# Patient Record
Sex: MALE | Race: WHITE | Employment: OTHER | URBAN - METROPOLITAN AREA
[De-identification: names, ages, dates, MRNs, and addresses within clinical notes are randomized per-mention and may not be internally consistent; named-entity substitution may affect disease eponyms.]

---

## 2017-01-01 ENCOUNTER — HOSPITAL ENCOUNTER (OUTPATIENT)
Dept: CT IMAGING | Age: 82
Discharge: HOME | End: 2017-12-19
Attending: INTERNAL MEDICINE
Payer: MEDICARE

## 2017-01-01 DIAGNOSIS — R19.7 DIARRHEA: ICD-10-CM

## 2017-01-01 DIAGNOSIS — R63.4 WEIGHT LOSS, UNINTENTIONAL: ICD-10-CM

## 2017-01-01 LAB — CREAT BLD-MCNC: 0.9 MG/DL (ref 0.6–1.3)

## 2017-01-01 PROCEDURE — 74011250636 HC RX REV CODE- 250/636: Performed by: INTERNAL MEDICINE

## 2017-01-01 PROCEDURE — 74011636320 HC RX REV CODE- 636/320: Performed by: INTERNAL MEDICINE

## 2017-01-01 PROCEDURE — 82565 ASSAY OF CREATININE: CPT

## 2017-01-01 PROCEDURE — 74011000255 HC RX REV CODE- 255: Performed by: INTERNAL MEDICINE

## 2017-01-01 PROCEDURE — 74177 CT ABD & PELVIS W/CONTRAST: CPT

## 2017-01-01 RX ORDER — SODIUM CHLORIDE 9 MG/ML
50 INJECTION, SOLUTION INTRAVENOUS
Status: COMPLETED | OUTPATIENT
Start: 2017-01-01 | End: 2017-01-01

## 2017-01-01 RX ORDER — METOPROLOL SUCCINATE 25 MG/1
25 TABLET, EXTENDED RELEASE ORAL DAILY
Qty: 90 TAB | Refills: 3 | Status: SHIPPED | OUTPATIENT
Start: 2017-01-01 | End: 2018-01-01 | Stop reason: DRUGHIGH

## 2017-01-01 RX ORDER — BARIUM SULFATE 20 MG/ML
900 SUSPENSION ORAL
Status: COMPLETED | OUTPATIENT
Start: 2017-01-01 | End: 2017-01-01

## 2017-01-01 RX ORDER — SODIUM CHLORIDE 0.9 % (FLUSH) 0.9 %
10 SYRINGE (ML) INJECTION
Status: COMPLETED | OUTPATIENT
Start: 2017-01-01 | End: 2017-01-01

## 2017-01-01 RX ADMIN — BARIUM SULFATE 900 ML: 21 SUSPENSION ORAL at 10:26

## 2017-01-01 RX ADMIN — SODIUM CHLORIDE 50 ML/HR: 900 INJECTION, SOLUTION INTRAVENOUS at 10:26

## 2017-01-01 RX ADMIN — Medication 10 ML: at 10:26

## 2017-01-01 RX ADMIN — IOPAMIDOL 100 ML: 755 INJECTION, SOLUTION INTRAVENOUS at 10:26

## 2017-03-01 ENCOUNTER — HOSPITAL ENCOUNTER (OUTPATIENT)
Dept: LAB | Age: 82
Discharge: HOME | End: 2017-03-01
Payer: MEDICARE

## 2017-03-01 ENCOUNTER — APPOINTMENT (OUTPATIENT)
Dept: INTERNAL MEDICINE CLINIC | Age: 82
End: 2017-03-01

## 2017-03-01 DIAGNOSIS — E78.00 HYPERCHOLESTEROLEMIA: ICD-10-CM

## 2017-03-01 DIAGNOSIS — I10 ESSENTIAL HYPERTENSION WITH GOAL BLOOD PRESSURE LESS THAN 140/90: ICD-10-CM

## 2017-03-01 DIAGNOSIS — E11.42 WELL CONTROLLED TYPE 2 DIABETES MELLITUS WITH PERIPHERAL NEUROPATHY (HCC): ICD-10-CM

## 2017-03-01 PROCEDURE — 80048 BASIC METABOLIC PNL TOTAL CA: CPT

## 2017-03-01 PROCEDURE — 80061 LIPID PANEL: CPT

## 2017-03-01 PROCEDURE — 36415 COLL VENOUS BLD VENIPUNCTURE: CPT

## 2017-03-01 PROCEDURE — 82043 UR ALBUMIN QUANTITATIVE: CPT

## 2017-03-01 PROCEDURE — 83036 HEMOGLOBIN GLYCOSYLATED A1C: CPT

## 2017-03-02 LAB
ALBUMIN/CREAT UR: 3.2 MG/G CREAT (ref 0–30)
BUN SERPL-MCNC: 17 MG/DL (ref 8–27)
BUN/CREAT SERPL: 18 (ref 10–22)
CALCIUM SERPL-MCNC: 9.1 MG/DL (ref 8.6–10.2)
CHLORIDE SERPL-SCNC: 99 MMOL/L (ref 96–106)
CHOLEST SERPL-MCNC: 157 MG/DL (ref 100–199)
CO2 SERPL-SCNC: 27 MMOL/L (ref 18–29)
CREAT SERPL-MCNC: 0.92 MG/DL (ref 0.76–1.27)
CREAT UR-MCNC: 113.8 MG/DL
EST. AVERAGE GLUCOSE BLD GHB EST-MCNC: 140 MG/DL
GLUCOSE SERPL-MCNC: 111 MG/DL (ref 65–99)
HBA1C MFR BLD: 6.5 % (ref 4.8–5.6)
HDLC SERPL-MCNC: 50 MG/DL
INTERPRETATION, 910389: NORMAL
LDLC SERPL CALC-MCNC: 87 MG/DL (ref 0–99)
Lab: NORMAL
MICROALBUMIN UR-MCNC: 3.6 UG/ML
POTASSIUM SERPL-SCNC: 4.4 MMOL/L (ref 3.5–5.2)
SODIUM SERPL-SCNC: 141 MMOL/L (ref 134–144)
TRIGL SERPL-MCNC: 99 MG/DL (ref 0–149)
VLDLC SERPL CALC-MCNC: 20 MG/DL (ref 5–40)

## 2017-03-31 ENCOUNTER — OFFICE VISIT (OUTPATIENT)
Dept: INTERNAL MEDICINE CLINIC | Age: 82
End: 2017-03-31

## 2017-03-31 VITALS
TEMPERATURE: 98 F | BODY MASS INDEX: 23.19 KG/M2 | WEIGHT: 162 LBS | DIASTOLIC BLOOD PRESSURE: 57 MMHG | HEIGHT: 70 IN | OXYGEN SATURATION: 95 % | SYSTOLIC BLOOD PRESSURE: 123 MMHG | RESPIRATION RATE: 14 BRPM | HEART RATE: 64 BPM

## 2017-03-31 DIAGNOSIS — E78.00 HYPERCHOLESTEROLEMIA: ICD-10-CM

## 2017-03-31 DIAGNOSIS — I10 HYPERTENSION, ESSENTIAL: ICD-10-CM

## 2017-03-31 DIAGNOSIS — I25.10 CORONARY ARTERY DISEASE INVOLVING NATIVE CORONARY ARTERY OF NATIVE HEART WITHOUT ANGINA PECTORIS: ICD-10-CM

## 2017-03-31 DIAGNOSIS — Z95.2 S/P AVR (AORTIC VALVE REPLACEMENT): ICD-10-CM

## 2017-03-31 DIAGNOSIS — M79.671 PAIN IN RIGHT FOOT: ICD-10-CM

## 2017-03-31 DIAGNOSIS — E11.42 WELL CONTROLLED TYPE 2 DIABETES MELLITUS WITH PERIPHERAL NEUROPATHY (HCC): Primary | ICD-10-CM

## 2017-03-31 DIAGNOSIS — M25.471 PAIN AND SWELLING OF ANKLE, RIGHT: ICD-10-CM

## 2017-03-31 DIAGNOSIS — I77.9 BILATERAL CAROTID ARTERY DISEASE (HCC): ICD-10-CM

## 2017-03-31 DIAGNOSIS — Z23 NEED FOR TDAP VACCINATION: ICD-10-CM

## 2017-03-31 DIAGNOSIS — M25.571 PAIN AND SWELLING OF ANKLE, RIGHT: ICD-10-CM

## 2017-03-31 NOTE — PROGRESS NOTES
HISTORY OF PRESENT ILLNESS  Lisa Souaz is a 80 y.o. male. HPI  He presents for follow up of diabetes mellitus, hypertension, hyperlipidemia, coronary artery disease, carotid artery disease, and AVR. Diabetic ROS - medication compliance: compliant all of the time,      home glucose monitoring: is not performed,      [facility]. further diabetic ROS: no polyuria or polydipsia, no unusual visual symptoms, no hypoglycemia, no medication side effects noted, has dysesthesias in the feet. Diet and Lifestyle: generally follows a low fat low cholesterol diet, generally follows a low sodium diet, does not rigorously follow a diabetic diet, exercises regularly, nonsmoker  Cardiovascular ROS:  He complains of fatigue, ?claudication at 1/4 mile walking, dyspnea on exertion. He denies palpitations, orthopnea, exertional chest pressure/discomfort, lower extremity edema, dizziness     Pain right foot plantar tingling for approximately 2 years. He has pain right ankle associated with swelling for at least 2 years. He has rheumatoid arthritis. Dr Vidya Martínez plans to do an ultrasound at his office. He also wants an X-ray, but that is not done at his office.      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    Ulcerative colitis ([facility] [address] K51.90    Hypercholesterolemia E78.00    Carotid artery disease ([facility] [address] I77.9    Inguinal hernia K40.90    Asbestos exposure Z77.090    Hypertension, essential I10    Thyroid nodule O87.6    Umbilical hernia B22.4    S/P AVR (aortic valve replacement) Z95.2    Myalgia and myositis [id number]    Polyneuropathy in diabetes(357.2)     Osteoporosis M81.0    Lumbar radiculopathy M54.16    Coronary artery disease involving native coronary artery without angina pectoris I25.10    Essential tremor G25.0    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 ([facility] [address] E11.42    Advance directive discussed with patient Z71.89     Past Medical History:   Diagnosis Date    Aortic stenosis, severe     Arrhythmia     heart murmur    Arthritis     Rheumatoid    CAD (coronary artery disease) 1998    S/P CABG    Cancer (HCC)     Basal cell carcinoma    Chronic pain     from rheumatoid arthritis in knees, elbows fingers    HTN (hypertension)     Hypercholesterolemia     S/P AVR (aortic valve replacement)     23 mm Trifecta with Redo CABG x 1SVG to PDA    S/P CABG x 1 10/13/2014    Redo CABG x 1 SVG to PDA; 'y' from collins of existing OM vein graft    Sarcoidosis ([facility] [address]     No longer active.      Ulcerative colitis ([facility] [address]     Not active     Past Surgical History:   Procedure Laterality Date    CARDIAC SURG PROCEDURE UNLIST      CABG    HX AORTIC VALVE REPLACEMENT  10/2014    CABG redo X1    HX CATARACT REMOVAL      Left    HX LUMBAR LAMINECTOMY      HX OTHER SURGICAL      skin lesions removed from nose and forehead- basal cell    LAMINECTOMY,LUMBAR  110/2000    MT COLONOSCOPY W/BIOPSY SINGLE/MULTIPLE  12/5/2011          Social History     Social History    Marital status:      Spouse name: N/A    Number of children: N/A    Years of education: N/A     Social History Main Topics    Smoking status: Former Smoker     Packs/day: 1.50     Years: 20.00     Quit date: 1/1/1981    Smokeless tobacco: Never Used    Alcohol use Yes      Comment: rarel    Drug use: No    Sexual activity: Not Asked     Other Topics Concern    None     Social History Narrative     Family History   Problem Relation Age of Onset    Heart Disease Father     Cancer Sister      breast     Diabetes Sister     Cancer Sister      leukemia    Diabetes Sister     Diabetes Mother    Adama Corey Bladder Disease Mother      Allergies   Allergen Reactions    Lisinopril Cough    Questran [Cholestyramine (With Sugar)] Other (comments)     Muscle cramps     Current Outpatient Prescriptions   Medication Sig Dispense Refill    metoprolol succinate (TOPROL-XL) 25 mg XL tablet Take 1 Tab by mouth daily. 90 Tab 3    atorvastatin (LIPITOR) 20 mg tablet Take 1 tablet by mouth  daily 90 Tab 3    losartan (COZAAR) 25 mg tablet Take 1 tablet by mouth  daily 90 Tab 3    oxycodone-acetaminophen (PERCOCET)  mg per tablet Take 1 tablet by mouth every four (4) hours as needed for Pain.  predniSONE (DELTASONE) 5 mg tablet Take 2.5 mg by mouth daily.  methotrexate sodium (METHOTREXATE, ANTI-RHEUMATIC,) 2.5 mg DsPk Take  by mouth every seven (7) days. 8 tablets week      folic acid (FOLVITE) 1 mg tablet Take 1 mg by mouth daily.  etanercept (ENBREL) 50 mg/mL (0.98 mL) injection by SubCUTAneous route every seven (7) days.  aspirin 81 mg chewable tablet Take 81 mg by mouth every evening.  docusate sodium (COLACE) 100 mg capsule Take 100 mg by mouth two (2) times daily as needed. Review of Systems   Constitutional: Positive for malaise/fatigue. Negative for weight loss. Gastrointestinal: Positive for constipation and heartburn. Negative for diarrhea. Musculoskeletal: Positive for back pain and joint pain (right ankle, wrists). Neurological: Negative for dizziness, tingling and focal weakness. Visit Vitals    /57 (BP 1 Location: Left arm, BP Patient Position: Sitting)    Pulse 64    Temp 98 °F (36.7 °C) (Oral)    Resp 14    Ht 5' 10\" (1.778 m)    Wt 162 lb (73.5 kg)    SpO2 95%    BMI 23.24 kg/m2     Physical Exam   Constitutional: He is oriented to person, place, and time. He appears well-developed and well-nourished. HENT:   Head: Normocephalic and atraumatic. Eyes: Conjunctivae are normal. Pupils are equal, round, and reactive to light. Neck: Neck supple. Carotid bruit is not present. No thyromegaly present. Cardiovascular: Normal rate and regular rhythm. PMI is not displaced. Exam reveals no gallop. Murmur heard. Systolic (at base) murmur is present with a grade of 2/6    No diastolic murmur is present   Pulses:       Dorsalis pedis pulses are 2+ on the right side, and 2+ on the left side. Posterior tibial pulses are 2+ on the right side, and 2+ on the left side. Pulmonary/Chest: Effort normal. He has no wheezes. He has no rhonchi. He has no rales. Abdominal: Soft. Normal appearance. He exhibits no abdominal bruit and no mass. There is no hepatosplenomegaly. There is no tenderness. Musculoskeletal: He exhibits no edema. Right ankle: He exhibits decreased range of motion and swelling (throughout ankle. Appears to be effusion). He exhibits no ecchymosis and no deformity. Tenderness. Lateral malleolus tenderness found. Lymphadenopathy:     He has no cervical adenopathy. Right: No supraclavicular adenopathy present. Left: No inguinal and no supraclavicular adenopathy present. Neurological: He is alert and oriented to person, place, and time. No sensory deficit. Skin: Skin is warm, dry and intact. No rash noted. Psychiatric: He has a normal mood and affect. His behavior is normal.   Nursing note and vitals reviewed.       Results for orders placed or performed in visit on 03/01/17   HEMOGLOBIN A1C WITH EAG   Result Value Ref Range    Hemoglobin A1c 6.5 (H) 4.8 - 5.6 %    Estimated average glucose 140 mg/dL   LIPID PANEL   Result Value Ref Range    Cholesterol, total 157 100 - 199 mg/dL    Triglyceride 99 0 - 149 mg/dL    HDL Cholesterol 50 >39 mg/dL    VLDL, calculated 20 5 - 40 mg/dL    LDL, calculated 87 0 - 99 mg/dL   METABOLIC PANEL, BASIC   Result Value Ref Range    Glucose 111 (H) 65 - 99 mg/dL    BUN 17 8 - 27 mg/dL    Creatinine 0.92 0.76 - 1.27 mg/dL    GFR est non-AA 79 >59 mL/min/1.73    GFR est AA 91 >59 mL/min/1.73    BUN/Creatinine ratio 18 10 - 22    Sodium 141 134 - 144 mmol/L    Potassium 4.4 3.5 - 5.2 mmol/L    Chloride 99 96 - 106 mmol/L    CO2 27 18 - 29 mmol/L Calcium 9.1 8.6 - 10.2 mg/dL   MICROALBUMIN, UR, RAND W/ MICROALBUMIN/CREA RATIO   Result Value Ref Range    Creatinine, urine 113.8 Not Estab. mg/dL    Microalbumin, urine 3.6 Not Estab. ug/mL    Microalb/Creat ratio (ug/mg creat.) 3.2 0.0 - 30.0 mg/g creat   CVD REPORT   Result Value Ref Range    INTERPRETATION Note    DIABETES PATIENT EDUCATION   Result Value Ref Range    PDF Image Not applicable      Results for orders placed or performed in visit on 03/31/17   XR ANKLE RT MIN 3 V    Narrative    EXAM:  XR ANKLE RT MIN 3 V, XR FOOT RT MIN 3 V    INDICATION:  pain and swelling, no injury. History of rheumatoid arthritis    COMPARISON: None. FINDINGS:     Three views of the right ankle demonstrate no fracture or disruption of the  ankle mortise. There is anterior and posterior ankle spurring. A large ankle  effusion is present. Lateral soft tissue swelling is seen. Vascular  calcification is noted. The soft tissues are within normal limits. 3 views of the right foot demonstrate no fracture. There are corticated   erosions involving all of the metatarsal heads. Forefoot osteopenia is seen. Nonspecific mild soft tissue swelling is present. There is a small spur at the  Achilles insertion. Impression    IMPRESSION:     Right ankle sprain. Right ankle arthritis. Inactive erosions in the metatarsal heads with nonspecific associated soft  tissue swelling. XR FOOT RT MIN 3 V    Narrative    EXAM:  XR ANKLE RT MIN 3 V, XR FOOT RT MIN 3 V    INDICATION:  pain and swelling, no injury. History of rheumatoid arthritis    COMPARISON: None. FINDINGS:     Three views of the right ankle demonstrate no fracture or disruption of the  ankle mortise. There is anterior and posterior ankle spurring. A large ankle  effusion is present. Lateral soft tissue swelling is seen. Vascular  calcification is noted. The soft tissues are within normal limits. 3 views of the right foot demonstrate no fracture.  There are corticated   erosions involving all of the metatarsal heads. Forefoot osteopenia is seen. Nonspecific mild soft tissue swelling is present. There is a small spur at the  Achilles insertion. Impression    IMPRESSION:     Right ankle sprain. Right ankle arthritis. Inactive erosions in the metatarsal heads with nonspecific associated soft  tissue swelling. ASSESSMENT and PLAN    ICD-10-CM ICD-9-CM    1. 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 (HCC) E11.42 250.60      357.2    2. Pain and swelling of ankle, right M25.471 719.47 XR ANKLE RT MIN 3 V    M25.474 719.07     M25.571     3. Pain in right foot M79.671 729.5 XR FOOT RT MIN 3 V   4. Need for Tdap vaccination Z23 V06.1 Diphth, Pertus,Acell,, Tetanus (BOOSTRIX TDAP) 2.5-8-5 Lf-mcg-Lf/0.5mL susp susp   5. Hypertension, essential I10 401.9    6. Hypercholesterolemia E78.00 272.0    7. Bilateral carotid artery disease (HCC) I77.9 447.9    8. Coronary artery disease involving native coronary artery of native heart without angina pectoris I25.10 414.01    9. S/P AVR (aortic valve replacement) Z95.2 V43.3          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 (HCC)    Pain and swelling of ankle, right  Given X-rays on a disc for Dr Silvia Mason. He may want to inject joint. -     XR ANKLE RT MIN 3 V; Future    Pain in right foot  -     XR FOOT RT MIN 3 V; Future    Need for Tdap vaccination  -     Diphth, Pertus,Acell,, Tetanus (BOOSTRIX TDAP) 2.5-8-5 Lf-mcg-Lf/0.5mL susp susp; 0.5 mL by IntraMUSCular route once for 1 dose. Hypertension, essential  Hypertension is controlled    Hypercholesterolemia  Hyperlipidemia is controlled    Bilateral carotid artery disease (HCC)    Coronary artery disease involving native coronary artery of native heart without angina pectoris  Stable.      S/P AVR (aortic valve replacement)      Follow-up Disposition:  Return in about 6 months (around 9/30/2017) for DM, chol, CAD, POC A1c .  lab results and schedule of future lab studies reviewed with patient  reviewed diet, exercise and weight control  cardiovascular risk and specific lipid/LDL goals reviewed  I have discussed the diagnosis with the patient and the intended plan as seen in the above orders. Patient is in agreement. The patient has received an after-visit summary and questions were answered concerning future plans.   I have discussed medication side effects and warnings with the patient as well.'

## 2017-03-31 NOTE — MR AVS SNAPSHOT
Visit Information Date & Time Provider Department Dept. Phone Encounter #  
 3/31/2017 10:00 AM Chalo Tello MD [facility] [phone] [id number] Follow-up Instructions Return in about 6 months (around 9/30/2017) for DM, chol, CAD, POC A1c . Upcoming Health Maintenance Date Due DTaP/Tdap/Td series (1 - Tdap) 1/2/2006 EYE EXAM RETINAL OR DILATED Q1 9/22/2016 FOOT EXAM Q1 5/2/2017 HEMOGLOBIN A1C Q6M 9/1/2017 MEDICARE YEARLY EXAM 9/8/2017 GLAUCOMA SCREENING Q2Y 9/22/2017 MICROALBUMIN Q1 3/1/2018 LIPID PANEL Q1 3/1/2018 Allergies as of 3/31/2017  Review Complete On: 3/31/2017 By: Chalo Tello MD  
  
 Severity Noted Reaction Type Reactions Lisinopril  11/18/2009    Cough Questran [Cholestyramine (With Sugar)]  11/18/2009    Other (comments) Muscle cramps Current Immunizations  Reviewed on 3/31/2017 Name Date Influenza High Dose Vaccine PF 9/7/2016 Influenza Vaccine 9/29/2015 Influenza Vaccine (Madin Berenice Canine Kidney) PF 10/16/2014 10:54 AM  
 Influenza Vaccine Split 10/29/2012, 9/24/2011  1:34 PM, 10/12/2010 Influenza Vaccine Whole 10/10/2009 Pneumococcal Conjugate (PCV-13) 9/29/2015 Pneumococcal Vaccine (Unspecified Type) 1/1/2009, 11/14/2002 TD Vaccine 1/1/2006 Reviewed by Crys Harrington LPN on 6/86/7709 at  9:41 AM  
 Reviewed by Crys Harrington LPN on 9/79/6824 at 31:63 AM  
You Were Diagnosed With   
  
 Codes Comments 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 ([facility] [address]    -  Primary ICD-10-CM: E11.42 
ICD-9-CM: 250.60, 357.2 Pain and swelling of ankle, right     ICD-10-CM: M25.471, M25.474, M25.571 ICD-9-CM: 719.47, 719.07 Pain in right foot     ICD-10-CM: M79.671 ICD-9-CM: 729.5 Need for Tdap vaccination     ICD-10-CM: N67 ICD-9-CM: V06.1 Hypertension, essential     ICD-10-CM: I10 
ICD-9-CM: 401.9 Hypercholesterolemia     ICD-10-CM: E78.00 ICD-9-CM: 272.0 Bilateral carotid artery disease ([facility] [address]     ICD-10-CM: I77.9 ICD-9-CM: 447.9 Coronary artery disease involving native coronary artery of native heart without angina pectoris     ICD-10-CM: I25.10 ICD-9-CM: 414.01 S/P AVR (aortic valve replacement)     ICD-10-CM: I25.1 ICD-9-CM: V43.3 Vitals BP Pulse Temp Resp Height(growth percentile) Weight(growth percentile) 123/57 (BP 1 Location: Left arm, BP Patient Position: Sitting) 64 98 °F (36.7 °C) (Oral) 14 5' 10\" (1.778 m) 162 lb (73.5 kg) SpO2 BMI Smoking Status 95% 23.24 kg/m2 Former Smoker BMI and BSA Data Body Mass Index Body Surface Area  
 23.24 kg/m 2 1.91 m 2 Preferred Pharmacy Pharmacy Name Phone 305 [facility], [address] [place] [address] [address] Your Updated Medication List  
  
   
This list is accurate as of: 3/31/17 10:48 AM.  Always use your most recent med list.  
  
  
  
  
 aspirin 81 mg chewable tablet Take 81 mg by mouth every evening. atorvastatin 20 mg tablet Commonly known as:  LIPITOR Take 1 tablet by mouth  daily COLACE 100 mg capsule Generic drug:  docusate sodium Take 100 mg by mouth two (2) times daily as needed. Diphth, Pertus(Acell), Tetanus 2.5-8-5 Lf-mcg-Lf/0.5mL Susp susp Commonly known as:  BOOSTRIX TDAP  
0.5 mL by IntraMUSCular route once for 1 dose. ENBREL 50 mg/mL (0.98 mL) injection Generic drug:  etanercept  
by SubCUTAneous route every seven (7) days. folic acid 1 mg tablet Commonly known as:  Google Take 1 mg by mouth daily. losartan 25 mg tablet Commonly known as:  COZAAR Take 1 tablet by mouth  daily METHOTREXATE (ANTI-RHEUMATIC) 2.5 mg tablet dose pack Generic drug:  methotrexate Take  by mouth every seven (7) days. 8 tablets week  
  
 metoprolol succinate 25 mg XL tablet Commonly known as:  TOPROL-XL Take 1 Tab by mouth daily. PERCOCET  mg per tablet Generic drug:  oxyCODONE-acetaminophen Take 1 tablet by mouth every four (4) hours as needed for Pain. predniSONE 5 mg tablet Commonly known as:  Shauna Oyster Take 2.5 mg by mouth daily. Prescriptions Printed Refills Urban Gaspar,, Tetanus (BOOSTRIX TDAP) 2.5-8-5 Lf-mcg-Lf/0.5mL susp susp 0 Si.5 mL by IntraMUSCular route once for 1 dose. Class: Print Route: IntraMUSCular Follow-up Instructions Return in about 6 months (around 2017) for DM, chol, CAD, POC A1c . To-Do List   
 2017 Imaging:  XR ANKLE RT MIN 3 V   
  
 2017 Imaging:  XR FOOT RT MIN 3 V Patient Instructions Office visit in 6 months ; we will do hemoglobin A1c at that visit. Diabetes Foot Health: Care Instructions Your Care Instructions When you have diabetes, your feet need extra care and attention. Diabetes can damage the nerve endings and blood vessels in your feet, making you less likely to notice when your feet are injured. Diabetes also limits your body's ability to fight infection and get blood to areas that need it. If you get a minor foot injury, it could become an ulcer or a serious infection. With good foot care, you can prevent most of these problems. Caring for your feet can be quick and easy. Most of the care can be done when you are bathing or getting ready for bed. Follow-up care is a key part of your treatment and safety. Be sure to make and go to all appointments, and call your doctor if you are having problems. Its also a good idea to know your test results and keep a list of the medicines you take. How can you care for yourself at home? · Keep your blood sugar close to normal by watching what and how much you eat, monitoring blood sugar, taking medicines if prescribed, and getting regular exercise. · Do not smoke. Smoking affects blood flow and can make foot problems worse. If you need help quitting, talk to your doctor about stop-smoking programs and medicines. These can increase your chances of quitting for good. · Eat a diet that is low in fats. High fat intake can cause fat to build up in your blood vessels and decrease blood flow. · Inspect your feet daily for blisters, cuts, cracks, or sores. If you cannot see well, use a mirror or have someone help you. · Take care of your feet: 
[facility] your feet every day. Use warm (not hot) water. Check the water temperature with your wrists or other part of your body, not your feet. ¨ Dry your feet well. Pat them dry. Do not rub the skin on your feet too hard. Dry well between your toes. If the skin on your feet stays moist, bacteria or a fungus can grow, which can lead to infection. ¨ Keep your skin soft. Use moisturizing skin cream to keep the skin on your feet soft and prevent calluses and cracks. But do not put the cream between your toes, and stop using any cream that causes a rash. ¨ Clean underneath your toenails carefully. Do not use a sharp object to clean underneath your toenails. Use the blunt end of a nail file or other rounded tool. ¨ Trim and file your toenails straight across to prevent ingrown toenails. Use a nail clipper, not scissors. Use an emery board to smooth the edges. · Change socks daily. Socks without seams are best, because seams often rub the feet. You can find socks for people with diabetes from specialty catalogs. · Look inside your shoes every day for things like gravel or torn linings, which could cause blisters or sores. · Buy shoes that fit well: 
¨ Look for shoes that have plenty of space around the toes. This helps prevent bunions and blisters. ¨ Try on shoes while wearing the kind of socks you will usually wear with the shoes. ¨ Avoid plastic shoes. They may rub your feet and cause blisters.  Good shoes should be made of materials that are flexible and breathable, such as leather or cloth. ¨ Break in new shoes slowly by wearing them for no more than an hour a day for several days. Take extra time to check your feet for red areas, blisters, or other problems after you wear new shoes. · Do not go barefoot. Do not wear sandals, and do not wear shoes with very thin soles. Thin soles are easy to puncture. They also do not protect your feet from hot pavement or cold weather. · Have your doctor check your feet during each visit. If you have a foot problem, see your doctor. Do not try to treat an early foot problem at home. Home remedies or treatments that you can buy without a prescription (such as corn removers) can be harmful. · Always get early treatment for foot problems. A minor irritation can lead to a major problem if not properly cared for early. When should you call for help? Call your doctor now or seek immediate medical care if: 
· You have a foot sore, an ulcer or break in the skin that is not healing after 4 days, bleeding corns or calluses, or an ingrown toenail. · You have blue or black areas, which can mean bruising or blood flow problems. · You have peeling skin or tiny blisters between your toes or cracking or oozing of the skin. · You have a fever for more than 24 hours and a foot sore. · You have new numbness or tingling in your feet that does not go away after you move your feet or change positions. · You have unexplained or unusual swelling of the foot or ankle. Watch closely for changes in your health, and be sure to contact your doctor if: 
· You cannot do proper foot care. Where can you learn more? Go to http://ligia-judi.info/. Enter A739 in the search box to learn more about \"Diabetes Foot Health: Care Instructions. \" Current as of: May 23, 2016 Content Version: 11.2 © 1671-9811 [employer], [place].  Care instructions adapted under license by [address] (which disclaims liability or warranty for this information). If you have questions about a medical condition or this instruction, always ask your healthcare professional. [address] any warranty or liability for your use of this information. Introducing [facility]! Jessica Julio Cesar introduces Uptake patient portal. Now you can access parts of your medical record, email your doctor's office, and request medication refills online. 1. In your internet browser, go to [email or web address] 2. Click on the First Time User? Click Here link in the Sign In box. You will see the New Member Sign Up page. 3. Enter your Uptake Access Code exactly as it appears below. You will not need to use this code after youve completed the sign-up process. If you do not sign up before the expiration date, you must request a new code. · Uptake Access Code: [email or web address] Expires: 6/29/2017 10:48 AM 
 
4. Enter the last four digits of your Social Security Number (xxxx) and Date of Birth (mm/dd/yyyy) as indicated and click Submit. You will be taken to the next sign-up page. 5. Create a Uptake ID. This will be your Uptake login ID and cannot be changed, so think of one that is secure and easy to remember. 6. Create a Uptake password. You can change your password at any time. 7. Enter your Password Reset Question and Answer. This can be used at a later time if you forget your password. 8. Enter your e-mail address. You will receive e-mail notification when new information is available in [address] 9. Click Sign Up. You can now view and download portions of your medical record. 10. Click the Download Summary menu link to download a portable copy of your medical information. If you have questions, please visit the Frequently Asked Questions section of the Uptake website.  Remember, Uptake is NOT to be used for urgent needs. For medical emergencies, dial 911. Now available from your iPhone and Android! Please provide this summary of care documentation to your next provider. Your primary care clinician is listed as Eudora Gilford. If you have any questions after today's visit, please call [phone].

## 2017-03-31 NOTE — PATIENT INSTRUCTIONS
Office visit in 6 months ; we will do hemoglobin A1c at that visit. Diabetes Foot Health: Care Instructions  Your Care Instructions    When you have diabetes, your feet need extra care and attention. Diabetes can damage the nerve endings and blood vessels in your feet, making you less likely to notice when your feet are injured. Diabetes also limits your body's ability to fight infection and get blood to areas that need it. If you get a minor foot injury, it could become an ulcer or a serious infection. With good foot care, you can prevent most of these problems. Caring for your feet can be quick and easy. Most of the care can be done when you are bathing or getting ready for bed. Follow-up care is a key part of your treatment and safety. Be sure to make and go to all appointments, and call your doctor if you are having problems. Its also a good idea to know your test results and keep a list of the medicines you take. How can you care for yourself at home? · Keep your blood sugar close to normal by watching what and how much you eat, monitoring blood sugar, taking medicines if prescribed, and getting regular exercise. · Do not smoke. Smoking affects blood flow and can make foot problems worse. If you need help quitting, talk to your doctor about stop-smoking programs and medicines. These can increase your chances of quitting for good. · Eat a diet that is low in fats. High fat intake can cause fat to build up in your blood vessels and decrease blood flow. · Inspect your feet daily for blisters, cuts, cracks, or sores. If you cannot see well, use a mirror or have someone help you. · Take care of your feet:  [facility] your feet every day. Use warm (not hot) water. Check the water temperature with your wrists or other part of your body, not your feet. ¨ Dry your feet well. Pat them dry. Do not rub the skin on your feet too hard. Dry well between your toes.  If the skin on your feet stays moist, bacteria or a fungus can grow, which can lead to infection. ¨ Keep your skin soft. Use moisturizing skin cream to keep the skin on your feet soft and prevent calluses and cracks. But do not put the cream between your toes, and stop using any cream that causes a rash. ¨ Clean underneath your toenails carefully. Do not use a sharp object to clean underneath your toenails. Use the blunt end of a nail file or other rounded tool. ¨ Trim and file your toenails straight across to prevent ingrown toenails. Use a nail clipper, not scissors. Use an emery board to smooth the edges. · Change socks daily. Socks without seams are best, because seams often rub the feet. You can find socks for people with diabetes from specialty catalogs. · Look inside your shoes every day for things like gravel or torn linings, which could cause blisters or sores. · Buy shoes that fit well:  ¨ Look for shoes that have plenty of space around the toes. This helps prevent bunions and blisters. ¨ Try on shoes while wearing the kind of socks you will usually wear with the shoes. ¨ Avoid plastic shoes. They may rub your feet and cause blisters. Good shoes should be made of materials that are flexible and breathable, such as leather or cloth. ¨ Break in new shoes slowly by wearing them for no more than an hour a day for several days. Take extra time to check your feet for red areas, blisters, or other problems after you wear new shoes. · Do not go barefoot. Do not wear sandals, and do not wear shoes with very thin soles. Thin soles are easy to puncture. They also do not protect your feet from hot pavement or cold weather. · Have your doctor check your feet during each visit. If you have a foot problem, see your doctor. Do not try to treat an early foot problem at home. Home remedies or treatments that you can buy without a prescription (such as corn removers) can be harmful. · Always get early treatment for foot problems.  A minor irritation can lead to a major problem if not properly cared for early. When should you call for help? Call your doctor now or seek immediate medical care if:  · You have a foot sore, an ulcer or break in the skin that is not healing after 4 days, bleeding corns or calluses, or an ingrown toenail. · You have blue or black areas, which can mean bruising or blood flow problems. · You have peeling skin or tiny blisters between your toes or cracking or oozing of the skin. · You have a fever for more than 24 hours and a foot sore. · You have new numbness or tingling in your feet that does not go away after you move your feet or change positions. · You have unexplained or unusual swelling of the foot or ankle. Watch closely for changes in your health, and be sure to contact your doctor if:  · You cannot do proper foot care. Where can you learn more? Go to http://ligia-judi.info/. Enter A739 in the search box to learn more about \"Diabetes Foot Health: Care Instructions. \"  Current as of: May 23, 2016  Content Version: 11.2  © 0662-9300 [employer]. Care instructions adapted under license by [employer] (which disclaims liability or warranty for this information). If you have questions about a medical condition or this instruction, always ask your healthcare professional. [address] any warranty or liability for your use of this information.

## 2017-03-31 NOTE — PROGRESS NOTES
Reviewed record In preparation for visit and have obtained necessary documentation. Has info on advanced directive but has not filled them out. 1. Have you been to the ER, urgent care clinic or hospitalized since your last visit? No     2. Have you seen or consulted any other health care providers outside of the [address] [place] [address] since your last visit? Include any pap smears or colon screening.  Dr Elaine Lorenzo, Dr Hernando Rice Maintenance reviewed:

## 2017-07-18 ENCOUNTER — HOSPITAL ENCOUNTER (OUTPATIENT)
Dept: ULTRASOUND IMAGING | Age: 82
Discharge: HOME | End: 2017-07-18
Attending: OTOLARYNGOLOGY
Payer: MEDICARE

## 2017-07-18 DIAGNOSIS — R22.1 NECK MASS: ICD-10-CM

## 2017-07-18 PROCEDURE — 76536 US EXAM OF HEAD AND NECK: CPT

## 2017-08-01 ENCOUNTER — OFFICE VISIT (OUTPATIENT)
Dept: INTERNAL MEDICINE CLINIC | Age: 82
End: 2017-08-01

## 2017-08-01 VITALS
RESPIRATION RATE: 14 BRPM | DIASTOLIC BLOOD PRESSURE: 51 MMHG | BODY MASS INDEX: 22.48 KG/M2 | HEART RATE: 76 BPM | OXYGEN SATURATION: 95 % | HEIGHT: 70 IN | SYSTOLIC BLOOD PRESSURE: 98 MMHG | TEMPERATURE: 98.1 F | WEIGHT: 157 LBS

## 2017-08-01 DIAGNOSIS — S16.1XXA CERVICAL MUSCLE STRAIN, INITIAL ENCOUNTER: Primary | ICD-10-CM

## 2017-08-01 RX ORDER — PREDNISONE 2.5 MG/1
2.5 TABLET ORAL DAILY
Status: ON HOLD | COMMUNITY
Start: 2017-07-21 | End: 2018-01-01

## 2017-08-01 RX ORDER — AMOXICILLIN 500 MG/1
CAPSULE ORAL
COMMUNITY
Start: 2017-06-27 | End: 2018-01-01

## 2017-08-01 NOTE — PROGRESS NOTES
HISTORY OF PRESENT ILLNESS  Liv Muller is a [de-identified] y.o. male. HPI  He complains of a burning prickling feeling behind right ear for 6-8 weeks. It swells for about 50% of the time. He saw Dr Lois Mcgregor , his dermatologist, then Dr Jazzmine yRan, ENT, who did U/S of this area. Nothing was seen on ultrasound. His neck feels stiff. He denies any numbness, tingling or weakness in upper extremities. 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    Ulcerative colitis ([address] K51.90    Hypercholesterolemia E78.00    Carotid artery disease ([address] I77.9    Inguinal hernia K40.90    Asbestos exposure Z77.090    Hypertension, essential I10    Thyroid nodule L26.7    Umbilical hernia G39.0    S/P AVR (aortic valve replacement) Z95.2    Myalgia and myositis [id number]    Diabetic peripheral neuropathy (HCC) E11.42    Osteoporosis M81.0    Lumbar radiculopathy M54.16    Coronary artery disease involving native coronary artery without angina pectoris I25.10    Essential tremor G25.0    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 ([address] E11.42    Advance directive discussed with patient Z71.89     Past Medical History:   Diagnosis Date    Aortic stenosis, severe     Arrhythmia     heart murmur    Arthritis     Rheumatoid    CAD (coronary artery disease) 1998    S/P CABG    Cancer (HCC)     Basal cell carcinoma    Chronic pain     from rheumatoid arthritis in knees, elbows fingers    HTN (hypertension)     Hypercholesterolemia     S/P AVR (aortic valve replacement)     23 mm Trifecta with Redo CABG x 1SVG to PDA    S/P CABG x 1 10/13/2014    Redo CABG x 1 SVG to PDA; 'y' from collins of existing OM vein graft    Sarcoidosis (HCC)     No longer active.      Ulcerative colitis ([address]     Not active     Past Surgical History:   Procedure Laterality Date    CARDIAC SURG PROCEDURE UNLIST      CABG    HX AORTIC VALVE REPLACEMENT  10/2014    CABG redo X1    HX CATARACT REMOVAL      Left    HX LUMBAR LAMINECTOMY      HX OTHER SURGICAL      skin lesions removed from nose and forehead- basal cell    LAMINECTOMY,LUMBAR  110/2000    AL COLONOSCOPY W/BIOPSY SINGLE/MULTIPLE  12/5/2011          Social History     Social History    Marital status:      Spouse name: N/A    Number of children: N/A    Years of education: N/A     Social History Main Topics    Smoking status: Former Smoker     Packs/day: 1.50     Years: 20.00     Quit date: 1/1/1981    Smokeless tobacco: Never Used    Alcohol use Yes      Comment: rarel    Drug use: No    Sexual activity: Not Asked     Other Topics Concern    None     Social History Narrative     Family History   Problem Relation Age of Onset    Heart Disease Father     Cancer Sister      breast     Diabetes Sister     Cancer Sister      leukemia    Diabetes Sister     Diabetes Mother    Clarise Boot Bladder Disease Mother      Allergies   Allergen Reactions    Lisinopril Cough    Questran [Cholestyramine (With Sugar)] Other (comments)     Muscle cramps     Current Outpatient Prescriptions   Medication Sig Dispense Refill    amoxicillin (AMOXIL) 500 mg capsule 4 tablets 1 hour prior to dental work      predniSONE (DELTASONE) 2.5 mg tablet Take 2.5 mg by mouth daily.  metoprolol succinate (TOPROL-XL) 25 mg XL tablet Take 1 Tab by mouth daily. 90 Tab 3    atorvastatin (LIPITOR) 20 mg tablet Take 1 tablet by mouth  daily 90 Tab 3    oxycodone-acetaminophen (PERCOCET)  mg per tablet Take 1 tablet by mouth every four (4) hours as needed for Pain.  methotrexate sodium (METHOTREXATE, ANTI-RHEUMATIC,) 2.5 mg DsPk Take  by mouth every seven (7) days. 8 tablets week      folic acid (FOLVITE) 1 mg tablet Take 1 mg by mouth daily.  etanercept (ENBREL) 50 mg/mL (0.98 mL) injection by SubCUTAneous route every seven (7) days.       aspirin 81 mg chewable tablet Take 81 mg by mouth every evening.  docusate sodium (COLACE) 100 mg capsule Take 100 mg by mouth two (2) times daily as needed.  polyethylene glycol (MIRALAX) 17 gram/dose powder Take 17 g by mouth two (2) times a day. 235 g 1       ROS     Visit Vitals    BP 98/51 (BP 1 Location: Right arm, BP Patient Position: Sitting)    Pulse 76    Temp 98.1 °F (36.7 °C) (Oral)    Resp 14    Ht 5' 10\" (1.778 m)    Wt 157 lb (71.2 kg)    SpO2 95%    BMI 22.53 kg/m2     Physical Exam   Constitutional: He appears well-developed and well-nourished. HENT:   Head: Normocephalic and atraumatic. Right Ear: Tympanic membrane and ear canal normal.   Left Ear: Tympanic membrane and ear canal normal.   Mouth/Throat: Oropharynx is clear and moist. Mucous membranes are not pale and not dry. No mass or swelling in post auricular area. Neck: Neck supple. Carotid bruit is not present. Musculoskeletal:        Cervical back: He exhibits decreased range of motion, tenderness and spasm. He exhibits no bony tenderness, no swelling and no deformity. Back:    Neurological:   Reflex Scores:       Bicep reflexes are 2+ on the right side and 2+ on the left side. Motor strength is 5/5 to finger flexion/extension, abduction and opposition, wrist flexion/extension, elbow flexion extension     Nursing note and vitals reviewed. ASSESSMENT and PLAN    ICD-10-CM ICD-9-CM    1. Cervical muscle strain, initial encounter S16. 1XXA 847.0      Diagnoses and all orders for this visit:    1. Cervical muscle strain, initial encounter  Heat, stretching, Tylenol or Aspercreme rub        Follow-up Disposition:  Return if symptoms worsen or fail to improve. I have discussed the diagnosis with the patient and the intended plan as seen in the above orders. Patient is in agreement. The patient has received an after-visit summary and questions were answered concerning future plans.   I have discussed medication side effects and warnings with the patient as well.

## 2017-08-01 NOTE — PATIENT INSTRUCTIONS
Use heat for 15 minutes 3-4 times a day. Do exercises only after using heat (once or twice a day.)  If there is increased pain after exercises, use an ice pack (wrapped in towel) for 10-15 minutes. If you want may use Aspercreme, but not immediately before heat application. Neck Spasm: Exercises  Your Care Instructions  Here are some examples of typical rehabilitation exercises for your condition. Start each exercise slowly. Ease off the exercise if you start to have pain. Your doctor or physical therapist will tell you when you can start these exercises and which ones will work best for you. How to do the exercises  Levator scapula stretch    1. Sit in a firm chair, or stand up straight. 2. Gently tilt your head toward your left shoulder. 3. Turn your head to look down into your armpit, bending your head slightly forward. Let the weight of your head stretch your neck muscles. 4. Hold for 15 to 30 seconds. 5. Return to your starting position. 6. Follow the same instructions above, but tilt your head toward your right shoulder. 7. Repeat 2 to 4 times toward each shoulder. Upper trapezius stretch    1. Sit in a firm chair, or stand up straight. 2. This stretch works best if you keep your shoulder down as you lean away from it. To help you remember to do this, start by relaxing your shoulders and lightly holding on to your thighs or your chair. 3. Tilt your head toward your shoulder and hold for 15 to 30 seconds. Let the weight of your head stretch your muscles. 4. If you would like a little added stretch, place your arm behind your back. Use the arm opposite of the direction you are tilting your head. For example, if you are tilting your head to the left, place your right arm behind your back. 5. Repeat 2 to 4 times toward each shoulder. Neck rotation    1. Sit in a firm chair, or stand up straight. 2. Keeping your chin level, turn your head to the right, and hold for 15 to 30 seconds.   3. Turn your head to the left, and hold for 15 to 30 seconds. 4. Repeat 2 to 4 times to each side. Chin tuck    1. Lie on the floor with a rolled-up towel under your neck. Your head should be touching the floor. 2. Slowly bring your chin toward the front of your neck. 3. Hold for a count of 6, and then relax for up to 10 seconds. 4. Repeat 8 to 12 times. Forward neck flexion    1. Sit in a firm chair, or stand up straight. 2. Bend your head forward. 3. Hold for 15 to 30 seconds, then return to your starting position. 4. Repeat 2 to 4 times. Follow-up care is a key part of your treatment and safety. Be sure to make and go to all appointments, and call your doctor if you are having problems. It's also a good idea to know your test results and keep a list of the medicines you take. Where can you learn more? Go to http://ligia-judi.info/. Enter P962 in the search box to learn more about \"Neck Spasm: Exercises. \"  Current as of: March 21, 2017  Content Version: 11.3  © 2741-2778 [employer]. Care instructions adapted under license by [employer] (which disclaims liability or warranty for this information). If you have questions about a medical condition or this instruction, always ask your healthcare professional. [address] any warranty or liability for your use of this information. Neck: Exercises  Your Care Instructions  Here are some examples of typical rehabilitation exercises for your condition. Start each exercise slowly. Ease off the exercise if you start to have pain. Your doctor or physical therapist will tell you when you can start these exercises and which ones will work best for you. How to do the exercises  Note: Stretching should make you feel a gentle stretch, but no pain. Stop any strengthening exercise that makes pain worse. Neck stretch    8. This stretch works best if you keep your shoulder down as you lean away from it. To help you remember to do this, start by relaxing your shoulders and lightly holding on to your thighs or your chair. 9. Tilt your head toward your shoulder and hold for 15 to 30 seconds. Let the weight of your head stretch your muscles. 10. If you would like a little added stretch, use your hand to gently and steadily pull your head toward your shoulder. For example, keeping your right shoulder down, lean your head to the left. 11. Repeat 2 to 4 times toward each shoulder. Diagonal neck stretch    6. Turn your head slightly toward the direction you will be stretching, and tilt your head diagonally toward your chest and hold for 15 to 30 seconds. 7. If you would like a little added stretch, use your hand to gently and steadily pull your head forward on the diagonal.  8. Repeat 2 to 4 times toward each side. Dorsal glide stretch    5. Sit or stand tall and look straight ahead. 6. Slowly tuck your chin as you glide your head backward over your body  7. Hold for a count of 6, and then relax for up to 10 seconds. 8. Repeat 8 to 12 times. Note: The dorsal glide stretches the back of the neck. If you feel pain, do not glide so far back. Some people find this exercise easier to do while lying on their backs with an ice pack on the neck. Chest and shoulder stretch    5. Sit or stand tall and glide your head backward as in the dorsal glide stretch. 6. Raise both arms so that your hands are next to your ears. 7. Take a deep breath, and as you breathe out, lower your elbows down and behind your back. You will feel your shoulder blades slide down and together, and at the same time you will feel a stretch across your chest and the front of your shoulders. 8. Hold for about 6 seconds, and then relax for up to 10 seconds. 9. Repeat 8 to 12 times. Strengthening: Hands on head    5.  Move your head backward, forward, and side to side against gentle pressure from your hands, holding each position for about 6 seconds. 6. Repeat 8 to 12 times. Follow-up care is a key part of your treatment and safety. Be sure to make and go to all appointments, and call your doctor if you are having problems. It's also a good idea to know your test results and keep a list of the medicines you take. Where can you learn more? Go to http://ligia-judi.info/. Enter P975 in the search box to learn more about \"Neck: Exercises. \"  Current as of: March 21, 2017  Content Version: 11.3  © 1745-5782 [employer]. Care instructions adapted under license by [employer] (which disclaims liability or warranty for this information). If you have questions about a medical condition or this instruction, always ask your healthcare professional. [address] any warranty or liability for your use of this information.

## 2017-08-01 NOTE — MR AVS SNAPSHOT
Visit Information Date & Time Provider Department Dept. Phone Encounter #  
 8/1/2017  4:00 PM MD Lyndsey Mondragon [phone] [id number] Follow-up Instructions Return if symptoms worsen or fail to improve. Your Appointments 10/5/2017 10:30 AM  
ROUTINE CARE with MD Lyndsey Mondragon [address] [facility]) Appt Note: 6mth f/u; DM, chol, CAD, POC A1c  
 [address] [facility] [address] [phone]  
  
   
 [address] Upcoming Health Maintenance Date Due DTaP/Tdap/Td series (1 - Tdap) 1/2/2006 EYE EXAM RETINAL OR DILATED Q1 9/22/2016 FOOT EXAM Q1 5/2/2017 INFLUENZA AGE 9 TO ADULT 8/1/2017 HEMOGLOBIN A1C Q6M 9/1/2017 GLAUCOMA SCREENING Q2Y 9/22/2017 MEDICARE YEARLY EXAM 9/8/2017 MICROALBUMIN Q1 3/1/2018 LIPID PANEL Q1 3/1/2018 Allergies as of 8/1/2017  Review Complete On: 8/1/2017 By: Ivette Contreras MD  
  
 Severity Noted Reaction Type Reactions Lisinopril  11/18/2009    Cough Questran [Cholestyramine (With Sugar)]  11/18/2009    Other (comments) Muscle cramps Current Immunizations  Reviewed on 8/1/2017 Name Date Influenza High Dose Vaccine PF 9/7/2016 Influenza Vaccine 9/29/2015 Influenza Vaccine (Madin Berenice Canine Kidney) PF 10/16/2014 10:54 AM  
 Influenza Vaccine Split 10/29/2012, 9/24/2011  1:34 PM, 10/12/2010 Influenza Vaccine Whole 10/10/2009 Pneumococcal Conjugate (PCV-13) 9/29/2015 TD Vaccine 1/1/2006 ZZZ-RETIRED (DO NOT USE) Pneumococcal Vaccine (Unspecified Type) 1/1/2009, 11/14/2002 Reviewed by Romario Mckeon LPN on 4/9/0819 at  1:82 PM  
You Were Diagnosed With   
  
 Codes Comments Cervical muscle strain, initial encounter    -  Primary ICD-10-CM: S16. Clif[place]heavenlyyessenia Paz ICD-9-CM: 277. 0 Vitals BP Pulse Temp Resp Height(growth percentile) Weight(growth percentile) 98/51 (BP 1 Location: Right arm, BP Patient Position: Sitting) 76 98.1 °F (36.7 °C) (Oral) 14 5' 10\" (1.778 m) 157 lb (71.2 kg) SpO2 BMI Smoking Status 95% 22.53 kg/m2 Former Smoker BMI and BSA Data Body Mass Index Body Surface Area  
 22.53 kg/m 2 1.88 m 2 Preferred Pharmacy Pharmacy Name Phone [address] [facility], [address] [place] [address] [address]ina Chavez [address] Your Updated Medication List  
  
   
This list is accurate as of: 8/1/17  4:43 PM.  Always use your most recent med list.  
  
  
  
  
 amoxicillin 500 mg capsule Commonly known as:  AMOXIL  
4 tablets 1 hour prior to dental work  
  
 aspirin 81 mg chewable tablet Take 81 mg by mouth every evening. atorvastatin 20 mg tablet Commonly known as:  LIPITOR Take 1 tablet by mouth  daily COLACE 100 mg capsule Generic drug:  docusate sodium Take 100 mg by mouth two (2) times daily as needed. ENBREL 50 mg/mL (0.98 mL) injection Generic drug:  etanercept  
by SubCUTAneous route every seven (7) days. folic acid 1 mg tablet Commonly known as:  Google Take 1 mg by mouth daily. losartan 25 mg tablet Commonly known as:  COZAAR Take 1 tablet by mouth  daily METHOTREXATE (ANTI-RHEUMATIC) 2.5 mg tablet dose pack Generic drug:  methotrexate Take  by mouth every seven (7) days. 8 tablets week  
  
 metoprolol succinate 25 mg XL tablet Commonly known as:  TOPROL-XL Take 1 Tab by mouth daily. PERCOCET  mg per tablet Generic drug:  oxyCODONE-acetaminophen Take 1 tablet by mouth every four (4) hours as needed for Pain. predniSONE 2.5 mg tablet Commonly known as:  Alcario Shires Take 2.5 mg by mouth daily. Follow-up Instructions Return if symptoms worsen or fail to improve. Patient Instructions Use heat for 15 minutes 3-4 times a day. Do exercises only after using heat (once or twice a day.) If there is increased pain after exercises, use an ice pack (wrapped in towel) for 10-15 minutes. If you want may use Aspercreme, but not immediately before heat application. Neck Spasm: Exercises Your Care Instructions Here are some examples of typical rehabilitation exercises for your condition. Start each exercise slowly. Ease off the exercise if you start to have pain. Your doctor or physical therapist will tell you when you can start these exercises and which ones will work best for you. How to do the exercises Levator scapula stretch 1. Sit in a firm chair, or stand up straight. 2. Gently tilt your head toward your left shoulder. 3. Turn your head to look down into your armpit, bending your head slightly forward. Let the weight of your head stretch your neck muscles. 4. Hold for 15 to 30 seconds. 5. Return to your starting position. 6. Follow the same instructions above, but tilt your head toward your right shoulder. 7. Repeat 2 to 4 times toward each shoulder. Upper trapezius stretch 1. Sit in a firm chair, or stand up straight. 2. This stretch works best if you keep your shoulder down as you lean away from it. To help you remember to do this, start by relaxing your shoulders and lightly holding on to your thighs or your chair. 3. Tilt your head toward your shoulder and hold for 15 to 30 seconds. Let the weight of your head stretch your muscles. 4. If you would like a little added stretch, place your arm behind your back. Use the arm opposite of the direction you are tilting your head. For example, if you are tilting your head to the left, place your right arm behind your back. 5. Repeat 2 to 4 times toward each shoulder. Neck rotation 1. Sit in a firm chair, or stand up straight. 2. Keeping your chin level, turn your head to the right, and hold for 15 to 30 seconds. 3. Turn your head to the left, and hold for 15 to 30 seconds. 4. Repeat 2 to 4 times to each side. Chin tuck 1. Lie on the floor with a rolled-up towel under your neck. Your head should be touching the floor. 2. Slowly bring your chin toward the front of your neck. 3. Hold for a count of 6, and then relax for up to 10 seconds. 4. Repeat 8 to 12 times. Forward neck flexion 1. Sit in a firm chair, or stand up straight. 2. Bend your head forward. 3. Hold for 15 to 30 seconds, then return to your starting position. 4. Repeat 2 to 4 times. Follow-up care is a key part of your treatment and safety. Be sure to make and go to all appointments, and call your doctor if you are having problems. It's also a good idea to know your test results and keep a list of the medicines you take. Where can you learn more? Go to http://ligia-judi.info/. Enter P962 in the search box to learn more about \"Neck Spasm: Exercises. \" Current as of: March 21, 2017 Content Version: 11.3 © 4699-8806 [employer]. Care instructions adapted under license by [employer] (which disclaims liability or warranty for this information). If you have questions about a medical condition or this instruction, always ask your healthcare professional. [address] any warranty or liability for your use of this information. Neck: Exercises Your Care Instructions Here are some examples of typical rehabilitation exercises for your condition. Start each exercise slowly. Ease off the exercise if you start to have pain. Your doctor or physical therapist will tell you when you can start these exercises and which ones will work best for you. How to do the exercises Note: Stretching should make you feel a gentle stretch, but no pain. Stop any strengthening exercise that makes pain worse. Neck stretch 8.  This stretch works best if you keep your shoulder down as you lean away from it. To help you remember to do this, start by relaxing your shoulders and lightly holding on to your thighs or your chair. 9. Tilt your head toward your shoulder and hold for 15 to 30 seconds. Let the weight of your head stretch your muscles. 10. If you would like a little added stretch, use your hand to gently and steadily pull your head toward your shoulder. For example, keeping your right shoulder down, lean your head to the left. 11. Repeat 2 to 4 times toward each shoulder. Diagonal neck stretch 6. Turn your head slightly toward the direction you will be stretching, and tilt your head diagonally toward your chest and hold for 15 to 30 seconds. 7. If you would like a little added stretch, use your hand to gently and steadily pull your head forward on the diagonal. 
8. Repeat 2 to 4 times toward each side. Dorsal glide stretch 5. Sit or stand tall and look straight ahead. 6. Slowly tuck your chin as you glide your head backward over your body 7. Hold for a count of 6, and then relax for up to 10 seconds. 8. Repeat 8 to 12 times. Note: The dorsal glide stretches the back of the neck. If you feel pain, do not glide so far back. Some people find this exercise easier to do while lying on their backs with an ice pack on the neck. Chest and shoulder stretch 5. Sit or stand tall and glide your head backward as in the dorsal glide stretch. 6. Raise both arms so that your hands are next to your ears. 7. Take a deep breath, and as you breathe out, lower your elbows down and behind your back. You will feel your shoulder blades slide down and together, and at the same time you will feel a stretch across your chest and the front of your shoulders. 8. Hold for about 6 seconds, and then relax for up to 10 seconds. 9. Repeat 8 to 12 times. Strengthening: Hands on head 5.  Move your head backward, forward, and side to side against gentle pressure from your hands, holding each position for about 6 seconds. 6. Repeat 8 to 12 times. Follow-up care is a key part of your treatment and safety. Be sure to make and go to all appointments, and call your doctor if you are having problems. It's also a good idea to know your test results and keep a list of the medicines you take. Where can you learn more? Go to http://ligia-judi.info/. Enter P975 in the search box to learn more about \"Neck: Exercises. \" Current as of: March 21, 2017 Content Version: 11.3 © 1080-2959 [employer]. Care instructions adapted under license by [employer] (which disclaims liability or warranty for this information). If you have questions about a medical condition or this instruction, always ask your healthcare professional. [address] any warranty or liability for your use of this information. Introducing [facility]! [facility] introduces Vacatia patient portal. Now you can access parts of your medical record, email your doctor's office, and request medication refills online. 1. In your internet browser, go to [email or web address] 2. Click on the First Time User? Click Here link in the Sign In box. You will see the New Member Sign Up page. 3. Enter your Vacatia Access Code exactly as it appears below. You will not need to use this code after youve completed the sign-up process. If you do not sign up before the expiration date, you must request a new code. · Vacatia Access Code: [email or web address] Expires: 10/15/2017  2:25 PM 
 
4. Enter the last four digits of your Social Security Number (xxxx) and Date of Birth (mm/dd/yyyy) as indicated and click Submit. You will be taken to the next sign-up page. 5. Create a Vacatia ID. This will be your Vacatia login ID and cannot be changed, so think of one that is secure and easy to remember. 6. Create a Art-Exchange password. You can change your password at any time. 7. Enter your Password Reset Question and Answer. This can be used at a later time if you forget your password. 8. Enter your e-mail address. You will receive e-mail notification when new information is available in [address] 9. Click Sign Up. You can now view and download portions of your medical record. 10. Click the Download Summary menu link to download a portable copy of your medical information. If you have questions, please visit the Frequently Asked Questions section of the Art-Exchange website. Remember, Art-Exchange is NOT to be used for urgent needs. For medical emergencies, dial 911. Now available from your iPhone and Android! Please provide this summary of care documentation to your next provider. Your primary care clinician is listed as Nick Eli. If you have any questions after today's visit, please call [phone].

## 2017-08-01 NOTE — PROGRESS NOTES
Reviewed record In preparation for visit and have obtained necessary documentation. Has info on advanced directive but has not filled them out. 1. Have you been to the ER, urgent care clinic or hospitalized since your last visit? No     2. Have you seen or consulted any other health care providers outside of the [address] [place] [address] since your last visit? Include any pap smears or colon screening. , Dr Viviane Rodriguez, Dr Eliana Delgado reviewed with provider.     Health Maintenance reviewed:

## 2017-08-04 ENCOUNTER — TELEPHONE (OUTPATIENT)
Dept: INTERNAL MEDICINE CLINIC | Age: 82
End: 2017-08-04

## 2017-08-04 DIAGNOSIS — K59.01 SLOW TRANSIT CONSTIPATION: Primary | ICD-10-CM

## 2017-08-04 RX ORDER — POLYETHYLENE GLYCOL 3350 17 G/17G
17 POWDER, FOR SOLUTION ORAL 2 TIMES DAILY
Qty: 235 G | Refills: 1 | Status: SHIPPED | OUTPATIENT
Start: 2017-08-04 | End: 2018-01-01 | Stop reason: DRUGHIGH

## 2017-08-04 NOTE — TELEPHONE ENCOUNTER
Pt would like to request a medication for constipation for taking narcotics. He stated that the Metamucil and stool softener are not working. He would like to get a prescription of the generic of that medication. Please send to [facility] on [address]. Pt can be reached at [phone] for further questions.

## 2017-08-16 ENCOUNTER — HOSPITAL ENCOUNTER (OUTPATIENT)
Dept: MRI IMAGING | Age: 82
Discharge: HOME | End: 2017-08-16
Attending: ORTHOPAEDIC SURGERY
Payer: MEDICARE

## 2017-08-16 DIAGNOSIS — M47.26 OSTEOARTHRITIS OF SPINE WITH RADICULOPATHY, LUMBAR REGION: ICD-10-CM

## 2017-08-16 DIAGNOSIS — M54.5 LOW BACK PAIN, UNSPECIFIED BACK PAIN LATERALITY, UNSPECIFIED CHRONICITY, WITH SCIATICA PRESENCE UNSPECIFIED: ICD-10-CM

## 2017-08-16 LAB — CREAT BLD-MCNC: 1.1 MG/DL (ref 0.6–1.3)

## 2017-08-16 PROCEDURE — A9576 INJ PROHANCE MULTIPACK: HCPCS | Performed by: ORTHOPAEDIC SURGERY

## 2017-08-16 PROCEDURE — 74011250636 HC RX REV CODE- 250/636: Performed by: ORTHOPAEDIC SURGERY

## 2017-08-16 PROCEDURE — 82565 ASSAY OF CREATININE: CPT

## 2017-08-16 PROCEDURE — 72158 MRI LUMBAR SPINE W/O & W/DYE: CPT

## 2017-08-16 RX ADMIN — GADOTERIDOL 15 ML: 279.3 INJECTION, SOLUTION INTRAVENOUS at 18:27

## 2017-10-05 ENCOUNTER — OFFICE VISIT (OUTPATIENT)
Dept: INTERNAL MEDICINE CLINIC | Age: 82
End: 2017-10-05

## 2017-10-05 VITALS
TEMPERATURE: 98 F | DIASTOLIC BLOOD PRESSURE: 61 MMHG | WEIGHT: 160 LBS | HEIGHT: 70 IN | BODY MASS INDEX: 22.9 KG/M2 | HEART RATE: 65 BPM | SYSTOLIC BLOOD PRESSURE: 133 MMHG | OXYGEN SATURATION: 97 % | RESPIRATION RATE: 20 BRPM

## 2017-10-05 DIAGNOSIS — E78.00 HYPERCHOLESTEROLEMIA: ICD-10-CM

## 2017-10-05 DIAGNOSIS — I25.10 CORONARY ARTERY DISEASE INVOLVING NATIVE CORONARY ARTERY OF NATIVE HEART WITHOUT ANGINA PECTORIS: ICD-10-CM

## 2017-10-05 DIAGNOSIS — Z71.89 ADVANCE DIRECTIVE DISCUSSED WITH PATIENT: ICD-10-CM

## 2017-10-05 DIAGNOSIS — M06.9 RHEUMATOID ARTHRITIS INVOLVING MULTIPLE SITES, UNSPECIFIED RHEUMATOID FACTOR PRESENCE: ICD-10-CM

## 2017-10-05 DIAGNOSIS — Z00.00 MEDICARE ANNUAL WELLNESS VISIT, SUBSEQUENT: Primary | ICD-10-CM

## 2017-10-05 DIAGNOSIS — I65.23 CAROTID ARTERY STENOSIS, ASYMPTOMATIC, BILATERAL: ICD-10-CM

## 2017-10-05 DIAGNOSIS — Z13.31 DEPRESSION SCREEN: ICD-10-CM

## 2017-10-05 DIAGNOSIS — Z23 ENCOUNTER FOR IMMUNIZATION: ICD-10-CM

## 2017-10-05 DIAGNOSIS — E11.42 CONTROLLED TYPE 2 DIABETES MELLITUS WITH DIABETIC POLYNEUROPATHY, WITHOUT LONG-TERM CURRENT USE OF INSULIN (HCC): ICD-10-CM

## 2017-10-05 DIAGNOSIS — I10 HYPERTENSION, ESSENTIAL: ICD-10-CM

## 2017-10-05 PROBLEM — I77.9 BILATERAL CAROTID ARTERY DISEASE (HCC): Status: ACTIVE | Noted: 2017-10-05

## 2017-10-05 PROBLEM — M48.062 SPINAL STENOSIS, LUMBAR REGION, WITH NEUROGENIC CLAUDICATION: Status: ACTIVE | Noted: 2017-08-28

## 2017-10-05 PROBLEM — M96.1 POSTLAMINECTOMY SYNDROME, LUMBAR REGION: Status: ACTIVE | Noted: 2017-08-28

## 2017-10-05 PROBLEM — M47.26 OSTEOARTHRITIS OF SPINE WITH RADICULOPATHY, LUMBAR REGION: Status: ACTIVE | Noted: 2017-08-09

## 2017-10-05 PROBLEM — M21.371 RIGHT FOOT DROP: Status: ACTIVE | Noted: 2017-08-28

## 2017-10-05 PROBLEM — R29.898 WEAKNESS OF RIGHT LOWER EXTREMITY: Status: ACTIVE | Noted: 2017-08-09

## 2017-10-05 LAB — HBA1C MFR BLD HPLC: 7 % (ref 4.8–5.6)

## 2017-10-05 RX ORDER — GABAPENTIN 100 MG/1
100 CAPSULE ORAL 3 TIMES DAILY
COMMUNITY
Start: 2017-08-28 | End: 2018-01-01

## 2017-10-05 RX ORDER — CLINDAMYCIN HYDROCHLORIDE 150 MG/1
CAPSULE ORAL
COMMUNITY
Start: 2017-10-03 | End: 2017-01-01

## 2017-10-05 NOTE — PROGRESS NOTES
Joi Torres    Chief Complaint   Patient presents with    Diabetes    Cholesterol Problem    Coronary Artery Disease    Immunization/Injection       Reviewed record In preparation for visit and have obtained necessary documentation. Has info on advanced directive but has not filled them out. 1. Have you been to the ER, urgent care clinic or hospitalized since your last visit? No     2. Have you seen or consulted any other health care providers outside of the [address] [place] [address] since your last visit? Include any pap smears or colon screening. DR Neno Booth, MRI    Vitals reviewed with provider. Health Maintenance reviewed: After verbal order read back of , patient received High Dose Flu Shot in right arm. [address] [phone]  Lot [id number] Exp 05/24/18. Patient tolerated procedure without complaints and received VIS.

## 2017-10-05 NOTE — PROGRESS NOTES
HISTORY OF PRESENT ILLNESS  Ruby Michelle is a [de-identified] y.o. male. HPI   He presents for follow up of diabetes mellitus with peripheral neuropathy, hypertension, hyperlipidemia, coronary artery disease, S/P CABG plus redo, S/P AVR and carotid artery disease (bilateral <50% stenosis). Diabetic ROS - medication compliance: compliant all of the time,      home glucose monitoring: is not performed,      home BP Monitoring: is well controlled at home, ranging 120's/70's.      further diabetic ROS: no polyuria or polydipsia, no unusual visual symptoms, no hypoglycemia, no medication side effects noted, numbness and tingling in right leg from radiculopthy. .   Diet and Lifestyle: generally follows a low fat low cholesterol diet, generally follows a low sodium diet, follows a diabetic diet regularly, exercises sporadically, nonsmoker  Cardiovascular ROS:  He complains of claudication on right from back, lower extremity edema, dyspnea on exertion. He denies palpitations, orthopnea, exertional chest pressure/discomfort, dizziness    He continues on Enbrel and low dose prednisone for rheumatoid arthritis. Joints are not painful. He thinks however that leg pain is so bothersome other pains are less prominent. Pain in low back to right leg. Has right foot drop and AFO brace. Had EMG and MRI. Given gabapentin which helps some. Also had ALFONSO. Followed by Dr Verner Asp.     Patient Active Problem List   Diagnosis Code    RA (rheumatoid arthritis) ([facility] [address] M06.9    Sarcoidosis of lung ([facility] [address] D86.0    Ulcerative colitis ([facility] [address] K51.90    Hypercholesterolemia E78.00    Inguinal hernia K40.90    Asbestos exposure Z77.090    Hypertension, essential I10    Thyroid nodule A95.2    Umbilical hernia T89.8    S/P AVR (aortic valve replacement) Z95.2    Myalgia and myositis [id number]    Diabetic peripheral neuropathy (HCC) E11.42    Osteoporosis M81.0    Lumbar radiculopathy M54.16    Coronary artery disease involving native coronary artery without angina pectoris I25.10    Essential tremor G25.0    Controlled type 2 diabetes mellitus with diabetic polyneuropathy, without long-term current use of insulin ([facility]) E11.42    Advance directive discussed with patient Z71.89    Postlaminectomy syndrome, lumbar region M96.1    Osteoarthritis of spine with radiculopathy, lumbar region M47.26    Right foot drop M21.371    Spinal stenosis, lumbar region, with neurogenic claudication M48.062    Weakness of right lower extremity R29.898    Coronary artery disease involving native coronary artery of native heart without angina pectoris I25.10    Bilateral carotid artery disease ([facility]) I77.9    Carotid artery stenosis, asymptomatic, bilateral I65.23     Past Medical History:   Diagnosis Date    Aortic stenosis, severe     Arrhythmia     heart murmur    Arthritis     Rheumatoid    CAD (coronary artery disease) 1998    S/P CABG    Cancer ([facility])     Basal cell carcinoma    Chronic pain     from rheumatoid arthritis in knees, elbows fingers    HTN (hypertension)     Hypercholesterolemia     S/P AVR (aortic valve replacement)     23 mm Trifecta with Redo CABG x 1SVG to PDA    S/P CABG x 1 10/13/2014    Redo CABG x 1 SVG to PDA; 'y' from collins of existing OM vein graft    Sarcoidosis     No longer active.      Ulcerative colitis ([facility] [address]     Not active     Past Surgical History:   Procedure Laterality Date    CARDIAC SURG PROCEDURE UNLIST      CABG    HX AORTIC VALVE REPLACEMENT  10/2014    CABG redo X1    HX CATARACT REMOVAL      Left    HX LUMBAR LAMINECTOMY      HX OTHER SURGICAL      skin lesions removed from nose and forehead- basal cell    LAMINECTOMY,LUMBAR  110/2000    [place] COLONOSCOPY W/BIOPSY SINGLE/MULTIPLE  12/5/2011          Social History     Social History    Marital status:      Spouse name: N/A    Number of children: N/A    Years of education: N/A     Social History Main Topics    Smoking status: Former Smoker Packs/day: 1.50     Years: 20.00     Quit date: 1/1/1981    Smokeless tobacco: Never Used    Alcohol use Yes      Comment: rarel    Drug use: No    Sexual activity: Not Asked     Other Topics Concern    None     Social History Narrative     Family History   Problem Relation Age of Onset    Heart Disease Father     Cancer Sister      breast     Diabetes Sister     Cancer Sister      leukemia    Diabetes Sister     Diabetes Mother    Eva Leavens Bladder Disease Mother      Allergies   Allergen Reactions    Lisinopril Cough    Questran [Cholestyramine (With Sugar)] Other (comments)     Muscle cramps     Current Outpatient Prescriptions   Medication Sig Dispense Refill    clindamycin (CLEOCIN) 150 mg capsule       gabapentin (NEURONTIN) 100 mg capsule       atorvastatin (LIPITOR) 20 mg tablet Take 1 tablet by mouth  daily 90 Tab 3    polyethylene glycol (MIRALAX) 17 gram/dose powder Take 17 g by mouth two (2) times a day. 235 g 1    amoxicillin (AMOXIL) 500 mg capsule 4 tablets 1 hour prior to dental work      predniSONE (DELTASONE) 2.5 mg tablet Take 2.5 mg by mouth daily.  metoprolol succinate (TOPROL-XL) 25 mg XL tablet Take 1 Tab by mouth daily. 90 Tab 3    oxycodone-acetaminophen (PERCOCET)  mg per tablet Take 1 tablet by mouth every four (4) hours as needed for Pain.  methotrexate sodium (METHOTREXATE, ANTI-RHEUMATIC,) 2.5 mg DsPk Take  by mouth every seven (7) days. 8 tablets week      folic acid (FOLVITE) 1 mg tablet Take 1 mg by mouth daily.  etanercept (ENBREL) 50 mg/mL (0.98 mL) injection by SubCUTAneous route every seven (7) days.  aspirin 81 mg chewable tablet Take 81 mg by mouth every evening.  docusate sodium (COLACE) 100 mg capsule Take 100 mg by mouth two (2) times daily as needed. Review of Systems   Constitutional: Positive for malaise/fatigue. Negative for weight loss. Gastrointestinal: Positive for constipation. Negative for diarrhea. Musculoskeletal: Negative for back pain and joint pain. Neurological: Positive for focal weakness (right leg ). Negative for dizziness and tingling. Visit Vitals    /61 (BP 1 Location: Left arm, BP Patient Position: Sitting)    Pulse 65    Temp 98 °F (36.7 °C) (Oral)    Resp 20    Ht 5' 10\" (1.778 m)    Wt 160 lb (72.6 kg)    SpO2 97%    BMI 22.96 kg/m2     Physical Exam   Constitutional: He is oriented to person, place, and time. He appears well-developed and well-nourished. HENT:   Head: Normocephalic and atraumatic. Eyes: Conjunctivae are normal. Pupils are equal, round, and reactive to light. Neck: Neck supple. Carotid bruit is not present. No thyromegaly present. Cardiovascular: Normal rate, regular rhythm and normal heart sounds. PMI is not displaced. Exam reveals no gallop. No murmur heard. Pulses:       Dorsalis pedis pulses are 0 on the right side, and 2+ on the left side. Posterior tibial pulses are 0 on the right side, and 2+ on the left side. Pulmonary/Chest: Effort normal. He has no wheezes. He has no rhonchi. He has no rales. Abdominal: Soft. Normal appearance. He exhibits no abdominal bruit and no mass. There is no hepatosplenomegaly. There is no tenderness. Musculoskeletal: He exhibits no edema. Lymphadenopathy:     He has no cervical adenopathy. Right: No supraclavicular adenopathy present. Left: No supraclavicular adenopathy present. Neurological: He is alert and oriented to person, place, and time. No sensory deficit. Skin: Skin is warm, dry and intact. No rash noted. Psychiatric: He has a normal mood and affect. His behavior is normal.   Nursing note and vitals reviewed.   Diabetic foot exam:     Left: Reflexes absent     Vibratory sensation diminished    Proprioception normal   Sharp/dull discrimination normal    Filament test 6/6   Pulse DP: 2+ (normal)   Pulse PT: 2+ (normal)   Deformities: None  Right: Reflexes absent   Vibratory sensation absent   Proprioception normal   Sharp/dull discrimination normal   Filament test 3/6   Pulse DP: absent   Pulse PT: absent   Deformities: None    Results for orders placed or performed in visit on 10/05/17   AMB POC HEMOGLOBIN A1C   Result Value Ref Range    Hemoglobin A1c (POC) 7.0 (A) 4.8 - 5.6 %     Lab Results   Component Value Date/Time    Cholesterol, total 157 03/01/2017 08:51 AM    HDL Cholesterol 50 03/01/2017 08:51 AM    LDL, calculated 87 03/01/2017 08:51 AM    VLDL, calculated 20 03/01/2017 08:51 AM    Triglyceride 99 03/01/2017 08:51 AM    CHOL/HDL Ratio 4.1 09/12/2014 10:44 AM     Lab Results   Component Value Date/Time    Sodium 141 03/01/2017 08:51 AM    Potassium 4.4 03/01/2017 08:51 AM    Chloride 99 03/01/2017 08:51 AM    CO2 27 03/01/2017 08:51 AM    Anion gap 7 10/16/2014 03:30 AM    Glucose 111 03/01/2017 08:51 AM    BUN 17 03/01/2017 08:51 AM    Creatinine 0.92 03/01/2017 08:51 AM    BUN/Creatinine ratio 18 03/01/2017 08:51 AM    GFR est AA 91 03/01/2017 08:51 AM    GFR est non-AA 79 03/01/2017 08:51 AM    Calcium 9.1 03/01/2017 08:51 AM    Bilirubin, total 0.6 08/31/2016 11:13 AM    AST (SGOT) 15 08/31/2016 11:13 AM    Alk. phosphatase 65 08/31/2016 11:13 AM    Protein, total 6.8 08/31/2016 11:13 AM    Albumin 4.2 08/31/2016 11:13 AM    Globulin 1.7 10/13/2014 07:35 PM    A-G Ratio 1.6 08/31/2016 11:13 AM    ALT (SGPT) 15 08/31/2016 11:13 AM         ASSESSMENT and PLAN    ICD-10-CM ICD-9-CM    1. Medicare annual wellness visit, subsequent Z00.00 V70.0    2. Advance directive discussed with patient Z71.89 V65.49    3. Controlled type 2 diabetes mellitus with diabetic polyneuropathy, without long-term current use of insulin (HCC) E11.42 250.60 AMB POC HEMOGLOBIN A1C     357.2 HM DIABETES FOOT EXAM      HEMOGLOBIN A1C WITH EAG      MICROALBUMIN, UR, RAND W/ MICROALBUMIN/CREA RATIO   4. Hypertension, essential I10 401.9    5.  Hypercholesterolemia E78.00 272.0 LIPID PANEL      METABOLIC PANEL, COMPREHENSIVE   6. Coronary artery disease involving native coronary artery of native heart without angina pectoris I25.10 414.01    7. Carotid artery stenosis, asymptomatic, bilateral I65.23 433.10      433.30    8. Rheumatoid arthritis involving multiple sites, unspecified rheumatoid factor presence (HCC) M06.9 714.0    9. Depression screen Z13.89 V79.0 MO DEPRESSION SCREEN ANNUAL   10. Encounter for immunization Z23 V03.89 INFLUENZA VIRUS VACCINE, HIGH DOSE SEASONAL, PRESERVATIVE FREE      ADMIN INFLUENZA VIRUS VAC     Diagnoses and all orders for this visit:    1. Medicare annual wellness visit, subsequent    2. Advance directive discussed with patient    3. Controlled type 2 diabetes mellitus with diabetic polyneuropathy, without long-term current use of insulin (HCC)  -     AMB POC HEMOGLOBIN A1C  -      DIABETES FOOT EXAM  -     HEMOGLOBIN A1C WITH EAG; Future  -     MICROALBUMIN, UR, RAND W/ MICROALBUMIN/CREA RATIO; Future    4. Hypertension, essential  Hypertension is controlled    5. Hypercholesterolemia  Hyperlipidemia is controlled  -     LIPID PANEL; Future  -     METABOLIC PANEL, COMPREHENSIVE; Future    6. Coronary artery disease involving native coronary artery of native heart without angina pectoris    7. Carotid artery stenosis, asymptomatic, bilateral    8. Rheumatoid arthritis involving multiple sites, unspecified rheumatoid factor presence ([facility] [address]    9. Depression screen  -     MO DEPRESSION SCREEN ANNUAL    10. Encounter for immunization  -     Influenza virus vaccine ([address]) 72 years and older (03240)  -     Administration fee () for Medicare insured patients      Follow-up Disposition:  Return in about 6 months (around 4/5/2018) for DM, HTN, chol, Fasting lab one week prior .   lab results and schedule of future lab studies reviewed with patient  reviewed diet, exercise and weight control  cardiovascular risk and specific lipid/LDL goals reviewed  I have discussed the diagnosis with the patient and the intended plan as seen in the above orders. Patient is in agreement. The patient has received an after-visit summary and questions were answered concerning future plans. I have discussed medication side effects and warnings with the patient as well.

## 2017-10-05 NOTE — PROGRESS NOTES
This is a Subsequent Medicare Annual Wellness Exam (AWV) (Performed 12 months after IPPE or effective date of Medicare Part B enrollment, Once in a lifetime)    I have reviewed the patient's medical history in detail and updated the computerized patient record. History     Past Medical History:   Diagnosis Date    Aortic stenosis, severe     Arrhythmia     heart murmur    Arthritis     Rheumatoid    CAD (coronary artery disease) 1998    S/P CABG    Cancer (HCC)     Basal cell carcinoma    Chronic pain     from rheumatoid arthritis in knees, elbows fingers    HTN (hypertension)     Hypercholesterolemia     S/P AVR (aortic valve replacement)     23 mm Trifecta with Redo CABG x 1SVG to PDA    S/P CABG x 1 10/13/2014    Redo CABG x 1 SVG to PDA; 'y' from collins of existing OM vein graft    Sarcoidosis     No longer active.  Ulcerative colitis ([address]     Not active      Past Surgical History:   Procedure Laterality Date    CARDIAC SURG PROCEDURE UNLIST      CABG    HX AORTIC VALVE REPLACEMENT  10/2014    CABG redo X1    HX CATARACT REMOVAL      Left    HX LUMBAR LAMINECTOMY      HX OTHER SURGICAL      skin lesions removed from nose and forehead- basal cell    LAMINECTOMY,LUMBAR  110/2000    [place] COLONOSCOPY W/BIOPSY SINGLE/MULTIPLE  12/5/2011          Current Outpatient Prescriptions   Medication Sig Dispense Refill    clindamycin (CLEOCIN) 150 mg capsule       gabapentin (NEURONTIN) 100 mg capsule       atorvastatin (LIPITOR) 20 mg tablet Take 1 tablet by mouth  daily 90 Tab 3    polyethylene glycol (MIRALAX) 17 gram/dose powder Take 17 g by mouth two (2) times a day. 235 g 1    amoxicillin (AMOXIL) 500 mg capsule 4 tablets 1 hour prior to dental work      predniSONE (DELTASONE) 2.5 mg tablet Take 2.5 mg by mouth daily.  metoprolol succinate (TOPROL-XL) 25 mg XL tablet Take 1 Tab by mouth daily.  90 Tab 3    oxycodone-acetaminophen (PERCOCET)  mg per tablet Take 1 tablet by mouth every four (4) hours as needed for Pain.  methotrexate sodium (METHOTREXATE, ANTI-RHEUMATIC,) 2.5 mg DsPk Take  by mouth every seven (7) days. 8 tablets week      folic acid (FOLVITE) 1 mg tablet Take 1 mg by mouth daily.  etanercept (ENBREL) 50 mg/mL (0.98 mL) injection by SubCUTAneous route every seven (7) days.  aspirin 81 mg chewable tablet Take 81 mg by mouth every evening.  docusate sodium (COLACE) 100 mg capsule Take 100 mg by mouth two (2) times daily as needed.        Allergies   Allergen Reactions    Lisinopril Cough    Questran [Cholestyramine (With Sugar)] Other (comments)     Muscle cramps     Family History   Problem Relation Age of Onset    Heart Disease Father     Cancer Sister      breast     Diabetes Sister    [employer] Cancer Sister      leukemia    Diabetes Sister     Diabetes Mother    [facility] Bladder Disease Mother      Social History   Substance Use Topics    Smoking status: Former Smoker     Packs/day: 1.50     Years: 20.00     Quit date: 1/1/1981    Smokeless tobacco: Never Used    Alcohol use Yes      Comment: rarel     Patient Active Problem List   Diagnosis Code    RA (rheumatoid arthritis) ([facility] [address] M06.9    Sarcoidosis of lung ([facility] [address] D86.0    Ulcerative colitis ([facility] [address] K51.90    Hypercholesterolemia E78.00    Inguinal hernia K40.90    Asbestos exposure Z77.090    Hypertension, essential I10    Thyroid nodule V72.2    Umbilical hernia U37.3    S/P AVR (aortic valve replacement) Z95.2    Myalgia and myositis [id number]    Diabetic peripheral neuropathy (HCC) E11.42    Osteoporosis M81.0    Lumbar radiculopathy M54.16    Coronary artery disease involving native coronary artery without angina pectoris I25.10    Essential tremor G25.0    Controlled type 2 diabetes mellitus with diabetic polyneuropathy, without long-term current use of insulin (HCC) E11.42    Advance directive discussed with patient Z71.89    Postlaminectomy syndrome, lumbar region M96.1    Osteoarthritis of spine with radiculopathy, lumbar region M47.26    Right foot drop M21.371    Spinal stenosis, lumbar region, with neurogenic claudication M48.062    Weakness of right lower extremity R29.898    Coronary artery disease involving native coronary artery of native heart without angina pectoris I25.10    Bilateral carotid artery disease (HCC) I77.9    Carotid artery stenosis, asymptomatic, bilateral I65.23       Depression Risk Factor Screening:     PHQ over the last two weeks 10/5/2017   Little interest or pleasure in doing things Not at all   Feeling down, depressed or hopeless Not at all   Total Score PHQ 2 0     Alcohol Risk Factor Screening: You do not drink alcohol or very rarely. Functional Ability and Level of Safety:   Hearing Loss  Thinks he has mild hearing loss; people say he does not hear as well as he use to hear. Activities of Daily Living  The home contains: handrails, grab bars and rugs  Patient does total self care    Fall RiskFall Risk Assessment, last 12 mths 10/5/2017   Able to walk? Yes   Fall in past 12 months? No       Abuse Screen  Patient is not abused    Cognitive Screening   Evaluation of Cognitive Function:  Has your family/caregiver stated any concerns about your memory: yes  Mini-Mental Status Examination (MMSE)    Max Score     Score                                             ORIENTATION         5                 4          1. What is the (year) (season) (date) (day) (month)? 5                 5          2. Where are we now: (state) (county) (town or city) (building)                                                                                                            (floor)                                        [address] [place]         3                 3           Name 3 unrelated objects \"apple,\" \"table,\" \"conor. \"  Score first                                                 repetition, but repeat until can name all three up to 6 trials ATTENTION AND CALCULATION         5                1/5          Serial 7's up to 5 subtractions, If unable to do spell \"WORLD\"                                             Backwards. RECALL         3                 3           Repeat 3 items                                        LANGUAGE         2                 2            Naming: \"wristwatch,\" \"pencil\"         1                 0            Repetition: \"No ifs, ands, or buts\"         3                3             3-Stage Command \"Take piece of paper in your right hand, fold it                                                                        in half and place it on the floor. 1                1             Reading: Large printed \"CLOSE YOUR EYES. \" Read and do                                                      what it says. 1                 1            Writing: Write a sentence. Must contain a subject, verb and be                                                    sensible. 1                 1            Copying: Copy figure of intersecting pentagons.  Must contain all                                                     10 angles and intersect to form a 4 sided figure          30                28        21 or greater = Mild cognitive impairment                                        10-20 = Moderate cognitive impairment                                        9 or less = Severe cognitive impairment              Patient Care Team   Patient Care Team:  Madisyn Aguilar MD as PCP - General (Internal Medicine)  Josefa Zafar MD (Inactive) (Cardiology)  Smita Humphrey MD (Rheumatology)  Stanley Edwards MD as Physician (Cardiothoracic Surgery)  Kalee Hamilton MD (Neurology)  Saleem Read OD as Physician (Optometry)    Assessment/Plan   Education and counseling provided:  End-of-Life planning (with patient's consent)  Influenza Vaccine    Diagnoses and all orders for this visit:    1. Controlled type 2 diabetes mellitus with diabetic polyneuropathy, without long-term current use of insulin (HCC)  -     AMB POC HEMOGLOBIN A1C  -      DIABETES FOOT EXAM    2. Hypertension, essential    3. Hypercholesterolemia    4. Coronary artery disease involving native coronary artery of native heart without angina pectoris    5. Carotid artery stenosis, asymptomatic, bilateral    6.  Encounter for immunization  -     Influenza virus vaccine ([address]) 72 years and older (06043)  -     Administration fee () for Medicare insured patients        Health Maintenance Due   Topic Date Due    DTaP/Tdap/Td series (1 - Tdap) 01/02/2006    FOOT EXAM Q1  05/02/2017    MEDICARE YEARLY EXAM  09/08/2017

## 2017-10-05 NOTE — MR AVS SNAPSHOT
Visit Information Date & Time Provider Department Dept. Phone Encounter #  
 10/5/2017 10:30 AM Marlene Pro MD [facility] [phone] [id number] Follow-up Instructions Return in about 6 months (around 4/5/2018) for DM, HTN, chol, Fasting lab one week prior . Upcoming Health Maintenance Date Due DTaP/Tdap/Td series (1 - Tdap) 1/2/2006 FOOT EXAM Q1 5/2/2017 MEDICARE YEARLY EXAM 9/8/2017 MICROALBUMIN Q1 3/1/2018 LIPID PANEL Q1 3/1/2018 HEMOGLOBIN A1C Q6M 4/5/2018 EYE EXAM RETINAL OR DILATED Q1 4/6/2018 GLAUCOMA SCREENING Q2Y 4/6/2019 Allergies as of 10/5/2017  Review Complete On: 10/5/2017 By: Marlene Pro MD  
  
 Severity Noted Reaction Type Reactions Lisinopril  11/18/2009    Cough Questran [Cholestyramine (With Sugar)]  11/18/2009    Other (comments) Muscle cramps Current Immunizations  Reviewed on 10/5/2017 Name Date Influenza High Dose Vaccine PF 10/5/2017, 9/7/2016 Influenza Vaccine 9/29/2015 Influenza Vaccine (Madin Culbertson Canine Kidney) PF 10/16/2014 10:54 AM  
 Influenza Vaccine Split 10/29/2012, 9/24/2011  1:34 PM, 10/12/2010 Influenza Vaccine Whole 10/10/2009 Pneumococcal Conjugate (PCV-13) 9/29/2015 TD Vaccine 1/1/2006 ZZZ-RETIRED (DO NOT USE) Pneumococcal Vaccine (Unspecified Type) 1/1/2009, 11/14/2002 Reviewed by Tanya Beck LPN on 22/5/0129 at 25:08 AM  
 Reviewed by Tanya Beck LPN on 06/2/8254 at 36:33 AM  
You Were Diagnosed With   
  
 Codes Comments Medicare annual wellness visit, subsequent    -  Primary ICD-10-CM: Z00.00 ICD-9-CM: V70.0 Advance directive discussed with patient     ICD-10-CM: Z71.89 ICD-9-CM: V65.49 Controlled type 2 diabetes mellitus with diabetic polyneuropathy, without long-term current use of insulin (HCC)     ICD-10-CM: E11.42 
ICD-9-CM: 250.60, 357.2 Hypertension, essential     ICD-10-CM: I10 
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 heart without angina pectoris     ICD-10-CM: I25.10 ICD-9-CM: 414.01 Carotid artery stenosis, asymptomatic, bilateral     ICD-10-CM: I65.23 ICD-9-CM: 433.10, 433.30 Rheumatoid arthritis involving multiple sites, unspecified rheumatoid factor presence ([facility]     ICD-10-CM: M06.9 ICD-9-CM: 714.0 Depression screen     ICD-10-CM: Z13.89 ICD-9-CM: V79.0 Encounter for immunization     ICD-10-CM: N79 ICD-9-CM: V03.89 Vitals BP Pulse Temp Resp Height(growth percentile) Weight(growth percentile) 133/61 (BP 1 Location: Left arm, BP Patient Position: Sitting) 65 98 °F (36.7 °C) (Oral) 20 5' 10\" (1.778 m) 160 lb (72.6 kg) SpO2 BMI Smoking Status 97% 22.96 kg/m2 Former Smoker Vitals History BMI and BSA Data Body Mass Index Body Surface Area  
 22.96 kg/m 2 1.89 m 2 Preferred Pharmacy Pharmacy Name Phone 305 [facility], [address] [place] [address] [address] [facility] 134 Your Updated Medication List  
  
   
This list is accurate as of: 10/5/17 11:35 AM.  Always use your most recent med list.  
  
  
  
  
 amoxicillin 500 mg capsule Commonly known as:  AMOXIL  
4 tablets 1 hour prior to dental work  
  
 aspirin 81 mg chewable tablet Take 81 mg by mouth every evening. atorvastatin 20 mg tablet Commonly known as:  LIPITOR Take 1 tablet by mouth  daily  
  
 clindamycin 150 mg capsule Commonly known as:  CLEOCIN  
  
 COLACE 100 mg capsule Generic drug:  docusate sodium Take 100 mg by mouth two (2) times daily as needed. ENBREL 50 mg/mL (0.98 mL) injection Generic drug:  etanercept  
by SubCUTAneous route every seven (7) days. folic acid 1 mg tablet Commonly known as:  Google Take 1 mg by mouth daily. gabapentin 100 mg capsule Commonly known as:  NEURONTIN  
  
 METHOTREXATE (ANTI-RHEUMATIC) 2.5 mg tablet dose pack Generic drug:  methotrexate Take  by mouth every seven (7) days. 8 tablets week  
  
 metoprolol succinate 25 mg XL tablet Commonly known as:  TOPROL-XL Take 1 Tab by mouth daily. PERCOCET  mg per tablet Generic drug:  oxyCODONE-acetaminophen Take 1 tablet by mouth every four (4) hours as needed for Pain.  
  
 polyethylene glycol 17 gram/dose powder Commonly known as:  [place] Armor Take 17 g by mouth two (2) times a day. predniSONE 2.5 mg tablet Commonly known as:  Jenny Brink Take 2.5 mg by mouth daily. We Performed the Following ADMIN INFLUENZA VIRUS VAC [ [facility]] AMB POC HEMOGLOBIN A1C [85061 CPT(R)]  DIABETES FOOT EXAM [[facility]] INFLUENZA VIRUS VACCINE, HIGH DOSE SEASONAL, PRESERVATIVE FREE [62838 CPT(R)] [address] [employer] [ [facility]] Follow-up Instructions Return in about 6 months (around 4/5/2018) for DM, HTN, chol, Fasting lab one week prior . To-Do List   
 04/05/2018 Lab:  HEMOGLOBIN A1C WITH EAG   
  
 04/05/2018 Lab:  LIPID PANEL   
  
 04/05/2018 Lab:  METABOLIC PANEL, COMPREHENSIVE   
  
 04/05/2018 Lab:  MICROALBUMIN, UR, RAND W/ MICROALBUMIN/CREA RATIO Patient Instructions Use Colace (or store brand equivalent) 100 mg, 1 or 2 once or twice a day for constipation. Add Miralax once a day if still constipated after 2 weeks. If still constipated after 2 more weeks, add Citrucel once a day. Drink plenty of water and maintain high fiber diet. May use Sennakot 1-2 tablets twice a day if still having problems. Office visit in 6 months with fasting lab work a week before visit. The best way to stay healthy is to live a healthy lifestyle. A healthy lifestyle includes regular exercise, eating a well-balanced diet, keeping a healthy weight and not smoking. Regular physical exams and screening tests are another important way to take care of yourself. Preventive exams provided by health care providers can find health problems early when treatment works best and can keep you from getting certain diseases or illnesses. Preventive services include exams, lab tests, screenings, shots, monitoring and information to help you take care of your own health. All people over 65 should have a pneumonia shot. Pneumonia shots are usually only needed once in a lifetime unless your doctor decides differently. In addition to your physical exam, some screening tests are recommended: 
 
All people over 65 should have a yearly flu shot. People over 65 are at medium to high risk for Hepatitis B. Three shots are needed for complete protection. Bone mass measurement (dexa scan) is recommended every two years. Diabetes Mellitus screening is recommended every year. Glaucoma is an eye disease caused by high pressure in the eye. An eye exam is recommended every year. Cardiovascular screening tests that check your cholesterol and other blood fat (lipid) levels are recommended every five years. Colorectal Cancer screening tests help to find pre-cancerous polyps (growths in the colon) so they can be removed before they turn into cancer. Tests ordered for screening depend on your personal and family history risk factors. Prostate Cancer Screening (annually up to age 76) Screening for breast cancer is recommended yearly with a Mammogram. 
 
Screening for cervical and vaginal cancer is recommended with a pelvic and Pap test every two years. However if you have had an abnormal pap in the past  three years or at high risk for cervical or vaginal cancer Medicare will cover a pap test and a pelvic exam every year. Here is a list of your current Health Maintenance items with a due date: 
Health Maintenance Due Topic Date Due  
  DTaP/Tdap/Td  (1 - Tdap) 01/02/2006  Diabetic Foot Care  05/02/2017 Tatiana Merida Annual Well Visit  09/08/2017 Vaccine Information Statement Influenza (Flu) Vaccine (Inactivated or Recombinant): What you need to know Many Vaccine Information Statements are available in German and other languages. See www.immunize.org/vis Hojas de Información Sobre Vacunas están disponibles en Español y en muchos otros idiomas. Visite www.immunize.org/vis 1. Why get vaccinated? Influenza (flu) is a contagious disease that spreads around the [facility] every year, usually between October and May. Flu is caused by influenza viruses, and is spread mainly by coughing, sneezing, and close contact. Anyone can get flu. Flu strikes suddenly and can last several days. Symptoms vary by age, but can include: 
 fever/chills  sore throat  muscle aches  fatigue  cough  headache  runny or stuffy nose Flu can also lead to pneumonia and blood infections, and cause diarrhea and seizures in children. If you have a medical condition, such as heart or lung disease, flu can make it worse. Flu is more dangerous for some people. Infants and young children, people 72years of age and older, pregnant women, and people with certain health conditions or a weakened immune system are at greatest risk. Each year thousands of people in the [facility] die from flu, and many more are hospitalized. Flu vaccine can: 
 keep you from getting flu, 
 make flu less severe if you do get it, and 
 keep you from spreading flu to your family and other people. 2. Inactivated and recombinant flu vaccines A dose of flu vaccine is recommended every flu season. Children 6 months through 6years of age may need two doses during the same flu season. Everyone else needs only one dose each flu season.   
 
 
Some inactivated flu vaccines contain a very small amount of a mercury-based preservative called thimerosal. Studies have not shown thimerosal in vaccines to be harmful, but flu vaccines that do not contain thimerosal are available. There is no live flu virus in flu shots. They cannot cause the flu. There are many flu viruses, and they are always changing. Each year a new flu vaccine is made to protect against three or four viruses that are likely to cause disease in the upcoming flu season. But even when the vaccine doesnt exactly match these viruses, it may still provide some protection Flu vaccine cannot prevent: 
 flu that is caused by a virus not covered by the vaccine, or 
 illnesses that look like flu but are not. It takes about 2 weeks for protection to develop after vaccination, and protection lasts through the flu season. 3. Some people should not get this vaccine Tell the person who is giving you the vaccine:  If you have any severe, life-threatening allergies. If you ever had a life-threatening allergic reaction after a dose of flu vaccine, or have a severe allergy to any part of this vaccine, you may be advised not to get vaccinated. Most, but not all, types of flu vaccine contain a small amount of egg protein.  If you ever had Guillain-Barré Syndrome (also called GBS). Some people with a history of GBS should not get this vaccine. This should be discussed with your doctor.  If you are not feeling well. It is usually okay to get flu vaccine when you have a mild illness, but you might be asked to come back when you feel better. 4. Risks of a vaccine reaction With any medicine, including vaccines, there is a chance of reactions. These are usually mild and go away on their own, but serious reactions are also possible. Most people who get a flu shot do not have any problems with it. Minor problems following a flu shot include:  
 soreness, redness, or swelling where the shot was given  hoarseness  sore, red or itchy eyes  cough  fever  aches  headache  itching  fatigue If these problems occur, they usually begin soon after the shot and last 1 or 2 days. More serious problems following a flu shot can include the following:  There may be a small increased risk of Guillain-Barré Syndrome (GBS) after inactivated flu vaccine. This risk has been estimated at 1 or 2 additional cases per million people vaccinated. This is much lower than the risk of severe complications from flu, which can be prevented by flu vaccine.  Young children who get the flu shot along with pneumococcal vaccine (PCV13) and/or DTaP vaccine at the same time might be slightly more likely to have a seizure caused by fever. Ask your doctor for more information. Tell your doctor if a child who is getting flu vaccine has ever had a seizure. Problems that could happen after any injected vaccine:  People sometimes faint after a medical procedure, including vaccination. Sitting or lying down for about 15 minutes can help prevent fainting, and injuries caused by a fall. Tell your doctor if you feel dizzy, or have vision changes or ringing in the ears.  Some people get severe pain in the shoulder and have difficulty moving the arm where a shot was given. This happens very rarely.  Any medication can cause a severe allergic reaction. Such reactions from a vaccine are very rare, estimated at about 1 in a million doses, and would happen within a few minutes to a few hours after the vaccination. As with any medicine, there is a very remote chance of a vaccine causing a serious injury or death. The safety of vaccines is always being monitored. For more information, visit: www.cdc.gov/vaccinesafety/ 
 
5. What if there is a serious reaction? What should I look for?  Look for anything that concerns you, such as signs of a severe allergic reaction, very high fever, or unusual behavior. Signs of a severe allergic reaction can include hives, swelling of the face and throat, difficulty breathing, a fast heartbeat, dizziness, and weakness  usually within a few minutes to a few hours after the vaccination. What should I do?  If you think it is a severe allergic reaction or other emergency that cant wait, call 9-1-1 and get the person to the nearest hospital. Otherwise, call your doctor.  Reactions should be reported to the Vaccine Adverse Event Reporting System (VAERS). Your doctor should file this report, or you can do it yourself through  the VAERS web site at www.vaers. [facility].gov, or by calling 0-821.812.8221. VAERS does not give medical advice. 6. The National Vaccine Injury Compensation Program 
 
The [facility] Vaccine Injury Compensation Program (VICP) is a federal program that was created to compensate people who may have been injured by certain vaccines. Persons who believe they may have been injured by a vaccine can learn about the program and about filing a claim by calling 4-663.589.6524 or visiting the [employer] website at www.[facility].gov/vaccinecompensation. There is a time limit to file a claim for compensation. 7. How can I learn more?  Ask your healthcare provider. He or she can give you the vaccine package insert or suggest other sources of information.  Call your local or state health department.  Contact the Centers for Disease Control and Prevention (CDC): 
- Call 6-145.408.6138 (1-800-CDC-INFO) or 
- Visit CDCs website at www.cdc.gov/flu Vaccine Information Statement Inactivated Influenza Vaccine 8/7/2015 
42 ANNITA Malagon [id number]-24 Department of Health and [employer] Centers for Disease Control and Prevention Office Use Only Introducing [facility]! [facility] introduces "[employer] patient portal. Now you can access parts of your medical record, email your doctor's office, and request medication refills online. 1. In your internet browser, go to [email or web address] 2. Click on the First Time User? Click Here link in the Sign In box. You will see the New Member Sign Up page. 3. Enter your Page[employer] Access Code exactly as it appears below. You will not need to use this code after youve completed the sign-up process. If you do not sign up before the expiration date, you must request a new code. · [employer] Access Code: [email or web address] Expires: 10/15/2017  2:25 PM 
 
4. Enter the last four digits of your Social Security Number (xxxx) and Date of Birth (mm/dd/yyyy) as indicated and click Submit. You will be taken to the next sign-up page. 5. Create a [employer] ID. This will be your Page[employer] login ID and cannot be changed, so think of one that is secure and easy to remember. 6. Create a [employer] password. You can change your password at any time. 7. Enter your Password Reset Question and Answer. This can be used at a later time if you forget your password. 8. Enter your e-mail address. You will receive e-mail notification when new information is available in [address] 9. Click Sign Up. You can now view and download portions of your medical record. 10. Click the Download Summary menu link to download a portable copy of your medical information. If you have questions, please visit the Frequently Asked Questions section of the [employer] website. Remember, [employer] is NOT to be used for urgent needs. For medical emergencies, dial 911. Now available from your iPhone and Android! Please provide this summary of care documentation to your next provider. Your primary care clinician is listed as Maryjo Pineda. If you have any questions after today's visit, please call [phone].

## 2017-10-05 NOTE — PATIENT INSTRUCTIONS
Use Colace (or store brand equivalent) 100 mg, 1 or 2 once or twice a day for constipation. Add Miralax once a day if still constipated after 2 weeks. If still constipated after 2 more weeks, add Citrucel once a day. Drink plenty of water and maintain high fiber diet. May use Sennakot 1-2 tablets twice a day if still having problems. Office visit in 6 months with fasting lab work a week before visit. The best way to stay healthy is to live a healthy lifestyle. A healthy lifestyle includes regular exercise, eating a well-balanced diet, keeping a healthy weight and not smoking. Regular physical exams and screening tests are another important way to take care of yourself. Preventive exams provided by health care providers can find health problems early when treatment works best and can keep you from getting certain diseases or illnesses. Preventive services include exams, lab tests, screenings, shots, monitoring and information to help you take care of your own health. All people over 65 should have a pneumonia shot. Pneumonia shots are usually only needed once in a lifetime unless your doctor decides differently. In addition to your physical exam, some screening tests are recommended:    All people over 65 should have a yearly flu shot. People over 65 are at medium to high risk for Hepatitis B. Three shots are needed for complete protection. Bone mass measurement (dexa scan) is recommended every two years. Diabetes Mellitus screening is recommended every year. Glaucoma is an eye disease caused by high pressure in the eye. An eye exam is recommended every year. Cardiovascular screening tests that check your cholesterol and other blood fat (lipid) levels are recommended every five years. Colorectal Cancer screening tests help to find pre-cancerous polyps (growths in the colon) so they can be removed before they turn into cancer.   Tests ordered for screening depend on your personal and family history risk factors. Prostate Cancer Screening (annually up to age 76)    Screening for breast cancer is recommended yearly with a Mammogram.    Screening for cervical and vaginal cancer is recommended with a pelvic and Pap test every two years. However if you have had an abnormal pap in the past  three years or at high risk for cervical or vaginal cancer Medicare will cover a pap test and a pelvic exam every year. Here is a list of your current Health Maintenance items with a due date:  Health Maintenance Due   Topic Date Due    DTaP/Tdap/Td  (1 - Tdap) 01/02/2006    Diabetic Foot Care  05/02/2017    Annual Well Visit  09/08/2017           Vaccine Information Statement    Influenza (Flu) Vaccine (Inactivated or Recombinant): What you need to know    Many Vaccine Information Statements are available in Welsh and other languages. See www.immunize.org/vis  Hojas de Información Sobre Vacunas están disponibles en Español y en muchos otros idiomas. Visite www.immunize.org/vis    1. Why get vaccinated? Influenza (flu) is a contagious disease that spreads around the [place] [facility] every year, usually between October and May. Flu is caused by influenza viruses, and is spread mainly by coughing, sneezing, and close contact. Anyone can get flu. Flu strikes suddenly and can last several days. Symptoms vary by age, but can include:   fever/chills   sore throat   muscle aches   fatigue   cough   headache    runny or stuffy nose    Flu can also lead to pneumonia and blood infections, and cause diarrhea and seizures in children. If you have a medical condition, such as heart or lung disease, flu can make it worse. Flu is more dangerous for some people. Infants and young children, people 72years of age and older, pregnant women, and people with certain health conditions or a weakened immune system are at greatest risk.       Each year thousands of people in the [place] die from flu, and many more are hospitalized. Flu vaccine can:   keep you from getting flu,   make flu less severe if you do get it, and   keep you from spreading flu to your family and other people. 2. Inactivated and recombinant flu vaccines    A dose of flu vaccine is recommended every flu season. Children 6 months through 6years of age may need two doses during the same flu season. Everyone else needs only one dose each flu season. Some inactivated flu vaccines contain a very small amount of a mercury-based preservative called thimerosal. Studies have not shown thimerosal in vaccines to be harmful, but flu vaccines that do not contain thimerosal are available. There is no live flu virus in flu shots. They cannot cause the flu. There are many flu viruses, and they are always changing. Each year a new flu vaccine is made to protect against three or four viruses that are likely to cause disease in the upcoming flu season. But even when the vaccine doesnt exactly match these viruses, it may still provide some protection    Flu vaccine cannot prevent:   flu that is caused by a virus not covered by the vaccine, or   illnesses that look like flu but are not. It takes about 2 weeks for protection to develop after vaccination, and protection lasts through the flu season. 3. Some people should not get this vaccine    Tell the person who is giving you the vaccine:     If you have any severe, life-threatening allergies. If you ever had a life-threatening allergic reaction after a dose of flu vaccine, or have a severe allergy to any part of this vaccine, you may be advised not to get vaccinated. Most, but not all, types of flu vaccine contain a small amount of egg protein.  If you ever had Guillain-Barré Syndrome (also called GBS). Some people with a history of GBS should not get this vaccine. This should be discussed with your doctor.  If you are not feeling well.     It is usually okay to get flu vaccine when you have a mild illness, but you might be asked to come back when you feel better. 4. Risks of a vaccine reaction    With any medicine, including vaccines, there is a chance of reactions. These are usually mild and go away on their own, but serious reactions are also possible. Most people who get a flu shot do not have any problems with it. Minor problems following a flu shot include:    soreness, redness, or swelling where the shot was given     hoarseness   sore, red or itchy eyes   cough   fever   aches   headache   itching   fatigue  If these problems occur, they usually begin soon after the shot and last 1 or 2 days. More serious problems following a flu shot can include the following:     There may be a small increased risk of Guillain-Barré Syndrome (GBS) after inactivated flu vaccine. This risk has been estimated at 1 or 2 additional cases per million people vaccinated. This is much lower than the risk of severe complications from flu, which can be prevented by flu vaccine.  Young children who get the flu shot along with pneumococcal vaccine (PCV13) and/or DTaP vaccine at the same time might be slightly more likely to have a seizure caused by fever. Ask your doctor for more information. Tell your doctor if a child who is getting flu vaccine has ever had a seizure. Problems that could happen after any injected vaccine:      People sometimes faint after a medical procedure, including vaccination. Sitting or lying down for about 15 minutes can help prevent fainting, and injuries caused by a fall. Tell your doctor if you feel dizzy, or have vision changes or ringing in the ears.  Some people get severe pain in the shoulder and have difficulty moving the arm where a shot was given. This happens very rarely.  Any medication can cause a severe allergic reaction.  Such reactions from a vaccine are very rare, estimated at about 1 in a million doses, and would happen within a few minutes to a few hours after the vaccination. As with any medicine, there is a very remote chance of a vaccine causing a serious injury or death. The safety of vaccines is always being monitored. For more information, visit: www.cdc.gov/vaccinesafety/    5. What if there is a serious reaction? What should I look for?  Look for anything that concerns you, such as signs of a severe allergic reaction, very high fever, or unusual behavior. Signs of a severe allergic reaction can include hives, swelling of the face and throat, difficulty breathing, a fast heartbeat, dizziness, and weakness  usually within a few minutes to a few hours after the vaccination. What should I do?  If you think it is a severe allergic reaction or other emergency that cant wait, call 9-1-1 and get the person to the nearest hospital. Otherwise, call your doctor.  Reactions should be reported to the Vaccine Adverse Event Reporting System (VAERS). Your doctor should file this report, or you can do it yourself through  the VAERS web site at www.vaers. [facility].gov, or by calling 9-664.443.8074. VAERS does not give medical advice. 6. The National Vaccine Injury Compensation Program    The [facility] Vaccine Injury Compensation Program (VICP) is a federal program that was created to compensate people who may have been injured by certain vaccines. Persons who believe they may have been injured by a vaccine can learn about the program and about filing a claim by calling 4-799.642.8781 or visiting the [employer] website at www.[facility].gov/vaccinecompensation. There is a time limit to file a claim for compensation. 7. How can I learn more?  Ask your healthcare provider. He or she can give you the vaccine package insert or suggest other sources of information.  Call your local or state health department.    Contact the Centers for Disease Control and Prevention (CDC):  - Call 2-178-644-499-865-5042 (7-816-RRV-INFO) or  - Visit CDCs website at www.cdc.gov/flu    Vaccine Information Statement   Inactivated Influenza Vaccine   8/7/2015  [address] [id number]    Department of Health and Human Services  Centers for Disease Control and Prevention    Office Use Only

## 2017-10-08 NOTE — ACP (ADVANCE CARE PLANNING)
With patient's permission advance care planning discussed. Primary SDM would be wife. Secondary SDM would be daughter Alicia Males. He wants no life prolonging treatment if death is imminent. He wants no life prolonging treatment if there is overwhelming, permanent neurologic injury. Reviewed paperwork. Given name of NN who can be contacted with any questions or help needed completing forms. Conversation took 4 minutes.

## 2018-01-01 ENCOUNTER — HOME CARE VISIT (OUTPATIENT)
Dept: SCHEDULING | Facility: HOME HEALTH | Age: 83
End: 2018-01-01
Payer: MEDICARE

## 2018-01-01 ENCOUNTER — HOME HEALTH ADMISSION (OUTPATIENT)
Dept: HOME HEALTH SERVICES | Facility: HOME HEALTH | Age: 83
End: 2018-01-01
Payer: MEDICARE

## 2018-01-01 ENCOUNTER — HOME CARE VISIT (OUTPATIENT)
Dept: HOME HEALTH SERVICES | Facility: HOME HEALTH | Age: 83
End: 2018-01-01
Payer: MEDICARE

## 2018-01-01 ENCOUNTER — PATIENT OUTREACH (OUTPATIENT)
Dept: INTERNAL MEDICINE CLINIC | Age: 83
End: 2018-01-01

## 2018-01-01 ENCOUNTER — APPOINTMENT (OUTPATIENT)
Dept: GENERAL RADIOLOGY | Age: 83
DRG: 371 | End: 2018-01-01
Attending: INTERNAL MEDICINE
Payer: MEDICARE

## 2018-01-01 ENCOUNTER — APPOINTMENT (OUTPATIENT)
Dept: MRI IMAGING | Age: 83
DRG: 371 | End: 2018-01-01
Attending: EMERGENCY MEDICINE
Payer: MEDICARE

## 2018-01-01 ENCOUNTER — APPOINTMENT (OUTPATIENT)
Dept: CT IMAGING | Age: 83
DRG: 371 | End: 2018-01-01
Attending: INTERNAL MEDICINE
Payer: MEDICARE

## 2018-01-01 ENCOUNTER — PATIENT OUTREACH (OUTPATIENT)
Dept: INTERNAL MEDICINE CLINIC | Facility: CLINIC | Age: 83
End: 2018-01-01

## 2018-01-01 ENCOUNTER — TELEPHONE (OUTPATIENT)
Dept: INTERNAL MEDICINE CLINIC | Facility: CLINIC | Age: 83
End: 2018-01-01

## 2018-01-01 ENCOUNTER — HOSPITAL ENCOUNTER (INPATIENT)
Age: 83
LOS: 11 days | Discharge: NURSING, REHAB OR LONG-TERM CARE | DRG: 371 | End: 2018-08-21
Attending: EMERGENCY MEDICINE | Admitting: INTERNAL MEDICINE
Payer: MEDICARE

## 2018-01-01 ENCOUNTER — ANESTHESIA (OUTPATIENT)
Dept: ENDOSCOPY | Age: 83
DRG: 371 | End: 2018-01-01
Payer: MEDICARE

## 2018-01-01 ENCOUNTER — APPOINTMENT (OUTPATIENT)
Dept: GENERAL RADIOLOGY | Age: 83
DRG: 872 | End: 2018-01-01
Attending: EMERGENCY MEDICINE
Payer: MEDICARE

## 2018-01-01 ENCOUNTER — APPOINTMENT (OUTPATIENT)
Dept: MRI IMAGING | Age: 83
DRG: 371 | End: 2018-01-01
Attending: PSYCHIATRY & NEUROLOGY
Payer: MEDICARE

## 2018-01-01 ENCOUNTER — TELEPHONE (OUTPATIENT)
Dept: INTERNAL MEDICINE CLINIC | Age: 83
End: 2018-01-01

## 2018-01-01 ENCOUNTER — HOSPITAL ENCOUNTER (INPATIENT)
Age: 83
LOS: 2 days | Discharge: HOME HEALTH | DRG: 872 | End: 2018-03-08
Attending: EMERGENCY MEDICINE | Admitting: INTERNAL MEDICINE
Payer: MEDICARE

## 2018-01-01 ENCOUNTER — ANESTHESIA EVENT (OUTPATIENT)
Dept: ENDOSCOPY | Age: 83
DRG: 371 | End: 2018-01-01
Payer: MEDICARE

## 2018-01-01 ENCOUNTER — APPOINTMENT (OUTPATIENT)
Dept: CT IMAGING | Age: 83
DRG: 371 | End: 2018-01-01
Attending: EMERGENCY MEDICINE
Payer: MEDICARE

## 2018-01-01 ENCOUNTER — OFFICE VISIT (OUTPATIENT)
Dept: INTERNAL MEDICINE CLINIC | Age: 83
End: 2018-01-01

## 2018-01-01 ENCOUNTER — HOSPICE ADMISSION (OUTPATIENT)
Dept: HOSPICE | Facility: HOSPICE | Age: 83
End: 2018-01-01

## 2018-01-01 ENCOUNTER — OFFICE VISIT (OUTPATIENT)
Dept: INTERNAL MEDICINE CLINIC | Facility: CLINIC | Age: 83
End: 2018-01-01

## 2018-01-01 ENCOUNTER — APPOINTMENT (OUTPATIENT)
Dept: GENERAL RADIOLOGY | Age: 83
DRG: 371 | End: 2018-01-01
Attending: PSYCHIATRY & NEUROLOGY
Payer: MEDICARE

## 2018-01-01 ENCOUNTER — NURSE NAVIGATOR (OUTPATIENT)
Dept: PALLATIVE CARE | Age: 83
End: 2018-01-01

## 2018-01-01 ENCOUNTER — APPOINTMENT (OUTPATIENT)
Dept: CT IMAGING | Age: 83
DRG: 872 | End: 2018-01-01
Attending: EMERGENCY MEDICINE
Payer: MEDICARE

## 2018-01-01 VITALS
HEART RATE: 69 BPM | DIASTOLIC BLOOD PRESSURE: 52 MMHG | SYSTOLIC BLOOD PRESSURE: 96 MMHG | TEMPERATURE: 98.4 F | RESPIRATION RATE: 12 BRPM | OXYGEN SATURATION: 96 %

## 2018-01-01 VITALS
SYSTOLIC BLOOD PRESSURE: 106 MMHG | BODY MASS INDEX: 18.19 KG/M2 | HEART RATE: 78 BPM | DIASTOLIC BLOOD PRESSURE: 60 MMHG | WEIGHT: 120 LBS | TEMPERATURE: 96.7 F | HEIGHT: 68 IN | OXYGEN SATURATION: 98 % | RESPIRATION RATE: 18 BRPM

## 2018-01-01 VITALS
DIASTOLIC BLOOD PRESSURE: 60 MMHG | RESPIRATION RATE: 16 BRPM | OXYGEN SATURATION: 98 % | HEART RATE: 72 BPM | SYSTOLIC BLOOD PRESSURE: 120 MMHG | TEMPERATURE: 98 F

## 2018-01-01 VITALS
TEMPERATURE: 98.3 F | WEIGHT: 102 LBS | OXYGEN SATURATION: 98 % | SYSTOLIC BLOOD PRESSURE: 124 MMHG | BODY MASS INDEX: 15.51 KG/M2 | DIASTOLIC BLOOD PRESSURE: 58 MMHG | RESPIRATION RATE: 16 BRPM | HEART RATE: 68 BPM

## 2018-01-01 VITALS
SYSTOLIC BLOOD PRESSURE: 108 MMHG | HEART RATE: 78 BPM | RESPIRATION RATE: 16 BRPM | OXYGEN SATURATION: 98 % | TEMPERATURE: 98.4 F | DIASTOLIC BLOOD PRESSURE: 54 MMHG

## 2018-01-01 VITALS
TEMPERATURE: 97.7 F | DIASTOLIC BLOOD PRESSURE: 55 MMHG | HEART RATE: 71 BPM | OXYGEN SATURATION: 98 % | SYSTOLIC BLOOD PRESSURE: 120 MMHG | RESPIRATION RATE: 18 BRPM

## 2018-01-01 VITALS
SYSTOLIC BLOOD PRESSURE: 99 MMHG | OXYGEN SATURATION: 97 % | RESPIRATION RATE: 18 BRPM | HEART RATE: 69 BPM | TEMPERATURE: 98.1 F | BODY MASS INDEX: 17.03 KG/M2 | WEIGHT: 115 LBS | DIASTOLIC BLOOD PRESSURE: 51 MMHG | HEIGHT: 69 IN

## 2018-01-01 VITALS
TEMPERATURE: 97.2 F | HEART RATE: 51 BPM | DIASTOLIC BLOOD PRESSURE: 68 MMHG | SYSTOLIC BLOOD PRESSURE: 112 MMHG | RESPIRATION RATE: 18 BRPM | OXYGEN SATURATION: 99 %

## 2018-01-01 VITALS
OXYGEN SATURATION: 95 % | HEART RATE: 71 BPM | BODY MASS INDEX: 18.18 KG/M2 | TEMPERATURE: 97.7 F | RESPIRATION RATE: 16 BRPM | WEIGHT: 127 LBS | DIASTOLIC BLOOD PRESSURE: 72 MMHG | HEIGHT: 70 IN | SYSTOLIC BLOOD PRESSURE: 130 MMHG

## 2018-01-01 VITALS
DIASTOLIC BLOOD PRESSURE: 62 MMHG | TEMPERATURE: 97.7 F | SYSTOLIC BLOOD PRESSURE: 136 MMHG | HEART RATE: 60 BPM | OXYGEN SATURATION: 98 % | RESPIRATION RATE: 16 BRPM

## 2018-01-01 VITALS
HEART RATE: 55 BPM | TEMPERATURE: 98.3 F | OXYGEN SATURATION: 97 % | SYSTOLIC BLOOD PRESSURE: 110 MMHG | DIASTOLIC BLOOD PRESSURE: 50 MMHG

## 2018-01-01 VITALS
OXYGEN SATURATION: 98 % | SYSTOLIC BLOOD PRESSURE: 100 MMHG | HEART RATE: 78 BPM | TEMPERATURE: 98 F | DIASTOLIC BLOOD PRESSURE: 60 MMHG

## 2018-01-01 VITALS
OXYGEN SATURATION: 97 % | DIASTOLIC BLOOD PRESSURE: 68 MMHG | TEMPERATURE: 98.6 F | SYSTOLIC BLOOD PRESSURE: 108 MMHG | HEIGHT: 68 IN | HEART RATE: 63 BPM | RESPIRATION RATE: 20 BRPM | BODY MASS INDEX: 21.07 KG/M2 | WEIGHT: 139 LBS

## 2018-01-01 VITALS
TEMPERATURE: 98 F | BODY MASS INDEX: 15.81 KG/M2 | HEART RATE: 66 BPM | DIASTOLIC BLOOD PRESSURE: 68 MMHG | WEIGHT: 104 LBS | RESPIRATION RATE: 18 BRPM | SYSTOLIC BLOOD PRESSURE: 110 MMHG | OXYGEN SATURATION: 98 %

## 2018-01-01 VITALS
WEIGHT: 104 LBS | HEART RATE: 66 BPM | OXYGEN SATURATION: 99 % | BODY MASS INDEX: 15.81 KG/M2 | DIASTOLIC BLOOD PRESSURE: 60 MMHG | SYSTOLIC BLOOD PRESSURE: 90 MMHG

## 2018-01-01 VITALS
DIASTOLIC BLOOD PRESSURE: 72 MMHG | TEMPERATURE: 97.8 F | SYSTOLIC BLOOD PRESSURE: 122 MMHG | OXYGEN SATURATION: 98 % | RESPIRATION RATE: 16 BRPM | HEART RATE: 58 BPM

## 2018-01-01 VITALS
DIASTOLIC BLOOD PRESSURE: 68 MMHG | HEART RATE: 60 BPM | SYSTOLIC BLOOD PRESSURE: 102 MMHG | TEMPERATURE: 98.2 F | OXYGEN SATURATION: 95 % | RESPIRATION RATE: 16 BRPM

## 2018-01-01 VITALS
SYSTOLIC BLOOD PRESSURE: 126 MMHG | BODY MASS INDEX: 19.31 KG/M2 | TEMPERATURE: 98 F | OXYGEN SATURATION: 98 % | RESPIRATION RATE: 18 BRPM | WEIGHT: 127 LBS | HEART RATE: 70 BPM | DIASTOLIC BLOOD PRESSURE: 70 MMHG

## 2018-01-01 VITALS
RESPIRATION RATE: 16 BRPM | OXYGEN SATURATION: 99 % | HEART RATE: 66 BPM | DIASTOLIC BLOOD PRESSURE: 72 MMHG | TEMPERATURE: 97.4 F | SYSTOLIC BLOOD PRESSURE: 128 MMHG

## 2018-01-01 VITALS
DIASTOLIC BLOOD PRESSURE: 69 MMHG | BODY MASS INDEX: 18.19 KG/M2 | SYSTOLIC BLOOD PRESSURE: 143 MMHG | HEIGHT: 68 IN | HEART RATE: 60 BPM | OXYGEN SATURATION: 100 % | RESPIRATION RATE: 16 BRPM | WEIGHT: 120 LBS | TEMPERATURE: 98.3 F

## 2018-01-01 VITALS
OXYGEN SATURATION: 95 % | SYSTOLIC BLOOD PRESSURE: 111 MMHG | HEART RATE: 80 BPM | DIASTOLIC BLOOD PRESSURE: 71 MMHG | BODY MASS INDEX: 16.06 KG/M2 | RESPIRATION RATE: 16 BRPM | TEMPERATURE: 97.9 F | WEIGHT: 106 LBS | HEIGHT: 68 IN

## 2018-01-01 VITALS
OXYGEN SATURATION: 98 % | RESPIRATION RATE: 18 BRPM | SYSTOLIC BLOOD PRESSURE: 122 MMHG | DIASTOLIC BLOOD PRESSURE: 76 MMHG | HEART RATE: 76 BPM | TEMPERATURE: 98.1 F

## 2018-01-01 VITALS
SYSTOLIC BLOOD PRESSURE: 122 MMHG | RESPIRATION RATE: 18 BRPM | OXYGEN SATURATION: 97 % | DIASTOLIC BLOOD PRESSURE: 78 MMHG | HEART RATE: 62 BPM | TEMPERATURE: 99.1 F

## 2018-01-01 VITALS
SYSTOLIC BLOOD PRESSURE: 90 MMHG | RESPIRATION RATE: 14 BRPM | TEMPERATURE: 98.5 F | DIASTOLIC BLOOD PRESSURE: 52 MMHG | OXYGEN SATURATION: 96 % | HEART RATE: 68 BPM

## 2018-01-01 VITALS
HEART RATE: 76 BPM | OXYGEN SATURATION: 98 % | TEMPERATURE: 98 F | SYSTOLIC BLOOD PRESSURE: 115 MMHG | DIASTOLIC BLOOD PRESSURE: 60 MMHG

## 2018-01-01 VITALS
DIASTOLIC BLOOD PRESSURE: 60 MMHG | OXYGEN SATURATION: 98 % | SYSTOLIC BLOOD PRESSURE: 110 MMHG | HEART RATE: 76 BPM | TEMPERATURE: 98 F

## 2018-01-01 VITALS
RESPIRATION RATE: 12 BRPM | TEMPERATURE: 98.5 F | DIASTOLIC BLOOD PRESSURE: 58 MMHG | HEART RATE: 58 BPM | OXYGEN SATURATION: 97 % | SYSTOLIC BLOOD PRESSURE: 112 MMHG

## 2018-01-01 VITALS
DIASTOLIC BLOOD PRESSURE: 70 MMHG | HEART RATE: 76 BPM | TEMPERATURE: 98 F | OXYGEN SATURATION: 98 % | SYSTOLIC BLOOD PRESSURE: 110 MMHG

## 2018-01-01 VITALS
DIASTOLIC BLOOD PRESSURE: 58 MMHG | SYSTOLIC BLOOD PRESSURE: 100 MMHG | WEIGHT: 101 LBS | BODY MASS INDEX: 15.36 KG/M2 | HEART RATE: 67 BPM | OXYGEN SATURATION: 99 %

## 2018-01-01 VITALS
TEMPERATURE: 98.5 F | DIASTOLIC BLOOD PRESSURE: 60 MMHG | OXYGEN SATURATION: 99 % | HEART RATE: 64 BPM | SYSTOLIC BLOOD PRESSURE: 118 MMHG | RESPIRATION RATE: 16 BRPM

## 2018-01-01 VITALS
RESPIRATION RATE: 18 BRPM | TEMPERATURE: 98 F | OXYGEN SATURATION: 98 % | SYSTOLIC BLOOD PRESSURE: 124 MMHG | HEART RATE: 78 BPM | DIASTOLIC BLOOD PRESSURE: 76 MMHG

## 2018-01-01 VITALS
DIASTOLIC BLOOD PRESSURE: 58 MMHG | HEART RATE: 53 BPM | RESPIRATION RATE: 16 BRPM | OXYGEN SATURATION: 95 % | SYSTOLIC BLOOD PRESSURE: 104 MMHG | TEMPERATURE: 98.4 F

## 2018-01-01 VITALS
BODY MASS INDEX: 15.05 KG/M2 | WEIGHT: 99 LBS | DIASTOLIC BLOOD PRESSURE: 54 MMHG | OXYGEN SATURATION: 98 % | SYSTOLIC BLOOD PRESSURE: 102 MMHG | TEMPERATURE: 98.3 F | RESPIRATION RATE: 16 BRPM | HEART RATE: 80 BPM

## 2018-01-01 VITALS
HEART RATE: 70 BPM | DIASTOLIC BLOOD PRESSURE: 62 MMHG | SYSTOLIC BLOOD PRESSURE: 124 MMHG | OXYGEN SATURATION: 98 % | RESPIRATION RATE: 16 BRPM | TEMPERATURE: 97.3 F

## 2018-01-01 VITALS
OXYGEN SATURATION: 95 % | RESPIRATION RATE: 18 BRPM | TEMPERATURE: 97.6 F | DIASTOLIC BLOOD PRESSURE: 60 MMHG | HEART RATE: 63 BPM | SYSTOLIC BLOOD PRESSURE: 102 MMHG

## 2018-01-01 VITALS
HEART RATE: 66 BPM | DIASTOLIC BLOOD PRESSURE: 60 MMHG | RESPIRATION RATE: 16 BRPM | TEMPERATURE: 97.6 F | SYSTOLIC BLOOD PRESSURE: 106 MMHG | OXYGEN SATURATION: 96 %

## 2018-01-01 VITALS
OXYGEN SATURATION: 96 % | HEART RATE: 153 BPM | DIASTOLIC BLOOD PRESSURE: 56 MMHG | TEMPERATURE: 97.3 F | SYSTOLIC BLOOD PRESSURE: 96 MMHG | RESPIRATION RATE: 16 BRPM

## 2018-01-01 VITALS
SYSTOLIC BLOOD PRESSURE: 105 MMHG | OXYGEN SATURATION: 98 % | HEART RATE: 77 BPM | TEMPERATURE: 98.3 F | DIASTOLIC BLOOD PRESSURE: 60 MMHG

## 2018-01-01 VITALS
TEMPERATURE: 98 F | DIASTOLIC BLOOD PRESSURE: 70 MMHG | OXYGEN SATURATION: 96 % | RESPIRATION RATE: 16 BRPM | HEART RATE: 76 BPM | SYSTOLIC BLOOD PRESSURE: 110 MMHG

## 2018-01-01 DIAGNOSIS — R26.89 POOR BALANCE: ICD-10-CM

## 2018-01-01 DIAGNOSIS — R06.02 SHORTNESS OF BREATH: ICD-10-CM

## 2018-01-01 DIAGNOSIS — M05.79 RHEUMATOID ARTHRITIS INVOLVING MULTIPLE SITES WITH POSITIVE RHEUMATOID FACTOR (HCC): Chronic | ICD-10-CM

## 2018-01-01 DIAGNOSIS — R62.7 FAILURE TO THRIVE IN ADULT: ICD-10-CM

## 2018-01-01 DIAGNOSIS — A04.72 CLOSTRIDIUM DIFFICILE COLITIS: Primary | ICD-10-CM

## 2018-01-01 DIAGNOSIS — A04.72 C. DIFFICILE DIARRHEA: ICD-10-CM

## 2018-01-01 DIAGNOSIS — R11.2 NAUSEA VOMITING AND DIARRHEA: ICD-10-CM

## 2018-01-01 DIAGNOSIS — E46 MALNUTRITION, UNSPECIFIED TYPE (HCC): ICD-10-CM

## 2018-01-01 DIAGNOSIS — M48.062 SPINAL STENOSIS, LUMBAR REGION, WITH NEUROGENIC CLAUDICATION: ICD-10-CM

## 2018-01-01 DIAGNOSIS — G54.0 BRACHIAL PLEXITIS: ICD-10-CM

## 2018-01-01 DIAGNOSIS — M50.30 DDD (DEGENERATIVE DISC DISEASE), CERVICAL: ICD-10-CM

## 2018-01-01 DIAGNOSIS — R29.898 WEAKNESS OF LEFT ARM: ICD-10-CM

## 2018-01-01 DIAGNOSIS — R19.7 NAUSEA VOMITING AND DIARRHEA: ICD-10-CM

## 2018-01-01 DIAGNOSIS — R29.898 WEAKNESS OF RIGHT LOWER EXTREMITY: ICD-10-CM

## 2018-01-01 DIAGNOSIS — R53.1 WEAKNESS: ICD-10-CM

## 2018-01-01 DIAGNOSIS — M79.602 PAIN OF LEFT UPPER EXTREMITY: ICD-10-CM

## 2018-01-01 DIAGNOSIS — E11.42 CONTROLLED TYPE 2 DIABETES MELLITUS WITH DIABETIC POLYNEUROPATHY, WITHOUT LONG-TERM CURRENT USE OF INSULIN (HCC): ICD-10-CM

## 2018-01-01 DIAGNOSIS — E78.49 FAMILIAL HYPERLIPIDEMIA: ICD-10-CM

## 2018-01-01 DIAGNOSIS — Z71.89 GOALS OF CARE, COUNSELING/DISCUSSION: ICD-10-CM

## 2018-01-01 DIAGNOSIS — R63.4 WEIGHT LOSS: ICD-10-CM

## 2018-01-01 DIAGNOSIS — I95.9 HYPOTENSION, UNSPECIFIED HYPOTENSION TYPE: ICD-10-CM
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
## 2018-01-01 DIAGNOSIS — M47.26 OSTEOARTHRITIS OF SPINE WITH RADICULOPATHY, LUMBAR REGION: ICD-10-CM

## 2018-01-01 DIAGNOSIS — D86.0 SARCOIDOSIS OF LUNG (HCC): ICD-10-CM

## 2018-01-01 DIAGNOSIS — M48.062 SPINAL STENOSIS, LUMBAR REGION, WITH NEUROGENIC CLAUDICATION: Primary | ICD-10-CM

## 2018-01-01 DIAGNOSIS — R53.1 GENERAL WEAKNESS: ICD-10-CM

## 2018-01-01 DIAGNOSIS — R62.7 FAILURE TO THRIVE IN ADULT: Primary | ICD-10-CM

## 2018-01-01 DIAGNOSIS — E44.0 MODERATE PROTEIN-CALORIE MALNUTRITION (HCC): Primary | ICD-10-CM

## 2018-01-01 DIAGNOSIS — M21.371 RIGHT FOOT DROP: ICD-10-CM

## 2018-01-01 DIAGNOSIS — Z71.89 ADVANCED CARE PLANNING/COUNSELING DISCUSSION: ICD-10-CM

## 2018-01-01 DIAGNOSIS — R52 PAIN: ICD-10-CM

## 2018-01-01 DIAGNOSIS — E87.6 HYPOKALEMIA: ICD-10-CM

## 2018-01-01 DIAGNOSIS — R63.4 WEIGHT LOSS, NON-INTENTIONAL: ICD-10-CM

## 2018-01-01 DIAGNOSIS — G54.0 BRACHIAL PLEXOPATHY: ICD-10-CM

## 2018-01-01 DIAGNOSIS — K51.30 ULCERATIVE RECTOSIGMOIDITIS WITHOUT COMPLICATION (HCC): ICD-10-CM

## 2018-01-01 DIAGNOSIS — I48.0 PAF (PAROXYSMAL ATRIAL FIBRILLATION) (HCC): ICD-10-CM

## 2018-01-01 DIAGNOSIS — M21.371 FOOT DROP, RIGHT: ICD-10-CM

## 2018-01-01 DIAGNOSIS — K52.9 NONINFECTIOUS GASTROENTERITIS, UNSPECIFIED TYPE: ICD-10-CM

## 2018-01-01 DIAGNOSIS — E86.0 DEHYDRATION: Primary | ICD-10-CM

## 2018-01-01 DIAGNOSIS — I77.9 BILATERAL CAROTID ARTERY DISEASE, UNSPECIFIED TYPE (HCC): ICD-10-CM

## 2018-01-01 DIAGNOSIS — M21.371 RIGHT FOOT DROP: Primary | ICD-10-CM

## 2018-01-01 DIAGNOSIS — M51.36 DDD (DEGENERATIVE DISC DISEASE), LUMBAR: ICD-10-CM

## 2018-01-01 DIAGNOSIS — E78.00 HYPERCHOLESTEROLEMIA: ICD-10-CM

## 2018-01-01 LAB
ACE SERPL-CCNC: 22 U/L (ref 14–82)
ALBUMIN SERPL ELPH-MCNC: 2.4 G/DL (ref 2.9–4.4)
ALBUMIN SERPL-MCNC: 2 G/DL (ref 3.5–5)
ALBUMIN SERPL-MCNC: 2.3 G/DL (ref 3.5–5)
ALBUMIN SERPL-MCNC: 2.9 G/DL (ref 3.5–5)
ALBUMIN SERPL-MCNC: 3.2 G/DL (ref 3.5–5)
ALBUMIN SERPL-MCNC: 4 G/DL (ref 3.5–4.7)
ALBUMIN SERPL-MCNC: 4.3 G/DL (ref 3.5–4.7)
ALBUMIN/GLOB SERPL: 0.7 {RATIO} (ref 1.1–2.2)
ALBUMIN/GLOB SERPL: 0.8 {RATIO} (ref 1.1–2.2)
ALBUMIN/GLOB SERPL: 1 {RATIO} (ref 0.7–1.7)
ALBUMIN/GLOB SERPL: 1.5 {RATIO} (ref 1.2–2.2)
ALBUMIN/GLOB SERPL: 1.7 {RATIO} (ref 1.2–2.2)
ALP SERPL-CCNC: 106 U/L (ref 45–117)
ALP SERPL-CCNC: 57 IU/L (ref 39–117)
ALP SERPL-CCNC: 67 U/L (ref 45–117)
ALP SERPL-CCNC: 72 U/L (ref 45–117)
ALP SERPL-CCNC: 80 U/L (ref 45–117)
ALP SERPL-CCNC: 87 IU/L (ref 39–117)
ALPHA1 GLOB SERPL ELPH-MCNC: 0.4 G/DL (ref 0–0.4)
ALPHA2 GLOB SERPL ELPH-MCNC: 0.8 G/DL (ref 0.4–1)
ALT SERPL-CCNC: 15 U/L (ref 12–78)
ALT SERPL-CCNC: 6 IU/L (ref 0–44)
ALT SERPL-CCNC: 7 U/L (ref 12–78)
ALT SERPL-CCNC: 7 U/L (ref 12–78)
ALT SERPL-CCNC: 8 U/L (ref 12–78)
ALT SERPL-CCNC: 9 IU/L (ref 0–44)
ANA SER QL: NEGATIVE
ANION GAP SERPL CALC-SCNC: 4 MMOL/L (ref 5–15)
ANION GAP SERPL CALC-SCNC: 5 MMOL/L (ref 5–15)
ANION GAP SERPL CALC-SCNC: 6 MMOL/L (ref 5–15)
ANION GAP SERPL CALC-SCNC: 7 MMOL/L (ref 5–15)
ANION GAP SERPL CALC-SCNC: 8 MMOL/L (ref 5–15)
ANION GAP SERPL CALC-SCNC: 8 MMOL/L (ref 5–15)
ANION GAP SERPL CALC-SCNC: 9 MMOL/L (ref 5–15)
ANTI-HU AB: NORMAL TITER
ANTI-RI AB: NORMAL TITER
APPEARANCE CSF: CLEAR
APPEARANCE CSF: CLEAR
APPEARANCE UR: CLEAR
APPEARANCE UR: CLEAR
AST SERPL-CCNC: 10 U/L (ref 15–37)
AST SERPL-CCNC: 11 U/L (ref 15–37)
AST SERPL-CCNC: 12 IU/L (ref 0–40)
AST SERPL-CCNC: 12 U/L (ref 15–37)
AST SERPL-CCNC: 14 U/L (ref 15–37)
AST SERPL-CCNC: 15 IU/L (ref 0–40)
ATRIAL RATE: 70 BPM
ATRIAL RATE: 72 BPM
ATRIAL RATE: 83 BPM
ATRIAL RATE: 87 BPM
B BURGDOR IGG PATRN CSF IB-IMP: NEGATIVE
B BURGDOR IGG+IGM SER-ACNC: <0.91 ISR (ref 0–0.9)
B BURGDOR IGM PATRN CSF IB-IMP: NEGATIVE
B BURGDOR18KD IGG CSF QL IB: NORMAL
B BURGDOR23KD IGG CSF QL IB: NORMAL
B BURGDOR23KD IGM CSF QL IB: NORMAL
B BURGDOR28KD IGG CSF QL IB: NORMAL
B BURGDOR30KD IGG CSF QL IB: NORMAL
B BURGDOR39KD IGG CSF QL IB: NORMAL
B BURGDOR39KD IGM CSF QL IB: NORMAL
B BURGDOR41KD IGG CSF QL IB: NORMAL
B BURGDOR41KD IGM CSF QL IB: NORMAL
B BURGDOR45KD IGG CSF QL IB: NORMAL
B BURGDOR58KD IGG CSF QL IB: NORMAL
B BURGDOR66KD IGG CSF QL IB: NORMAL
B BURGDOR93KD IGG CSF QL IB: NORMAL
B-GLOBULIN SERPL ELPH-MCNC: 0.6 G/DL (ref 0.7–1.3)
BACTERIA SPEC CULT: NORMAL
BACTERIA URNS QL MICRO: NEGATIVE /HPF
BACTERIA URNS QL MICRO: NEGATIVE /HPF
BASOPHILS # BLD AUTO: 0 X10E3/UL (ref 0–0.2)
BASOPHILS # BLD: 0 K/UL (ref 0–0.1)
BASOPHILS NFR BLD AUTO: 1 %
BASOPHILS NFR BLD: 0 % (ref 0–1)
BILIRUB SERPL-MCNC: 0.4 MG/DL (ref 0.2–1)
BILIRUB SERPL-MCNC: 0.5 MG/DL (ref 0.2–1)
BILIRUB SERPL-MCNC: 0.7 MG/DL (ref 0.2–1)
BILIRUB SERPL-MCNC: 0.8 MG/DL (ref 0–1.2)
BILIRUB SERPL-MCNC: 1 MG/DL (ref 0.2–1)
BILIRUB SERPL-MCNC: 1 MG/DL (ref 0–1.2)
BILIRUB UR QL CFM: NEGATIVE
BILIRUB UR QL: NEGATIVE
BUN SERPL-MCNC: 12 MG/DL (ref 6–20)
BUN SERPL-MCNC: 13 MG/DL (ref 6–20)
BUN SERPL-MCNC: 13 MG/DL (ref 6–20)
BUN SERPL-MCNC: 13 MG/DL (ref 8–27)
BUN SERPL-MCNC: 14 MG/DL (ref 6–20)
BUN SERPL-MCNC: 15 MG/DL (ref 6–20)
BUN SERPL-MCNC: 17 MG/DL (ref 6–20)
BUN SERPL-MCNC: 17 MG/DL (ref 8–27)
BUN SERPL-MCNC: 18 MG/DL (ref 6–20)
BUN SERPL-MCNC: 6 MG/DL (ref 6–20)
BUN SERPL-MCNC: 7 MG/DL (ref 6–20)
BUN SERPL-MCNC: 7 MG/DL (ref 6–20)
BUN SERPL-MCNC: 8 MG/DL (ref 6–20)
BUN SERPL-MCNC: 9 MG/DL (ref 6–20)
BUN/CREAT SERPL: 11 (ref 12–20)
BUN/CREAT SERPL: 14 (ref 12–20)
BUN/CREAT SERPL: 16 (ref 10–24)
BUN/CREAT SERPL: 16 (ref 12–20)
BUN/CREAT SERPL: 18 (ref 12–20)
BUN/CREAT SERPL: 19 (ref 10–24)
BUN/CREAT SERPL: 22 (ref 12–20)
BUN/CREAT SERPL: 22 (ref 12–20)
BUN/CREAT SERPL: 23 (ref 12–20)
BUN/CREAT SERPL: 23 (ref 12–20)
BUN/CREAT SERPL: 35 (ref 12–20)
BUN/CREAT SERPL: 37 (ref 12–20)
BUN/CREAT SERPL: 45 (ref 12–20)
C DIFF TOX GENS STL QL NAA+PROBE: POSITIVE
CALCIUM SERPL-MCNC: 7 MG/DL (ref 8.5–10.1)
CALCIUM SERPL-MCNC: 7.1 MG/DL (ref 8.5–10.1)
CALCIUM SERPL-MCNC: 7.3 MG/DL (ref 8.5–10.1)
CALCIUM SERPL-MCNC: 7.3 MG/DL (ref 8.5–10.1)
CALCIUM SERPL-MCNC: 7.4 MG/DL (ref 8.5–10.1)
CALCIUM SERPL-MCNC: 7.4 MG/DL (ref 8.5–10.1)
CALCIUM SERPL-MCNC: 7.5 MG/DL (ref 8.5–10.1)
CALCIUM SERPL-MCNC: 7.7 MG/DL (ref 8.5–10.1)
CALCIUM SERPL-MCNC: 7.8 MG/DL (ref 8.5–10.1)
CALCIUM SERPL-MCNC: 7.9 MG/DL (ref 8.5–10.1)
CALCIUM SERPL-MCNC: 8.6 MG/DL (ref 8.5–10.1)
CALCIUM SERPL-MCNC: 8.7 MG/DL (ref 8.5–10.1)
CALCIUM SERPL-MCNC: 9.2 MG/DL (ref 8.6–10.2)
CALCIUM SERPL-MCNC: 9.7 MG/DL (ref 8.6–10.2)
CALCULATED P AXIS, ECG09: 79 DEGREES
CALCULATED P AXIS, ECG09: 80 DEGREES
CALCULATED P AXIS, ECG09: 95 DEGREES
CALCULATED R AXIS, ECG10: -68 DEGREES
CALCULATED R AXIS, ECG10: -70 DEGREES
CALCULATED R AXIS, ECG10: -71 DEGREES
CALCULATED R AXIS, ECG10: -74 DEGREES
CALCULATED T AXIS, ECG11: 66 DEGREES
CALCULATED T AXIS, ECG11: 68 DEGREES
CALCULATED T AXIS, ECG11: 84 DEGREES
CALCULATED T AXIS, ECG11: 85 DEGREES
CHLORIDE SERPL-SCNC: 101 MMOL/L (ref 97–108)
CHLORIDE SERPL-SCNC: 103 MMOL/L (ref 97–108)
CHLORIDE SERPL-SCNC: 105 MMOL/L (ref 97–108)
CHLORIDE SERPL-SCNC: 107 MMOL/L (ref 97–108)
CHLORIDE SERPL-SCNC: 108 MMOL/L (ref 97–108)
CHLORIDE SERPL-SCNC: 108 MMOL/L (ref 97–108)
CHLORIDE SERPL-SCNC: 109 MMOL/L (ref 97–108)
CHLORIDE SERPL-SCNC: 109 MMOL/L (ref 97–108)
CHLORIDE SERPL-SCNC: 110 MMOL/L (ref 97–108)
CHLORIDE SERPL-SCNC: 110 MMOL/L (ref 97–108)
CHLORIDE SERPL-SCNC: 114 MMOL/L (ref 97–108)
CHLORIDE SERPL-SCNC: 115 MMOL/L (ref 97–108)
CHLORIDE SERPL-SCNC: 97 MMOL/L (ref 96–106)
CHLORIDE SERPL-SCNC: 97 MMOL/L (ref 96–106)
CHOLEST SERPL-MCNC: 146 MG/DL (ref 100–199)
CK MB CFR SERPL CALC: ABNORMAL % (ref 0–2.5)
CK MB SERPL-MCNC: <1 NG/ML (ref 5–25)
CK SERPL-CCNC: 38 U/L (ref 39–308)
CO2 SERPL-SCNC: 23 MMOL/L (ref 20–29)
CO2 SERPL-SCNC: 25 MMOL/L (ref 18–29)
CO2 SERPL-SCNC: 26 MMOL/L (ref 21–32)
CO2 SERPL-SCNC: 26 MMOL/L (ref 21–32)
CO2 SERPL-SCNC: 27 MMOL/L (ref 21–32)
CO2 SERPL-SCNC: 28 MMOL/L (ref 21–32)
CO2 SERPL-SCNC: 28 MMOL/L (ref 21–32)
CO2 SERPL-SCNC: 29 MMOL/L (ref 21–32)
CO2 SERPL-SCNC: 31 MMOL/L (ref 21–32)
CO2 SERPL-SCNC: 32 MMOL/L (ref 21–32)
COLOR CSF: COLORLESS
COLOR CSF: COLORLESS
COLOR UR: ABNORMAL
COLOR UR: NORMAL
COPPER SERPL-MCNC: 90 UG/DL (ref 72–166)
CREAT SERPL-MCNC: 0.35 MG/DL (ref 0.7–1.3)
CREAT SERPL-MCNC: 0.38 MG/DL (ref 0.7–1.3)
CREAT SERPL-MCNC: 0.38 MG/DL (ref 0.7–1.3)
CREAT SERPL-MCNC: 0.39 MG/DL (ref 0.7–1.3)
CREAT SERPL-MCNC: 0.43 MG/DL (ref 0.7–1.3)
CREAT SERPL-MCNC: 0.45 MG/DL (ref 0.7–1.3)
CREAT SERPL-MCNC: 0.49 MG/DL (ref 0.7–1.3)
CREAT SERPL-MCNC: 0.49 MG/DL (ref 0.7–1.3)
CREAT SERPL-MCNC: 0.51 MG/DL (ref 0.7–1.3)
CREAT SERPL-MCNC: 0.53 MG/DL (ref 0.7–1.3)
CREAT SERPL-MCNC: 0.6 MG/DL (ref 0.7–1.3)
CREAT SERPL-MCNC: 0.62 MG/DL (ref 0.7–1.3)
CREAT SERPL-MCNC: 0.67 MG/DL (ref 0.76–1.27)
CREAT SERPL-MCNC: 0.74 MG/DL (ref 0.7–1.3)
CREAT SERPL-MCNC: 0.83 MG/DL (ref 0.7–1.3)
CREAT SERPL-MCNC: 1.07 MG/DL (ref 0.76–1.27)
CREATININE, EXTERNAL: 0.91
CRP SERPL HS-MCNC: >9.5 MG/L
CRYPTOC AG CSF QL IA: NEGATIVE
DAT POLY-SP REAG RBC QL: NORMAL
DIAGNOSIS, 93000: NORMAL
DIFFERENTIAL METHOD BLD: ABNORMAL
ENA SS-A AB SER-ACNC: <0.2 AI (ref 0–0.9)
ENA SS-B AB SER-ACNC: <0.2 AI (ref 0–0.9)
EOSINOPHIL # BLD AUTO: 0.2 X10E3/UL (ref 0–0.4)
EOSINOPHIL # BLD: 0 K/UL (ref 0–0.4)
EOSINOPHIL # BLD: 0.1 K/UL (ref 0–0.4)
EOSINOPHIL NFR BLD AUTO: 3 %
EOSINOPHIL NFR BLD: 0 % (ref 0–7)
EOSINOPHIL NFR BLD: 1 % (ref 0–7)
EPITH CASTS URNS QL MICRO: ABNORMAL /LPF
EPITH CASTS URNS QL MICRO: NORMAL /LPF
ERYTHROCYTE [DISTWIDTH] IN BLOOD BY AUTOMATED COUNT: 15.6 % (ref 11.5–14.5)
ERYTHROCYTE [DISTWIDTH] IN BLOOD BY AUTOMATED COUNT: 15.6 % (ref 11.5–14.5)
ERYTHROCYTE [DISTWIDTH] IN BLOOD BY AUTOMATED COUNT: 15.7 % (ref 11.5–14.5)
ERYTHROCYTE [DISTWIDTH] IN BLOOD BY AUTOMATED COUNT: 15.7 % (ref 11.5–14.5)
ERYTHROCYTE [DISTWIDTH] IN BLOOD BY AUTOMATED COUNT: 15.8 % (ref 11.5–14.5)
ERYTHROCYTE [DISTWIDTH] IN BLOOD BY AUTOMATED COUNT: 16.2 % (ref 11.5–14.5)
ERYTHROCYTE [DISTWIDTH] IN BLOOD BY AUTOMATED COUNT: 16.7 % (ref 11.5–14.5)
ERYTHROCYTE [DISTWIDTH] IN BLOOD BY AUTOMATED COUNT: 17.4 % (ref 11.5–14.5)
ERYTHROCYTE [DISTWIDTH] IN BLOOD BY AUTOMATED COUNT: 17.5 % (ref 12.3–15.4)
EST. AVERAGE GLUCOSE BLD GHB EST-MCNC: 108 MG/DL
EST. AVERAGE GLUCOSE BLD GHB EST-MCNC: 108 MG/DL
FERRITIN SERPL-MCNC: 140 NG/ML (ref 26–388)
FOLATE SERPL-MCNC: 23 NG/ML (ref 5–21)
GAMMA GLOB SERPL ELPH-MCNC: 0.6 G/DL (ref 0.4–1.8)
GFR SERPLBLD CREATININE-BSD FMLA CKD-EPI: 65 ML/MIN/{1.73_M2}
GFR SERPLBLD CREATININE-BSD FMLA CKD-EPI: 75 ML/MIN/{1.73_M2}
GLOBULIN SER CALC-MCNC: 2.3 G/DL (ref 2.2–3.9)
GLOBULIN SER CALC-MCNC: 2.6 G/DL (ref 2–4)
GLOBULIN SER CALC-MCNC: 2.6 G/L (ref 1.5–4.5)
GLOBULIN SER CALC-MCNC: 2.7 G/DL (ref 1.5–4.5)
GLOBULIN SER CALC-MCNC: 3.2 G/DL (ref 2–4)
GLOBULIN SER CALC-MCNC: 3.8 G/DL (ref 2–4)
GLOBULIN SER CALC-MCNC: 3.9 G/DL (ref 2–4)
GLUCOSE BLD STRIP.AUTO-MCNC: 100 MG/DL (ref 65–100)
GLUCOSE BLD STRIP.AUTO-MCNC: 105 MG/DL (ref 65–100)
GLUCOSE BLD STRIP.AUTO-MCNC: 85 MG/DL (ref 65–100)
GLUCOSE BLD STRIP.AUTO-MCNC: 95 MG/DL (ref 65–100)
GLUCOSE CSF-MCNC: 69 MG/DL (ref 40–70)
GLUCOSE SERPL-MCNC: 105 MG/DL (ref 65–100)
GLUCOSE SERPL-MCNC: 118 MG/DL (ref 65–100)
GLUCOSE SERPL-MCNC: 122 MG/DL (ref 65–100)
GLUCOSE SERPL-MCNC: 132 MG/DL (ref 65–99)
GLUCOSE SERPL-MCNC: 135 MG/DL (ref 65–100)
GLUCOSE SERPL-MCNC: 143 MG/DL (ref 65–100)
GLUCOSE SERPL-MCNC: 146 MG/DL (ref 65–100)
GLUCOSE SERPL-MCNC: 147 MG/DL (ref 65–100)
GLUCOSE SERPL-MCNC: 151 MG/DL (ref 65–100)
GLUCOSE SERPL-MCNC: 152 MG/DL (ref 65–100)
GLUCOSE SERPL-MCNC: 192 MG/DL (ref 65–100)
GLUCOSE SERPL-MCNC: 195 MG/DL (ref 65–99)
GLUCOSE SERPL-MCNC: 311 MG/DL (ref 65–100)
GLUCOSE SERPL-MCNC: 83 MG/DL (ref 65–100)
GLUCOSE SERPL-MCNC: 88 MG/DL (ref 65–100)
GLUCOSE SERPL-MCNC: 89 MG/DL (ref 65–100)
GLUCOSE UR STRIP.AUTO-MCNC: NEGATIVE MG/DL
GLUCOSE UR STRIP.AUTO-MCNC: NEGATIVE MG/DL
GRAM STN SPEC: NORMAL
GRAM STN SPEC: NORMAL
HAPTOGLOB SERPL-MCNC: 289 MG/DL (ref 30–200)
HBA1C MFR BLD: 5.4 % (ref 4.8–5.6)
HBA1C MFR BLD: 5.4 % (ref 4.8–5.6)
HCT VFR BLD AUTO: 28.6 % (ref 36.6–50.3)
HCT VFR BLD AUTO: 28.6 % (ref 36.6–50.3)
HCT VFR BLD AUTO: 28.9 % (ref 36.6–50.3)
HCT VFR BLD AUTO: 31.4 % (ref 36.6–50.3)
HCT VFR BLD AUTO: 33.5 % (ref 36.6–50.3)
HCT VFR BLD AUTO: 34.1 % (ref 36.6–50.3)
HCT VFR BLD AUTO: 34.7 % (ref 36.6–50.3)
HCT VFR BLD AUTO: 39 % (ref 36.6–50.3)
HCT VFR BLD AUTO: 40.2 % (ref 37.5–51)
HCT VFR BLD AUTO: 43.9 % (ref 36.6–50.3)
HCT VFR BLD AUTO: 47.9 % (ref 36.6–50.3)
HDLC SERPL-MCNC: 36 MG/DL
HEMOCCULT STL QL: NEGATIVE
HGB BLD-MCNC: 10.3 G/DL (ref 12.1–17)
HGB BLD-MCNC: 10.3 G/DL (ref 12.1–17)
HGB BLD-MCNC: 10.8 G/DL (ref 12.1–17)
HGB BLD-MCNC: 12.5 G/DL (ref 12.1–17)
HGB BLD-MCNC: 13.3 G/DL (ref 13–17.7)
HGB BLD-MCNC: 13.4 G/DL (ref 12.1–17)
HGB BLD-MCNC: 15.2 G/DL (ref 12.1–17)
HGB BLD-MCNC: 8.8 G/DL (ref 12.1–17)
HGB BLD-MCNC: 8.9 G/DL (ref 12.1–17)
HGB BLD-MCNC: 9 G/DL (ref 12.1–17)
HGB BLD-MCNC: 9.7 G/DL (ref 12.1–17)
HGB UR QL STRIP: ABNORMAL
HGB UR QL STRIP: NEGATIVE
HYALINE CASTS URNS QL MICRO: ABNORMAL /LPF (ref 0–5)
IMM GRANULOCYTES # BLD: 0 K/UL (ref 0–0.04)
IMM GRANULOCYTES # BLD: 0 X10E3/UL (ref 0–0.1)
IMM GRANULOCYTES # BLD: 0.1 K/UL (ref 0–0.04)
IMM GRANULOCYTES # BLD: 0.2 K/UL (ref 0–0.04)
IMM GRANULOCYTES NFR BLD AUTO: 0 % (ref 0–0.5)
IMM GRANULOCYTES NFR BLD AUTO: 0 % (ref 0–0.5)
IMM GRANULOCYTES NFR BLD AUTO: 1 % (ref 0–0.5)
IMM GRANULOCYTES NFR BLD AUTO: 2 % (ref 0–0.5)
IMM GRANULOCYTES NFR BLD AUTO: 3 % (ref 0–0.5)
IMM GRANULOCYTES NFR BLD AUTO: 4 % (ref 0–0.5)
IMM GRANULOCYTES NFR BLD: 0 %
INR PPP: 1.1 (ref 0.9–1.1)
INTERPRETATION, 910389: NORMAL
KAPPA LC FREE SER-MCNC: 30.5 MG/L (ref 3.3–19.4)
KAPPA LC FREE/LAMBDA FREE SER: 1.47 {RATIO} (ref 0.26–1.65)
KETONES UR QL STRIP.AUTO: 15 MG/DL
KETONES UR QL STRIP.AUTO: NEGATIVE MG/DL
LACTATE SERPL-SCNC: 1.7 MMOL/L (ref 0.4–2)
LACTATE SERPL-SCNC: 2.2 MMOL/L (ref 0.4–2)
LAMBDA LC FREE SERPL-MCNC: 20.8 MG/L (ref 5.7–26.3)
LDH SERPL L TO P-CCNC: 149 U/L (ref 85–241)
LDLC SERPL CALC-MCNC: 82 MG/DL (ref 0–99)
LEUKOCYTE ESTERASE UR QL STRIP.AUTO: NEGATIVE
LEUKOCYTE ESTERASE UR QL STRIP.AUTO: NEGATIVE
LYMPHOCYTES # BLD AUTO: 1.2 X10E3/UL (ref 0.7–3.1)
LYMPHOCYTES # BLD: 0.5 K/UL (ref 0.8–3.5)
LYMPHOCYTES # BLD: 0.6 K/UL (ref 0.8–3.5)
LYMPHOCYTES # BLD: 0.7 K/UL (ref 0.8–3.5)
LYMPHOCYTES # BLD: 0.8 K/UL (ref 0.8–3.5)
LYMPHOCYTES # BLD: 0.9 K/UL (ref 0.8–3.5)
LYMPHOCYTES # BLD: 0.9 K/UL (ref 0.8–3.5)
LYMPHOCYTES # BLD: 1.1 K/UL (ref 0.8–3.5)
LYMPHOCYTES # BLD: 1.4 K/UL (ref 0.8–3.5)
LYMPHOCYTES # BLD: 2 K/UL (ref 0.8–3.5)
LYMPHOCYTES NFR BLD AUTO: 18 %
LYMPHOCYTES NFR BLD: 12 % (ref 12–49)
LYMPHOCYTES NFR BLD: 15 % (ref 12–49)
LYMPHOCYTES NFR BLD: 19 % (ref 12–49)
LYMPHOCYTES NFR BLD: 19 % (ref 12–49)
LYMPHOCYTES NFR BLD: 23 % (ref 12–49)
LYMPHOCYTES NFR BLD: 26 % (ref 12–49)
LYMPHOCYTES NFR BLD: 27 % (ref 12–49)
Lab: NORMAL
M PROTEIN SERPL ELPH-MCNC: ABNORMAL G/DL
MAGNESIUM SERPL-MCNC: 1.7 MG/DL (ref 1.6–2.4)
MAGNESIUM SERPL-MCNC: 1.9 MG/DL (ref 1.6–2.4)
MAGNESIUM SERPL-MCNC: 2 MG/DL (ref 1.6–2.4)
MAGNESIUM SERPL-MCNC: 2 MG/DL (ref 1.6–2.4)
MCH RBC QN AUTO: 27.2 PG (ref 26–34)
MCH RBC QN AUTO: 27.3 PG (ref 26–34)
MCH RBC QN AUTO: 27.4 PG (ref 26–34)
MCH RBC QN AUTO: 27.5 PG (ref 26–34)
MCH RBC QN AUTO: 27.5 PG (ref 26–34)
MCH RBC QN AUTO: 27.6 PG (ref 26–34)
MCH RBC QN AUTO: 27.8 PG (ref 26–34)
MCH RBC QN AUTO: 27.8 PG (ref 26–34)
MCH RBC QN AUTO: 27.9 PG (ref 26–34)
MCH RBC QN AUTO: 28 PG (ref 26.6–33)
MCH RBC QN AUTO: 28 PG (ref 26–34)
MCHC RBC AUTO-ENTMCNC: 30.2 G/DL (ref 30–36.5)
MCHC RBC AUTO-ENTMCNC: 30.5 G/DL (ref 30–36.5)
MCHC RBC AUTO-ENTMCNC: 30.7 G/DL (ref 30–36.5)
MCHC RBC AUTO-ENTMCNC: 30.8 G/DL (ref 30–36.5)
MCHC RBC AUTO-ENTMCNC: 30.9 G/DL (ref 30–36.5)
MCHC RBC AUTO-ENTMCNC: 31.1 G/DL (ref 30–36.5)
MCHC RBC AUTO-ENTMCNC: 31.7 G/DL (ref 30–36.5)
MCHC RBC AUTO-ENTMCNC: 32.1 G/DL (ref 30–36.5)
MCHC RBC AUTO-ENTMCNC: 33.1 G/DL (ref 31.5–35.7)
MCV RBC AUTO: 85 FL (ref 79–97)
MCV RBC AUTO: 86.7 FL (ref 80–99)
MCV RBC AUTO: 87.1 FL (ref 80–99)
MCV RBC AUTO: 88.3 FL (ref 80–99)
MCV RBC AUTO: 88.5 FL (ref 80–99)
MCV RBC AUTO: 89.5 FL (ref 80–99)
MCV RBC AUTO: 89.6 FL (ref 80–99)
MCV RBC AUTO: 89.8 FL (ref 80–99)
MCV RBC AUTO: 89.9 FL (ref 80–99)
MCV RBC AUTO: 90.2 FL (ref 80–99)
MCV RBC AUTO: 90.5 FL (ref 80–99)
METAMYELOCYTES NFR BLD MANUAL: 1 %
MONOCYTES # BLD AUTO: 0.7 X10E3/UL (ref 0.1–0.9)
MONOCYTES # BLD: 0.2 K/UL (ref 0–1)
MONOCYTES # BLD: 0.3 K/UL (ref 0–1)
MONOCYTES # BLD: 0.3 K/UL (ref 0–1)
MONOCYTES # BLD: 0.4 K/UL (ref 0–1)
MONOCYTES # BLD: 0.6 K/UL (ref 0–1)
MONOCYTES # BLD: 0.8 K/UL (ref 0–1)
MONOCYTES # BLD: 0.9 K/UL (ref 0–1)
MONOCYTES # BLD: 1.3 K/UL (ref 0–1)
MONOCYTES # BLD: 1.5 K/UL (ref 0–1)
MONOCYTES NFR BLD AUTO: 10 %
MONOCYTES NFR BLD: 10 % (ref 5–13)
MONOCYTES NFR BLD: 12 % (ref 5–13)
MONOCYTES NFR BLD: 13 % (ref 5–13)
MONOCYTES NFR BLD: 14 % (ref 5–13)
MONOCYTES NFR BLD: 14 % (ref 5–13)
MONOCYTES NFR BLD: 15 % (ref 5–13)
MONOCYTES NFR BLD: 17 % (ref 5–13)
MONOCYTES NFR BLD: 4 % (ref 5–13)
MONOCYTES NFR BLD: 9 % (ref 5–13)
NEUTROPHILS # BLD AUTO: 4.6 X10E3/UL (ref 1.4–7)
NEUTROPHILS NFR BLD AUTO: 68 %
NEUTS SEG # BLD: 1.5 K/UL (ref 1.8–8)
NEUTS SEG # BLD: 2 K/UL (ref 1.8–8)
NEUTS SEG # BLD: 2.4 K/UL (ref 1.8–8)
NEUTS SEG # BLD: 2.5 K/UL (ref 1.8–8)
NEUTS SEG # BLD: 3.8 K/UL (ref 1.8–8)
NEUTS SEG # BLD: 3.9 K/UL (ref 1.8–8)
NEUTS SEG # BLD: 4.8 K/UL (ref 1.8–8)
NEUTS SEG # BLD: 6.1 K/UL (ref 1.8–8)
NEUTS SEG # BLD: 7.1 K/UL (ref 1.8–8)
NEUTS SEG NFR BLD: 57 % (ref 32–75)
NEUTS SEG NFR BLD: 60 % (ref 32–75)
NEUTS SEG NFR BLD: 63 % (ref 32–75)
NEUTS SEG NFR BLD: 66 % (ref 32–75)
NEUTS SEG NFR BLD: 66 % (ref 32–75)
NEUTS SEG NFR BLD: 68 % (ref 32–75)
NEUTS SEG NFR BLD: 69 % (ref 32–75)
NEUTS SEG NFR BLD: 72 % (ref 32–75)
NEUTS SEG NFR BLD: 83 % (ref 32–75)
NITRITE UR QL STRIP.AUTO: NEGATIVE
NITRITE UR QL STRIP.AUTO: NEGATIVE
NRBC # BLD: 0 K/UL (ref 0–0.01)
NRBC BLD-RTO: 0 PER 100 WBC
P-R INTERVAL, ECG05: 122 MS
P-R INTERVAL, ECG05: 124 MS
P-R INTERVAL, ECG05: 146 MS
P-R INTERVAL, ECG05: 374 MS
PH UR STRIP: 5 [PH] (ref 5–8)
PH UR STRIP: 7 [PH] (ref 5–8)
PHOSPHATE SERPL-MCNC: 2.7 MG/DL (ref 2.6–4.7)
PLATELET # BLD AUTO: 126 K/UL (ref 150–400)
PLATELET # BLD AUTO: 132 K/UL (ref 150–400)
PLATELET # BLD AUTO: 169 K/UL (ref 150–400)
PLATELET # BLD AUTO: 187 K/UL (ref 150–400)
PLATELET # BLD AUTO: 188 K/UL (ref 150–400)
PLATELET # BLD AUTO: 196 K/UL (ref 150–400)
PLATELET # BLD AUTO: 201 X10E3/UL (ref 150–379)
PLATELET # BLD AUTO: 206 K/UL (ref 150–400)
PLATELET # BLD AUTO: 220 K/UL (ref 150–400)
PLATELET # BLD AUTO: 283 K/UL (ref 150–400)
PLATELET # BLD AUTO: 302 K/UL (ref 150–400)
PMV BLD AUTO: 10 FL (ref 8.9–12.9)
PMV BLD AUTO: 10 FL (ref 8.9–12.9)
PMV BLD AUTO: 10.1 FL (ref 8.9–12.9)
PMV BLD AUTO: 10.2 FL (ref 8.9–12.9)
PMV BLD AUTO: 10.3 FL (ref 8.9–12.9)
PMV BLD AUTO: 10.4 FL (ref 8.9–12.9)
PMV BLD AUTO: 10.4 FL (ref 8.9–12.9)
PMV BLD AUTO: 10.9 FL (ref 8.9–12.9)
PMV BLD AUTO: 11 FL (ref 8.9–12.9)
PMV BLD AUTO: 9.7 FL (ref 8.9–12.9)
POTASSIUM SERPL-SCNC: 2.9 MMOL/L (ref 3.5–5.1)
POTASSIUM SERPL-SCNC: 2.9 MMOL/L (ref 3.5–5.1)
POTASSIUM SERPL-SCNC: 3.1 MMOL/L (ref 3.5–5.1)
POTASSIUM SERPL-SCNC: 3.2 MMOL/L (ref 3.5–5.1)
POTASSIUM SERPL-SCNC: 3.4 MMOL/L (ref 3.5–5.1)
POTASSIUM SERPL-SCNC: 3.5 MMOL/L (ref 3.5–5.1)
POTASSIUM SERPL-SCNC: 3.6 MMOL/L (ref 3.5–5.1)
POTASSIUM SERPL-SCNC: 3.6 MMOL/L (ref 3.5–5.1)
POTASSIUM SERPL-SCNC: 3.7 MMOL/L (ref 3.5–5.1)
POTASSIUM SERPL-SCNC: 3.8 MMOL/L (ref 3.5–5.1)
POTASSIUM SERPL-SCNC: 4 MMOL/L (ref 3.5–5.1)
POTASSIUM SERPL-SCNC: 4.2 MMOL/L (ref 3.5–5.1)
POTASSIUM SERPL-SCNC: 4.4 MMOL/L (ref 3.5–5.2)
POTASSIUM SERPL-SCNC: 4.4 MMOL/L (ref 3.5–5.2)
PROT CSF-MCNC: 67 MG/DL (ref 15–45)
PROT SERPL-MCNC: 4.6 G/DL (ref 6.4–8.2)
PROT SERPL-MCNC: 4.7 G/DL (ref 6–8.5)
PROT SERPL-MCNC: 5.5 G/DL (ref 6.4–8.2)
PROT SERPL-MCNC: 6.7 G/DL (ref 6.4–8.2)
PROT SERPL-MCNC: 6.7 G/DL (ref 6–8.5)
PROT SERPL-MCNC: 6.9 G/DL (ref 6–8.5)
PROT SERPL-MCNC: 7.1 G/DL (ref 6.4–8.2)
PROT UR STRIP-MCNC: NEGATIVE MG/DL
PROT UR STRIP-MCNC: NEGATIVE MG/DL
PROTHROMBIN TIME: 10.9 SEC (ref 9–11.1)
Q-T INTERVAL, ECG07: 374 MS
Q-T INTERVAL, ECG07: 404 MS
Q-T INTERVAL, ECG07: 410 MS
Q-T INTERVAL, ECG07: 418 MS
QRS DURATION, ECG06: 100 MS
QRS DURATION, ECG06: 86 MS
QRS DURATION, ECG06: 96 MS
QRS DURATION, ECG06: 96 MS
QTC CALCULATION (BEZET), ECG08: 442 MS
QTC CALCULATION (BEZET), ECG08: 450 MS
QTC CALCULATION (BEZET), ECG08: 451 MS
QTC CALCULATION (BEZET), ECG08: 481 MS
RBC # BLD AUTO: 3.16 M/UL (ref 4.1–5.7)
RBC # BLD AUTO: 3.18 M/UL (ref 4.1–5.7)
RBC # BLD AUTO: 3.23 M/UL (ref 4.1–5.7)
RBC # BLD AUTO: 3.55 M/UL (ref 4.1–5.7)
RBC # BLD AUTO: 3.74 M/UL (ref 4.1–5.7)
RBC # BLD AUTO: 3.78 M/UL (ref 4.1–5.7)
RBC # BLD AUTO: 3.93 M/UL (ref 4.1–5.7)
RBC # BLD AUTO: 4.5 M/UL (ref 4.1–5.7)
RBC # BLD AUTO: 4.75 X10E6/UL (ref 4.14–5.8)
RBC # BLD AUTO: 4.89 M/UL (ref 4.1–5.7)
RBC # BLD AUTO: 5.5 M/UL (ref 4.1–5.7)
RBC # CSF: 6 /CU MM
RBC # CSF: 8 /CU MM
RBC #/AREA URNS HPF: ABNORMAL /HPF (ref 0–5)
RBC #/AREA URNS HPF: NORMAL /HPF (ref 0–5)
RBC MORPH BLD: ABNORMAL
RETICS # AUTO: 0.03 M/UL (ref 0.03–0.1)
RETICS/RBC NFR AUTO: 0.8 % (ref 0.7–2.1)
RHEUMATOID FACT SERPL-ACNC: 46 IU/ML
RPR SER QL: NONREACTIVE
SEE BELOW:, 164879: NORMAL
SERVICE CMNT-IMP: ABNORMAL
SERVICE CMNT-IMP: NORMAL
SODIUM SERPL-SCNC: 138 MMOL/L (ref 136–145)
SODIUM SERPL-SCNC: 139 MMOL/L (ref 134–144)
SODIUM SERPL-SCNC: 139 MMOL/L (ref 136–145)
SODIUM SERPL-SCNC: 140 MMOL/L (ref 136–145)
SODIUM SERPL-SCNC: 141 MMOL/L (ref 136–145)
SODIUM SERPL-SCNC: 142 MMOL/L (ref 136–145)
SODIUM SERPL-SCNC: 142 MMOL/L (ref 136–145)
SODIUM SERPL-SCNC: 143 MMOL/L (ref 134–144)
SODIUM SERPL-SCNC: 144 MMOL/L (ref 136–145)
SODIUM SERPL-SCNC: 144 MMOL/L (ref 136–145)
SODIUM SERPL-SCNC: 145 MMOL/L (ref 136–145)
SODIUM SERPL-SCNC: 146 MMOL/L (ref 136–145)
SODIUM SERPL-SCNC: 147 MMOL/L (ref 136–145)
SODIUM SERPL-SCNC: 150 MMOL/L (ref 136–145)
SP GR UR REFRACTOMETRY: 1.03 (ref 1–1.03)
SP GR UR REFRACTOMETRY: <1.005 (ref 1–1.03)
T4 FREE SERPL-MCNC: 1.1 NG/DL (ref 0.8–1.5)
TRIGL SERPL-MCNC: 142 MG/DL (ref 0–149)
TROPONIN I SERPL-MCNC: <0.05 NG/ML
TSH SERPL DL<=0.005 MIU/L-ACNC: 1.55 UIU/ML (ref 0.45–4.5)
TSH SERPL DL<=0.05 MIU/L-ACNC: 1.64 UIU/ML (ref 0.36–3.74)
TUBE # CSF: 1
TUBE # CSF: 2
TUBE # CSF: 4
TUBE # CSF: 4
UA: UC IF INDICATED,UAUC: NORMAL
UROBILINOGEN UR QL STRIP.AUTO: 0.2 EU/DL (ref 0.2–1)
UROBILINOGEN UR QL STRIP.AUTO: 1 EU/DL (ref 0.2–1)
VENTRICULAR RATE, ECG03: 70 BPM
VENTRICULAR RATE, ECG03: 72 BPM
VENTRICULAR RATE, ECG03: 83 BPM
VENTRICULAR RATE, ECG03: 87 BPM
VIT B12 SERPL-MCNC: 614 PG/ML (ref 193–986)
VLDLC SERPL CALC-MCNC: 28 MG/DL (ref 5–40)
WBC # BLD AUTO: 10.8 K/UL (ref 4.1–11.1)
WBC # BLD AUTO: 2.4 K/UL (ref 4.1–11.1)
WBC # BLD AUTO: 3.1 K/UL (ref 4.1–11.1)
WBC # BLD AUTO: 3.5 K/UL (ref 4.1–11.1)
WBC # BLD AUTO: 4.3 K/UL (ref 4.1–11.1)
WBC # BLD AUTO: 4.8 K/UL (ref 4.1–11.1)
WBC # BLD AUTO: 5.5 K/UL (ref 4.1–11.1)
WBC # BLD AUTO: 5.7 K/UL (ref 4.1–11.1)
WBC # BLD AUTO: 6.8 X10E3/UL (ref 3.4–10.8)
WBC # BLD AUTO: 7.4 K/UL (ref 4.1–11.1)
WBC # BLD AUTO: 7.7 K/UL (ref 4.1–11.1)
WBC # CSF: 3 /CU MM (ref 0–5)
WBC # CSF: 4 /CU MM (ref 0–5)
WBC URNS QL MICRO: ABNORMAL /HPF (ref 0–4)
WBC URNS QL MICRO: NORMAL /HPF (ref 0–4)
WNV IGG SPEC QL IA: NEGATIVE
WNV IGM CSF QL IA: NEGATIVE

## 2018-01-01 PROCEDURE — G0300 HHS/HOSPICE OF LPN EA 15 MIN: HCPCS

## 2018-01-01 PROCEDURE — 65660000000 HC RM CCU STEPDOWN

## 2018-01-01 PROCEDURE — 36415 COLL VENOUS BLD VENIPUNCTURE: CPT | Performed by: INTERNAL MEDICINE

## 2018-01-01 PROCEDURE — 83010 ASSAY OF HAPTOGLOBIN QUANT: CPT | Performed by: INTERNAL MEDICINE

## 2018-01-01 PROCEDURE — 80053 COMPREHEN METABOLIC PANEL: CPT | Performed by: INTERNAL MEDICINE

## 2018-01-01 PROCEDURE — 86788 WEST NILE VIRUS AB IGM: CPT | Performed by: PSYCHIATRY & NEUROLOGY

## 2018-01-01 PROCEDURE — G8978 MOBILITY CURRENT STATUS: HCPCS | Performed by: PHYSICAL THERAPIST

## 2018-01-01 PROCEDURE — 80053 COMPREHEN METABOLIC PANEL: CPT | Performed by: EMERGENCY MEDICINE

## 2018-01-01 PROCEDURE — 36415 COLL VENOUS BLD VENIPUNCTURE: CPT | Performed by: PSYCHIATRY & NEUROLOGY

## 2018-01-01 PROCEDURE — 74011250636 HC RX REV CODE- 250/636: Performed by: INTERNAL MEDICINE

## 2018-01-01 PROCEDURE — 85025 COMPLETE CBC W/AUTO DIFF WBC: CPT | Performed by: INTERNAL MEDICINE

## 2018-01-01 PROCEDURE — 74011250637 HC RX REV CODE- 250/637: Performed by: INTERNAL MEDICINE

## 2018-01-01 PROCEDURE — G0299 HHS/HOSPICE OF RN EA 15 MIN: HCPCS

## 2018-01-01 PROCEDURE — 65610000006 HC RM INTENSIVE CARE

## 2018-01-01 PROCEDURE — 97161 PT EVAL LOW COMPLEX 20 MIN: CPT | Performed by: PHYSICAL THERAPIST

## 2018-01-01 PROCEDURE — 74011250636 HC RX REV CODE- 250/636: Performed by: EMERGENCY MEDICINE

## 2018-01-01 PROCEDURE — 97530 THERAPEUTIC ACTIVITIES: CPT

## 2018-01-01 PROCEDURE — 86235 NUCLEAR ANTIGEN ANTIBODY: CPT | Performed by: PSYCHIATRY & NEUROLOGY

## 2018-01-01 PROCEDURE — 80048 BASIC METABOLIC PNL TOTAL CA: CPT | Performed by: INTERNAL MEDICINE

## 2018-01-01 PROCEDURE — G0151 HHCP-SERV OF PT,EA 15 MIN: HCPCS

## 2018-01-01 PROCEDURE — 74011000250 HC RX REV CODE- 250: Performed by: EMERGENCY MEDICINE

## 2018-01-01 PROCEDURE — 86038 ANTINUCLEAR ANTIBODIES: CPT | Performed by: PSYCHIATRY & NEUROLOGY

## 2018-01-01 PROCEDURE — 97530 THERAPEUTIC ACTIVITIES: CPT | Performed by: PHYSICAL THERAPIST

## 2018-01-01 PROCEDURE — G0152 HHCP-SERV OF OT,EA 15 MIN: HCPCS

## 2018-01-01 PROCEDURE — 74011000250 HC RX REV CODE- 250: Performed by: INTERNAL MEDICINE

## 2018-01-01 PROCEDURE — 86431 RHEUMATOID FACTOR QUANT: CPT | Performed by: PSYCHIATRY & NEUROLOGY

## 2018-01-01 PROCEDURE — 36415 COLL VENOUS BLD VENIPUNCTURE: CPT | Performed by: EMERGENCY MEDICINE

## 2018-01-01 PROCEDURE — 76040000007: Performed by: INTERNAL MEDICINE

## 2018-01-01 PROCEDURE — 97110 THERAPEUTIC EXERCISES: CPT | Performed by: OCCUPATIONAL THERAPIST

## 2018-01-01 PROCEDURE — 74177 CT ABD & PELVIS W/CONTRAST: CPT

## 2018-01-01 PROCEDURE — 82607 VITAMIN B-12: CPT | Performed by: INTERNAL MEDICINE

## 2018-01-01 PROCEDURE — 71045 X-RAY EXAM CHEST 1 VIEW: CPT

## 2018-01-01 PROCEDURE — G8987 SELF CARE CURRENT STATUS: HCPCS | Performed by: OCCUPATIONAL THERAPIST

## 2018-01-01 PROCEDURE — 86618 LYME DISEASE ANTIBODY: CPT | Performed by: PSYCHIATRY & NEUROLOGY

## 2018-01-01 PROCEDURE — 96360 HYDRATION IV INFUSION INIT: CPT

## 2018-01-01 PROCEDURE — 83735 ASSAY OF MAGNESIUM: CPT | Performed by: EMERGENCY MEDICINE

## 2018-01-01 PROCEDURE — 81001 URINALYSIS AUTO W/SCOPE: CPT | Performed by: EMERGENCY MEDICINE

## 2018-01-01 PROCEDURE — 85025 COMPLETE CBC W/AUTO DIFF WBC: CPT | Performed by: EMERGENCY MEDICINE

## 2018-01-01 PROCEDURE — 97535 SELF CARE MNGMENT TRAINING: CPT | Performed by: OCCUPATIONAL THERAPIST

## 2018-01-01 PROCEDURE — 84155 ASSAY OF PROTEIN SERUM: CPT | Performed by: INTERNAL MEDICINE

## 2018-01-01 PROCEDURE — 83605 ASSAY OF LACTIC ACID: CPT | Performed by: EMERGENCY MEDICINE

## 2018-01-01 PROCEDURE — 96374 THER/PROPH/DIAG INJ IV PUSH: CPT

## 2018-01-01 PROCEDURE — 83615 LACTATE (LD) (LDH) ENZYME: CPT | Performed by: INTERNAL MEDICINE

## 2018-01-01 PROCEDURE — 82962 GLUCOSE BLOOD TEST: CPT

## 2018-01-01 PROCEDURE — G8987 SELF CARE CURRENT STATUS: HCPCS

## 2018-01-01 PROCEDURE — 86592 SYPHILIS TEST NON-TREP QUAL: CPT | Performed by: PSYCHIATRY & NEUROLOGY

## 2018-01-01 PROCEDURE — 77010033678 HC OXYGEN DAILY

## 2018-01-01 PROCEDURE — 85027 COMPLETE CBC AUTOMATED: CPT | Performed by: INTERNAL MEDICINE

## 2018-01-01 PROCEDURE — 93005 ELECTROCARDIOGRAM TRACING: CPT

## 2018-01-01 PROCEDURE — 0DBP8ZX EXCISION OF RECTUM, VIA NATURAL OR ARTIFICIAL OPENING ENDOSCOPIC, DIAGNOSTIC: ICD-10-PCS | Performed by: INTERNAL MEDICINE

## 2018-01-01 PROCEDURE — 83735 ASSAY OF MAGNESIUM: CPT | Performed by: INTERNAL MEDICINE

## 2018-01-01 PROCEDURE — 74011636320 HC RX REV CODE- 636/320: Performed by: INTERNAL MEDICINE

## 2018-01-01 PROCEDURE — 84100 ASSAY OF PHOSPHORUS: CPT | Performed by: INTERNAL MEDICINE

## 2018-01-01 PROCEDURE — 97116 GAIT TRAINING THERAPY: CPT | Performed by: PHYSICAL THERAPIST

## 2018-01-01 PROCEDURE — G0157 HHC PT ASSISTANT EA 15: HCPCS

## 2018-01-01 PROCEDURE — 86141 C-REACTIVE PROTEIN HS: CPT | Performed by: PSYCHIATRY & NEUROLOGY

## 2018-01-01 PROCEDURE — 82728 ASSAY OF FERRITIN: CPT | Performed by: INTERNAL MEDICINE

## 2018-01-01 PROCEDURE — 97165 OT EVAL LOW COMPLEX 30 MIN: CPT | Performed by: OCCUPATIONAL THERAPIST

## 2018-01-01 PROCEDURE — 82945 GLUCOSE OTHER FLUID: CPT | Performed by: PSYCHIATRY & NEUROLOGY

## 2018-01-01 PROCEDURE — 74011636637 HC RX REV CODE- 636/637: Performed by: INTERNAL MEDICINE

## 2018-01-01 PROCEDURE — 87205 SMEAR GRAM STAIN: CPT | Performed by: PSYCHIATRY & NEUROLOGY

## 2018-01-01 PROCEDURE — 82550 ASSAY OF CK (CPK): CPT | Performed by: EMERGENCY MEDICINE

## 2018-01-01 PROCEDURE — 0DBG8ZX EXCISION OF LEFT LARGE INTESTINE, VIA NATURAL OR ARTIFICIAL OPENING ENDOSCOPIC, DIAGNOSTIC: ICD-10-PCS | Performed by: INTERNAL MEDICINE

## 2018-01-01 PROCEDURE — A9575 INJ GADOTERATE MEGLUMI 0.1ML: HCPCS | Performed by: INTERNAL MEDICINE

## 2018-01-01 PROCEDURE — 71552 MRI CHEST W/O & W/DYE: CPT

## 2018-01-01 PROCEDURE — 85045 AUTOMATED RETICULOCYTE COUNT: CPT | Performed by: INTERNAL MEDICINE

## 2018-01-01 PROCEDURE — 82525 ASSAY OF COPPER: CPT | Performed by: INTERNAL MEDICINE

## 2018-01-01 PROCEDURE — 77030034696 HC CATH URETH FOL 2W BARD -A

## 2018-01-01 PROCEDURE — 86256 FLUORESCENT ANTIBODY TITER: CPT | Performed by: PSYCHIATRY & NEUROLOGY

## 2018-01-01 PROCEDURE — 89050 BODY FLUID CELL COUNT: CPT | Performed by: PSYCHIATRY & NEUROLOGY

## 2018-01-01 PROCEDURE — 71260 CT THORAX DX C+: CPT

## 2018-01-01 PROCEDURE — 77030009426 HC FCPS BIOP ENDOSC BSC -B: Performed by: INTERNAL MEDICINE

## 2018-01-01 PROCEDURE — 85610 PROTHROMBIN TIME: CPT | Performed by: EMERGENCY MEDICINE

## 2018-01-01 PROCEDURE — 84157 ASSAY OF PROTEIN OTHER: CPT | Performed by: PSYCHIATRY & NEUROLOGY

## 2018-01-01 PROCEDURE — 87327 CRYPTOCOCCUS NEOFORM AG IA: CPT | Performed by: PSYCHIATRY & NEUROLOGY

## 2018-01-01 PROCEDURE — 86617 LYME DISEASE ANTIBODY: CPT | Performed by: PSYCHIATRY & NEUROLOGY

## 2018-01-01 PROCEDURE — 97116 GAIT TRAINING THERAPY: CPT

## 2018-01-01 PROCEDURE — 74011000255 HC RX REV CODE- 255: Performed by: INTERNAL MEDICINE

## 2018-01-01 PROCEDURE — 74011636320 HC RX REV CODE- 636/320: Performed by: EMERGENCY MEDICINE

## 2018-01-01 PROCEDURE — 88108 CYTOPATH CONCENTRATE TECH: CPT | Performed by: INTERNAL MEDICINE

## 2018-01-01 PROCEDURE — 72141 MRI NECK SPINE W/O DYE: CPT

## 2018-01-01 PROCEDURE — 83883 ASSAY NEPHELOMETRY NOT SPEC: CPT | Performed by: INTERNAL MEDICINE

## 2018-01-01 PROCEDURE — 97535 SELF CARE MNGMENT TRAINING: CPT

## 2018-01-01 PROCEDURE — 65270000029 HC RM PRIVATE

## 2018-01-01 PROCEDURE — 99285 EMERGENCY DEPT VISIT HI MDM: CPT

## 2018-01-01 PROCEDURE — 74011250637 HC RX REV CODE- 250/637: Performed by: EMERGENCY MEDICINE

## 2018-01-01 PROCEDURE — 82746 ASSAY OF FOLIC ACID SERUM: CPT | Performed by: INTERNAL MEDICINE

## 2018-01-01 PROCEDURE — 0DBF8ZX EXCISION OF RIGHT LARGE INTESTINE, VIA NATURAL OR ARTIFICIAL OPENING ENDOSCOPIC, DIAGNOSTIC: ICD-10-PCS | Performed by: INTERNAL MEDICINE

## 2018-01-01 PROCEDURE — 74011250637 HC RX REV CODE- 250/637: Performed by: NURSE PRACTITIONER

## 2018-01-01 PROCEDURE — 74011250636 HC RX REV CODE- 250/636

## 2018-01-01 PROCEDURE — 77030011943

## 2018-01-01 PROCEDURE — 82164 ANGIOTENSIN I ENZYME TEST: CPT | Performed by: PSYCHIATRY & NEUROLOGY

## 2018-01-01 PROCEDURE — 84439 ASSAY OF FREE THYROXINE: CPT | Performed by: PSYCHIATRY & NEUROLOGY

## 2018-01-01 PROCEDURE — 82272 OCCULT BLD FECES 1-3 TESTS: CPT | Performed by: EMERGENCY MEDICINE

## 2018-01-01 PROCEDURE — 77003 FLUOROGUIDE FOR SPINE INJECT: CPT

## 2018-01-01 PROCEDURE — G8979 MOBILITY GOAL STATUS: HCPCS | Performed by: PHYSICAL THERAPIST

## 2018-01-01 PROCEDURE — 76060000032 HC ANESTHESIA 0.5 TO 1 HR: Performed by: INTERNAL MEDICINE

## 2018-01-01 PROCEDURE — 74011000258 HC RX REV CODE- 258: Performed by: INTERNAL MEDICINE

## 2018-01-01 PROCEDURE — 94760 N-INVAS EAR/PLS OXIMETRY 1: CPT

## 2018-01-01 PROCEDURE — 70551 MRI BRAIN STEM W/O DYE: CPT

## 2018-01-01 PROCEDURE — 72148 MRI LUMBAR SPINE W/O DYE: CPT

## 2018-01-01 PROCEDURE — G8988 SELF CARE GOAL STATUS: HCPCS | Performed by: OCCUPATIONAL THERAPIST

## 2018-01-01 PROCEDURE — 87040 BLOOD CULTURE FOR BACTERIA: CPT | Performed by: EMERGENCY MEDICINE

## 2018-01-01 PROCEDURE — 97165 OT EVAL LOW COMPLEX 30 MIN: CPT

## 2018-01-01 PROCEDURE — G8978 MOBILITY CURRENT STATUS: HCPCS

## 2018-01-01 PROCEDURE — G8988 SELF CARE GOAL STATUS: HCPCS

## 2018-01-01 PROCEDURE — G8989 SELF CARE D/C STATUS: HCPCS | Performed by: OCCUPATIONAL THERAPIST

## 2018-01-01 PROCEDURE — 400013 HH SOC

## 2018-01-01 PROCEDURE — 86880 COOMBS TEST DIRECT: CPT | Performed by: INTERNAL MEDICINE

## 2018-01-01 PROCEDURE — 97162 PT EVAL MOD COMPLEX 30 MIN: CPT

## 2018-01-01 PROCEDURE — 84484 ASSAY OF TROPONIN QUANT: CPT | Performed by: EMERGENCY MEDICINE

## 2018-01-01 PROCEDURE — 84443 ASSAY THYROID STIM HORMONE: CPT | Performed by: INTERNAL MEDICINE

## 2018-01-01 PROCEDURE — 70450 CT HEAD/BRAIN W/O DYE: CPT

## 2018-01-01 PROCEDURE — 5A1213Z PERFORMANCE OF CARDIAC PACING, INTERMITTENT: ICD-10-PCS | Performed by: INTERNAL MEDICINE

## 2018-01-01 PROCEDURE — G8979 MOBILITY GOAL STATUS: HCPCS

## 2018-01-01 PROCEDURE — 87493 C DIFF AMPLIFIED PROBE: CPT | Performed by: EMERGENCY MEDICINE

## 2018-01-01 PROCEDURE — 74011636637 HC RX REV CODE- 636/637: Performed by: GENERAL ACUTE CARE HOSPITAL

## 2018-01-01 RX ORDER — POTASSIUM CHLORIDE 750 MG/1
20 TABLET, FILM COATED, EXTENDED RELEASE ORAL 3 TIMES DAILY
Status: DISCONTINUED | OUTPATIENT
Start: 2018-01-01 | End: 2018-01-01 | Stop reason: HOSPADM

## 2018-01-01 RX ORDER — LORAZEPAM 1 MG/1
1 TABLET ORAL
Status: DISCONTINUED | OUTPATIENT
Start: 2018-01-01 | End: 2018-01-01 | Stop reason: HOSPADM

## 2018-01-01 RX ORDER — DEXTROSE MONOHYDRATE 50 MG/ML
75 INJECTION, SOLUTION INTRAVENOUS CONTINUOUS
Status: DISCONTINUED | OUTPATIENT
Start: 2018-01-01 | End: 2018-01-01

## 2018-01-01 RX ORDER — POTASSIUM CHLORIDE 750 MG/1
10 TABLET, FILM COATED, EXTENDED RELEASE ORAL DAILY
Qty: 30 TAB | Refills: 0 | Status: SHIPPED
Start: 2018-01-01

## 2018-01-01 RX ORDER — LORAZEPAM 1 MG/1
1 TABLET ORAL
COMMUNITY
End: 2018-01-01

## 2018-01-01 RX ORDER — HYDROMORPHONE HYDROCHLORIDE 2 MG/ML
0.2 INJECTION, SOLUTION INTRAMUSCULAR; INTRAVENOUS; SUBCUTANEOUS
Status: DISCONTINUED | OUTPATIENT
Start: 2018-01-01 | End: 2018-01-01

## 2018-01-01 RX ORDER — SODIUM CHLORIDE 0.9 % (FLUSH) 0.9 %
5-10 SYRINGE (ML) INJECTION AS NEEDED
Status: ACTIVE | OUTPATIENT
Start: 2018-01-01 | End: 2018-01-01

## 2018-01-01 RX ORDER — LIDOCAINE HYDROCHLORIDE 20 MG/ML
INJECTION, SOLUTION EPIDURAL; INFILTRATION; INTRACAUDAL; PERINEURAL AS NEEDED
Status: DISCONTINUED | OUTPATIENT
Start: 2018-01-01 | End: 2018-01-01

## 2018-01-01 RX ORDER — METOPROLOL SUCCINATE 25 MG/1
25 TABLET, EXTENDED RELEASE ORAL EVERY EVENING
Status: DISCONTINUED | OUTPATIENT
Start: 2018-01-01 | End: 2018-01-01

## 2018-01-01 RX ORDER — HYDROCORTISONE SODIUM SUCCINATE 100 MG/2ML
50 INJECTION, POWDER, FOR SOLUTION INTRAMUSCULAR; INTRAVENOUS DAILY
Status: DISCONTINUED | OUTPATIENT
Start: 2018-01-01 | End: 2018-01-01 | Stop reason: HOSPADM

## 2018-01-01 RX ORDER — OXYCODONE AND ACETAMINOPHEN 5; 325 MG/1; MG/1
1 TABLET ORAL
Status: COMPLETED | OUTPATIENT
Start: 2018-01-01 | End: 2018-01-01

## 2018-01-01 RX ORDER — MORPHINE SULFATE 4 MG/ML
1 INJECTION INTRAVENOUS
Status: DISCONTINUED | OUTPATIENT
Start: 2018-01-01 | End: 2018-01-01

## 2018-01-01 RX ORDER — METRONIDAZOLE 500 MG/100ML
500 INJECTION, SOLUTION INTRAVENOUS
Status: COMPLETED | OUTPATIENT
Start: 2018-01-01 | End: 2018-01-01

## 2018-01-01 RX ORDER — DEXTROMETHORPHAN/PSEUDOEPHED 2.5-7.5/.8
1.2 DROPS ORAL
Status: DISCONTINUED | OUTPATIENT
Start: 2018-01-01 | End: 2018-01-01 | Stop reason: HOSPADM

## 2018-01-01 RX ORDER — CIPROFLOXACIN 500 MG/1
500 TABLET ORAL
Status: COMPLETED | OUTPATIENT
Start: 2018-01-01 | End: 2018-01-01

## 2018-01-01 RX ORDER — GABAPENTIN 600 MG/1
600 TABLET ORAL 3 TIMES DAILY
Qty: 270 TAB | Refills: 1 | Status: SHIPPED | OUTPATIENT
Start: 2018-01-01 | End: 2018-01-01 | Stop reason: DRUGHIGH

## 2018-01-01 RX ORDER — POLYETHYLENE GLYCOL 3350 17 G/17G
119 POWDER, FOR SOLUTION ORAL ONCE
Status: COMPLETED | OUTPATIENT
Start: 2018-01-01 | End: 2018-01-01

## 2018-01-01 RX ORDER — ATORVASTATIN CALCIUM 20 MG/1
20 TABLET, FILM COATED ORAL
Status: DISCONTINUED | OUTPATIENT
Start: 2018-01-01 | End: 2018-01-01

## 2018-01-01 RX ORDER — GUAIFENESIN 100 MG/5ML
81 LIQUID (ML) ORAL EVERY EVENING
Status: DISCONTINUED | OUTPATIENT
Start: 2018-01-01 | End: 2018-01-01 | Stop reason: HOSPADM

## 2018-01-01 RX ORDER — PREDNISONE 2.5 MG/1
2.5 TABLET ORAL DAILY
Qty: 30 TAB | Refills: 0 | Status: SHIPPED
Start: 2018-01-01

## 2018-01-01 RX ORDER — MAGNESIUM SULFATE HEPTAHYDRATE 40 MG/ML
2 INJECTION, SOLUTION INTRAVENOUS ONCE
Status: COMPLETED | OUTPATIENT
Start: 2018-01-01 | End: 2018-01-01

## 2018-01-01 RX ORDER — POTASSIUM CHLORIDE 20 MEQ/1
40 TABLET, EXTENDED RELEASE ORAL
Status: DISCONTINUED | OUTPATIENT
Start: 2018-01-01 | End: 2018-01-01

## 2018-01-01 RX ORDER — VANCOMYCIN HYDROCHLORIDE 125 MG/1
125 CAPSULE ORAL EVERY 6 HOURS
COMMUNITY
Start: 2018-01-01 | End: 2018-01-01

## 2018-01-01 RX ORDER — SODIUM CHLORIDE 0.9 % (FLUSH) 0.9 %
5-10 SYRINGE (ML) INJECTION EVERY 8 HOURS
Status: DISPENSED | OUTPATIENT
Start: 2018-01-01 | End: 2018-01-01

## 2018-01-01 RX ORDER — NALOXONE HYDROCHLORIDE 0.4 MG/ML
0.4 INJECTION, SOLUTION INTRAMUSCULAR; INTRAVENOUS; SUBCUTANEOUS AS NEEDED
Status: DISCONTINUED | OUTPATIENT
Start: 2018-01-01 | End: 2018-01-01 | Stop reason: HOSPADM

## 2018-01-01 RX ORDER — MESALAMINE 400 MG/1
800 CAPSULE, DELAYED RELEASE ORAL 3 TIMES DAILY
Qty: 180 CAP | Refills: 0 | Status: SHIPPED | OUTPATIENT
Start: 2018-01-01 | End: 2018-01-01 | Stop reason: RX

## 2018-01-01 RX ORDER — SODIUM CHLORIDE 9 MG/ML
75 INJECTION, SOLUTION INTRAVENOUS CONTINUOUS
Status: DISCONTINUED | OUTPATIENT
Start: 2018-01-01 | End: 2018-01-01

## 2018-01-01 RX ORDER — SORBITOL SOLUTION 70 %
60 SOLUTION, ORAL MISCELLANEOUS
Status: COMPLETED | OUTPATIENT
Start: 2018-01-01 | End: 2018-01-01

## 2018-01-01 RX ORDER — SODIUM CHLORIDE 0.9 % (FLUSH) 0.9 %
5-10 SYRINGE (ML) INJECTION AS NEEDED
Status: DISCONTINUED | OUTPATIENT
Start: 2018-01-01 | End: 2018-01-01 | Stop reason: HOSPADM

## 2018-01-01 RX ORDER — LIDOCAINE HYDROCHLORIDE 10 MG/ML
4 INJECTION, SOLUTION EPIDURAL; INFILTRATION; INTRACAUDAL; PERINEURAL
Status: DISPENSED | OUTPATIENT
Start: 2018-01-01 | End: 2018-01-01

## 2018-01-01 RX ORDER — SODIUM CHLORIDE 9 MG/ML
100 INJECTION, SOLUTION INTRAVENOUS CONTINUOUS
Status: DISCONTINUED | OUTPATIENT
Start: 2018-01-01 | End: 2018-01-01

## 2018-01-01 RX ORDER — POTASSIUM CHLORIDE 7.45 MG/ML
10 INJECTION INTRAVENOUS
Status: DISCONTINUED | OUTPATIENT
Start: 2018-01-01 | End: 2018-01-01

## 2018-01-01 RX ORDER — ENOXAPARIN SODIUM 100 MG/ML
40 INJECTION SUBCUTANEOUS EVERY 24 HOURS
Status: DISCONTINUED | OUTPATIENT
Start: 2018-01-01 | End: 2018-01-01 | Stop reason: DRUGHIGH

## 2018-01-01 RX ORDER — FLUMAZENIL 0.1 MG/ML
0.2 INJECTION INTRAVENOUS
Status: ACTIVE | OUTPATIENT
Start: 2018-01-01 | End: 2018-01-01

## 2018-01-01 RX ORDER — LOPERAMIDE HCL 2 MG
4 TABLET ORAL
COMMUNITY
End: 2018-01-01

## 2018-01-01 RX ORDER — MESALAMINE 800 MG/1
800 TABLET, DELAYED RELEASE ORAL 3 TIMES DAILY
Qty: 90 TAB | Refills: 2 | Status: SHIPPED | OUTPATIENT
Start: 2018-01-01

## 2018-01-01 RX ORDER — SODIUM CHLORIDE 9 MG/ML
50 INJECTION, SOLUTION INTRAVENOUS
Status: COMPLETED | OUTPATIENT
Start: 2018-01-01 | End: 2018-01-01

## 2018-01-01 RX ORDER — SODIUM CHLORIDE 0.9 % (FLUSH) 0.9 %
10 SYRINGE (ML) INJECTION
Status: COMPLETED | OUTPATIENT
Start: 2018-01-01 | End: 2018-01-01

## 2018-01-01 RX ORDER — EPINEPHRINE 0.1 MG/ML
1 INJECTION INTRACARDIAC; INTRAVENOUS
Status: DISCONTINUED | OUTPATIENT
Start: 2018-01-01 | End: 2018-01-01 | Stop reason: HOSPADM

## 2018-01-01 RX ORDER — HYDROCORTISONE SODIUM SUCCINATE 100 MG/2ML
50 INJECTION, POWDER, FOR SOLUTION INTRAMUSCULAR; INTRAVENOUS EVERY 8 HOURS
Status: DISCONTINUED | OUTPATIENT
Start: 2018-01-01 | End: 2018-01-01

## 2018-01-01 RX ORDER — HYDROCORTISONE SODIUM SUCCINATE 100 MG/2ML
50 INJECTION, POWDER, FOR SOLUTION INTRAMUSCULAR; INTRAVENOUS EVERY 12 HOURS
Status: DISCONTINUED | OUTPATIENT
Start: 2018-01-01 | End: 2018-01-01

## 2018-01-01 RX ORDER — PREDNISONE 5 MG/1
2.5 TABLET ORAL DAILY
Status: DISCONTINUED | OUTPATIENT
Start: 2018-01-01 | End: 2018-01-01

## 2018-01-01 RX ORDER — BARIUM SULFATE 20 MG/ML
900 SUSPENSION ORAL
Status: COMPLETED | OUTPATIENT
Start: 2018-01-01 | End: 2018-01-01

## 2018-01-01 RX ORDER — METOPROLOL SUCCINATE 25 MG/1
TABLET, EXTENDED RELEASE ORAL
COMMUNITY
Start: 2018-01-01

## 2018-01-01 RX ORDER — OXYCODONE AND ACETAMINOPHEN 10; 325 MG/1; MG/1
1 TABLET ORAL
Qty: 15 TAB | Refills: 0 | Status: SHIPPED | OUTPATIENT
Start: 2018-01-01

## 2018-01-01 RX ORDER — METRONIDAZOLE 500 MG/100ML
500 INJECTION, SOLUTION INTRAVENOUS EVERY 8 HOURS
Status: DISCONTINUED | OUTPATIENT
Start: 2018-01-01 | End: 2018-01-01

## 2018-01-01 RX ORDER — DEXTROSE MONOHYDRATE 50 MG/ML
100 INJECTION, SOLUTION INTRAVENOUS CONTINUOUS
Status: DISCONTINUED | OUTPATIENT
Start: 2018-01-01 | End: 2018-01-01

## 2018-01-01 RX ORDER — DOCUSATE SODIUM 100 MG/1
100 CAPSULE, LIQUID FILLED ORAL
Status: DISCONTINUED | OUTPATIENT
Start: 2018-01-01 | End: 2018-01-01 | Stop reason: HOSPADM

## 2018-01-01 RX ORDER — FENTANYL CITRATE 50 UG/ML
50 INJECTION, SOLUTION INTRAMUSCULAR; INTRAVENOUS
Status: ACTIVE | OUTPATIENT
Start: 2018-01-01 | End: 2018-01-01

## 2018-01-01 RX ORDER — ACETAMINOPHEN 325 MG/1
325 TABLET ORAL
Status: DISCONTINUED | OUTPATIENT
Start: 2018-01-01 | End: 2018-01-01 | Stop reason: HOSPADM

## 2018-01-01 RX ORDER — OXYCODONE AND ACETAMINOPHEN 10; 325 MG/1; MG/1
1 TABLET ORAL
Status: DISCONTINUED | OUTPATIENT
Start: 2018-01-01 | End: 2018-01-01 | Stop reason: HOSPADM

## 2018-01-01 RX ORDER — VANCOMYCIN HYDROCHLORIDE 125 MG/1
125 CAPSULE ORAL 4 TIMES DAILY
Qty: 40 CAP | Refills: 0 | Status: SHIPPED | OUTPATIENT
Start: 2018-01-01 | End: 2018-01-01 | Stop reason: ALTCHOICE

## 2018-01-01 RX ORDER — MESALAMINE 400 MG/1
800 CAPSULE, DELAYED RELEASE ORAL 3 TIMES DAILY
Status: DISCONTINUED | OUTPATIENT
Start: 2018-01-01 | End: 2018-01-01 | Stop reason: HOSPADM

## 2018-01-01 RX ORDER — PREDNISONE 10 MG/1
TABLET ORAL
Qty: 37 TAB | Refills: 0 | Status: SHIPPED
Start: 2018-01-01

## 2018-01-01 RX ORDER — PROPOFOL 10 MG/ML
INJECTION, EMULSION INTRAVENOUS AS NEEDED
Status: DISCONTINUED | OUTPATIENT
Start: 2018-01-01 | End: 2018-01-01 | Stop reason: HOSPADM

## 2018-01-01 RX ORDER — FENTANYL CITRATE 50 UG/ML
50 INJECTION, SOLUTION INTRAMUSCULAR; INTRAVENOUS
Status: COMPLETED | OUTPATIENT
Start: 2018-01-01 | End: 2018-01-01

## 2018-01-01 RX ORDER — GABAPENTIN 300 MG/1
300 CAPSULE ORAL 3 TIMES DAILY
COMMUNITY
End: 2018-01-01 | Stop reason: SDUPTHER

## 2018-01-01 RX ORDER — GABAPENTIN 300 MG/1
300 CAPSULE ORAL 3 TIMES DAILY
Qty: 270 CAP | Refills: 3 | Status: SHIPPED | OUTPATIENT
Start: 2018-01-01

## 2018-01-01 RX ORDER — POLYETHYLENE GLYCOL 3350 17 G/17G
17 POWDER, FOR SOLUTION ORAL
COMMUNITY
End: 2018-01-01

## 2018-01-01 RX ORDER — MIDAZOLAM HYDROCHLORIDE 1 MG/ML
.25-5 INJECTION, SOLUTION INTRAMUSCULAR; INTRAVENOUS
Status: ACTIVE | OUTPATIENT
Start: 2018-01-01 | End: 2018-01-01

## 2018-01-01 RX ORDER — POTASSIUM CHLORIDE 750 MG/1
40 TABLET, FILM COATED, EXTENDED RELEASE ORAL ONCE
Status: DISCONTINUED | OUTPATIENT
Start: 2018-01-01 | End: 2018-01-01

## 2018-01-01 RX ORDER — LIDOCAINE HYDROCHLORIDE 20 MG/ML
INJECTION, SOLUTION EPIDURAL; INFILTRATION; INTRACAUDAL; PERINEURAL AS NEEDED
Status: DISCONTINUED | OUTPATIENT
Start: 2018-01-01 | End: 2018-01-01 | Stop reason: HOSPADM

## 2018-01-01 RX ORDER — ONDANSETRON 2 MG/ML
4 INJECTION INTRAMUSCULAR; INTRAVENOUS
Status: DISCONTINUED | OUTPATIENT
Start: 2018-01-01 | End: 2018-01-01 | Stop reason: HOSPADM

## 2018-01-01 RX ORDER — POLYETHYLENE GLYCOL 3350 17 G/17G
17 POWDER, FOR SOLUTION ORAL DAILY
Status: DISCONTINUED | OUTPATIENT
Start: 2018-01-01 | End: 2018-01-01 | Stop reason: HOSPADM

## 2018-01-01 RX ORDER — FOLIC ACID 1 MG/1
1 TABLET ORAL DAILY
Status: DISCONTINUED | OUTPATIENT
Start: 2018-01-01 | End: 2018-01-01 | Stop reason: HOSPADM

## 2018-01-01 RX ORDER — LANOLIN ALCOHOL/MO/W.PET/CERES
400 CREAM (GRAM) TOPICAL 2 TIMES DAILY
Status: COMPLETED | OUTPATIENT
Start: 2018-01-01 | End: 2018-01-01

## 2018-01-01 RX ORDER — LANOLIN ALCOHOL/MO/W.PET/CERES
3 CREAM (GRAM) TOPICAL
Status: DISCONTINUED | OUTPATIENT
Start: 2018-01-01 | End: 2018-01-01 | Stop reason: HOSPADM

## 2018-01-01 RX ORDER — GUAIFENESIN 100 MG/5ML
81 LIQUID (ML) ORAL EVERY EVENING
Qty: 90 TAB | Refills: 3
Start: 2018-01-01

## 2018-01-01 RX ORDER — MORPHINE SULFATE 2 MG/ML
1 INJECTION, SOLUTION INTRAMUSCULAR; INTRAVENOUS
Status: DISCONTINUED | OUTPATIENT
Start: 2018-01-01 | End: 2018-01-01 | Stop reason: HOSPADM

## 2018-01-01 RX ORDER — ENOXAPARIN SODIUM 100 MG/ML
30 INJECTION SUBCUTANEOUS EVERY 24 HOURS
Status: DISCONTINUED | OUTPATIENT
Start: 2018-01-01 | End: 2018-01-01 | Stop reason: HOSPADM

## 2018-01-01 RX ORDER — DEXTROSE AND POTASSIUM CHLORIDE 5; .15 G/100ML; G/100ML
SOLUTION INTRAVENOUS CONTINUOUS
Status: DISCONTINUED | OUTPATIENT
Start: 2018-01-01 | End: 2018-01-01

## 2018-01-01 RX ORDER — HYDROCORTISONE SODIUM SUCCINATE 100 MG/2ML
100 INJECTION, POWDER, FOR SOLUTION INTRAMUSCULAR; INTRAVENOUS EVERY 8 HOURS
Status: DISCONTINUED | OUTPATIENT
Start: 2018-01-01 | End: 2018-01-01

## 2018-01-01 RX ORDER — MUPIROCIN 20 MG/G
OINTMENT TOPICAL 2 TIMES DAILY
Status: DISPENSED | OUTPATIENT
Start: 2018-01-01 | End: 2018-01-01

## 2018-01-01 RX ORDER — NALOXONE HYDROCHLORIDE 0.4 MG/ML
0.4 INJECTION, SOLUTION INTRAMUSCULAR; INTRAVENOUS; SUBCUTANEOUS
Status: ACTIVE | OUTPATIENT
Start: 2018-01-01 | End: 2018-01-01

## 2018-01-01 RX ORDER — GADOTERATE MEGLUMINE 376.9 MG/ML
10 INJECTION INTRAVENOUS
Status: COMPLETED | OUTPATIENT
Start: 2018-01-01 | End: 2018-01-01

## 2018-01-01 RX ORDER — METOPROLOL SUCCINATE 25 MG/1
25 TABLET, EXTENDED RELEASE ORAL EVERY EVENING
COMMUNITY
End: 2018-01-01

## 2018-01-01 RX ORDER — BISACODYL 5 MG
10 TABLET, DELAYED RELEASE (ENTERIC COATED) ORAL EVERY 12 HOURS
Status: COMPLETED | OUTPATIENT
Start: 2018-01-01 | End: 2018-01-01

## 2018-01-01 RX ORDER — PREDNISONE 5 MG/1
2.5 TABLET ORAL DAILY
Status: DISCONTINUED | OUTPATIENT
Start: 2018-01-01 | End: 2018-01-01 | Stop reason: HOSPADM

## 2018-01-01 RX ORDER — GABAPENTIN 300 MG/1
300 CAPSULE ORAL 3 TIMES DAILY
COMMUNITY
Start: 2017-01-01 | End: 2018-01-01 | Stop reason: DRUGHIGH

## 2018-01-01 RX ORDER — GABAPENTIN 300 MG/1
300 CAPSULE ORAL 3 TIMES DAILY
Status: DISCONTINUED | OUTPATIENT
Start: 2018-01-01 | End: 2018-01-01 | Stop reason: HOSPADM

## 2018-01-01 RX ORDER — SODIUM CHLORIDE 0.9 % (FLUSH) 0.9 %
5-10 SYRINGE (ML) INJECTION EVERY 8 HOURS
Status: DISCONTINUED | OUTPATIENT
Start: 2018-01-01 | End: 2018-01-01 | Stop reason: HOSPADM

## 2018-01-01 RX ORDER — POLYETHYLENE GLYCOL 3350, SODIUM SULFATE ANHYDROUS, SODIUM BICARBONATE, SODIUM CHLORIDE, POTASSIUM CHLORIDE 236; 22.74; 6.74; 5.86; 2.97 G/4L; G/4L; G/4L; G/4L; G/4L
4000 POWDER, FOR SOLUTION ORAL ONCE
Status: COMPLETED | OUTPATIENT
Start: 2018-01-01 | End: 2018-01-01

## 2018-01-01 RX ORDER — BISACODYL 5 MG
10 TABLET, DELAYED RELEASE (ENTERIC COATED) ORAL
Status: COMPLETED | OUTPATIENT
Start: 2018-01-01 | End: 2018-01-01

## 2018-01-01 RX ORDER — HEPARIN SODIUM 5000 [USP'U]/ML
5000 INJECTION, SOLUTION INTRAVENOUS; SUBCUTANEOUS EVERY 12 HOURS
Status: DISCONTINUED | OUTPATIENT
Start: 2018-01-01 | End: 2018-01-01 | Stop reason: HOSPADM

## 2018-01-01 RX ORDER — ATROPINE SULFATE 0.1 MG/ML
0.5 INJECTION INTRAVENOUS
Status: DISCONTINUED | OUTPATIENT
Start: 2018-01-01 | End: 2018-01-01 | Stop reason: HOSPADM

## 2018-01-01 RX ORDER — SODIUM CHLORIDE 9 MG/ML
150 INJECTION, SOLUTION INTRAVENOUS CONTINUOUS
Status: DISCONTINUED | OUTPATIENT
Start: 2018-01-01 | End: 2018-01-01 | Stop reason: HOSPADM

## 2018-01-01 RX ORDER — POLYETHYLENE GLYCOL 3350 17 G/17G
17 POWDER, FOR SOLUTION ORAL
Qty: 30 PACKET | Refills: 0 | Status: SHIPPED
Start: 2018-01-01

## 2018-01-01 RX ADMIN — DEXTROSE MONOHYDRATE 75 ML/HR: 5 INJECTION, SOLUTION INTRAVENOUS at 20:49

## 2018-01-01 RX ADMIN — MORPHINE SULFATE 1 MG: 2 INJECTION, SOLUTION INTRAMUSCULAR; INTRAVENOUS at 22:05

## 2018-01-01 RX ADMIN — HYDROMORPHONE HYDROCHLORIDE 0.2 MG: 2 INJECTION, SOLUTION INTRAMUSCULAR; INTRAVENOUS; SUBCUTANEOUS at 04:37

## 2018-01-01 RX ADMIN — FOLIC ACID 1 MG: 1 TABLET ORAL at 08:25

## 2018-01-01 RX ADMIN — GABAPENTIN 300 MG: 300 CAPSULE ORAL at 21:23

## 2018-01-01 RX ADMIN — POTASSIUM CHLORIDE 20 MEQ: 750 TABLET, EXTENDED RELEASE ORAL at 21:14

## 2018-01-01 RX ADMIN — METRONIDAZOLE 500 MG: 500 INJECTION, SOLUTION INTRAVENOUS at 18:03

## 2018-01-01 RX ADMIN — MUPIROCIN: 20 OINTMENT TOPICAL at 09:00

## 2018-01-01 RX ADMIN — POTASSIUM CHLORIDE 10 MEQ: 10 INJECTION, SOLUTION INTRAVENOUS at 05:43

## 2018-01-01 RX ADMIN — VANCOMYCIN HYDROCHLORIDE 125 MG: 1 INJECTION, POWDER, LYOPHILIZED, FOR SOLUTION INTRAVENOUS at 17:10

## 2018-01-01 RX ADMIN — MORPHINE SULFATE 1 MG: 2 INJECTION, SOLUTION INTRAMUSCULAR; INTRAVENOUS at 14:19

## 2018-01-01 RX ADMIN — ASPIRIN 81 MG 81 MG: 81 TABLET ORAL at 18:46

## 2018-01-01 RX ADMIN — GABAPENTIN 300 MG: 300 CAPSULE ORAL at 18:03

## 2018-01-01 RX ADMIN — ENOXAPARIN SODIUM 30 MG: 30 INJECTION SUBCUTANEOUS at 17:56

## 2018-01-01 RX ADMIN — OXYCODONE AND ACETAMINOPHEN 1 TABLET: 10; 325 TABLET ORAL at 10:16

## 2018-01-01 RX ADMIN — VANCOMYCIN HYDROCHLORIDE 125 MG: 1 INJECTION, POWDER, LYOPHILIZED, FOR SOLUTION INTRAVENOUS at 07:35

## 2018-01-01 RX ADMIN — MUPIROCIN: 20 OINTMENT TOPICAL at 08:26

## 2018-01-01 RX ADMIN — MUPIROCIN: 20 OINTMENT TOPICAL at 11:18

## 2018-01-01 RX ADMIN — MORPHINE SULFATE 1 MG: 2 INJECTION, SOLUTION INTRAMUSCULAR; INTRAVENOUS at 17:56

## 2018-01-01 RX ADMIN — MELATONIN TAB 3 MG 3 MG: 3 TAB at 21:37

## 2018-01-01 RX ADMIN — FOLIC ACID 1 MG: 1 TABLET ORAL at 11:19

## 2018-01-01 RX ADMIN — GABAPENTIN 300 MG: 300 CAPSULE ORAL at 16:01

## 2018-01-01 RX ADMIN — ASPIRIN 81 MG 81 MG: 81 TABLET ORAL at 18:37

## 2018-01-01 RX ADMIN — GABAPENTIN 300 MG: 300 CAPSULE ORAL at 15:10

## 2018-01-01 RX ADMIN — OXYCODONE AND ACETAMINOPHEN 1 TABLET: 10; 325 TABLET ORAL at 05:22

## 2018-01-01 RX ADMIN — Medication 10 ML: at 15:03

## 2018-01-01 RX ADMIN — ATORVASTATIN CALCIUM 20 MG: 20 TABLET, FILM COATED ORAL at 22:12

## 2018-01-01 RX ADMIN — MELATONIN TAB 3 MG 3 MG: 3 TAB at 21:14

## 2018-01-01 RX ADMIN — Medication 10 ML: at 05:15

## 2018-01-01 RX ADMIN — PREDNISONE 2.5 MG: 5 TABLET ORAL at 08:45

## 2018-01-01 RX ADMIN — SODIUM CHLORIDE 100 ML/HR: 900 INJECTION, SOLUTION INTRAVENOUS at 01:04

## 2018-01-01 RX ADMIN — Medication 10 ML: at 05:07

## 2018-01-01 RX ADMIN — FOLIC ACID 1 MG: 1 TABLET ORAL at 09:26

## 2018-01-01 RX ADMIN — HYDROCORTISONE SODIUM SUCCINATE 50 MG: 100 INJECTION, POWDER, FOR SOLUTION INTRAMUSCULAR; INTRAVENOUS at 21:31

## 2018-01-01 RX ADMIN — OXYCODONE AND ACETAMINOPHEN 1 TABLET: 10; 325 TABLET ORAL at 20:03

## 2018-01-01 RX ADMIN — OXYCODONE AND ACETAMINOPHEN 1 TABLET: 10; 325 TABLET ORAL at 18:11

## 2018-01-01 RX ADMIN — GABAPENTIN 300 MG: 300 CAPSULE ORAL at 09:41

## 2018-01-01 RX ADMIN — ASPIRIN 81 MG 81 MG: 81 TABLET ORAL at 17:02

## 2018-01-01 RX ADMIN — HEPARIN SODIUM 5000 UNITS: 5000 INJECTION INTRAVENOUS; SUBCUTANEOUS at 21:23

## 2018-01-01 RX ADMIN — MUPIROCIN: 20 OINTMENT TOPICAL at 18:00

## 2018-01-01 RX ADMIN — METRONIDAZOLE 500 MG: 500 INJECTION, SOLUTION INTRAVENOUS at 00:43

## 2018-01-01 RX ADMIN — MELATONIN TAB 3 MG 3 MG: 3 TAB at 21:55

## 2018-01-01 RX ADMIN — SODIUM CHLORIDE 150 ML/HR: 900 INJECTION, SOLUTION INTRAVENOUS at 00:43

## 2018-01-01 RX ADMIN — GABAPENTIN 300 MG: 300 CAPSULE ORAL at 18:37

## 2018-01-01 RX ADMIN — GABAPENTIN 300 MG: 300 CAPSULE ORAL at 10:10

## 2018-01-01 RX ADMIN — Medication 10 ML: at 17:21

## 2018-01-01 RX ADMIN — VANCOMYCIN HYDROCHLORIDE 125 MG: 1 INJECTION, POWDER, LYOPHILIZED, FOR SOLUTION INTRAVENOUS at 17:30

## 2018-01-01 RX ADMIN — GABAPENTIN 300 MG: 300 CAPSULE ORAL at 09:26

## 2018-01-01 RX ADMIN — MESALAMINE 800 MG: 400 CAPSULE, DELAYED RELEASE ORAL at 09:45

## 2018-01-01 RX ADMIN — FOLIC ACID 1 MG: 1 TABLET ORAL at 09:27

## 2018-01-01 RX ADMIN — MESALAMINE 800 MG: 400 CAPSULE, DELAYED RELEASE ORAL at 15:10

## 2018-01-01 RX ADMIN — MORPHINE SULFATE 1 MG: 2 INJECTION, SOLUTION INTRAMUSCULAR; INTRAVENOUS at 10:13

## 2018-01-01 RX ADMIN — Medication 10 ML: at 21:20

## 2018-01-01 RX ADMIN — VANCOMYCIN HYDROCHLORIDE 125 MG: 1 INJECTION, POWDER, LYOPHILIZED, FOR SOLUTION INTRAVENOUS at 11:57

## 2018-01-01 RX ADMIN — HEPARIN SODIUM 5000 UNITS: 5000 INJECTION INTRAVENOUS; SUBCUTANEOUS at 22:13

## 2018-01-01 RX ADMIN — IOPAMIDOL 100 ML: 755 INJECTION, SOLUTION INTRAVENOUS at 14:09

## 2018-01-01 RX ADMIN — Medication 10 ML: at 14:10

## 2018-01-01 RX ADMIN — GABAPENTIN 300 MG: 300 CAPSULE ORAL at 10:02

## 2018-01-01 RX ADMIN — OXYCODONE AND ACETAMINOPHEN 1 TABLET: 10; 325 TABLET ORAL at 12:38

## 2018-01-01 RX ADMIN — HEPARIN SODIUM 5000 UNITS: 5000 INJECTION INTRAVENOUS; SUBCUTANEOUS at 09:41

## 2018-01-01 RX ADMIN — HEPARIN SODIUM 5000 UNITS: 5000 INJECTION INTRAVENOUS; SUBCUTANEOUS at 21:36

## 2018-01-01 RX ADMIN — SODIUM CHLORIDE 500 ML: 900 INJECTION, SOLUTION INTRAVENOUS at 12:40

## 2018-01-01 RX ADMIN — MUPIROCIN: 20 OINTMENT TOPICAL at 08:56

## 2018-01-01 RX ADMIN — OXYCODONE AND ACETAMINOPHEN 1 TABLET: 10; 325 TABLET ORAL at 00:44

## 2018-01-01 RX ADMIN — MELATONIN TAB 3 MG 3 MG: 3 TAB at 21:23

## 2018-01-01 RX ADMIN — MUPIROCIN: 20 OINTMENT TOPICAL at 09:41

## 2018-01-01 RX ADMIN — PREDNISONE 2.5 MG: 5 TABLET ORAL at 08:25

## 2018-01-01 RX ADMIN — OXYCODONE AND ACETAMINOPHEN 1 TABLET: 10; 325 TABLET ORAL at 20:45

## 2018-01-01 RX ADMIN — VANCOMYCIN HYDROCHLORIDE 125 MG: 1 INJECTION, POWDER, LYOPHILIZED, FOR SOLUTION INTRAVENOUS at 17:01

## 2018-01-01 RX ADMIN — GABAPENTIN 300 MG: 300 CAPSULE ORAL at 17:20

## 2018-01-01 RX ADMIN — OXYCODONE HYDROCHLORIDE AND ACETAMINOPHEN 1 TABLET: 5; 325 TABLET ORAL at 17:20

## 2018-01-01 RX ADMIN — GABAPENTIN 300 MG: 300 CAPSULE ORAL at 21:59

## 2018-01-01 RX ADMIN — VANCOMYCIN HYDROCHLORIDE 125 MG: 1 INJECTION, POWDER, LYOPHILIZED, FOR SOLUTION INTRAVENOUS at 06:25

## 2018-01-01 RX ADMIN — VANCOMYCIN HYDROCHLORIDE 125 MG: 1 INJECTION, POWDER, LYOPHILIZED, FOR SOLUTION INTRAVENOUS at 12:20

## 2018-01-01 RX ADMIN — SODIUM CHLORIDE 150 ML/HR: 900 INJECTION, SOLUTION INTRAVENOUS at 18:02

## 2018-01-01 RX ADMIN — DEXTROSE MONOHYDRATE 75 ML/HR: 5 INJECTION, SOLUTION INTRAVENOUS at 10:59

## 2018-01-01 RX ADMIN — HYDROCORTISONE SODIUM SUCCINATE 50 MG: 100 INJECTION, POWDER, FOR SOLUTION INTRAMUSCULAR; INTRAVENOUS at 09:45

## 2018-01-01 RX ADMIN — SODIUM CHLORIDE 250 ML: 900 INJECTION, SOLUTION INTRAVENOUS at 15:00

## 2018-01-01 RX ADMIN — ASPIRIN 81 MG 81 MG: 81 TABLET ORAL at 17:30

## 2018-01-01 RX ADMIN — HYDROCORTISONE SODIUM SUCCINATE 50 MG: 100 INJECTION, POWDER, FOR SOLUTION INTRAMUSCULAR; INTRAVENOUS at 11:22

## 2018-01-01 RX ADMIN — GABAPENTIN 300 MG: 300 CAPSULE ORAL at 17:05

## 2018-01-01 RX ADMIN — FOLIC ACID 1 MG: 1 TABLET ORAL at 09:15

## 2018-01-01 RX ADMIN — OXYCODONE AND ACETAMINOPHEN 1 TABLET: 10; 325 TABLET ORAL at 22:23

## 2018-01-01 RX ADMIN — GABAPENTIN 300 MG: 300 CAPSULE ORAL at 09:27

## 2018-01-01 RX ADMIN — MAGNESIUM SULFATE HEPTAHYDRATE 2 G: 40 INJECTION, SOLUTION INTRAVENOUS at 08:50

## 2018-01-01 RX ADMIN — OXYCODONE AND ACETAMINOPHEN 1 TABLET: 10; 325 TABLET ORAL at 19:17

## 2018-01-01 RX ADMIN — HYDROMORPHONE HYDROCHLORIDE 0.2 MG: 2 INJECTION, SOLUTION INTRAMUSCULAR; INTRAVENOUS; SUBCUTANEOUS at 20:08

## 2018-01-01 RX ADMIN — ACETAMINOPHEN 325 MG: 325 TABLET ORAL at 22:50

## 2018-01-01 RX ADMIN — MELATONIN TAB 3 MG 3 MG: 3 TAB at 21:44

## 2018-01-01 RX ADMIN — FOLIC ACID 1 MG: 1 TABLET ORAL at 09:41

## 2018-01-01 RX ADMIN — Medication 10 ML: at 21:32

## 2018-01-01 RX ADMIN — METRONIDAZOLE 500 MG: 500 INJECTION, SOLUTION INTRAVENOUS at 23:11

## 2018-01-01 RX ADMIN — HEPARIN SODIUM 5000 UNITS: 5000 INJECTION INTRAVENOUS; SUBCUTANEOUS at 11:23

## 2018-01-01 RX ADMIN — SORBITOL 60 ML: 258.2 SOLUTION ORAL at 22:21

## 2018-01-01 RX ADMIN — Medication 10 ML: at 14:00

## 2018-01-01 RX ADMIN — Medication 10 ML: at 21:36

## 2018-01-01 RX ADMIN — MORPHINE SULFATE 1 MG: 4 INJECTION INTRAVENOUS at 01:05

## 2018-01-01 RX ADMIN — OXYCODONE AND ACETAMINOPHEN 1 TABLET: 10; 325 TABLET ORAL at 06:18

## 2018-01-01 RX ADMIN — Medication 10 ML: at 21:55

## 2018-01-01 RX ADMIN — GABAPENTIN 300 MG: 300 CAPSULE ORAL at 22:12

## 2018-01-01 RX ADMIN — VANCOMYCIN HYDROCHLORIDE 125 MG: 1 INJECTION, POWDER, LYOPHILIZED, FOR SOLUTION INTRAVENOUS at 18:18

## 2018-01-01 RX ADMIN — MUPIROCIN: 20 OINTMENT TOPICAL at 18:58

## 2018-01-01 RX ADMIN — DEXTROSE MONOHYDRATE 75 ML/HR: 5 INJECTION, SOLUTION INTRAVENOUS at 15:16

## 2018-01-01 RX ADMIN — Medication 10 ML: at 06:29

## 2018-01-01 RX ADMIN — SODIUM CHLORIDE 1632 ML: 900 INJECTION, SOLUTION INTRAVENOUS at 12:54

## 2018-01-01 RX ADMIN — POTASSIUM CHLORIDE 40 MEQ: 1500 TABLET, EXTENDED RELEASE ORAL at 08:55

## 2018-01-01 RX ADMIN — ENOXAPARIN SODIUM 30 MG: 30 INJECTION SUBCUTANEOUS at 18:03

## 2018-01-01 RX ADMIN — OXYCODONE AND ACETAMINOPHEN 1 TABLET: 10; 325 TABLET ORAL at 08:25

## 2018-01-01 RX ADMIN — Medication 10 ML: at 19:14

## 2018-01-01 RX ADMIN — OXYCODONE AND ACETAMINOPHEN 1 TABLET: 10; 325 TABLET ORAL at 06:02

## 2018-01-01 RX ADMIN — Medication 10 ML: at 21:17

## 2018-01-01 RX ADMIN — MESALAMINE 800 MG: 400 CAPSULE, DELAYED RELEASE ORAL at 08:30

## 2018-01-01 RX ADMIN — HYDROCORTISONE SODIUM SUCCINATE 50 MG: 100 INJECTION, POWDER, FOR SOLUTION INTRAMUSCULAR; INTRAVENOUS at 08:30

## 2018-01-01 RX ADMIN — POTASSIUM CHLORIDE 20 MEQ: 750 TABLET, EXTENDED RELEASE ORAL at 16:24

## 2018-01-01 RX ADMIN — FOLIC ACID 1 MG: 1 TABLET ORAL at 08:26

## 2018-01-01 RX ADMIN — OXYCODONE AND ACETAMINOPHEN 1 TABLET: 10; 325 TABLET ORAL at 21:44

## 2018-01-01 RX ADMIN — LORAZEPAM 1 MG: 1 TABLET ORAL at 21:18

## 2018-01-01 RX ADMIN — SODIUM CHLORIDE 150 ML/HR: 900 INJECTION, SOLUTION INTRAVENOUS at 10:55

## 2018-01-01 RX ADMIN — OXYCODONE AND ACETAMINOPHEN 1 TABLET: 10; 325 TABLET ORAL at 13:01

## 2018-01-01 RX ADMIN — HYDROCORTISONE SODIUM SUCCINATE 50 MG: 100 INJECTION, POWDER, FOR SOLUTION INTRAMUSCULAR; INTRAVENOUS at 21:36

## 2018-01-01 RX ADMIN — MORPHINE SULFATE 1 MG: 2 INJECTION, SOLUTION INTRAMUSCULAR; INTRAVENOUS at 21:18

## 2018-01-01 RX ADMIN — MESALAMINE 800 MG: 400 CAPSULE, DELAYED RELEASE ORAL at 19:43

## 2018-01-01 RX ADMIN — OXYCODONE AND ACETAMINOPHEN 1 TABLET: 10; 325 TABLET ORAL at 15:09

## 2018-01-01 RX ADMIN — MESALAMINE 800 MG: 400 CAPSULE, DELAYED RELEASE ORAL at 16:24

## 2018-01-01 RX ADMIN — BISACODYL 10 MG: 5 TABLET, COATED ORAL at 09:27

## 2018-01-01 RX ADMIN — VANCOMYCIN HYDROCHLORIDE 125 MG: 1 INJECTION, POWDER, LYOPHILIZED, FOR SOLUTION INTRAVENOUS at 00:43

## 2018-01-01 RX ADMIN — CIPROFLOXACIN HYDROCHLORIDE 500 MG: 500 TABLET, FILM COATED ORAL at 17:01

## 2018-01-01 RX ADMIN — HEPARIN SODIUM 5000 UNITS: 5000 INJECTION INTRAVENOUS; SUBCUTANEOUS at 10:02

## 2018-01-01 RX ADMIN — VANCOMYCIN HYDROCHLORIDE 125 MG: 1 INJECTION, POWDER, LYOPHILIZED, FOR SOLUTION INTRAVENOUS at 05:24

## 2018-01-01 RX ADMIN — METOPROLOL SUCCINATE 25 MG: 25 TABLET, EXTENDED RELEASE ORAL at 18:57

## 2018-01-01 RX ADMIN — VANCOMYCIN HYDROCHLORIDE 125 MG: 1 INJECTION, POWDER, LYOPHILIZED, FOR SOLUTION INTRAVENOUS at 00:05

## 2018-01-01 RX ADMIN — FOLIC ACID 1 MG: 1 TABLET ORAL at 10:02

## 2018-01-01 RX ADMIN — POLYETHYLENE GLYCOL 3350 119 G: 17 POWDER, FOR SOLUTION ORAL at 09:26

## 2018-01-01 RX ADMIN — GABAPENTIN 300 MG: 300 CAPSULE ORAL at 16:40

## 2018-01-01 RX ADMIN — HEPARIN SODIUM 5000 UNITS: 5000 INJECTION INTRAVENOUS; SUBCUTANEOUS at 10:08

## 2018-01-01 RX ADMIN — FOLIC ACID 1 MG: 1 TABLET ORAL at 09:45

## 2018-01-01 RX ADMIN — SODIUM CHLORIDE 100 ML/HR: 900 INJECTION, SOLUTION INTRAVENOUS at 03:13

## 2018-01-01 RX ADMIN — GABAPENTIN 300 MG: 300 CAPSULE ORAL at 08:42

## 2018-01-01 RX ADMIN — ACETAMINOPHEN 325 MG: 325 TABLET ORAL at 19:38

## 2018-01-01 RX ADMIN — OXYCODONE AND ACETAMINOPHEN 1 TABLET: 10; 325 TABLET ORAL at 16:40

## 2018-01-01 RX ADMIN — ASPIRIN 81 MG 81 MG: 81 TABLET ORAL at 18:58

## 2018-01-01 RX ADMIN — SORBITOL 60 ML: 258.2 SOLUTION ORAL at 16:40

## 2018-01-01 RX ADMIN — MESALAMINE 800 MG: 400 CAPSULE, DELAYED RELEASE ORAL at 21:14

## 2018-01-01 RX ADMIN — GABAPENTIN 300 MG: 300 CAPSULE ORAL at 08:25

## 2018-01-01 RX ADMIN — VANCOMYCIN HYDROCHLORIDE 125 MG: 1 INJECTION, POWDER, LYOPHILIZED, FOR SOLUTION INTRAVENOUS at 11:54

## 2018-01-01 RX ADMIN — HYDROCORTISONE SODIUM SUCCINATE 50 MG: 100 INJECTION, POWDER, FOR SOLUTION INTRAMUSCULAR; INTRAVENOUS at 09:27

## 2018-01-01 RX ADMIN — MELATONIN TAB 3 MG 3 MG: 3 TAB at 22:08

## 2018-01-01 RX ADMIN — MUPIROCIN: 20 OINTMENT TOPICAL at 17:07

## 2018-01-01 RX ADMIN — HYDROCORTISONE SODIUM SUCCINATE 100 MG: 100 INJECTION, POWDER, FOR SOLUTION INTRAMUSCULAR; INTRAVENOUS at 05:37

## 2018-01-01 RX ADMIN — VANCOMYCIN HYDROCHLORIDE 125 MG: 1 INJECTION, POWDER, LYOPHILIZED, FOR SOLUTION INTRAVENOUS at 05:07

## 2018-01-01 RX ADMIN — GABAPENTIN 300 MG: 300 CAPSULE ORAL at 21:37

## 2018-01-01 RX ADMIN — SODIUM CHLORIDE 50 ML/HR: 900 INJECTION, SOLUTION INTRAVENOUS at 19:14

## 2018-01-01 RX ADMIN — Medication 10 ML: at 22:09

## 2018-01-01 RX ADMIN — METRONIDAZOLE 500 MG: 500 INJECTION, SOLUTION INTRAVENOUS at 17:00

## 2018-01-01 RX ADMIN — FENTANYL CITRATE 50 MCG: 50 INJECTION, SOLUTION INTRAMUSCULAR; INTRAVENOUS at 13:32

## 2018-01-01 RX ADMIN — GABAPENTIN 300 MG: 300 CAPSULE ORAL at 21:55

## 2018-01-01 RX ADMIN — MESALAMINE 800 MG: 400 CAPSULE, DELAYED RELEASE ORAL at 17:02

## 2018-01-01 RX ADMIN — POTASSIUM CHLORIDE 20 MEQ: 750 TABLET, EXTENDED RELEASE ORAL at 11:23

## 2018-01-01 RX ADMIN — MESALAMINE 800 MG: 400 CAPSULE, DELAYED RELEASE ORAL at 10:11

## 2018-01-01 RX ADMIN — POLYETHYLENE GLYCOL 3350, SODIUM SULFATE ANHYDROUS, SODIUM BICARBONATE, SODIUM CHLORIDE, POTASSIUM CHLORIDE 4000 ML: 236; 22.74; 6.74; 5.86; 2.97 POWDER, FOR SOLUTION ORAL at 18:16

## 2018-01-01 RX ADMIN — HYDROCORTISONE SODIUM SUCCINATE 50 MG: 100 INJECTION, POWDER, FOR SOLUTION INTRAMUSCULAR; INTRAVENOUS at 10:02

## 2018-01-01 RX ADMIN — SORBITOL 60 ML: 258.2 SOLUTION ORAL at 19:37

## 2018-01-01 RX ADMIN — MORPHINE SULFATE 1 MG: 2 INJECTION, SOLUTION INTRAMUSCULAR; INTRAVENOUS at 05:24

## 2018-01-01 RX ADMIN — GABAPENTIN 300 MG: 300 CAPSULE ORAL at 17:02

## 2018-01-01 RX ADMIN — HYDROCORTISONE SODIUM SUCCINATE 100 MG: 100 INJECTION, POWDER, FOR SOLUTION INTRAMUSCULAR; INTRAVENOUS at 21:18

## 2018-01-01 RX ADMIN — SODIUM CHLORIDE 100 ML/HR: 900 INJECTION, SOLUTION INTRAVENOUS at 12:38

## 2018-01-01 RX ADMIN — OXYCODONE AND ACETAMINOPHEN 1 TABLET: 10; 325 TABLET ORAL at 20:16

## 2018-01-01 RX ADMIN — OXYCODONE AND ACETAMINOPHEN 1 TABLET: 10; 325 TABLET ORAL at 11:54

## 2018-01-01 RX ADMIN — Medication 400 MG: at 08:45

## 2018-01-01 RX ADMIN — SORBITOL 60 ML: 258.2 SOLUTION ORAL at 21:35

## 2018-01-01 RX ADMIN — POLYETHYLENE GLYCOL 3350 17 G: 17 POWDER, FOR SOLUTION ORAL at 11:16

## 2018-01-01 RX ADMIN — HYDROCORTISONE SODIUM SUCCINATE 50 MG: 100 INJECTION, POWDER, FOR SOLUTION INTRAMUSCULAR; INTRAVENOUS at 05:37

## 2018-01-01 RX ADMIN — MORPHINE SULFATE 1 MG: 2 INJECTION, SOLUTION INTRAMUSCULAR; INTRAVENOUS at 10:55

## 2018-01-01 RX ADMIN — HEPARIN SODIUM 5000 UNITS: 5000 INJECTION INTRAVENOUS; SUBCUTANEOUS at 22:08

## 2018-01-01 RX ADMIN — HYDROCORTISONE SODIUM SUCCINATE 50 MG: 100 INJECTION, POWDER, FOR SOLUTION INTRAMUSCULAR; INTRAVENOUS at 08:57

## 2018-01-01 RX ADMIN — POLYETHYLENE GLYCOL 3350 17 G: 17 POWDER, FOR SOLUTION ORAL at 09:45

## 2018-01-01 RX ADMIN — GADOTERATE MEGLUMINE 10 ML: 376.9 INJECTION INTRAVENOUS at 14:46

## 2018-01-01 RX ADMIN — ASPIRIN 81 MG 81 MG: 81 TABLET ORAL at 18:57

## 2018-01-01 RX ADMIN — FOLIC ACID 1 MG: 1 TABLET ORAL at 10:10

## 2018-01-01 RX ADMIN — VANCOMYCIN HYDROCHLORIDE 125 MG: 1 INJECTION, POWDER, LYOPHILIZED, FOR SOLUTION INTRAVENOUS at 05:21

## 2018-01-01 RX ADMIN — OXYCODONE AND ACETAMINOPHEN 1 TABLET: 10; 325 TABLET ORAL at 21:23

## 2018-01-01 RX ADMIN — OXYCODONE AND ACETAMINOPHEN 1 TABLET: 10; 325 TABLET ORAL at 22:12

## 2018-01-01 RX ADMIN — Medication 10 ML: at 12:51

## 2018-01-01 RX ADMIN — OXYCODONE AND ACETAMINOPHEN 1 TABLET: 10; 325 TABLET ORAL at 09:26

## 2018-01-01 RX ADMIN — Medication 10 ML: at 17:06

## 2018-01-01 RX ADMIN — POTASSIUM CHLORIDE 20 MEQ: 750 TABLET, EXTENDED RELEASE ORAL at 10:11

## 2018-01-01 RX ADMIN — GABAPENTIN 300 MG: 300 CAPSULE ORAL at 09:45

## 2018-01-01 RX ADMIN — MELATONIN TAB 3 MG 3 MG: 3 TAB at 21:31

## 2018-01-01 RX ADMIN — GABAPENTIN 300 MG: 300 CAPSULE ORAL at 16:22

## 2018-01-01 RX ADMIN — SODIUM CHLORIDE 150 ML/HR: 900 INJECTION, SOLUTION INTRAVENOUS at 18:11

## 2018-01-01 RX ADMIN — MESALAMINE 800 MG: 400 CAPSULE, DELAYED RELEASE ORAL at 21:55

## 2018-01-01 RX ADMIN — OXYCODONE AND ACETAMINOPHEN 1 TABLET: 10; 325 TABLET ORAL at 07:34

## 2018-01-01 RX ADMIN — BISACODYL 10 MG: 5 TABLET, COATED ORAL at 17:20

## 2018-01-01 RX ADMIN — HEPARIN SODIUM 5000 UNITS: 5000 INJECTION INTRAVENOUS; SUBCUTANEOUS at 21:55

## 2018-01-01 RX ADMIN — Medication 10 ML: at 03:30

## 2018-01-01 RX ADMIN — ASPIRIN 81 MG 81 MG: 81 TABLET ORAL at 18:04

## 2018-01-01 RX ADMIN — SODIUM CHLORIDE 100 ML/HR: 900 INJECTION, SOLUTION INTRAVENOUS at 21:30

## 2018-01-01 RX ADMIN — GABAPENTIN 300 MG: 300 CAPSULE ORAL at 22:08

## 2018-01-01 RX ADMIN — Medication 10 ML: at 21:30

## 2018-01-01 RX ADMIN — HEPARIN SODIUM 5000 UNITS: 5000 INJECTION INTRAVENOUS; SUBCUTANEOUS at 21:31

## 2018-01-01 RX ADMIN — BARIUM SULFATE 900 ML: 20 SUSPENSION ORAL at 16:22

## 2018-01-01 RX ADMIN — IOPAMIDOL 100 ML: 755 INJECTION, SOLUTION INTRAVENOUS at 19:14

## 2018-01-01 RX ADMIN — Medication 10 ML: at 17:57

## 2018-01-01 RX ADMIN — MORPHINE SULFATE 1 MG: 4 INJECTION INTRAVENOUS at 12:51

## 2018-01-01 RX ADMIN — HEPARIN SODIUM 5000 UNITS: 5000 INJECTION INTRAVENOUS; SUBCUTANEOUS at 21:30

## 2018-01-01 RX ADMIN — MORPHINE SULFATE 1 MG: 4 INJECTION INTRAVENOUS at 18:57

## 2018-01-01 RX ADMIN — SODIUM CHLORIDE: 9 INJECTION, SOLUTION INTRAVENOUS at 09:36

## 2018-01-01 RX ADMIN — ASPIRIN 81 MG 81 MG: 81 TABLET ORAL at 17:05

## 2018-01-01 RX ADMIN — GABAPENTIN 300 MG: 300 CAPSULE ORAL at 11:19

## 2018-01-01 RX ADMIN — VANCOMYCIN HYDROCHLORIDE 125 MG: 1 INJECTION, POWDER, LYOPHILIZED, FOR SOLUTION INTRAVENOUS at 17:04

## 2018-01-01 RX ADMIN — VANCOMYCIN HYDROCHLORIDE 125 MG: 1 INJECTION, POWDER, LYOPHILIZED, FOR SOLUTION INTRAVENOUS at 13:35

## 2018-01-01 RX ADMIN — HEPARIN SODIUM 5000 UNITS: 5000 INJECTION INTRAVENOUS; SUBCUTANEOUS at 21:17

## 2018-01-01 RX ADMIN — PREDNISONE 2.5 MG: 5 TABLET ORAL at 09:26

## 2018-01-01 RX ADMIN — Medication 10 ML: at 14:15

## 2018-01-01 RX ADMIN — Medication 10 ML: at 21:44

## 2018-01-01 RX ADMIN — VANCOMYCIN HYDROCHLORIDE 125 MG: 1 INJECTION, POWDER, LYOPHILIZED, FOR SOLUTION INTRAVENOUS at 12:37

## 2018-01-01 RX ADMIN — BISACODYL 10 MG: 5 TABLET, COATED ORAL at 22:53

## 2018-01-01 RX ADMIN — GABAPENTIN 300 MG: 300 CAPSULE ORAL at 08:26

## 2018-01-01 RX ADMIN — OXYCODONE AND ACETAMINOPHEN 1 TABLET: 10; 325 TABLET ORAL at 17:30

## 2018-01-01 RX ADMIN — OXYCODONE AND ACETAMINOPHEN 1 TABLET: 10; 325 TABLET ORAL at 10:54

## 2018-01-01 RX ADMIN — POTASSIUM CHLORIDE 10 MEQ: 10 INJECTION, SOLUTION INTRAVENOUS at 02:39

## 2018-01-01 RX ADMIN — GABAPENTIN 300 MG: 300 CAPSULE ORAL at 21:14

## 2018-01-01 RX ADMIN — Medication 10 ML: at 05:37

## 2018-01-01 RX ADMIN — FOLIC ACID 1 MG: 1 TABLET ORAL at 08:45

## 2018-01-01 RX ADMIN — ACETAMINOPHEN 325 MG: 325 TABLET ORAL at 05:07

## 2018-01-01 RX ADMIN — VANCOMYCIN HYDROCHLORIDE 125 MG: 1 INJECTION, POWDER, LYOPHILIZED, FOR SOLUTION INTRAVENOUS at 05:37

## 2018-01-01 RX ADMIN — VANCOMYCIN HYDROCHLORIDE 125 MG: 1 INJECTION, POWDER, LYOPHILIZED, FOR SOLUTION INTRAVENOUS at 01:47

## 2018-01-01 RX ADMIN — VANCOMYCIN HYDROCHLORIDE 125 MG: 1 INJECTION, POWDER, LYOPHILIZED, FOR SOLUTION INTRAVENOUS at 23:11

## 2018-01-01 RX ADMIN — METRONIDAZOLE 500 MG: 500 INJECTION, SOLUTION INTRAVENOUS at 09:18

## 2018-01-01 RX ADMIN — GABAPENTIN 300 MG: 300 CAPSULE ORAL at 21:31

## 2018-01-01 RX ADMIN — ASPIRIN 81 MG 81 MG: 81 TABLET ORAL at 17:32

## 2018-01-01 RX ADMIN — Medication 10 ML: at 05:21

## 2018-01-01 RX ADMIN — POLYETHYLENE GLYCOL 3350 17 G: 17 POWDER, FOR SOLUTION ORAL at 08:30

## 2018-01-01 RX ADMIN — Medication 400 MG: at 17:32

## 2018-01-01 RX ADMIN — GABAPENTIN 300 MG: 300 CAPSULE ORAL at 16:24

## 2018-01-01 RX ADMIN — SODIUM CHLORIDE 100 ML/HR: 900 INJECTION, SOLUTION INTRAVENOUS at 08:21

## 2018-01-01 RX ADMIN — SODIUM CHLORIDE 75 ML/HR: 900 INJECTION, SOLUTION INTRAVENOUS at 13:37

## 2018-01-01 RX ADMIN — GABAPENTIN 300 MG: 300 CAPSULE ORAL at 09:15

## 2018-01-01 RX ADMIN — GABAPENTIN 300 MG: 300 CAPSULE ORAL at 21:18

## 2018-01-01 RX ADMIN — Medication 10 ML: at 22:13

## 2018-01-01 RX ADMIN — POLYETHYLENE GLYCOL 3350 17 G: 17 POWDER, FOR SOLUTION ORAL at 09:41

## 2018-01-01 RX ADMIN — OXYCODONE AND ACETAMINOPHEN 1 TABLET: 10; 325 TABLET ORAL at 16:01

## 2018-01-01 RX ADMIN — POTASSIUM CHLORIDE 40 MEQ: 750 TABLET, EXTENDED RELEASE ORAL at 19:37

## 2018-01-01 RX ADMIN — GABAPENTIN 300 MG: 300 CAPSULE ORAL at 08:56

## 2018-01-01 RX ADMIN — Medication 10 ML: at 13:11

## 2018-01-01 RX ADMIN — HYDROCORTISONE SODIUM SUCCINATE 50 MG: 100 INJECTION, POWDER, FOR SOLUTION INTRAMUSCULAR; INTRAVENOUS at 14:42

## 2018-01-01 RX ADMIN — ASPIRIN 81 MG 81 MG: 81 TABLET ORAL at 17:10

## 2018-01-01 RX ADMIN — Medication 10 ML: at 04:46

## 2018-01-01 RX ADMIN — HEPARIN SODIUM 5000 UNITS: 5000 INJECTION INTRAVENOUS; SUBCUTANEOUS at 21:19

## 2018-01-01 RX ADMIN — FOLIC ACID 1 MG: 1 TABLET ORAL at 08:55

## 2018-01-01 RX ADMIN — POTASSIUM CHLORIDE 10 MEQ: 10 INJECTION, SOLUTION INTRAVENOUS at 04:01

## 2018-01-01 RX ADMIN — HYDROMORPHONE HYDROCHLORIDE 0.2 MG: 2 INJECTION, SOLUTION INTRAMUSCULAR; INTRAVENOUS; SUBCUTANEOUS at 08:42

## 2018-01-01 RX ADMIN — Medication 10 ML: at 06:00

## 2018-01-01 RX ADMIN — HEPARIN SODIUM 5000 UNITS: 5000 INJECTION INTRAVENOUS; SUBCUTANEOUS at 10:55

## 2018-01-01 RX ADMIN — OXYCODONE AND ACETAMINOPHEN 1 TABLET: 10; 325 TABLET ORAL at 21:58

## 2018-01-01 RX ADMIN — GABAPENTIN 300 MG: 300 CAPSULE ORAL at 18:57

## 2018-01-01 RX ADMIN — HYDROCORTISONE SODIUM SUCCINATE 50 MG: 100 INJECTION, POWDER, FOR SOLUTION INTRAMUSCULAR; INTRAVENOUS at 06:10

## 2018-01-01 RX ADMIN — ASPIRIN 81 MG 81 MG: 81 TABLET ORAL at 17:20

## 2018-01-01 RX ADMIN — LIDOCAINE HYDROCHLORIDE 40 MG: 20 INJECTION, SOLUTION EPIDURAL; INFILTRATION; INTRACAUDAL; PERINEURAL at 12:43

## 2018-01-01 RX ADMIN — SODIUM CHLORIDE 100 ML/HR: 900 INJECTION, SOLUTION INTRAVENOUS at 14:07

## 2018-01-01 RX ADMIN — PROPOFOL 80 MG: 10 INJECTION, EMULSION INTRAVENOUS at 13:05

## 2018-01-01 RX ADMIN — OXYCODONE AND ACETAMINOPHEN 1 TABLET: 10; 325 TABLET ORAL at 03:13

## 2018-01-01 RX ADMIN — SORBITOL 60 ML: 258.2 SOLUTION ORAL at 21:57

## 2018-01-01 RX ADMIN — POLYETHYLENE GLYCOL 3350 17 G: 17 POWDER, FOR SOLUTION ORAL at 10:11

## 2018-01-01 RX ADMIN — Medication 10 ML: at 21:59

## 2018-01-01 RX ADMIN — HEPARIN SODIUM 5000 UNITS: 5000 INJECTION INTRAVENOUS; SUBCUTANEOUS at 10:16

## 2018-01-01 RX ADMIN — GABAPENTIN 300 MG: 300 CAPSULE ORAL at 21:44

## 2018-01-01 RX ADMIN — VANCOMYCIN HYDROCHLORIDE 125 MG: 1 INJECTION, POWDER, LYOPHILIZED, FOR SOLUTION INTRAVENOUS at 23:21

## 2018-01-01 RX ADMIN — Medication 400 MG: at 08:25

## 2018-01-01 RX ADMIN — HYDROCORTISONE SODIUM SUCCINATE 50 MG: 100 INJECTION, POWDER, FOR SOLUTION INTRAMUSCULAR; INTRAVENOUS at 21:16

## 2018-01-01 RX ADMIN — Medication 10 ML: at 21:38

## 2018-01-01 RX ADMIN — SODIUM CHLORIDE 75 ML/HR: 900 INJECTION, SOLUTION INTRAVENOUS at 05:37

## 2018-01-01 RX ADMIN — DEXTROSE MONOHYDRATE 100 ML/HR: 5 INJECTION, SOLUTION INTRAVENOUS at 08:50

## 2018-01-01 RX ADMIN — HEPARIN SODIUM 5000 UNITS: 5000 INJECTION INTRAVENOUS; SUBCUTANEOUS at 12:38

## 2018-01-01 RX ADMIN — MORPHINE SULFATE 1 MG: 2 INJECTION, SOLUTION INTRAMUSCULAR; INTRAVENOUS at 00:44

## 2018-01-01 RX ADMIN — METOPROLOL SUCCINATE 25 MG: 25 TABLET, EXTENDED RELEASE ORAL at 17:32

## 2018-01-01 RX ADMIN — SODIUM CHLORIDE 100 ML/HR: 900 INJECTION, SOLUTION INTRAVENOUS at 14:15

## 2018-01-01 RX ADMIN — Medication 10 ML: at 06:10

## 2018-01-01 RX ADMIN — HYDROMORPHONE HYDROCHLORIDE 0.2 MG: 2 INJECTION, SOLUTION INTRAMUSCULAR; INTRAVENOUS; SUBCUTANEOUS at 00:09

## 2018-01-01 RX ADMIN — Medication 10 ML: at 21:23

## 2018-01-01 RX ADMIN — GABAPENTIN 300 MG: 300 CAPSULE ORAL at 21:30

## 2018-01-01 RX ADMIN — HYDROCORTISONE SODIUM SUCCINATE 50 MG: 100 INJECTION, POWDER, FOR SOLUTION INTRAMUSCULAR; INTRAVENOUS at 21:43

## 2018-01-01 RX ADMIN — HEPARIN SODIUM 5000 UNITS: 5000 INJECTION INTRAVENOUS; SUBCUTANEOUS at 21:37

## 2018-01-01 RX ADMIN — POLYETHYLENE GLYCOL 3350 17 G: 17 POWDER, FOR SOLUTION ORAL at 08:26

## 2018-01-01 RX ADMIN — GABAPENTIN 300 MG: 300 CAPSULE ORAL at 08:30

## 2018-01-01 RX ADMIN — Medication 10 ML: at 05:24

## 2018-01-01 RX ADMIN — SODIUM CHLORIDE 75 ML/HR: 900 INJECTION, SOLUTION INTRAVENOUS at 12:38

## 2018-01-01 RX ADMIN — DEXTROSE MONOHYDRATE 75 ML/HR: 5 INJECTION, SOLUTION INTRAVENOUS at 21:24

## 2018-01-01 RX ADMIN — Medication 10 ML: at 07:36

## 2018-01-01 RX ADMIN — HEPARIN SODIUM 5000 UNITS: 5000 INJECTION INTRAVENOUS; SUBCUTANEOUS at 21:44

## 2018-01-01 RX ADMIN — VANCOMYCIN HYDROCHLORIDE 125 MG: 1 INJECTION, POWDER, LYOPHILIZED, FOR SOLUTION INTRAVENOUS at 18:33

## 2018-01-01 RX ADMIN — SIMETHICONE 80 MG: 20 SUSPENSION/ DROPS ORAL at 12:58

## 2018-01-01 RX ADMIN — HEPARIN SODIUM 5000 UNITS: 5000 INJECTION INTRAVENOUS; SUBCUTANEOUS at 10:10

## 2018-01-01 RX ADMIN — SORBITOL 60 ML: 258.2 SOLUTION ORAL at 18:46

## 2018-01-01 RX ADMIN — HYDROCORTISONE SODIUM SUCCINATE 50 MG: 100 INJECTION, POWDER, FOR SOLUTION INTRAMUSCULAR; INTRAVENOUS at 21:55

## 2018-01-01 RX ADMIN — HYDROCORTISONE SODIUM SUCCINATE 50 MG: 100 INJECTION, POWDER, FOR SOLUTION INTRAMUSCULAR; INTRAVENOUS at 21:19

## 2018-01-01 RX ADMIN — MESALAMINE 800 MG: 400 CAPSULE, DELAYED RELEASE ORAL at 10:02

## 2018-01-01 RX ADMIN — VANCOMYCIN HYDROCHLORIDE 125 MG: 1 INJECTION, POWDER, LYOPHILIZED, FOR SOLUTION INTRAVENOUS at 01:58

## 2018-01-01 RX ADMIN — POLYETHYLENE GLYCOL 3350 17 G: 17 POWDER, FOR SOLUTION ORAL at 09:26

## 2018-01-01 RX ADMIN — OXYCODONE AND ACETAMINOPHEN 1 TABLET: 10; 325 TABLET ORAL at 09:09

## 2018-01-01 RX ADMIN — Medication 10 ML: at 05:43

## 2018-01-01 RX ADMIN — SODIUM CHLORIDE 100 ML/HR: 900 INJECTION, SOLUTION INTRAVENOUS at 05:39

## 2018-01-01 RX ADMIN — OXYCODONE AND ACETAMINOPHEN 1 TABLET: 10; 325 TABLET ORAL at 14:53

## 2018-01-01 RX ADMIN — OXYCODONE AND ACETAMINOPHEN 1 TABLET: 10; 325 TABLET ORAL at 14:15

## 2018-01-01 RX ADMIN — DEXTROSE MONOHYDRATE 75 ML/HR: 5 INJECTION, SOLUTION INTRAVENOUS at 05:20

## 2018-01-01 RX ADMIN — FOLIC ACID 1 MG: 1 TABLET ORAL at 08:30

## 2018-01-01 RX ADMIN — OXYCODONE AND ACETAMINOPHEN 1 TABLET: 10; 325 TABLET ORAL at 11:26

## 2018-01-01 RX ADMIN — MELATONIN TAB 3 MG 3 MG: 3 TAB at 22:12

## 2018-01-01 RX ADMIN — GABAPENTIN 300 MG: 300 CAPSULE ORAL at 21:19

## 2018-01-01 RX ADMIN — MESALAMINE 800 MG: 400 CAPSULE, DELAYED RELEASE ORAL at 21:37

## 2018-01-01 RX ADMIN — SODIUM CHLORIDE 50 ML/HR: 900 INJECTION, SOLUTION INTRAVENOUS at 14:10

## 2018-02-20 NOTE — PROGRESS NOTES
HISTORY OF PRESENT ILLNESS  Denise Holter is a [de-identified] y.o. male. HPI  He has lost 33 lbs since October. He has been getting dental implants over past 6 months. It is hard to eat. He does not like food such as yogurt and pudding. Eating soups, applesauce, toast, fruit punch. Dizzy for 2 months when rises from chair or bed. Feels lightheaded, some mild spinning quality to it. Feels off balance when walking and using a cane. Has numbness in both legs right > left. Has had problem with this for a couple of years and it is worse recently. His legs are weak, muscles are \"shrinking\". Feet feel cold. Has pain in lateral thigh and leg, and bottom of foot. The foot pain is causing the most problem. Pain currently is 6/10 intensity, described as aching, burning and tinling. Pain is worse with weight bearing, but not worst first thing in the morning. It does keep him awake at night. Has seen orthopedics about this. Has pinched nerve in back. Getting ALFONSO from Dr Jude Fritz. He thought gabapentin was helping, but now not so sure. He has diabetes with neuropathy.      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    Ulcerative colitis ([facility] K51.90    Hypercholesterolemia E78.00    Inguinal hernia K40.90    Asbestos exposure Z77.090    Hypertension, essential I10    Thyroid nodule K58.3    Umbilical hernia H94.2    S/P AVR (aortic valve replacement) Z95.2    Myalgia and myositis [id number]    Diabetic peripheral neuropathy ([facility]) E11.42    Osteoporosis M81.0    Lumbar radiculopathy M54.16    Coronary artery disease involving native coronary artery without angina pectoris I25.10    Essential tremor G25.0    Controlled type 2 diabetes mellitus with diabetic polyneuropathy, without long-term current use of insulin ([facility]) E11.42    Advance directive discussed with patient Z70.80    Postlaminectomy syndrome, lumbar region M96.1    Osteoarthritis of spine with radiculopathy, lumbar region M47.26    Right foot drop M21.371    Spinal stenosis, lumbar region, with neurogenic claudication M48.062    Weakness of right lower extremity R29.898    Coronary artery disease involving native coronary artery of native heart without angina pectoris I25.10    Bilateral carotid artery disease (HCC) I77.9     Past Medical History:   Diagnosis Date    Aortic stenosis, severe     Arrhythmia     heart murmur    Arthritis     Rheumatoid    CAD (coronary artery disease) 1998    S/P CABG    Cancer (HCC)     Basal cell carcinoma    Chronic pain     from rheumatoid arthritis in knees, elbows fingers    HTN (hypertension)     Hypercholesterolemia     S/P AVR (aortic valve replacement)     23 mm Trifecta with Redo CABG x 1SVG to PDA    S/P CABG x 1 10/13/2014    Redo CABG x 1 SVG to PDA; 'y' from collins of existing OM vein graft    Sarcoidosis     No longer active.      Ulcerative colitis ([facility]     Not active     Past Surgical History:   Procedure Laterality Date    CARDIAC SURG PROCEDURE UNLIST      CABG    HX AORTIC VALVE REPLACEMENT  10/2014    CABG redo X1    HX CATARACT REMOVAL      Left    HX LUMBAR LAMINECTOMY      HX OTHER SURGICAL      skin lesions removed from nose and forehead- basal cell    LAMINECTOMY,LUMBAR  110/2000    CT COLONOSCOPY W/BIOPSY SINGLE/MULTIPLE  12/5/2011          Social History     Social History    Marital status:      Spouse name: N/A    Number of children: N/A    Years of education: N/A     Social History Main Topics    Smoking status: Former Smoker     Packs/day: 1.50     Years: 20.00     Quit date: 1/1/1978    Smokeless tobacco: Never Used    Alcohol use Yes      Comment: rarely    Drug use: No    Sexual activity: Not Asked     Other Topics Concern    None     Social History Narrative     Family History   Problem Relation Age of Onset    Heart Disease Father     Cancer Sister      breast     Diabetes Sister     Cancer Sister      leukemia    Diabetes Sister     Diabetes Mother    Katia Arias Bladder Disease Mother      Allergies   Allergen Reactions    Lisinopril Cough    Questran [Cholestyramine (With Sugar)] Other (comments)     Muscle cramps     Current Outpatient Prescriptions   Medication Sig Dispense Refill    gabapentin (NEURONTIN) 300 mg capsule Take 300 mg by mouth three (3) times daily.  metoprolol succinate (TOPROL-XL) 25 mg XL tablet Take 1 Tab by mouth daily. 90 Tab 3    atorvastatin (LIPITOR) 20 mg tablet Take 1 tablet by mouth  daily 90 Tab 3    polyethylene glycol (MIRALAX) 17 gram/dose powder Take 17 g by mouth two (2) times a day. 235 g 1    amoxicillin (AMOXIL) 500 mg capsule 4 tablets 1 hour prior to dental work      predniSONE (DELTASONE) 2.5 mg tablet Take 2.5 mg by mouth daily.  oxycodone-acetaminophen (PERCOCET)  mg per tablet Take 1 tablet by mouth every four (4) hours as needed for Pain.  methotrexate sodium (METHOTREXATE, ANTI-RHEUMATIC,) 2.5 mg DsPk Take  by mouth every seven (7) days. 8 tablets week      folic acid (FOLVITE) 1 mg tablet Take 1 mg by mouth daily.  etanercept (ENBREL) 50 mg/mL (0.98 mL) injection by SubCUTAneous route every seven (7) days.  aspirin 81 mg chewable tablet Take 81 mg by mouth every evening.  docusate sodium (COLACE) 100 mg capsule Take 100 mg by mouth two (2) times daily as needed. Review of Systems   Constitutional: Negative for malaise/fatigue and weight loss. Gastrointestinal: Negative for constipation, diarrhea and heartburn. Musculoskeletal: Negative for back pain and joint pain. Neurological: Negative for dizziness, tingling and focal weakness.      Visit Vitals    /72 (BP 1 Location: Left arm, BP Patient Position: Sitting)    Pulse 71    Temp 97.7 °F (36.5 °C) (Oral)    Resp 16    Ht 5' 10\" (1.778 m)    Wt 127 lb (57.6 kg)  SpO2 95%    BMI 18.22 kg/m2     Physical Exam   Constitutional: He is oriented to person, place, and time. He appears well-developed and well-nourished. HENT:   Head: Normocephalic and atraumatic. Eyes: Conjunctivae are normal. Pupils are equal, round, and reactive to light. Neck: Neck supple. Carotid bruit is not present. No thyromegaly present. Cardiovascular: Normal rate, regular rhythm and normal heart sounds. PMI is not displaced. Exam reveals no gallop. No murmur heard. Pulses:       Dorsalis pedis pulses are 1+ on the right side, and 1+ on the left side. Posterior tibial pulses are 1+ on the right side, and 1+ on the left side. Pulmonary/Chest: Effort normal. He has no wheezes. He has no rhonchi. He has no rales. Abdominal: Soft. Normal appearance. He exhibits no abdominal bruit and no mass. There is no hepatosplenomegaly. There is no tenderness. Musculoskeletal: He exhibits no edema. Lymphadenopathy:     He has no cervical adenopathy. Right: No supraclavicular adenopathy present. Left: No supraclavicular adenopathy present. Neurological: He is alert and oriented to person, place, and time. No sensory deficit. Skin: Skin is warm, dry and intact. No rash noted. Psychiatric: He has a normal mood and affect. His behavior is normal.   Nursing note and vitals reviewed.     Results for orders placed or performed in visit on 02/20/18   LIPID PANEL   Result Value Ref Range    Cholesterol, total 146 100 - 199 mg/dL    Triglyceride 142 0 - 149 mg/dL    HDL Cholesterol 36 (L) >39 mg/dL    VLDL, calculated 28 5 - 40    LDL, calculated 82 0 - 99   TSH REFLEX TO T4   Result Value Ref Range    TSH 1.550 0.450 - 4.500 uIU/mL   CBC WITH AUTOMATED DIFF   Result Value Ref Range    WBC 6.8 3.4 - 10.8 x10E3/uL    RBC 4.75 4.14 - 5.80 x10E6/uL    HGB 13.3 13.0 - 17.7 g/dL    HCT 40.2 37.5 - 51.0 %    MCV 85 79 - 97 fL    MCH 28.0 26.6 - 33.0 pg    MCHC 33.1 31.5 - 35.7 g/dL RDW 17.5 (H) 12.3 - 15.4 %    PLATELET 904 333 - 239 x10E3/uL    NEUTROPHILS 68 Not Estab. %    Lymphocytes 18 Not Estab. %    MONOCYTES 10 Not Estab. %    EOSINOPHILS 3 Not Estab. %    BASOPHILS 1 Not Estab. %    ABS. NEUTROPHILS 4.6 1.4 - 7.0 x10E3/uL    Abs Lymphocytes 1.2 0.7 - 3.1 x10E3/uL    ABS. MONOCYTES 0.7 0.1 - 0.9 x10E3/uL    ABS. EOSINOPHILS 0.2 0.0 - 0.4 x10E3/uL    ABS. BASOPHILS 0.0 0.0 - 0.2 x10E3/uL    IMMATURE GRANULOCYTES 0 Not Estab. %    ABS. IMM. GRANS. 0.0 0.0 - 0.1 S97T0/VL   METABOLIC PANEL, COMPREHENSIVE   Result Value Ref Range    Glucose 132 (H) 65 - 99 mg/dL    BUN 17 8 - 27 mg/dL    Creatinine 1.07 0.76 - 1.27 mg/dL    GFR est non-AA 65 >59    GFR est AA 75 >59    BUN/Creatinine ratio 16 10 - 24    Sodium 139 134 - 144 mmol/L    Potassium 4.4 3.5 - 5.2 mmol/L    Chloride 97 96 - 106 mmol/L    CO2 25 18 - 29 mmol/L    Calcium 9.7 8.6 - 10.2 mg/dL    Protein, total 6.9 6.0 - 8.5 g/dL    Albumin 4.3 3.5 - 4.7 g/dL    GLOBULIN, TOTAL 2.6 1.5 - 4.5    A-G Ratio 1.7 1.2 - 2.2    Bilirubin, total 0.8 0.0 - 1.2 mg/dL    Alk. phosphatase 57 39 - 117 IU/L    AST (SGOT) 15 0 - 40 IU/L    ALT (SGPT) 9 0 - 44 IU/L   HEMOGLOBIN A1C WITH EAG   Result Value Ref Range    Hemoglobin A1c 5.4 4.8 - 5.6 %    Estimated average glucose 108    CVD REPORT   Result Value Ref Range    INTERPRETATION Note    DIABETES PATIENT EDUCATION   Result Value Ref Range    PDF Image Not applicable          ASSESSMENT and PLAN    ICD-10-CM ICD-9-CM    1. Spinal stenosis, lumbar region, with neurogenic claudication M48.062 724.03 gabapentin (NEURONTIN) 600 mg tablet   2. Weakness of right lower extremity R29.898 729.89    3. Weight loss, non-intentional R63.4 783.21 TSH REFLEX TO T4      CBC WITH AUTOMATED DIFF      METABOLIC PANEL, COMPREHENSIVE   4. Controlled type 2 diabetes mellitus with diabetic polyneuropathy, without long-term current use of insulin (HCC) E11.42 250.60 HEMOGLOBIN A1C WITH EAG     357.2    5. Hypercholesterolemia E78.00 272.0 LIPID PANEL     Diagnoses and all orders for this visit:    1. Spinal stenosis, lumbar region, with neurogenic claudication  -     gabapentin (NEURONTIN) 600 mg tablet; Take 1 Tab by mouth three (3) times daily. Indications: NEUROPATHIC PAIN    2. Weakness of right lower extremity    3. Weight loss, non-intentional  Most likely due to dental problems.   -     TSH REFLEX TO T4  -     CBC WITH AUTOMATED DIFF  -     METABOLIC PANEL, COMPREHENSIVE    4. Controlled type 2 diabetes mellitus with diabetic polyneuropathy, without long-term current use of insulin (HCC)  Neuropathy is the most likely cause of being off balance, but back problems may be contributing in the form of weakness and pain.   -     HEMOGLOBIN A1C WITH EAG    5. Hypercholesterolemia  -     LIPID PANEL    Other orders  -     aspirin 81 mg chewable tablet; Take 1 Tab by mouth every evening. Follow-up Disposition:  Return if symptoms worsen or fail to improve.  lab results and schedule of future lab studies reviewed with patient  reviewed diet, exercise and weight control  I have discussed the diagnosis with the patient and the intended plan as seen in the above orders. Patient is in agreement. The patient has received an after-visit summary and questions were answered concerning future plans. I have discussed medication side effects and warnings with the patient as well.

## 2018-02-20 NOTE — PROGRESS NOTES
Kaylin Navarro  Identified pt with two pt identifiers(name and ). Chief Complaint   Patient presents with    Dizziness       Reviewed record In preparation for visit and have obtained necessary documentation. Has info on advanced directive but has not filled them out. 1. Have you been to the ER, urgent care clinic or hospitalized since your last visit? No     2. Have you seen or consulted any other health care providers outside of the [address] [place] [address] since your last visit? Include any pap smears or colon screening. Dr Claire Chatterjee, GI, dermatology, Dr Courtney Yates reviewed with provider. Health Maintenance reviewed:     Health Maintenance Due   Topic    DTaP/Tdap/Td series (1 - Tdap)    MICROALBUMIN Q1     LIPID PANEL Q1         Wt Readings from Last 3 Encounters:   10/05/17 160 lb (72.6 kg)   17 157 lb (71.2 kg)   17 162 lb (73.5 kg)      Temp Readings from Last 3 Encounters:   10/05/17 98 °F (36.7 °C) (Oral)   17 98.1 °F (36.7 °C) (Oral)   17 98 °F (36.7 °C) (Oral)      BP Readings from Last 3 Encounters:   10/05/17 133/61   17 98/51   17 123/57      Pulse Readings from Last 3 Encounters:   10/05/17 65   17 76   17 64      There were no vitals filed for this visit.        Learning Assessment:   :     Learning Assessment 2016   PRIMARY LEARNER Patient Patient   HIGHEST LEVEL OF EDUCATION - PRIMARY LEARNER  GRADUATED HIGH SCHOOL OR GED GRADUATED HIGH SCHOOL OR GED   BARRIERS PRIMARY LEARNER NONE NONE   CO-LEARNER CAREGIVER - No   PRIMARY LANGUAGE ENGLISH ENGLISH   LEARNER PREFERENCE PRIMARY LISTENING DEMONSTRATION     READING READING     DEMONSTRATION -     PICTURES -     VIDEOS -   ANSWERED BY patient patient   RELATIONSHIP SELF SELF        Depression Screening:   :     PHQ over the last two weeks 10/5/2017   Little interest or pleasure in doing things Not at all   Feeling down, depressed or hopeless Not at all   Total Score PHQ 2 0        Fall Risk Assessment:   :     Fall Risk Assessment, last 12 mths 10/5/2017   Able to walk? Yes   Fall in past 12 months? No        Abuse Screening:   :     Abuse Screening Questionnaire 3/31/2017 3/1/2016 7/28/2014   Do you ever feel afraid of your partner? N N N   Are you in a relationship with someone who physically or mentally threatens you? N N N   Is it safe for you to go home?  Y Y Y        ADL Screening:   :     ADL Assessment 11/21/2014   Feeding yourself No Help Needed   Getting from bed to chair No Help Needed   Getting dressed Help Needed   Bathing or showering Help Needed   Walk across the room (includes cane/walker) No Help Needed   Using the telphone No Help Needed   Taking your medications No Help Needed   Preparing meals Help Needed   Managing money (expenses/bills) No Help Needed   Moderately strenuous housework (laundry) Help Needed   Shopping for personal items (toiletries/medicines) Help Needed   Shopping for groceries Help Needed   Driving No Help Needed   Climbing a flight of stairs Help Needed   Getting to places beyond walking distances No Help Needed

## 2018-02-20 NOTE — PATIENT INSTRUCTIONS
Increase gabapentin to 600 mg 3 times a day, starting with evening dose. Five days later increase the morning dose, and 5 days after that, increase the mid-day dose. Let me know in 2 weeks whether this is helping or not. Options are either to increase further, or add duloxetine. Instructions to help gain weight:  Eat frequently. Do not go more than 4 hours without eating. Add high quality foods: nuts and nut butters, yogurt, eggs, granola, avocados, humus. Eat more than one food at a time: for example whole wheat toast with almond butter and banana. Consider smoothies and meal replacement drinks (Instant breakfast, Ensure, Boost). Add protein powder or powdered milk where ever possible to boost nutrition  Jazz up foods with spices or herbs to improve flavor  Drizzle olive or canola oil on vegetables, salads, stews, and soups. If you do not have diabetes drink 100% fruit juice or fat free milk instead of plain water. Diabetes and Preventing Falls: Care Instructions  Your Care Instructions    If you are an older adult who has diabetes, you may have a higher risk of falling. Complications of diabetes-such as nerve damage, foot problems, and reduced vision-may increase your risk of a fall. Some of your medicines also may add to your risk. By making your home safer, you can lower your risk of falling. Doing things to prevent diabetes complications may also help to lower your risk. You can make your home safer with a few simple measures. Follow-up care is a key part of your treatment and safety. Be sure to make and go to all appointments, and call your doctor if you are having problems. It's also a good idea to know your test results and keep a list of the medicines you take. How can you care for yourself at home? Taking care of yourself  · Keep your blood sugar at a target level (which you set with your doctor). · Exercise regularly to improve your strength, muscle tone, and balance.  Walk if you can. Swimming may be a good choice if you cannot walk easily. · Have your vision checked as often as your doctor recommends. It is usually once a year or more often if you have eye problems. · Know the side effects of the medicines you take. Ask your doctor or pharmacist whether the medicines you take can affect your balance. Sleeping pills or sedatives can affect your balance. · Limit the amount of alcohol you drink. Alcohol can impair your balance and other senses. · Have your doctor check your feet during each visit. If you have a foot problem, see your doctor. Preventing falls at home  · Remove raised doorway thresholds, throw rugs, and clutter. Repair loose carpet or raised areas in the floor. · Move furniture and electrical cords to keep them out of walking paths. · Use nonskid floor wax, and wipe up spills right away, especially on ceramic tile floors. · If you use a walker or cane, put rubber tips on it. If you use crutches, clean the bottoms of them regularly with an abrasive pad, such as steel wool. · Keep your house well lit, especially [place], and outside walkways. Use night-lights in areas such as hallways and bathrooms. Add extra light switches or use remote switches (such as switches that go on or off when you clap your hands) to make it easier to turn lights on if you have to get up during the night. · Install sturdy handrails on stairways. Put grab bars near your shower, bathtub, and toilet. · Store household items on low shelves so that you do not have to climb or reach high. Or use a reaching device that you can get at a medical supply store. If you have to climb for something, use a step stool with handrails, or ask someone to get it for you. · Keep a cordless phone and a flashlight with new batteries by your bed. If possible, put a phone in each of the main rooms of your house, or carry a cell phone in case you fall and cannot reach a phone.  Or you can wear a device around your neck or wrist. You push a button that sends a signal for help. · Wear low-heeled shoes that fit well and give your feet good support. Use footwear with nonskid soles. Check the heels and soles of your shoes for wear. Repair or replace worn heels or soles. · Do not wear socks without shoes on wood floors. · Walk on the grass when the sidewalks are slippery. If you live in an area that gets snow and ice in the winter, sprinkle salt on slippery steps and sidewalks. Where can you learn more? Go to http://ligiaOrbiterjudi.info/. Enter V449 in the search box to learn more about \"Diabetes and Preventing Falls: Care Instructions. \"  Current as of: May 12, 2017  Content Version: 11.4  © 1679-9213 "[employer] Care instructions adapted under license by [employer] (which disclaims liability or warranty for this information). If you have questions about a medical condition or this instruction, always ask your healthcare professional. [place] [address] any warranty or liability for your use of this information. Abnormal Weight Loss: Care Instructions  Your Care Instructions    There are two types of weight loss-normal and abnormal. The normal kind happens when you are trying to lose weight by exercising more or eating less. The abnormal kind happens when you are not trying to lose weight. Many medical problems can cause abnormal weight loss. These include problems with your thyroid gland, long-term infections, mouth or throat problems that make it hard to eat, and digestive problems. They also include depression and cancer. Some medicines also may cause you to lose weight. You can work with your doctor to find the cause of your weight loss. You will probably need tests to do this. Follow-up care is a key part of your treatment and safety. Be sure to make and go to all appointments, and call your doctor if you are having problems.  It's also a good idea to know your test results and keep a list of the medicines you take. How can you care for yourself at home? · Weigh yourself at the same time every day. It's best to do it first thing in the morning after you empty your bladder. Be sure to always wear the same amount of clothing. · Write down any changes in your weight and the possible causes. Discuss these with your doctor. · Your doctor may want you to change your diet. Do your best to follow his or her advice. · Ask your doctor if you should see a dietitian. This is a person who can help you plan meals that work best for your lifestyle. · Note any changes in bowel habits. These may include changes in how often you have a bowel movement. Other changes include the color and size of your stools and how solid they are. · If you are prescribed medicines, take them exactly as prescribed. Call your doctor if you think you are having a problem with your medicine. You will get more details on the specific medicines your doctor prescribes. When should you call for help? Watch closely for changes in your health, and be sure to contact your doctor if:  ? · You do not get better as expected. ? · You continue to lose weight. Where can you learn more? Go to http://ligia-judi.info/. Enter A790 in the search box to learn more about \"Abnormal Weight Loss: Care Instructions. \"  Current as of: October 13, 2016  Content Version: 11.4  © 8465-0656 [employer]. Care instructions adapted under license by [employer] (which disclaims liability or warranty for this information). If you have questions about a medical condition or this instruction, always ask your healthcare professional. [address] any warranty or liability for your use of this information.

## 2018-02-20 NOTE — MR AVS SNAPSHOT
[address] [facility] 
 
 
 [address] [facility] [address] [phone] Patient: Elva Cole MRN: [id number] LUCIA:[birth year] Visit Information Date & Time Provider Department Dept. Phone Encounter #  
 2/20/2018 10:30 AM MD Juan Francisco Gonzalez [phone] [id number] Follow-up Instructions Return if symptoms worsen or fail to improve. Your Appointments 3/30/2018  8:15 AM  
LAB with LAB TT Juan Francisco Bishop [address] [place] [address]) Appt Note: fasting lab,  
 [address] [facility] [address] [phone]  
  
   
 [facility]  
  
    
 4/6/2018 10:30 AM  
ROUTINE CARE with MD Juan Francisco Gonzalez [address] [place] [address]) Appt Note: Dm htn chol $0cp 10.05.17  
 [address] [facility] [address] [phone]  
  
   
 [address] [facility] [address] Upcoming Health Maintenance Date Due DTaP/Tdap/Td series (1 - Tdap) 1/2/2006 MICROALBUMIN Q1 3/1/2018 LIPID PANEL Q1 3/1/2018 HEMOGLOBIN A1C Q6M 4/5/2018 EYE EXAM RETINAL OR DILATED Q1 4/6/2018 FOOT EXAM Q1 10/5/2018 MEDICARE YEARLY EXAM 10/6/2018 GLAUCOMA SCREENING Q2Y 4/6/2019 Allergies as of 2/20/2018  Review Complete On: 2/20/2018 By: Jamari Segovia MD  
  
 Severity Noted Reaction Type Reactions Lisinopril  11/18/2009    Cough Questran [Cholestyramine (With Sugar)]  11/18/2009    Other (comments) Muscle cramps Current Immunizations  Reviewed on 2/20/2018 Name Date Influenza High Dose Vaccine PF 10/5/2017, 9/7/2016 Influenza Vaccine 9/29/2015 Influenza Vaccine (Madin Berenice Canine Kidney) PF 10/16/2014 10:54 AM  
 Influenza Vaccine Split 10/29/2012, 9/24/2011  1:34 PM, 10/12/2010 Influenza Vaccine Whole 10/10/2009 Pneumococcal Conjugate (PCV-13) 9/29/2015 TD Vaccine 1/1/2006 ZZZ-RETIRED (DO NOT USE) Pneumococcal Vaccine (Unspecified Type) 1/1/2009, 11/14/2002 Reviewed by Lisa Lu LPN on 5/43/0063 at 53:99 AM  
You Were Diagnosed With   
  
 Codes Comments Spinal stenosis, lumbar region, with neurogenic claudication    -  Primary ICD-10-CM: M62.691 
ICD-9-CM: 724.03 Weakness of right lower extremity     ICD-10-CM: R29.898 ICD-9-CM: 729.89 Hypercholesterolemia     ICD-10-CM: E78.00 ICD-9-CM: 272.0 Weight loss, non-intentional     ICD-10-CM: R63.4 ICD-9-CM: 783.21 Controlled type 2 diabetes mellitus with diabetic polyneuropathy, without long-term current use of insulin (HCC)     ICD-10-CM: E11.42 
ICD-9-CM: 250.60, 357.2 Vitals BP Pulse Temp Resp Height(growth percentile) Weight(growth percentile) 130/72 (BP 1 Location: Left arm, BP Patient Position: Sitting) 71 97.7 °F (36.5 °C) (Oral) 16 5' 10\" (1.778 m) 127 lb (57.6 kg) SpO2 BMI Smoking Status 95% 18.22 kg/m2 Former Smoker BMI and BSA Data Body Mass Index Body Surface Area  
 18.22 kg/m 2 1.69 m 2 Preferred Pharmacy Pharmacy Name Phone [facility], [address] [place] [address] [address] Your Updated Medication List  
  
   
This list is accurate as of: 2/20/18 12:11 PM.  Always use your most recent med list.  
  
  
  
  
 amoxicillin 500 mg capsule Commonly known as:  AMOXIL  
4 tablets 1 hour prior to dental work  
  
 aspirin 81 mg chewable tablet Take 1 Tab by mouth every evening. atorvastatin 20 mg tablet Commonly known as:  LIPITOR Take 1 tablet by mouth  daily COLACE 100 mg capsule Generic drug:  docusate sodium Take 100 mg by mouth two (2) times daily as needed. ENBREL 50 mg/mL (0.98 mL) injection Generic drug:  etanercept  
by SubCUTAneous route every seven (7) days. folic acid 1 mg tablet Commonly known as:  [employer]  
 Take 1 mg by mouth daily. gabapentin 600 mg tablet Commonly known as:  NEURONTIN Take 1 Tab by mouth three (3) times daily. Indications: NEUROPATHIC PAIN  
  
 METHOTREXATE (ANTI-RHEUMATIC) 2.5 mg tablet dose pack Generic drug:  methotrexate Take  by mouth every seven (7) days. 8 tablets week  
  
 metoprolol succinate 25 mg XL tablet Commonly known as:  TOPROL-XL Take 1 Tab by mouth daily. PERCOCET  mg per tablet Generic drug:  oxyCODONE-acetaminophen Take 1 tablet by mouth every four (4) hours as needed for Pain.  
  
 polyethylene glycol 17 gram/dose powder Commonly known as:  Stephanie Bless Take 17 g by mouth two (2) times a day. predniSONE 2.5 mg tablet Commonly known as:  Brianne Mariposa Take 2.5 mg by mouth daily. Prescriptions Sent to Pharmacy Refills  
 gabapentin (NEURONTIN) 600 mg tablet 1 Sig: Take 1 Tab by mouth three (3) times daily. Indications: NEUROPATHIC PAIN Class: Normal  
 Pharmacy: [address] [place] [address] Ave, [address] Ph #: [phone] Route: Oral  
  
We Performed the Following CBC WITH AUTOMATED DIFF [83039 CPT(R)] HEMOGLOBIN A1C WITH EAG [00080 CPT(R)] LIPID PANEL [57874 CPT(R)] METABOLIC PANEL, COMPREHENSIVE [45100 CPT(R)] TSH REFLEX TO T4 [55135 CPT(R)] Follow-up Instructions Return if symptoms worsen or fail to improve. Patient Instructions Increase gabapentin to 600 mg 3 times a day, starting with evening dose. Five days later increase the morning dose, and 5 days after that, increase the mid-day dose. Let me know in 2 weeks whether this is helping or not. Options are either to increase further, or add duloxetine. Instructions to help gain weight: 
Eat frequently. Do not go more than 4 hours without eating. Add high quality foods: nuts and nut butters, yogurt, eggs, granola, avocados, humus. Eat more than one food at a time: for example whole wheat toast with almond butter and banana. Consider smoothies and meal replacement drinks (Instant breakfast, Ensure, Boost). Add protein powder or powdered milk where ever possible to boost nutrition Jazz up foods with spices or herbs to improve flavor Drizzle olive or canola oil on vegetables, salads, stews, and soups. If you do not have diabetes drink 100% fruit juice or fat free milk instead of plain water. Diabetes and Preventing Falls: Care Instructions Your Care Instructions If you are an older adult who has diabetes, you may have a higher risk of falling. Complications of diabetes-such as nerve damage, foot problems, and reduced vision-may increase your risk of a fall. Some of your medicines also may add to your risk. By making your home safer, you can lower your risk of falling. Doing things to prevent diabetes complications may also help to lower your risk. You can make your home safer with a few simple measures. Follow-up care is a key part of your treatment and safety. Be sure to make and go to all appointments, and call your doctor if you are having problems. It's also a good idea to know your test results and keep a list of the medicines you take. How can you care for yourself at home? Taking care of yourself · Keep your blood sugar at a target level (which you set with your doctor). · Exercise regularly to improve your strength, muscle tone, and balance. Walk if you can. Swimming may be a good choice if you cannot walk easily. · Have your vision checked as often as your doctor recommends. It is usually once a year or more often if you have eye problems. · Know the side effects of the medicines you take. Ask your doctor or pharmacist whether the medicines you take can affect your balance. Sleeping pills or sedatives can affect your balance. · Limit the amount of alcohol you drink.  Alcohol can impair your balance and other senses. · Have your doctor check your feet during each visit. If you have a foot problem, see your doctor. Preventing falls at home · Remove raised doorway thresholds, throw rugs, and clutter. Repair loose carpet or raised areas in the floor. · Move furniture and electrical cords to keep them out of walking paths. · Use nonskid floor wax, and wipe up spills right away, especially on ceramic tile floors. · If you use a walker or cane, put rubber tips on it. If you use crutches, clean the bottoms of them regularly with an abrasive pad, such as steel wool. · Keep your house well lit, especially [place], and outside walkways. Use night-lights in areas such as hallways and bathrooms. Add extra light switches or use remote switches (such as switches that go on or off when you clap your hands) to make it easier to turn lights on if you have to get up during the night. · Install sturdy handrails on stairways. Put grab bars near your shower, bathtub, and toilet. · Store household items on low shelves so that you do not have to climb or reach high. Or use a reaching device that you can get at a medical supply store. If you have to climb for something, use a step stool with handrails, or ask someone to get it for you. · Keep a cordless phone and a flashlight with new batteries by your bed. If possible, put a phone in each of the main rooms of your house, or carry a cell phone in case you fall and cannot reach a phone. Or you can wear a device around your neck or wrist. You push a button that sends a signal for help. · Wear low-heeled shoes that fit well and give your feet good support. Use footwear with nonskid soles. Check the heels and soles of your shoes for wear. Repair or replace worn heels or soles. · Do not wear socks without shoes on wood floors. · Walk on the grass when the sidewalks are slippery.  If you live in an area that gets snow and ice in the winter, sprinkle salt on slippery steps and sidewalks. Where can you learn more? Go to http://ligia-judi.info/. Enter B113 in the search box to learn more about \"Diabetes and Preventing Falls: Care Instructions. \" Current as of: May 12, 2017 Content Version: 11.4 © 2186-0703 [employer]. Care instructions adapted under license by [employer] (which disclaims liability or warranty for this information). If you have questions about a medical condition or this instruction, always ask your healthcare professional. [place] [address] any warranty or liability for your use of this information. Abnormal Weight Loss: Care Instructions Your Care Instructions There are two types of weight loss-normal and abnormal. The normal kind happens when you are trying to lose weight by exercising more or eating less. The abnormal kind happens when you are not trying to lose weight. Many medical problems can cause abnormal weight loss. These include problems with your thyroid gland, long-term infections, mouth or throat problems that make it hard to eat, and digestive problems. They also include depression and cancer. Some medicines also may cause you to lose weight. You can work with your doctor to find the cause of your weight loss. You will probably need tests to do this. Follow-up care is a key part of your treatment and safety. Be sure to make and go to all appointments, and call your doctor if you are having problems. It's also a good idea to know your test results and keep a list of the medicines you take. How can you care for yourself at home? · Weigh yourself at the same time every day. It's best to do it first thing in the morning after you empty your bladder. Be sure to always wear the same amount of clothing. · Write down any changes in your weight and the possible causes. Discuss these with your doctor. · Your doctor may want you to change your diet. Do your best to follow his or her advice. · Ask your doctor if you should see a dietitian. This is a person who can help you plan meals that work best for your lifestyle. · Note any changes in bowel habits. These may include changes in how often you have a bowel movement. Other changes include the color and size of your stools and how solid they are. · If you are prescribed medicines, take them exactly as prescribed. Call your doctor if you think you are having a problem with your medicine. You will get more details on the specific medicines your doctor prescribes. When should you call for help? Watch closely for changes in your health, and be sure to contact your doctor if: 
? · You do not get better as expected. ? · You continue to lose weight. Where can you learn more? Go to http://ligia-judi.info/. Enter A790 in the search box to learn more about \"Abnormal Weight Loss: Care Instructions. \" Current as of: October 13, 2016 Content Version: 11.4 © 1379-9385 [employer]. Care instructions adapted under license by [employer] (which disclaims liability or warranty for this information). If you have questions about a medical condition or this instruction, always ask your healthcare professional. [address] any warranty or liability for your use of this information. Introducing [facility]! [place] introduces [employer] patient portal. Now you can access parts of your medical record, email your doctor's office, and request medication refills online. 1. In your internet browser, go to [email or web address]. [email or web address] 2. Click on the First Time User? Click Here link in the Sign In box. You will see the New Member Sign Up page. 3. Enter your [employer] Access Code exactly as it appears below.  You will not need to use this code after youve completed the sign-up process. If you do not sign up before the expiration date, you must request a new code. · [employer] Access Code: [email or web address] Expires: 3/12/2018  2:53 PM 
 
4. Enter the last four digits of your Social Security Number (xxxx) and Date of Birth (mm/dd/yyyy) as indicated and click Submit. You will be taken to the next sign-up page. 5. Create a [employer] ID. This will be your [employer] login ID and cannot be changed, so think of one that is secure and easy to remember. 6. Create a [employer] password. You can change your password at any time. 7. Enter your Password Reset Question and Answer. This can be used at a later time if you forget your password. 8. Enter your e-mail address. You will receive e-mail notification when new information is available in [address] 9. Click Sign Up. You can now view and download portions of your medical record. 10. Click the Download Summary menu link to download a portable copy of your medical information. If you have questions, please visit the Frequently Asked Questions section of the [employer] website. Remember, [employer] is NOT to be used for urgent needs. For medical emergencies, dial 911. Now available from your iPhone and Android! Please provide this summary of care documentation to your next provider. Your primary care clinician is listed as Antonio Crawford. If you have any questions after today's visit, please call [phone].

## 2018-03-06 PROBLEM — I95.9 HYPOTENSION: Status: ACTIVE | Noted: 2018-01-01

## 2018-03-06 PROBLEM — A41.9 SEVERE SEPSIS (HCC): Status: ACTIVE | Noted: 2018-01-01

## 2018-03-06 PROBLEM — A04.72 C. DIFFICILE DIARRHEA: Status: ACTIVE | Noted: 2018-01-01

## 2018-03-06 PROBLEM — K52.9 COLITIS: Status: ACTIVE | Noted: 2018-01-01

## 2018-03-06 PROBLEM — R65.20 SEVERE SEPSIS (HCC): Status: ACTIVE | Noted: 2018-01-01

## 2018-03-06 NOTE — PROGRESS NOTES
TRANSFER - IN REPORT:    Verbal report received from Allie(name) on Garrett Bourne  being received from ED(unit) for routine progression of care      Report consisted of patients Situation, Background, Assessment and   Recommendations(SBAR). Information from the following report(s) SBAR was reviewed with the receiving nurse. Opportunity for questions and clarification was provided. Assessment completed upon patients arrival to unit and care assumed.

## 2018-03-06 NOTE — PROGRESS NOTES
Enoxaparin dose adjusted to 30 mg daily (0.5 mg/kg) per protocol for wt <60 kg and CrCl >30 ml/min.     Thanks,  Fernanda Romberg, PHARMD

## 2018-03-06 NOTE — ED PROVIDER NOTES
EMERGENCY DEPARTMENT HISTORY AND PHYSICAL EXAM      Date: 3/6/2018  Patient Name: Denise Holter    History of Presenting Illness     Chief Complaint   Patient presents with    Diarrhea     pt c/o diarrhea and vomiting x3 days. Pt noted decreased urinary output.  Vomiting       History Provided By: Patient, Patient's Wife and Patient's Daughter    HPI: Denise Holter, [de-identified] y.o. male with PMHx significant for HTN, CAD s/p CABG, diverticulitis, hypercholesterolemia, s/p AVR, aortic stenosis, basal cell carcinoma s/p excision, presents ambulatory to the ED with cc of 24 hours of moderately severe \"yellow watery\" diarrhea. Pt states he has been on 2-3 different abx for MRSA of LLE cellulitis and graft. He reports some improvement with taking two tablets of Loperamide last night. He additionally c/o nausea, vomiting, abd pain which began s/p diarrhea, weight loss of 40 lbs s/p lower teeth extraction. Pt is incidentally hypotensive and tachycardic in ER. He specifically denies any fevers, cough, blood in stool, or other sxs. Pt last took PO solids 5 days ago. Family states pt is s/p recent skin CA excision, from which nodules were found in the throat, but pt cancelled his f/u appointment d/t his CC. PCP: Hafsa Berman MD   GI: Nisha Pedroza MD  Neuro: Shorty Alamo MD      There are no other complaints, changes, or physical findings at this time. Current Facility-Administered Medications   Medication Dose Route Frequency Provider Last Rate Last Dose    sodium chloride (NS) flush 5-10 mL  5-10 mL IntraVENous PRN Meagan Rossi MD        ciprofloxacin HCl (CIPRO) tablet 500 mg  500 mg Oral NOW Meagan Rossi MD        metroNIDAZOLE (FLAGYL) IVPB premix 500 mg  500 mg IntraVENous NOW Meagan Rossi MD        vancomycin 50 mg/mL oral solution (compounded) 250 mg  250 mg Oral NOW Meagan Rossi MD         Current Outpatient Prescriptions   Medication Sig Dispense Refill    LORazepam (ATIVAN) 1 mg tablet Take 1 mg by mouth daily as needed for Anxiety (prior to injection).  gabapentin (NEURONTIN) 300 mg capsule Take 300 mg by mouth three (3) times daily.  metoprolol succinate (TOPROL-XL) 25 mg XL tablet Take 25 mg by mouth every evening.  polyethylene glycol (MIRALAX) 17 gram/dose powder Take 17 g by mouth two (2) times daily as needed (constipation).  loperamide (IMMODIUM) 2 mg tablet Take 4 mg by mouth four (4) times daily as needed for Diarrhea.  OTHER by Injection route every three (3) months. Patient receives lumbar epidural steroid injections by Dr. Jacky Zuleta from [facility] aspirin 81 mg chewable tablet Take 1 Tab by mouth every evening. 90 Tab 3    atorvastatin (LIPITOR) 20 mg tablet Take 1 tablet by mouth  daily 90 Tab 3    predniSONE (DELTASONE) 2.5 mg tablet Take 2.5 mg by mouth daily.  oxycodone-acetaminophen (PERCOCET)  mg per tablet Take 1 tablet by mouth every four (4) hours as needed for Pain.  methotrexate sodium (METHOTREXATE, ANTI-RHEUMATIC,) 2.5 mg DsPk Take 20 mg by mouth Every Saturday.  folic acid (FOLVITE) 1 mg tablet Take 1 mg by mouth daily.  etanercept (ENBREL) 50 mg/mL (0.98 mL) injection 50 mg by SubCUTAneous route every Sunday.  docusate sodium (COLACE) 100 mg capsule Take 100 mg by mouth two (2) times daily as needed for Constipation.          Past History     Past Medical History:  Past Medical History:   Diagnosis Date    Aortic stenosis, severe     Arrhythmia     heart murmur    Arthritis     Rheumatoid    CAD (coronary artery disease) 1998    S/P CABG    Cancer (HCC)     Basal cell carcinoma    Chronic pain     from rheumatoid arthritis in knees, elbows fingers    HTN (hypertension)     Hypercholesterolemia     S/P AVR (aortic valve replacement)     23 mm Trifecta with Redo CABG x 1SVG to PDA    S/P CABG x 1 10/13/2014    Redo CABG x 1 SVG to PDA; 'y' from collins of existing OM vein graft  Sarcoidosis     No longer active.  Ulcerative colitis ([facility] [address]     Not active       Past Surgical History:  Past Surgical History:   Procedure Laterality Date    CARDIAC SURG PROCEDURE UNLIST      CABG    HX AORTIC VALVE REPLACEMENT  10/2014    CABG redo X1    HX CATARACT REMOVAL      Left    HX LUMBAR LAMINECTOMY      HX OTHER SURGICAL      skin lesions removed from nose and forehead- basal cell    LAMINECTOMY,LUMBAR  110/2000    ND COLONOSCOPY W/BIOPSY SINGLE/MULTIPLE  12/5/2011            Family History:  Family History   Problem Relation Age of Onset    Heart Disease Father     Cancer Sister      breast     Diabetes Sister     Cancer Sister      leukemia    Diabetes Sister     Diabetes Mother    Allyson Raul Bladder Disease Mother        Social History:  Social History   Substance Use Topics    Smoking status: Former Smoker     Packs/day: 1.50     Years: 20.00     Quit date: 1/1/1978    Smokeless tobacco: Never Used    Alcohol use Yes      Comment: rarely       Allergies: Allergies   Allergen Reactions    Lisinopril Cough    Questran [Cholestyramine (With Sugar)] Other (comments)     Muscle cramps         Review of Systems   Review of Systems   Constitutional: Positive for appetite change and unexpected weight change. Negative for activity change, chills and fever. HENT: Negative for congestion. Eyes: Negative for pain and visual disturbance. Respiratory: Negative for cough and shortness of breath. Cardiovascular: Negative for chest pain. Positive for low blood pressure   Gastrointestinal: Positive for abdominal pain, diarrhea, nausea and vomiting. Negative for blood in stool. Genitourinary: Negative for dysuria. Musculoskeletal: Negative for back pain. Skin: Negative for rash. Neurological: Negative for headaches. Physical Exam   Physical Exam   Constitutional: He is oriented to person, place, and time. He appears well-developed and well-nourished.  He appears cachectic. HENT:   Head: Normocephalic and atraumatic. Mouth/Throat: Oropharynx is clear and moist.   Eyes: Conjunctivae and EOM are normal. Pupils are equal, round, and reactive to light. Right eye exhibits no discharge. Left eye exhibits no discharge. Neck: Normal range of motion. Neck supple. Cardiovascular: Regular rhythm and normal heart sounds. Tachycardia present. No murmur heard. Pulmonary/Chest: Effort normal and breath sounds normal. No respiratory distress. He has no wheezes. He has no rales. Abdominal: Soft. Bowel sounds are normal. He exhibits no distension and no mass. There is tenderness in the left lower quadrant. There is no rebound and no guarding. Musculoskeletal: Normal range of motion. He exhibits no edema. Neurological: He is alert and oriented to person, place, and time. No cranial nerve deficit. He exhibits normal muscle tone. Skin: Skin is warm and dry. No rash noted. He is not diaphoretic. Psychiatric:   Flat affect. Tearful    Nursing note and vitals reviewed.         Diagnostic Study Results     Labs -     Recent Results (from the past 12 hour(s))   EKG, 12 LEAD, INITIAL    Collection Time: 03/06/18 12:01 PM   Result Value Ref Range    Ventricular Rate 87 BPM    Atrial Rate 87 BPM    P-R Interval 124 ms    QRS Duration 96 ms    Q-T Interval 374 ms    QTC Calculation (Bezet) 450 ms    Calculated P Axis 80 degrees    Calculated R Axis -74 degrees    Calculated T Axis 66 degrees    Diagnosis       Normal sinus rhythm  Left axis deviation  Pulmonary disease pattern  Abnormal ECG  When compared with ECG of 13-OCT-2014 15:20,  QT has shortened     LACTIC ACID    Collection Time: 03/06/18 12:13 PM   Result Value Ref Range    Lactic acid 2.2 (HH) 0.4 - 2.0 MMOL/L   METABOLIC PANEL, COMPREHENSIVE    Collection Time: 03/06/18 12:13 PM   Result Value Ref Range    Sodium 138 136 - 145 mmol/L    Potassium 3.5 3.5 - 5.1 mmol/L    Chloride 101 97 - 108 mmol/L    CO2 29 21 - 32 mmol/L    Anion gap 8 5 - 15 mmol/L    Glucose 146 (H) 65 - 100 mg/dL    BUN 18 6 - 20 MG/DL    Creatinine 0.83 0.70 - 1.30 MG/DL    BUN/Creatinine ratio 22 (H) 12 - 20      GFR est AA >60 >60 ml/min/1.73m2    GFR est non-AA >60 >60 ml/min/1.73m2    Calcium 8.7 8.5 - 10.1 MG/DL    Bilirubin, total 1.0 0.2 - 1.0 MG/DL    ALT (SGPT) 15 12 - 78 U/L    AST (SGOT) 10 (L) 15 - 37 U/L    Alk. phosphatase 67 45 - 117 U/L    Protein, total 7.1 6.4 - 8.2 g/dL    Albumin 3.2 (L) 3.5 - 5.0 g/dL    Globulin 3.9 2.0 - 4.0 g/dL    A-G Ratio 0.8 (L) 1.1 - 2.2     CBC WITH AUTOMATED DIFF    Collection Time: 03/06/18 12:13 PM   Result Value Ref Range    WBC 7.4 4.1 - 11.1 K/uL    RBC 5.50 4. 10 - 5.70 M/uL    HGB 15.2 12.1 - 17.0 g/dL    HCT 47.9 36.6 - 50.3 %    MCV 87.1 80.0 - 99.0 FL    MCH 27.6 26.0 - 34.0 PG    MCHC 31.7 30.0 - 36.5 g/dL    RDW 16.2 (H) 11.5 - 14.5 %    PLATELET 189 664 - 878 K/uL    MPV 10.9 8.9 - 12.9 FL    NRBC 0.0 0  WBC    ABSOLUTE NRBC 0.00 0.00 - 0.01 K/uL    NEUTROPHILS 83 (H) 32 - 75 %    LYMPHOCYTES 12 12 - 49 %    MONOCYTES 4 (L) 5 - 13 %    EOSINOPHILS 1 0 - 7 %    BASOPHILS 0 0 - 1 %    IMMATURE GRANULOCYTES 0 0.0 - 0.5 %    ABS. NEUTROPHILS 6.1 1.8 - 8.0 K/UL    ABS. LYMPHOCYTES 0.9 0.8 - 3.5 K/UL    ABS. MONOCYTES 0.3 0.0 - 1.0 K/UL    ABS. EOSINOPHILS 0.1 0.0 - 0.4 K/UL    ABS. BASOPHILS 0.0 0.0 - 0.1 K/UL    ABS. IMM.  GRANS. 0.0 0.00 - 0.04 K/UL    DF AUTOMATED     URINALYSIS W/ RFLX MICROSCOPIC    Collection Time: 03/06/18  3:28 PM   Result Value Ref Range    Color YELLOW/STRAW      Appearance CLEAR CLEAR      Specific gravity PENDING     pH (UA) 7.0 5.0 - 8.0      Protein NEGATIVE  NEG mg/dL    Glucose NEGATIVE  NEG mg/dL    Ketone 15 (A) NEG mg/dL    Bilirubin NEGATIVE  NEG      Blood SMALL (A) NEG      Urobilinogen 0.2 0.2 - 1.0 EU/dL    Nitrites NEGATIVE  NEG      Leukocyte Esterase NEGATIVE  NEG      WBC 0-4 0 - 4 /hpf    RBC 10-20 0 - 5 /hpf    Epithelial cells FEW FEW /lpf Bacteria NEGATIVE  NEG /hpf    Hyaline cast 2-5 0 - 5 /lpf       Radiologic Studies -   CT ABD PELV W CONT   Final Result      XR CHEST PORT   Final Result        CT Results  (Last 48 hours)               03/06/18 1410  CT ABD PELV W CONT Final result    Impression:  IMPRESSION:       1. Near diffuse colitis is most likely inflammatory or infectious. Increased   involvement of the colon since last year. 2. No perforation or abscess. Narrative:  EXAM:  CT ABD PELV W CONT       INDICATION: Diarrhea and vomiting for 3 days. Previous ulcerative colitis,   chronic sarcoidosis. No abdominal surgery. COMPARISON: CT abdomen/pelvis on 12/19/2017. CONTRAST:  100 mL of Isovue-370. TECHNIQUE:    Following the uneventful intravenous administration of contrast, thin axial   images were obtained through the abdomen and pelvis. Coronal and sagittal   reconstructions were generated. Oral contrast was not administered. CT dose   reduction was achieved through use of a standardized protocol tailored for this   examination and automatic exposure control for dose modulation. FINDINGS:    LUNG BASES: Pleural calcifications and chronic interstitial lung disease are   unchanged. No pneumonia. INCIDENTALLY IMAGED HEART AND MEDIASTINUM: Unremarkable. LIVER: No mass or biliary dilatation. GALLBLADDER: Unremarkable. SPLEEN: Top normal splenic size is unchanged. Calcification is unchanged. No   mass or infarct. PANCREAS: No mass or ductal dilatation. ADRENALS: Unremarkable. KIDNEYS: No mass or hydronephrosis. Bilateral renal simple cysts are unchanged. STOMACH: Partial distention. SMALL BOWEL: No dilatation or wall thickening. COLON: Near diffuse mural thickening spares the proximal sigmoid colon. No   pneumatosis. No abscess. APPENDIX: Unremarkable. PERITONEUM: No ascites or pneumoperitoneum. RETROPERITONEUM: Aorta is atherosclerotic without aneurysm.    REPRODUCTIVE ORGANS: Heterogeneous partially calcified prostatomegaly. URINARY BLADDER: Incompletely distention. BONES: No change. ADDITIONAL COMMENTS: No lymphadenopathy. CXR Results  (Last 48 hours)               03/06/18 1305  XR CHEST PORT Final result    Impression:  IMPRESSION:   1. No acute process   2. Extensive bilateral calcified pleural plaques       Narrative:  EXAM:  XR CHEST PORT       INDICATION:  Sepsis vomiting x3 days, hypertension       COMPARISON:  5/17/2016       FINDINGS: A portable AP radiograph of the chest was obtained at 1301 hours. The   patient is on a cardiac monitor. Patient status post median sternotomy. The   heart size is normal. Lungs are clear of an acute process. 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 Medical Decision Making   I am the first provider for this patient. I reviewed the vital signs, available nursing notes, past medical history, past surgical history, family history and social history. Vital Signs-Reviewed the patient's vital signs. Patient Vitals for the past 12 hrs:   Temp Pulse Resp BP SpO2   03/06/18 1531 - 94 21 99/63 100 %   03/06/18 1300 - 91 21 (!) 115/94 100 %   03/06/18 1201 97.6 °F (36.4 °C) (!) 111 18 (!) 72/46 -   03/06/18 1146 97.6 °F (36.4 °C) (!) 111 18 (!) 72/46 100 %       Pulse Oximetry Analysis - 100% on RA    Cardiac Monitor:   Rate: 111 bpm  Rhythm: Sinus Tachycardia      EKG interpretation: (Preliminary) 1201  Rhythm: normal sinus rhythm; and regular . Rate (approx.): 87; Axis: LAD; NE interval: normal; QRS interval: normal ; ST/T wave: nonspecific ST changes; Other findings: none  Written by Bethany Low ED Scribe, as dictated by Dayana Pina MD.       Records Reviewed: Nursing Notes and Old Medical Records    Provider Notes (Medical Decision Making):   Sxs concerned for dehydration vs sepsis. Protocol initiated.  Given recent abx with liquid diarrhea, initiated c dif precautions. ED Course:   Initial assessment performed. The patients presenting problems have been discussed, and they are in agreement with the care plan formulated and outlined with them. I have encouraged them to ask questions as they arise throughout their visit. 12:23 PM  ivf infusing. Pt's heart rate has improved to 95 bpm on monitor. 14:45 Patient reevaluated appears calm. VS improving with infusion. CT completed, await results. Patient yet to provide urine sample. 15:40 CT with evidence of diffuse colitis. Will initiate antibiotics and discuss with IM for admission. 15:50 Updated pt and family regarding plan. SBP remains improved without further low MAP readings. Urinalysis at bedside. Await hospitalist evaluation. 16:45 Repeat lactate 1.7 with SBP 99 and HR 85-90, hold further bolus. Consult Note:   3:42 PM  Alireza Love MD spoke with Humaira Gongora MD   Specialty: Hospitalist  Discussed pt's hx, disposition, and available diagnostic and imaging results. Reviewed care plans. Consultant will evaluate pt for admission. Written by Svetlana Clayton, ED Scribe, as dictated by Alireza Love MD.     Critical Care Time:   CRITICAL CARE NOTE :  5:23 PM  IMPENDING DETERIORATION -Respiratory, Cardiovascular, Metabolic and Renal  ASSOCIATED RISK FACTORS - Hypotension, Shock, Hypoxia, Dysrhythmia, Metabolic changes and Dehydration  MANAGEMENT- Bedside Assessment and Supervision of Care  INTERPRETATION -  Xrays, ECG and Blood Pressure  INTERVENTIONS - hemodynamic mngmt and Metobolic interventions  CASE REVIEW - Hospitalist, Nursing and Family  TREATMENT RESPONSE -Improved and Stable  PERFORMED BY - Self    NOTES   :  I have spent 50 minutes of critical care time involved in lab review, consultations with specialist, family decision- making, bedside attention and documentation. During this entire length of time I was immediately available to the patient . Chalo Escamilla.  Termeer, MD    Disposition:  Admitted    Admit Note:  3:43 PM  Pt is being admitted by Dr. Blaine Pierre, hospitalist. The results of their tests and reason(s) for their admission have been discussed with pt and/or available family. They convey agreement and understanding for the need to be admitted and for admission diagnosis. PLAN: admit to hospitalist    Diagnosis     Clinical Impression:   1. Dehydration    2. Nausea vomiting and diarrhea    3. Noninfectious gastroenteritis, unspecified type        Attestations: This note is prepared by Mindy Martinez, acting as Scribe for Mindy Stiles MD.       The scribe's documentation has been prepared under my direction and personally reviewed by me in its entirety. I confirm that the note above accurately reflects all work, treatment, procedures, and medical decision making performed by me.

## 2018-03-06 NOTE — IP AVS SNAPSHOT
[address] [facility] 
[phone] Patient: Steve Alonzo MRN: [id number] SILVERIO:0/2/7058 About your hospitalization You were admitted on:  March 6, 2018 You last received care in the:  [facility] GENERAL SURGERY You were discharged on:  March 8, 2018 Why you were hospitalized Your primary diagnosis was:  Not on File Your diagnoses also included:  Colitis, Hypotension, Severe Sepsis (Hcc), Ulcerative Colitis (Hcc), Rheumatoid Arthritis Involving Multiple Sites With Positive Rheumatoid Factor (Hcc), C. Difficile Diarrhea Follow-up Information Follow up With Details Comments Contact Info [facility] [place] On 3/7/2018 THIS IS YOUR HOME HEALTH AGENCY. IF YOU DO NOT HEAR FROM THEM IN 24-48 HRS, PLEASE CONTACT THEM DIRECTLY [address] 
1st Floor [facility] [id number] [phone] Levell Achilles III, DO Schedule an appointment as soon as possible for a visit in 1 month  [address] [facility] 
[phone] Katelyn Munoz MD Go on 3/15/2018 For hospital follow up appointment at 2:30PM  [address] [place] [address] Road [facility] [phone] Lalo Ames MD In 2 weeks  [address] [facility] 
[phone] Your Scheduled Appointments Thursday March 15, 2018  2:30 PM EDT ROUTINE CARE with MD Grabiel Aguirre [address] [place] [address]) [address] [place] [address] [phone] Friday April 06, 2018 10:30 AM EDT ROUTINE CARE with MD Grabiel Aguirre [address] [place] [address]) [address] [facility] [address] [place] [address] [phone] Discharge Orders None A check juwan indicates which time of day the medication should be taken. My Medications START taking these medications Instructions Each Dose to Equal  
 Morning Noon Evening Bedtime  
 vancomycin 125 mg capsule Commonly known as:  [employer] Your last dose was: Your next dose is: Take 1 Cap by mouth four (4) times daily for 10 days. 125 mg CHANGE how you take these medications Instructions Each Dose to Equal  
 Morning Noon Evening Bedtime  
 gabapentin 300 mg capsule Commonly known as:  NEURONTIN What changed:  Another medication with the same name was removed. Continue taking this medication, and follow the directions you see here. Your last dose was: Your next dose is: Take 300 mg by mouth three (3) times daily. 300 mg  
    
   
   
   
  
 metoprolol succinate 25 mg XL tablet Commonly known as:  TOPROL-XL What changed:  Another medication with the same name was removed. Continue taking this medication, and follow the directions you see here. Your last dose was: Your next dose is: Take 25 mg by mouth every evening. 25 mg CONTINUE taking these medications Instructions Each Dose to Equal  
 Morning Noon Evening Bedtime  
 aspirin 81 mg chewable tablet Your last dose was: Your next dose is: Take 1 Tab by mouth every evening. 81 mg  
    
   
   
   
  
 atorvastatin 20 mg tablet Commonly known as:  LIPITOR Your last dose was: Your next dose is: Take 1 tablet by mouth  daily COLACE 100 mg capsule Generic drug:  docusate sodium Your last dose was: Your next dose is: Take 100 mg by mouth two (2) times daily as needed for Constipation. 100 mg ENBREL 50 mg/mL (0.98 mL) injection Generic drug:  etanercept Your last dose was: Your next dose is: 50 mg by SubCUTAneous route every Sunday. 50 mg  
    
   
   
   
  
 folic acid 1 mg tablet Commonly known as:  [employer] Your last dose was: Your next dose is: Take 1 mg by mouth daily. 1 mg LORazepam 1 mg tablet Commonly known as:  ATIVAN Your last dose was: Your next dose is: Take 1 mg by mouth daily as needed for Anxiety (prior to injection). 1 mg METHOTREXATE (ANTI-RHEUMATIC) 2.5 mg tablet dose pack Generic drug:  methotrexate Your last dose was: Your next dose is: Take 20 mg by mouth Every Saturday. 20 mg  
    
   
   
   
  
 OTHER Your last dose was: Your next dose is:    
   
   
 by Injection route every three (3) months. Patient receives lumbar epidural steroid injections by Dr. Roly Velez from [facility] PERCOCET  mg per tablet Generic drug:  oxyCODONE-acetaminophen Your last dose was: Your next dose is: Take 1 tablet by mouth every four (4) hours as needed for Pain. 1 Tab  
    
   
   
   
  
 predniSONE 2.5 mg tablet Commonly known as:  Loren Navarro Your last dose was: Your next dose is: Take 2.5 mg by mouth daily. 2.5 mg  
    
   
   
   
  
  
STOP taking these medications   
 loperamide 2 mg tablet Commonly known as:  IMMODIUM  
   
  
 polyethylene glycol 17 gram/dose powder Commonly known as:  Myriam Ordonez Where to Get Your Medications These medications were sent to [address] [place] [address] Misael Kaur,[address], [address] [facility] Phone:  [phone]  
  vancomycin 125 mg capsule Discharge Instructions Clostridium Difficile Colitis: Care Instructions Your Care Instructions Clostridium difficile (also called C. difficile) are bacteria that can cause swelling and irritation of the large intestine, or colon. This inflammation is also called colitis. It can cause diarrhea, fever, and belly cramps. You may get C. difficile colitis if you take antibiotics. The infection is most common in people who are taking antibiotics while in the hospital. It is also common in older people in hospitals and nursing homes. Severe disease could cause the colon to swell to many times its normal size (toxic megacolon). This can cause death and needs emergency treatment. You may have a swollen belly that is painful or tender, a rapid heartbeat, and a fever. Follow-up care is a key part of your treatment and safety. Be sure to make and go to all appointments, and call your doctor if you are having problems. It's also a good idea to know your test results and keep a list of the medicines you take. How can you care for yourself at home? · Your doctor may give you antibiotics to treat C. difficile colitis. If your doctor prescribes an antibiotic, he or she will give you a different antibiotic than the one that caused your infection. Take your antibiotics as directed. Do not stop taking them just because you feel better. You need to take the full course of antibiotics. · To prevent dehydration, drink plenty of fluids, enough so that your urine is light yellow or clear like water. Choose water and other caffeine-free clear liquids until you feel better. If you have kidney, heart, or liver disease and have to limit fluids, talk with your doctor before you increase the amount of fluids you drink. · Begin eating small amounts of mild foods, if you feel like it. Try yogurt that has live cultures of lactobacillus (check the label). ¨ Avoid spicy foods, fruits, alcohol, and caffeine until 48 hours after all symptoms go away. ¨ Avoid chewing gum that contains sorbitol.  
¨ Avoid dairy products (except for yogurt with lactobacillus) while you have diarrhea and for 3 days after symptoms go away. · To prevent the spread of C. difficile, practice good hygiene. Keep your hands clean by washing them well and often with soap and clean, running water. Alcohol-based hand sanitizers do not kill C. difficile. When should you call for help? Call 911 if: 
? · You passed out (lost consciousness). ?Call your doctor now or seek immediate medical care if: 
? · You have a fever over 101°F or shaking chills. ? · You feel lightheaded or have a fast heart rate. ? · You pass stools that are almost always bloody. ? · You have signs of needing more fluids. You have sunken eyes and a dry mouth, and you pass only a little dark urine. ? · You have severe belly pain with or without bloating. ? · You have severe vomiting and cannot keep down liquids. ? · You are not passing any stools or gas. ? Watch closely for changes in your health, and be sure to contact your doctor if: 
? · You do not get better as expected. Where can you learn more? Go to http://ligia-judi.info/. Enter ([phone] [phone] in the search box to learn more about \"Clostridium Difficile Colitis: Care Instructions. \" Current as of: March 3, 2017 Content Version: 11.4 © 1484-4540 [employer]. Care instructions adapted under license by [employer] (which disclaims liability or warranty for this information). If you have questions about a medical condition or this instruction, always ask your healthcare professional. [place] [address] any warranty or liability for your use of this information. ACO Transitions of Care Introducing [employer] [address] Ginna Leonard offers a voluntary care coordination program to provide high quality service and care to [facility] fee-for-service beneficiaries. Neeraj Navarro was designed to help you enhance your health and well-being through the following services: ? Transitions of Care  support for individuals who are transitioning from one care setting to another (example: Hospital to home). ? Chronic and Complex Care Coordination  support for individuals and caregivers of those with serious or chronic illnesses or with more than one chronic (ongoing) condition and those who take a number of different medications. If you meet specific medical criteria, a [facility] [address] may call you directly to coordinate your care with your primary care physician and your other care providers. For questions about the [facility]) programs, please, contact your physicians office. For general questions or additional information about Accountable Care Organizations: 
Please visit www.medicare.gov/acos. html or call 1-800-MEDICARE (4-760.646.6016) TTY users should call 5-654.543.3421. [employer] Announcement We are excited to announce that we are making your provider's discharge notes available to you in [employer]. You will see these notes when they are completed and signed by the physician that discharged you from your recent hospital stay. If you have any questions or concerns about any information you see in [employer], please call the Health Information Department where you were seen or reach out to your Primary Care Provider for more information about your plan of care. Introducing [facility]! Theodore Mobley introduces [employer] patient portal. Now you can access parts of your medical record, email your doctor's office, and request medication refills online. 1. In your internet browser, go to [email or web address] 2. Click on the First Time User? Click Here link in the Sign In box. You will see the New Member Sign Up page. 3. Enter your [employer] Access Code exactly as it appears below. You will not need to use this code after youve completed the sign-up process.  If you do not sign up before the expiration date, you must request a new code. · [employer] Access Code: [email or web address] Expires: 3/12/2018  2:53 PM 
 
4. Enter the last four digits of your Social Security Number (xxxx) and Date of Birth (mm/dd/yyyy) as indicated and click Submit. You will be taken to the next sign-up page. 5. Create a [employer] ID. This will be your [employer] login ID and cannot be changed, so think of one that is secure and easy to remember. 6. Create a [employer] password. You can change your password at any time. 7. Enter your Password Reset Question and Answer. This can be used at a later time if you forget your password. 8. Enter your e-mail address. You will receive e-mail notification when new information is available in [address] 9. Click Sign Up. You can now view and download portions of your medical record. 10. Click the Download Summary menu link to download a portable copy of your medical information. If you have questions, please visit the Frequently Asked Questions section of the [employer] website. Remember, [employer] is NOT to be used for urgent needs. For medical emergencies, dial 911. Now available from your iPhone and Android! Unresulted Labs-Please follow up with your PCP about these lab tests Order Current Status CULTURE, BLOOD Preliminary result CULTURE, BLOOD Preliminary result Providers Seen During Your Hospitalization Provider Specialty Primary office phone Franco Hummel. Sheri Lemus MD Emergency Medicine [phone] Rayo Paz MD Internal Medicine [phone] Your Primary Care Physician (PCP) Primary Care Physician Office Phone Office Fax [address] [facility], [address] [phone] You are allergic to the following Allergen Reactions Lisinopril Cough Questran (Cholestyramine (With Sugar)) Other (comments) Muscle cramps Recent Documentation Height Weight BMI Smoking Status 1.727 m 54.4 kg 18.25 kg/m2 Former Smoker Emergency Contacts Name Discharge Info Relation Home Work Mobile [address] CAREGIVER [3] Spouse [3] [phone] Patient Belongings The following personal items are in your possession at time of discharge: 
     Visual Aid: None Please provide this summary of care documentation to your next provider. Signatures-by signing, you are acknowledging that this After Visit Summary has been reviewed with you and you have received a copy. Patient Signature:  ____________________________________________________________ Date:  ____________________________________________________________  
  
[facility] Provider Signature:  ____________________________________________________________ Date:  ____________________________________________________________

## 2018-03-06 NOTE — IP AVS SNAPSHOT
[address] [facility] 
[phone] Patient: Denise Holter MRN: [id number] FJP:[birth year] A check juwan indicates which time of day the medication should be taken. My Medications START taking these medications Instructions Each Dose to Equal  
 Morning Noon Evening Bedtime  
 vancomycin 125 mg capsule Commonly known as:  [employer] Your last dose was: Your next dose is: Take 1 Cap by mouth four (4) times daily for 10 days. 125 mg CHANGE how you take these medications Instructions Each Dose to Equal  
 Morning Noon Evening Bedtime  
 gabapentin 300 mg capsule Commonly known as:  NEURONTIN What changed:  Another medication with the same name was removed. Continue taking this medication, and follow the directions you see here. Your last dose was: Your next dose is: Take 300 mg by mouth three (3) times daily. 300 mg  
    
   
   
   
  
 metoprolol succinate 25 mg XL tablet Commonly known as:  TOPROL-XL What changed:  Another medication with the same name was removed. Continue taking this medication, and follow the directions you see here. Your last dose was: Your next dose is: Take 25 mg by mouth every evening. 25 mg CONTINUE taking these medications Instructions Each Dose to Equal  
 Morning Noon Evening Bedtime  
 aspirin 81 mg chewable tablet Your last dose was: Your next dose is: Take 1 Tab by mouth every evening. 81 mg  
    
   
   
   
  
 atorvastatin 20 mg tablet Commonly known as:  LIPITOR Your last dose was: Your next dose is: Take 1 tablet by mouth  daily COLACE 100 mg capsule Generic drug:  docusate sodium Your last dose was: Your next dose is: Take 100 mg by mouth two (2) times daily as needed for Constipation. 100 mg ENBREL 50 mg/mL (0.98 mL) injection Generic drug:  etanercept Your last dose was: Your next dose is:    
   
   
 50 mg by SubCUTAneous route every Sunday. 50 mg  
    
   
   
   
  
 folic acid 1 mg tablet Commonly known as:  [employer] Your last dose was: Your next dose is: Take 1 mg by mouth daily. 1 mg LORazepam 1 mg tablet Commonly known as:  ATIVAN Your last dose was: Your next dose is: Take 1 mg by mouth daily as needed for Anxiety (prior to injection). 1 mg METHOTREXATE (ANTI-RHEUMATIC) 2.5 mg tablet dose pack Generic drug:  methotrexate Your last dose was: Your next dose is: Take 20 mg by mouth Every Saturday. 20 mg  
    
   
   
   
  
 OTHER Your last dose was: Your next dose is:    
   
   
 by Injection route every three (3) months. Patient receives lumbar epidural steroid injections by Dr. Faustino Taylor [facility] PERCOCET  mg per tablet Generic drug:  oxyCODONE-acetaminophen Your last dose was: Your next dose is: Take 1 tablet by mouth every four (4) hours as needed for Pain. 1 Tab  
    
   
   
   
  
 predniSONE 2.5 mg tablet Commonly known as:  Mecca Kathleen Your last dose was: Your next dose is: Take 2.5 mg by mouth daily. 2.5 mg  
    
   
   
   
  
  
STOP taking these medications   
 loperamide 2 mg tablet Commonly known as:  IMMODIUM  
   
  
 polyethylene glycol 17 gram/dose powder Commonly known as:  Mikayla Short Where to Get Your Medications These medications were sent to [address] [place] [address] Msiael Kaur,[address], [address] Phone:  [phone] vancomycin 125 mg capsule

## 2018-03-06 NOTE — PROGRESS NOTES
Notified by tele that pt had converted to A-fib Dr. Minerva Dumont notified and a new order to consult cardiology for new on set

## 2018-03-06 NOTE — H&P
Hospitalist Admission Note    NAME: Karen Sanders   :  1935   MRN:  [id number]     Date/Time:  3/6/2018 4:40 PM    Patient PCP: Scottie Pereira MD  ______________________________________________________________________  Given the patient's current clinical presentation, I have a high level of concern for decompensation if discharged from the emergency department. Complex decision making was performed, which includes reviewing the patient's available past medical records, laboratory results, and x-ray films. My assessment of this patient's clinical condition and my plan of care is as follows. Assessment / Plan:  PanColitis POA- inflammatory versus infectious?   R/o C Diff colitis  H/o Ulcerative colitis  Severe Sepsis POA due to above  Hypotension POA - responded to IVF challenge  Lactic acidosis POA- normalized with IVF challenge in ER    Admit to surgical/medical telemetry bed  S/p IVF bolus x 1 in ER, cont IVF at 150ml/hr for now  Stress dose steroid/cortef for now as pt was chronically on PO prednisone for RA/UC- DC once BP stabilized  Empiric IV flagyl+ PO vancomycin for now  Check Stool for CDiff- contact precautions till test neg  GI consult Dr Claudene Quam for further recommendations  NPO for now till seen by GI  Hold MTX, Enbrel, PO prednisone for now  Hold BP med- toprol  IV morphine for pain management as BP allows    RA  UC  Sarcoidosis    Cont FA  Holding MTX, Enbrel, PO Pred as above  Pain management  Can consider Rheum consult Dr Aleta Jimenez if needed    HTN  CAD s/p CABG  AS S/p AVR    Holding metoprolol for hypotension  Cont ASA  Holding statin for now    Chronic pain syndrome  Chronic Back pain- s/p recent shot to back on friday    Hold PO Percocet, IV morphine as above  PT/OT eval      Code Status: Full  Surrogate Decision Maker: wife Mendoza Huerta # [phone] (cell)    DVT Prophylaxis: SQ lovenox  GI Prophylaxis: not indicated    Baseline: pt is independent at home with ADLs Subjective:   CHIEF COMPLAINT: Diarrhea with abdominal pain x 3 days    HISTORY OF PRESENT ILLNESS:     Conchis Mckeon is a [de-identified] y.o.  male who presents with above complains from home ambulatory. Pt presents with CC of worsening Diarrhea x 3 days  H/o associated nausea/vomiting x 2 days  H/o diffuse abdominal soreness x 1 week  H/o recent pain shot to the back on Friday- for pinched nerve  H/o recently completing 2 rounds of PO Abx for skin graft infection (after  Basal cell carcinoma Sx) as per dermatologist.  H/o Ulcerative colitis(cared by GI Dr Vicenta Benitez) & RA (cared by Rheum Dr Nicanor Aiken)-- has been in control on PO prednisone, Enbrel & MTX/FA regimen as per pt. Pt was found to have Hypotension in ER with evidence of diffuse colitis on CT A/P on imaging. We were asked to admit for work up and evaluation of the above problems. Past Medical History:   Diagnosis Date    Aortic stenosis, severe     Arrhythmia     heart murmur    Arthritis     Rheumatoid    CAD (coronary artery disease) 1998    S/P CABG    Cancer (HCC)     Basal cell carcinoma    Chronic pain     from rheumatoid arthritis in knees, elbows fingers    HTN (hypertension)     Hypercholesterolemia     S/P AVR (aortic valve replacement)     23 mm Trifecta with Redo CABG x 1SVG to PDA    S/P CABG x 1 10/13/2014    Redo CABG x 1 SVG to PDA; 'y' from collins of existing OM vein graft    Sarcoidosis     No longer active.      Ulcerative colitis ([address]     Not active        Past Surgical History:   Procedure Laterality Date    CARDIAC SURG PROCEDURE UNLIST      CABG    HX AORTIC VALVE REPLACEMENT  10/2014    CABG redo X1    HX CATARACT REMOVAL      Left    HX LUMBAR LAMINECTOMY      HX OTHER SURGICAL      skin lesions removed from nose and forehead- basal cell    LAMINECTOMY,LUMBAR  110/2000    MN COLONOSCOPY W/BIOPSY SINGLE/MULTIPLE  12/5/2011            Social History   Substance Use Topics    Smoking status: Former Smoker Packs/day: 1.50     Years: 20.00     Quit date: 1/1/1978    Smokeless tobacco: Never Used    Alcohol use Yes      Comment: rarely        Family History   Problem Relation Age of Onset    Heart Disease Father     Cancer Sister      breast     Diabetes Sister     Cancer Sister      leukemia    Diabetes Sister     Diabetes Mother    Tsosie Harriet Bladder Disease Mother      Allergies   Allergen Reactions    Lisinopril Cough    Questran [Cholestyramine (With Sugar)] Other (comments)     Muscle cramps        Prior to Admission medications    Medication Sig Start Date End Date Taking? Authorizing Provider   LORazepam (ATIVAN) 1 mg tablet Take 1 mg by mouth daily as needed for Anxiety (prior to injection). Yes Rosa Maria Pierce MD   gabapentin (NEURONTIN) 300 mg capsule Take 300 mg by mouth three (3) times daily. Yes Rosa Maria Pierce MD   metoprolol succinate (TOPROL-XL) 25 mg XL tablet Take 25 mg by mouth every evening. Yes Rosa Maria Pierce MD   polyethylene glycol (MIRALAX) 17 gram/dose powder Take 17 g by mouth two (2) times daily as needed (constipation). Yes Rosa Maria Pierce MD   loperamide (IMMODIUM) 2 mg tablet Take 4 mg by mouth four (4) times daily as needed for Diarrhea. Yes MD MARTIN Gaines by Injection route every three (3) months. Patient receives lumbar epidural steroid injections by Dr. Faustino Taylor from [facility]   Yes Rosa Maria Pierce MD   aspirin 81 mg chewable tablet Take 1 Tab by mouth every evening. 2/20/18  Yes Jg Gonzalez MD   atorvastatin (LIPITOR) 20 mg tablet Take 1 tablet by mouth  daily 9/14/17  Yes Jg Gonzalez MD   predniSONE (DELTASONE) 2.5 mg tablet Take 2.5 mg by mouth daily. 7/21/17  Yes Historical Provider   oxycodone-acetaminophen (PERCOCET)  mg per tablet Take 1 tablet by mouth every four (4) hours as needed for Pain. Yes Historical Provider   methotrexate sodium (METHOTREXATE, ANTI-RHEUMATIC,) 2.5 mg DsPk Take 20 mg by mouth Every Saturday.  8/31/10  Yes Historical Provider folic acid (FOLVITE) 1 mg tablet Take 1 mg by mouth daily. Yes Historical Provider   etanercept (ENBREL) 50 mg/mL (0.98 mL) injection 50 mg by SubCUTAneous route every Sunday. Yes Historical Provider   docusate sodium (COLACE) 100 mg capsule Take 100 mg by mouth two (2) times daily as needed for Constipation. Historical Provider       REVIEW OF SYSTEMS:           Total of 12 systems reviewed as follows:       POSITIVE= underlined text  Negative = text not underlined  General:  fever, chills, sweats, generalized weakness, weight loss/gain,      loss of appetite   Eyes:    blurred vision, eye pain, loss of vision, double vision  ENT:    rhinorrhea, pharyngitis   Respiratory:   cough, sputum production, SOB, BEY, wheezing, pleuritic pain   Cardiology:   chest pain, palpitations, orthopnea, PND, edema, syncope   Gastrointestinal:  abdominal pain , Nausea/vomiting, diarrhea, dysphagia, constipation, bleeding   Genitourinary:  frequency, urgency, dysuria, hematuria, incontinence   Muskuloskeletal :  arthralgia, myalgia, back pain  Hematology:  easy bruising, nose or gum bleeding, lymphadenopathy   Dermatological: rash, ulceration, pruritis, color change / jaundice  Endocrine:   hot flashes or polydipsia   Neurological:  headache, dizziness, confusion, focal weakness, paresthesia,     Speech difficulties, memory loss, gait difficulty  Psychological: Feelings of anxiety, depression, agitation    Objective:   VITALS:    Visit Vitals    /67    Pulse (!) 101    Temp 97.6 °F (36.4 °C)    Resp 18    Ht 5' 8\" (1.727 m)    Wt 54.4 kg (120 lb)    SpO2 100%    BMI 18.25 kg/m2       PHYSICAL EXAM:    General:    Alert, cooperative, no distress, appears stated age. HEENT: Atraumatic, anicteric sclerae, pink conjunctivae     No oral ulcers, mucosa Dry+, throat clear, dentition fair  Neck:  Supple, symmetrical,  thyroid: non tender  Lungs:   Clear to auscultation bilaterally. No Wheezing or Rhonchi.  No rales.  Chest wall:  No tenderness  No Accessory muscle use. Heart:   Regular  rhythm,  No  murmur   No edema  Abdomen:   Soft, diffusely tender +. Not distended. Bowel sounds normal  Extremities: No cyanosis. No clubbing,      Skin turgor normal, Capillary refill normal, Radial dial pulse 2+  Skin/extremeties:     Not pale. Not Jaundiced  No rashes, Skin graft site on the R leg noted - not infected  Psych:  Good insight. Not depressed. Not anxious or agitated. Neurologic: EOMs intact. No facial asymmetry. No aphasia or slurred speech. Symmetrical strength, Sensation grossly intact. Alert and oriented X 4.     _______________________________________________________________________  Care Plan discussed with:    Comments   Patient x    Family  x Wife & daughter at bedside   RN x    Care Manager                    Consultant:  amari PRICE MD Termeer   _______________________________________________________________________  Expected  Disposition:   Home with Family x   HH/PT/OT/RN ?   SNF/LTC    [place]    ________________________________________________________________________  TOTAL TIME:  72 Minutes    Critical Care Provided     Minutes non procedure based      Comments    x Reviewed previous records   >50% of visit spent in counseling and coordination of care x Discussion with patient and/or family and questions answered       ________________________________________________________________________  Signed: Yury Angel MD    Procedures: see electronic medical records for all procedures/Xrays and details which were not copied into this note but were reviewed prior to creation of Plan.     LAB DATA REVIEWED:    Recent Results (from the past 24 hour(s))   EKG, 12 LEAD, INITIAL    Collection Time: 03/06/18 12:01 PM   Result Value Ref Range    Ventricular Rate 87 BPM    Atrial Rate 87 BPM    P-R Interval 124 ms    QRS Duration 96 ms    Q-T Interval 374 ms    QTC Calculation (Bezet) 450 ms    Calculated P Axis 80 degrees    Calculated R Axis -74 degrees    Calculated T Axis 66 degrees    Diagnosis       Normal sinus rhythm  Left axis deviation  Pulmonary disease pattern  Abnormal ECG  When compared with ECG of 13-OCT-2014 15:20,  QT has shortened     LACTIC ACID    Collection Time: 03/06/18 12:13 PM   Result Value Ref Range    Lactic acid 2.2 (HH) 0.4 - 2.0 MMOL/L   METABOLIC PANEL, COMPREHENSIVE    Collection Time: 03/06/18 12:13 PM   Result Value Ref Range    Sodium 138 136 - 145 mmol/L    Potassium 3.5 3.5 - 5.1 mmol/L    Chloride 101 97 - 108 mmol/L    CO2 29 21 - 32 mmol/L    Anion gap 8 5 - 15 mmol/L    Glucose 146 (H) 65 - 100 mg/dL    BUN 18 6 - 20 MG/DL    Creatinine 0.83 0.70 - 1.30 MG/DL    BUN/Creatinine ratio 22 (H) 12 - 20      GFR est AA >60 >60 ml/min/1.73m2    GFR est non-AA >60 >60 ml/min/1.73m2    Calcium 8.7 8.5 - 10.1 MG/DL    Bilirubin, total 1.0 0.2 - 1.0 MG/DL    ALT (SGPT) 15 12 - 78 U/L    AST (SGOT) 10 (L) 15 - 37 U/L    Alk. phosphatase 67 45 - 117 U/L    Protein, total 7.1 6.4 - 8.2 g/dL    Albumin 3.2 (L) 3.5 - 5.0 g/dL    Globulin 3.9 2.0 - 4.0 g/dL    A-G Ratio 0.8 (L) 1.1 - 2.2     CBC WITH AUTOMATED DIFF    Collection Time: 03/06/18 12:13 PM   Result Value Ref Range    WBC 7.4 4.1 - 11.1 K/uL    RBC 5.50 4. 10 - 5.70 M/uL    HGB 15.2 12.1 - 17.0 g/dL    HCT 47.9 36.6 - 50.3 %    MCV 87.1 80.0 - 99.0 FL    MCH 27.6 26.0 - 34.0 PG    MCHC 31.7 30.0 - 36.5 g/dL    RDW 16.2 (H) 11.5 - 14.5 %    PLATELET 067 096 - 375 K/uL    MPV 10.9 8.9 - 12.9 FL    NRBC 0.0 0  WBC    ABSOLUTE NRBC 0.00 0.00 - 0.01 K/uL    NEUTROPHILS 83 (H) 32 - 75 %    LYMPHOCYTES 12 12 - 49 %    MONOCYTES 4 (L) 5 - 13 %    EOSINOPHILS 1 0 - 7 %    BASOPHILS 0 0 - 1 %    IMMATURE GRANULOCYTES 0 0.0 - 0.5 %    ABS. NEUTROPHILS 6.1 1.8 - 8.0 K/UL    ABS. LYMPHOCYTES 0.9 0.8 - 3.5 K/UL    ABS. MONOCYTES 0.3 0.0 - 1.0 K/UL    ABS. EOSINOPHILS 0.1 0.0 - 0.4 K/UL    ABS. BASOPHILS 0.0 0.0 - 0.1 K/UL    ABS. IMM.  GRANS. 0.0 0.00 - 0.04 K/UL    DF AUTOMATED     URINALYSIS W/ RFLX MICROSCOPIC    Collection Time: 03/06/18  3:28 PM   Result Value Ref Range    Color YELLOW/STRAW      Appearance CLEAR CLEAR      Specific gravity <1.005 1.003 - 1.030    pH (UA) 7.0 5.0 - 8.0      Protein NEGATIVE  NEG mg/dL    Glucose NEGATIVE  NEG mg/dL    Ketone 15 (A) NEG mg/dL    Bilirubin NEGATIVE  NEG      Blood SMALL (A) NEG      Urobilinogen 0.2 0.2 - 1.0 EU/dL    Nitrites NEGATIVE  NEG      Leukocyte Esterase NEGATIVE  NEG      WBC 0-4 0 - 4 /hpf    RBC 10-20 0 - 5 /hpf    Epithelial cells FEW FEW /lpf    Bacteria NEGATIVE  NEG /hpf    Hyaline cast 2-5 0 - 5 /lpf   LACTIC ACID    Collection Time: 03/06/18  3:52 PM   Result Value Ref Range    Lactic acid 1.7 0.4 - 2.0 MMOL/L

## 2018-03-06 NOTE — PROGRESS NOTES
Primary Nurse Kristen Dawkins and Kellen Khan RN performed a dual skin assessment on this patient Impairment noted- see wound doc flow sheet  Freddy score is 18  Skin graft to RLE and two scabs below  Dry patches on head and neck  Scab to LLE

## 2018-03-06 NOTE — ED NOTES
TRANSFER - OUT REPORT:    Verbal report given to Rose Martinez RN (name) on Tiffanie Madrid  being transferred to Gen surg (unit) for routine progression of care       Report consisted of patients Situation, Background, Assessment and   Recommendations(SBAR). Information from the following report(s) SBAR, Kardex, ED Summary, Intake/Output, MAR, Recent Results and Med Rec Status was reviewed with the receiving nurse. Lines:   Peripheral IV 03/06/18 Left Antecubital (Active)   Site Assessment Clean, dry, & intact 3/6/2018 12:14 PM   Phlebitis Assessment 0 3/6/2018 12:14 PM   Infiltration Assessment 0 3/6/2018 12:14 PM   Dressing Status Clean, dry, & intact 3/6/2018 12:14 PM   Dressing Type Tape;Transparent 3/6/2018 12:14 PM   Hub Color/Line Status Pink;Flushed;Patent 3/6/2018 12:14 PM   Action Taken Blood drawn 3/6/2018 12:14 PM       Peripheral IV 03/06/18 Right Antecubital (Active)   Site Assessment Clean, dry, & intact 3/6/2018  1:39 PM   Phlebitis Assessment 0 3/6/2018  1:39 PM   Infiltration Assessment 0 3/6/2018  1:39 PM   Dressing Status Clean, dry, & intact 3/6/2018  1:39 PM        Opportunity for questions and clarification was provided.       Patient transported with:   [facility]

## 2018-03-06 NOTE — PROGRESS NOTES
Pharmacy Clarification of Prior to Admission Medication Regimen     The patient was interviewed regarding clarification of the prior to admission medication regimen. Wife and daughters were present in room and obtained permission from patient to discuss drug regimen with visitor(s) present. Patient was questioned regarding use of any other inhalers, topical products, over the counter medications, herbal medications, vitamin products or ophthalmic/nasal/otic medication use. Patient stated he receives 'back injections' by Dr. Kristina Rivera. MHT called Dr. Kristina Rivera' office, [phone], and spoke with Daysi Sagastume, medical assistant to Dr. Kristina Rivera, who was able to provide MHT with the type of injections the patient receives. Information Obtained From: Patient, prescription bottles, MD's office, RX Query    Pertinent Pharmacy Findings:   Updated patients preferred outpatient pharmacy to: [facility], [address]   Identified High Alert Medication Information  o Oral Methotrexate:  - Indication: RA  - Dosing Instructions: 20 mg every Saturday   OTHER, injections: Patient receives lumbar epidural steroid injections by Dr. Kristina Rivera from [facility]      PTA medication list was corrected to the following:     Prior to Admission Medications   Prescriptions Last Dose Informant Patient Reported? Taking? LORazepam (ATIVAN) 1 mg tablet 3/2/2018 at Unknown time Other Yes Yes   Sig: Take 1 mg by mouth daily as needed for Anxiety (prior to injection). OTHER 3/2/2018 at Unknown time Other Yes Yes   Sig: by Injection route every three (3) months. Patient receives lumbar epidural steroid injections by Dr. Kristina Rivera from [facility]   aspirin 81 mg chewable tablet 3/3/2018 at Unknown time Self No Yes   Sig: Take 1 Tab by mouth every evening.    atorvastatin (LIPITOR) 20 mg tablet 3/3/2018 at Unknown time Other No Yes   Sig: Take 1 tablet by mouth  daily   docusate sodium (COLACE) 100 mg capsule Unknown at Unknown time Self Yes No   Sig: Take 100 mg by mouth two (2) times daily as needed for Constipation. etanercept (ENBREL) 50 mg/mL (0.98 mL) injection 3/4/2018 at Unknown time Self Yes Yes   Si mg by SubCUTAneous route every . folic acid (FOLVITE) 1 mg tablet 3/5/2018 at Unknown time Other Yes Yes   Sig: Take 1 mg by mouth daily. gabapentin (NEURONTIN) 300 mg capsule 3/5/2018 at Unknown time Other Yes Yes   Sig: Take 300 mg by mouth three (3) times daily. loperamide (IMMODIUM) 2 mg tablet 3/5/2018 at Unknown time Self Yes Yes   Sig: Take 4 mg by mouth four (4) times daily as needed for Diarrhea. methotrexate sodium (METHOTREXATE, ANTI-RHEUMATIC,) 2.5 mg DsPk 3/3/2018 at Unknown time Self Yes Yes   Sig: Take 20 mg by mouth Every Saturday. metoprolol succinate (TOPROL-XL) 25 mg XL tablet 3/3/2018 at Unknown time Other Yes Yes   Sig: Take 25 mg by mouth every evening. oxycodone-acetaminophen (PERCOCET)  mg per tablet 3/5/2018 at Unknown time Other Yes Yes   Sig: Take 1 tablet by mouth every four (4) hours as needed for Pain.   polyethylene glycol (MIRALAX) 17 gram/dose powder 2018 at Unknown time Self Yes Yes   Sig: Take 17 g by mouth two (2) times daily as needed (constipation). predniSONE (DELTASONE) 2.5 mg tablet 3/5/2018 at Unknown time Other Yes Yes   Sig: Take 2.5 mg by mouth daily.       Facility-Administered Medications: None          Thank you,  Karalee Kehr, [facility]  Medication History Pharmacy Technician

## 2018-03-07 NOTE — PROGRESS NOTES
*CM acknowledged consult*. CM will send referral to [facility], in regards to pt's d/c needs. CM informed pt of FOC document (signed) placed in chart. UPDATE: 4:00pm    CM was informed that pt have been accepted to [facility]. CM will inform pt.     KIANA Wagner CM  [phone]

## 2018-03-07 NOTE — PROGRESS NOTES
* [facility]* Pt is a [de-identified] y.o.  male, admitted for dehydration. Pt was alert and oriented, upon CM room visit. Pt reported that he and spouse resides in a one story home (3 steps into main entrance). Pt reported that he is independent with ADLs, and he drives. Pt reported that he is active with PCP: Feb 2018, for physical. Pt reported that he uses [facility]) and mail in order-[place]. Pt reported that he has DME at home: cane, walker, and shower chair. Pt reported no HH/SNF. CM will contact NN: Funmilayo Briggs, via telephone and in basket message, in regards to pt's d/c needs and plans. CM spoke with pt in regards needing home health or [facility] visit, upon d/c and he reported that he is interested in home health. Pt is waiting to be seen by therapy, for d/c recommendations. CM will continue to follow up with pt and make referrals as deemed necessary. CM will continue to follow up with pt and make referrals as deemed necessary. CM will inform MD, via FYI that pt is recommending home health at d/c. CM will request order to from MD    Care Management Interventions  PCP Verified by CM: Yes  Mode of Transport at Discharge:  Other (see comment) (pt's family )  Transition of Care Consult (CM Consult): Discharge Planning  Discharge Durable Medical Equipment: No  Physical Therapy Consult: Yes  Occupational Therapy Consult: Yes  Speech Therapy Consult: No  Current Support Network: Lives with Spouse, Own Home  Confirm Follow Up Transport: Family  Plan discussed with Pt/Family/Caregiver: Yes  Discharge Location  Discharge Placement: [address] [place] [address] [address], [place]   [phone]

## 2018-03-07 NOTE — PROGRESS NOTES
General Surgery End of Shift Nursing Note    Bedside shift change report given to Olga (oncoming nurse) by Eloina Robertson (offgoing nurse). Report included the following information SBAR. Shift worked:   7am-7pm   Summary of shift:  Patient diet was increased to cardiac soft solids. Was complaining of upset stomach. Issues for physician to address:       Number times ambulated in hallway past shift: 0    Number of times OOB to chair past shift: 1    Pain Management:  Current medication: Morphine   Patient states pain is manageable on current pain medication: YES    GI:    Current diet:  DIET CARDIAC Soft Solids  DIET NUTRITIONAL SUPPLEMENTS No; Breakfast, Lunch, Dinner; [employer]  DIET ONE TIME MESSAGE    Tolerating current diet: YES  Passing flatus: YES  Last Bowel Movement: today   Appearance: loose    Respiratory:    Incentive Spirometer at bedside: NO  Patient instructed on use: NO    Patient Safety:    Falls Score: 4  Bed Alarm On? Not applicable  Sitter?  Not applicable    Eloina Robertson

## 2018-03-07 NOTE — PROGRESS NOTES
Nutrition Assessment:    RECOMMENDATIONS:   Continue diet as tolerated  RD to add Ensure TID     DIETITIANS INTERVENTIONS/PLAN:   Continue diet as tolerated  Add PO supplements  Monitor appetite/PO intake    ASSESSMENT:   Pt admitted with C diff Colitis. PMH: UC, HTN, CAD. Chart reviewed for low BMI. Pt lying in bed awake and alert. He reports a poor appetite since admission and decline in his intake at home as his lower dentures are being worked on and he can't eat. Pt reports a 60lb wt loss over the past 3 months. Question accuracy of his wt loss report as EMR reveals he never weighed much over 160lbs over the past few years. GI is following. He is open to trying Ensure and has thought about drinking this at home. SUBJECTIVE/OBJECTIVE:   \"I don't have much of an appetite right now\"   Diet Order: Cardiac, Other (comment) (soft solids )  % Eaten:  No data found. Pertinent Medications:folvite, flagyl, prednisone, vancomycin; [email or web address]). Chemistries:  Lab Results   Component Value Date/Time    Sodium 141 03/07/2018 03:23 AM    Potassium 4.0 03/07/2018 03:23 AM    Chloride 108 03/07/2018 03:23 AM    CO2 26 03/07/2018 03:23 AM    Anion gap 7 03/07/2018 03:23 AM    Glucose 118 (H) 03/07/2018 03:23 AM    BUN 14 03/07/2018 03:23 AM    Creatinine 0.62 (L) 03/07/2018 03:23 AM    BUN/Creatinine ratio 23 (H) 03/07/2018 03:23 AM    GFR est AA >60 03/07/2018 03:23 AM    GFR est non-AA >60 03/07/2018 03:23 AM    Calcium 7.5 (L) 03/07/2018 03:23 AM    AST (SGOT) 10 (L) 03/06/2018 12:13 PM    Alk.  phosphatase 67 03/06/2018 12:13 PM    Protein, total 7.1 03/06/2018 12:13 PM    Albumin 3.2 (L) 03/06/2018 12:13 PM    Globulin 3.9 03/06/2018 12:13 PM    A-G Ratio 0.8 (L) 03/06/2018 12:13 PM    ALT (SGPT) 15 03/06/2018 12:13 PM      Anthropometrics: Height: 5' 8\" (172.7 cm) Weight: 54.4 kg (120 lb)  []bed scale    []stated   [x]unknown(3/6)    IBW (%IBW):   ( ) UBW (%UBW): 81.6 kg (180 lb) (per pt 3 months ago ) (66.67 %)    BMI: Body mass index is 18.25 kg/(m^2). This BMI is indicative of:  [x]Underweight   []Normal   []Overweight   [] Obesity   [] Extreme Obesity (BMI>40)  Estimated Nutrition Needs (Based on): 1846 Kcals/day (MSJ 1228 x 1.3 (+250 for wt gain) ) , 65 g (1.2gPro/kg ) Protein  Carbohydrate: At Least 130 g/day  Fluids: 1800 mL/day    Last BM: 3/6   [x]Active     []Hyperactive  []Hypoactive       [] Absent   BS  Skin:    [x] Intact   [] Incision  [] Breakdown   [] DTI   [] Tears/Excoriation/Abrasion  []Edema [] Other: Wt Readings from Last 30 Encounters:   03/07/18 54.4 kg (120 lb)   02/20/18 57.6 kg (127 lb)   10/05/17 72.6 kg (160 lb)   08/01/17 71.2 kg (157 lb)   03/31/17 73.5 kg (162 lb)   09/07/16 69.4 kg (153 lb)   05/02/16 66.2 kg (146 lb)   03/01/16 66.9 kg (147 lb 8 oz)   01/21/16 69.4 kg (153 lb)   08/26/15 67.6 kg (149 lb)   05/05/15 73.9 kg (163 lb)   04/21/15 72.8 kg (160 lb 8 oz)   01/16/15 67.6 kg (149 lb)   11/21/14 68.7 kg (151 lb 8 oz)   11/13/14 67.7 kg (149 lb 3.2 oz)   11/12/14 67.5 kg (148 lb 12.8 oz)   11/11/14 64 kg (141 lb)   10/17/14 72.1 kg (158 lb 14.4 oz)   10/03/14 72.1 kg (159 lb)   09/18/14 73.5 kg (162 lb)   09/12/14 73.5 kg (162 lb)   07/28/14 72.6 kg (160 lb)   03/17/14 79.8 kg (176 lb)   04/29/13 76.7 kg (169 lb)   10/29/12 79.4 kg (175 lb)   04/24/12 81.6 kg (180 lb)   12/05/11 77.1 kg (170 lb)   10/25/11 78 kg (172 lb)   10/05/11 75.8 kg (167 lb)   09/23/11 78.9 kg (174 lb)      NUTRITION DIAGNOSES:   Problem:  Unintended weight loss      Etiology: related to decline in appetite and difficulty chewing     Signs/Symptoms: as evidenced by pt reports 60lb wt loss over the past 3 months. NUTRITION INTERVENTIONS:  Meals/Snacks: General/healthful diet   Supplements: Commercial supplement              GOAL:   Pt will consume >50% of meals/supplements in 2-4 days.      Cultural, Sikhism, or Ethnic Dietary Needs: None     LEARNING NEEDS (Diet, Food/Nutrient-Drug Interaction):    [x] None Identified   [] Identified and Education Provided/Documented   [] Identified and Pt declined/was not appropriate      [x] Interdisciplinary Care Plan Reviewed/Documented    [x] Participated in Discharge Planning: Soft diet + Ensure 2-3 x day    [] Interdisciplinary Rounds     NUTRITION RISK:    [x] High              [x] Moderate           []  Low  []  Minimal/Uncompromised    PT SEEN FOR:    []  MD Consult: []Calorie Count      []Diabetic Diet Education        []Diet Education     []Electrolyte Management     []General Nutrition Management and Supplements     []Management of Tube Feeding     []TPN Recommendations    []  RN Referral:  []MST score >=2     []Enteral/Parenteral Nutrition PTA     []Pregnant: Gestational DM or Multigestation   [x]  Low BMI  []  Re-Screen   []  LOS   []  NPO/clears x 5 days   []  New TF/TPN    Angel Yates, RD, [address]    Pager [phone]  Weekend Pager [phone]

## 2018-03-07 NOTE — CDMP QUERY
Dr. Thornton Board :    Patient is noted to have a BMI of 18.25. Please clarify if this patient is:     =>Underweight  =>Cachexia  =>Failure to Thrive  =>Other explanation of clinical findings  =>Unable to determine (no explanation for clinical findings)    Presentation: [de-identified] WM admitted with severe sepsis, 120 lbs = BMI 18.25    Please clarify and document your clinical opinion in the progress notes and discharge summary, including the definitive and or presumptive diagnosis, (suspected or probable), related to the above clinical findings. Please include clinical findings supporting your diagnosis. Thank [email or web address]  [phone]

## 2018-03-07 NOTE — PROGRESS NOTES
Problem: Mobility Impaired (Adult and Pediatric)  Goal: *Acute Goals and Plan of Care (Insert Text)  Physical Therapy Goals  Initiated 3/7/2018  1. Patient will move from supine to sit and sit to supine  in bed with independence within 7 day(s). 2.  Patient will transfer from bed to chair and chair to bed with independence using the least restrictive device within 7 day(s). 3.  Patient will perform sit to stand with independence within 7 day(s). 4.  Patient will ambulate with minimal assistance/contact guard assist for 200 feet with the least restrictive device within 7 day(s). 5.  Patient will ascend/descend 3 stairs with handrail(s) with minimal assistance/contact guard assist within 7 day(s). physical Therapy EVALUATION  Seen 0830 to 1740      Patient: Miles Vaca (31 y.o. male)  Date: 3/7/2018  Primary Diagnosis: Colitis  Hypotension  Severe sepsis [facility])  Precautions: Falls    ASSESSMENT :  Based on the objective data described below, the patient presents with decreased functional mobility and gait skills. He stated that he had lost 40# in the last 3 months. Stated that he just didn't have any appetite. Moving fairly well, but slow. Able to walk to the bathroom and then twice around the room with the cane in the left hand. Stated that the RLE still bothers him this long after surgery. Had 2-3 mild LOB posterior and to the left during gait needing CGA to recover. Up in chair at the end of the session. Told him to not get up on his own. May do well with HHPT at discharge, depending on progress. Patient will benefit from skilled intervention to address the above impairments.   Patients rehabilitation potential is considered to be Good  Factors which may influence rehabilitation potential include:   []         None noted  []         Mental ability/status  [x]         Medical condition  []         Home/family situation and support systems  [x]         Safety awareness  []         Pain tolerance/management  []         Other:      PLAN :  Recommendations and Planned Interventions:  [x]           Bed Mobility Training             []    Neuromuscular Re-Education  [x]           Transfer Training                   []    Orthotic/Prosthetic Training  [x]           Gait Training                         []    Modalities  [x]           Therapeutic Exercises           []    Edema Management/Control  []           Therapeutic Activities            [x]    Patient and Family Training/Education  []           Other (comment):    Frequency/Duration: Patient will be followed by physical therapy  4 times a week to address goals. Discharge Recommendations: Home Health  Further Equipment Recommendations for Discharge: has needed equipment     SUBJECTIVE:   Patient stated My RLE still bothers me after the surgery. The sensation is not the same.     OBJECTIVE DATA SUMMARY:   HISTORY:    Past Medical History:   Diagnosis Date    Aortic stenosis, severe     Arrhythmia     heart murmur    Arthritis     Rheumatoid    CAD (coronary artery disease) 1998    S/P CABG    Cancer (HCC)     Basal cell carcinoma    Chronic pain     from rheumatoid arthritis in knees, elbows fingers    HTN (hypertension)     Hypercholesterolemia     S/P AVR (aortic valve replacement)     23 mm Trifecta with Redo CABG x 1SVG to PDA    S/P CABG x 1 10/13/2014    Redo CABG x 1 SVG to PDA; 'y' from collins of existing OM vein graft    Sarcoidosis     No longer active.      Ulcerative colitis ([facility] [address]     Not active     Past Surgical History:   Procedure Laterality Date    CARDIAC SURG PROCEDURE UNLIST      CABG    HX AORTIC VALVE REPLACEMENT  10/2014    CABG redo X1    HX CATARACT REMOVAL      Left    HX LUMBAR LAMINECTOMY      HX OTHER SURGICAL      skin lesions removed from nose and forehead- basal cell    LAMINECTOMY,LUMBAR  110/2000    WA COLONOSCOPY W/BIOPSY SINGLE/MULTIPLE  12/5/2011          Prior Level of Function/Home Situation: ambulatory with cane or RW. Drives and independent with ADLS and most IADLs. Personal factors and/or comorbidities impacting plan of care: Current illness and weakness    Home Situation  Home Environment: Private residence  # Steps to Enter: 3  Rails to Enter: Yes (left side going up)  Wheelchair Ramp: No  One/Two Story Residence: One story  Living Alone: No  Support Systems: Family member(s)  Patient Expects to be Discharged to[de-identified] Private residence  Current DME Used/Available at Home: Lisa Highland, straight, Walker, rolling  Tub or Shower Type: Tub/Shower combination (has walk-in as well)    EXAMINATION/PRESENTATION/DECISION MAKING:   Critical Behavior:  Neurologic State: Alert  Orientation Level: Oriented X4  Cognition: Appropriate decision making, Appropriate for age attention/concentration, Appropriate safety awareness, Follows commands  Safety/Judgement: Awareness of environment, Insight into deficits     Hearing:   Auditory  Auditory Impairment: Hard of hearing, bilateral    Skin:  Skin graft RLE, impaired sensation since that surgery, 2 scabs beneath the graft, scab on LLE    Edema: none noted    Range Of Motion:  AROM: Within functional limits     Strength:    Strength: Generally decreased, functional     Tone & Sensation:   Tone: Normal  Sensation: Impaired (baseline paresthesias and numbness in RLE)    Coordination:  Coordination: Within functional limits     Vision:   Acuity: Within Defined Limits  Corrective Lenses: Reading glasses  Functional Mobility:  Bed Mobility:  Rolling: Independent  Supine to Sit: Independent  Scooting: Independent     Transfers:  Sit to Stand: Independent  Stand to Sit: Independent  Bed to Chair: Contact guard assistance (ambulating with a cane from opposite side of bed)        Balance:   Sitting: Intact  Standing: Impaired (but good for standing ADLs)  Standing - Static: Good  Standing - Dynamic : Good (for ADLs, but Fair for ambulation with cane, 3 LOB L )    Ambulation/Gait Training:  Distance (ft): 60 Feet (ft)  Assistive Device: Cane, straight;Gait belt  Ambulation - Level of Assistance: Contact guard assistance  Gait Abnormalities: Decreased step clearance; Path deviations    Functional Measure:    Elder Mobility Scale    12/20       EMS and G-code impairment scale:  Percentage of impairment CH  0% CI  1-19% CJ  20-39% CK  40-59% CL  60-79% CM  80-99% CN  100%   EMS Score 0-20 20 17-19 13-16 9-12 5-8 1-4 0      Scores under 10  generally these patients are dependent in mobility maneuvers; require help with  basic ADL, such as transfers, toileting and dressing. Scores between 10  13  generally these patients are borderline in terms of safe mobility and  independence in ADL i.e. they require some help with some mobility maneuvers. Scores over 14  Generally these patients are able to perform mobility maneuvers alone and safely  and are independent in basic ADL. G codes: In compliance with CMSs Claims Based Outcome Reporting, the following G-code set was chosen for this patient based on their primary functional limitation being treated: The outcome measure chosen to determine the severity of the functional limitation was the Elderly Mobility Scale Score with a score of 12/20 which was correlated with the impairment scale. ? Mobility - Walking and Moving Around:     - CURRENT STATUS: CK - 40%-59% impaired, limited or restricted    - GOAL STATUS: CJ - 20%-39% impaired, limited or restricted    - D/C STATUS:  ---------------To be determined---------------   Pain:  Pain Scale 1: Numeric (0 - 10)  Pain Intensity 1: 8  Pain Location 1: Back ((chronic))    Activity Tolerance: Tolerated PT evaluation/treatment session fairly well. Please refer to the flowsheet for vital signs taken during this treatment.   After treatment:   [x]         Patient left in no apparent distress sitting up in chair  []         Patient left in no apparent distress in bed  [x] Call bell left within reach  [x]         Nursing notified  []         Caregiver present  []         Bed alarm activated (not being used)    COMMUNICATION/EDUCATION:   The patients plan of care was discussed with: Occupational Therapist and Registered Nurse. [x]         Fall prevention education was provided and the patient/caregiver indicated understanding. []         Patient/family have participated as able in goal setting and plan of care. [x]         Patient/family agree to work toward stated goals and plan of care. []         Patient understands intent and goals of therapy, but is neutral about his/her participation. []         Patient is unable to participate in goal setting and plan of care.     Thank you for this referral.  Maria Victoria Mansfield, PT  Time Calculation: 29 mins

## 2018-03-07 NOTE — CONSULTS
Consult    NAME: Urmila Washington   :  1935   MRN:  [id number]     Date/Time:  3/7/2018 8:34 AM    Patient PCP: Isabel Jackson MD  ________________________________________________________________________     Assessment:     1. Paroxysmal atrial fibrillation; resolved  2. Pancolitis w/ h/o ulcerative colitis  3. Rheumatoid arthritis  4. Sarcoidosis (pulmonary)  5. Hypertension  6. Hyperlipidemia  7. Mild bilateral carotid disease  8. Former smoker  5. Diabetes  10. Coronary artery disease s/p remote redo CABG (1v SVG-PDA) 10/14. ' had SVG-LCx/OM, LIMA-LAD, and SVG-PDA. Negative Cardiolite stress  w/ EF 59%. Echo  w/ EF 60%, 9mmHg gradient across AVR, no AI. 11. Aortic stenosis s/p redo AVR (#23 Trifecta bp AVR) 10/14  12. Chronic pain        Plan:   CHADS2-VASc = 5  Recent TSH 1.6  Feels much better, wants to go home. PAF likely a reflection of underlying illness and has resolved. I would resume his beta blocker as soon as possible. Do not think he needs [facility] for an isolated event. If this recurs will discuss. 1. Agree w/ holding Toprol XL w/ recent hypotension; resume as able  2. Continue ASA 81mg  3. Continue atorvastatin  4. No additional changes. 5. He is supposed to see me in the office tomorrow, this can be rescheduled. Thank you for this consult and allowing me to take part in this patients care. Please call with questions. [x]        High complexity decision making was performed        Subjective:   CHIEF COMPLAINT: Diarrhea, ABD pain    HISTORY OF PRESENT ILLNESS:     Li Wadsworth is a [de-identified] y.o.  male who \"presents with above complains from home ambulatory.   Pt presents with CC of worsening Diarrhea x 3 days  H/o associated nausea/vomiting x 2 days  H/o diffuse abdominal soreness x 1 week  H/o recent pain shot to the back on Friday- for pinched nerve  H/o recently completing 2 rounds of PO Abx for skin graft infection (after  Basal cell carcinoma Sx) as per dermatologist.  H/o Ulcerative colitis(cared by GI Dr Berta Decker) & RA (cared by Rheum Dr Nicole Sharpe)-- has been in control on PO prednisone, Enbrel & MTX/FA regimen as per pt.     Pt was found to have Hypotension in ER with evidence of diffuse colitis on CT A/P on imaging.   \"      We were asked to consult for work up and evaluation of the above problems. Past Medical History:   Diagnosis Date    Aortic stenosis, severe     Arrhythmia     heart murmur    Arthritis     Rheumatoid    CAD (coronary artery disease) 1998    S/P CABG    Cancer (HCC)     Basal cell carcinoma    Chronic pain     from rheumatoid arthritis in knees, elbows fingers    HTN (hypertension)     Hypercholesterolemia     S/P AVR (aortic valve replacement)     23 mm Trifecta with Redo CABG x 1SVG to PDA    S/P CABG x 1 10/13/2014    Redo CABG x 1 SVG to PDA; 'y' from collins of existing OM vein graft    Sarcoidosis     No longer active.      Ulcerative colitis ([address]     Not active      Past Surgical History:   Procedure Laterality Date    CARDIAC SURG PROCEDURE UNLIST      CABG    HX AORTIC VALVE REPLACEMENT  10/2014    CABG redo X1    HX CATARACT REMOVAL      Left    HX LUMBAR LAMINECTOMY      HX OTHER SURGICAL      skin lesions removed from nose and forehead- basal cell    LAMINECTOMY,LUMBAR  110/2000    MT COLONOSCOPY W/BIOPSY SINGLE/MULTIPLE  12/5/2011          Allergies   Allergen Reactions    Lisinopril Cough    Questran [Cholestyramine (With Sugar)] Other (comments)     Muscle cramps      Meds:  See below  Social History   Substance Use Topics    Smoking status: Former Smoker     Packs/day: 1.50     Years: 20.00     Quit date: 1/1/1978    Smokeless tobacco: Never Used    Alcohol use Yes      Comment: rarely      Family History   Problem Relation Age of Onset    Heart Disease Father     Cancer Sister      breast     Diabetes Sister     Cancer Sister      leukemia    Diabetes Sister     Diabetes Mother    Kem Pollen Bladder Disease Mother        REVIEW OF SYSTEMS:     []         Unable to obtain  ROS due to ---   [x]         Total of 12 systems reviewed as follows:    Constitutional: negative fever, negative chills, negative weight loss  Eyes:   negative visual changes  ENT:   negative sore throat, tongue or lip swelling  Respiratory:  negative cough, negative dyspnea  Cards:  negative for chest pain, palpitations, lower extremity edema  GI:   negative for nausea, vomiting, diarrhea, and abdominal pain  Genitourinary: negative for frequency, dysuria  Integument:  negative for rash   Hematologic:  negative for easy bruising and gum/nose bleeding  Musculoskel: negative for myalgias,  back pain  Neurological:  negative for headaches, dizziness, vertigo, weakness  Behavl/Psych: negative for feelings of anxiety, depression     Pertinent Positives include :    Objective:      Physical Exam:    Last 24hrs VS reviewed since prior progress note. Most recent are:    Visit Vitals    /61    Pulse 61    Temp 98.4 °F (36.9 °C)    Resp 16    Ht 5' 8\" (1.727 m)    Wt 54.4 kg (120 lb)    SpO2 100%    BMI 18.25 kg/m2       Intake/Output Summary (Last 24 hours) at 03/07/18 0834  Last data filed at 03/07/18 0620   Gross per 24 hour   Intake                0 ml   Output              400 ml   Net             -400 ml        General Appearance: Well developed, well nourished, alert & oriented x 3,    no acute distress. Ears/Nose/Mouth/Throat: Pupils equal and round, Hearing grossly normal.  Neck: Supple. JVP within normal limits. Carotids good upstrokes, with no bruit. Chest: Lungs clear to auscultation bilaterally. Cardiovascular: Regular rate and rhythm, S1S2 normal, no murmur, rubs, gallops. Abdomen: Soft, mildly diffusely tender, bowel sounds are active. No organomegaly. Extremities: No edema bilaterally. Femoral pulses +2, Distal Pulses +1. Skin: Warm and dry.   Neuro: CN II-XII grossly intact, Strength and sensation grossly intact. []         Post-cath site without hematoma, bruit, tenderness, or thrill. Distal pulses intact. Data:      Telemetry:   SR    EKG: AF  []  No new EKG for review. Prior to Admission medications    Medication Sig Start Date End Date Taking? Authorizing Provider   LORazepam (ATIVAN) 1 mg tablet Take 1 mg by mouth daily as needed for Anxiety (prior to injection). Yes Rosa Maria Pierce MD   gabapentin (NEURONTIN) 300 mg capsule Take 300 mg by mouth three (3) times daily. Yes Rosa Maria Pierce MD   metoprolol succinate (TOPROL-XL) 25 mg XL tablet Take 25 mg by mouth every evening. Yes Rosa Maria Pierce MD   polyethylene glycol (MIRALAX) 17 gram/dose powder Take 17 g by mouth two (2) times daily as needed (constipation). Yes Rosa Maria Pierce MD   loperamide (IMMODIUM) 2 mg tablet Take 4 mg by mouth four (4) times daily as needed for Diarrhea. Yes MD MARTIN Gaines by Injection route every three (3) months. Patient receives lumbar epidural steroid injections by Dr. Lissy Lanza [facility]   Yes Rosa Maria Pierce MD   aspirin 81 mg chewable tablet Take 1 Tab by mouth every evening. 2/20/18  Yes Jose A Lieberman MD   atorvastatin (LIPITOR) 20 mg tablet Take 1 tablet by mouth  daily 9/14/17  Yes Jose A Lieberman MD   predniSONE (DELTASONE) 2.5 mg tablet Take 2.5 mg by mouth daily. 7/21/17  Yes Historical Provider   oxycodone-acetaminophen (PERCOCET)  mg per tablet Take 1 tablet by mouth every four (4) hours as needed for Pain. Yes Historical Provider   methotrexate sodium (METHOTREXATE, ANTI-RHEUMATIC,) 2.5 mg DsPk Take 20 mg by mouth Every Saturday. 8/31/10  Yes Historical Provider   folic acid (FOLVITE) 1 mg tablet Take 1 mg by mouth daily. Yes Historical Provider   etanercept (ENBREL) 50 mg/mL (0.98 mL) injection 50 mg by SubCUTAneous route every Sunday. Yes Historical Provider   docusate sodium (COLACE) 100 mg capsule Take 100 mg by mouth two (2) times daily as needed for Constipation.     Historical Provider       Recent Results (from the past 24 hour(s))   EKG, 12 LEAD, INITIAL    Collection Time: 03/06/18 12:01 PM   Result Value Ref Range    Ventricular Rate 87 BPM    Atrial Rate 87 BPM    P-R Interval 124 ms    QRS Duration 96 ms    Q-T Interval 374 ms    QTC Calculation (Bezet) 450 ms    Calculated P Axis 80 degrees    Calculated R Axis -74 degrees    Calculated T Axis 66 degrees    Diagnosis       Normal sinus rhythm  Left axis deviation  Pulmonary disease pattern  Abnormal ECG  When compared with ECG of 13-OCT-2014 15:20,  QT has shortened     CULTURE, BLOOD    Collection Time: 03/06/18 12:13 PM   Result Value Ref Range    Special Requests: NO SPECIAL REQUESTS      Culture result: NO GROWTH AFTER 19 HOURS     LACTIC ACID    Collection Time: 03/06/18 12:13 PM   Result Value Ref Range    Lactic acid 2.2 (HH) 0.4 - 2.0 MMOL/L   CULTURE, BLOOD    Collection Time: 03/06/18 12:13 PM   Result Value Ref Range    Special Requests: NO SPECIAL REQUESTS      Culture result: NO GROWTH AFTER 19 HOURS     METABOLIC PANEL, COMPREHENSIVE    Collection Time: 03/06/18 12:13 PM   Result Value Ref Range    Sodium 138 136 - 145 mmol/L    Potassium 3.5 3.5 - 5.1 mmol/L    Chloride 101 97 - 108 mmol/L    CO2 29 21 - 32 mmol/L    Anion gap 8 5 - 15 mmol/L    Glucose 146 (H) 65 - 100 mg/dL    BUN 18 6 - 20 MG/DL    Creatinine 0.83 0.70 - 1.30 MG/DL    BUN/Creatinine ratio 22 (H) 12 - 20      GFR est AA >60 >60 ml/min/1.73m2    GFR est non-AA >60 >60 ml/min/1.73m2    Calcium 8.7 8.5 - 10.1 MG/DL    Bilirubin, total 1.0 0.2 - 1.0 MG/DL    ALT (SGPT) 15 12 - 78 U/L    AST (SGOT) 10 (L) 15 - 37 U/L    Alk. phosphatase 67 45 - 117 U/L    Protein, total 7.1 6.4 - 8.2 g/dL    Albumin 3.2 (L) 3.5 - 5.0 g/dL    Globulin 3.9 2.0 - 4.0 g/dL    A-G Ratio 0.8 (L) 1.1 - 2.2     CBC WITH AUTOMATED DIFF    Collection Time: 03/06/18 12:13 PM   Result Value Ref Range    WBC 7.4 4.1 - 11.1 K/uL    RBC 5.50 4. 10 - 5.70 M/uL    HGB 15.2 12.1 - 17.0 g/dL    HCT 47.9 36.6 - 50.3 %    MCV 87.1 80.0 - 99.0 FL    MCH 27.6 26.0 - 34.0 PG    MCHC 31.7 30.0 - 36.5 g/dL    RDW 16.2 (H) 11.5 - 14.5 %    PLATELET 322 228 - 167 K/uL    MPV 10.9 8.9 - 12.9 FL    NRBC 0.0 0  WBC    ABSOLUTE NRBC 0.00 0.00 - 0.01 K/uL    NEUTROPHILS 83 (H) 32 - 75 %    LYMPHOCYTES 12 12 - 49 %    MONOCYTES 4 (L) 5 - 13 %    EOSINOPHILS 1 0 - 7 %    BASOPHILS 0 0 - 1 %    IMMATURE GRANULOCYTES 0 0.0 - 0.5 %    ABS. NEUTROPHILS 6.1 1.8 - 8.0 K/UL    ABS. LYMPHOCYTES 0.9 0.8 - 3.5 K/UL    ABS. MONOCYTES 0.3 0.0 - 1.0 K/UL    ABS. EOSINOPHILS 0.1 0.0 - 0.4 K/UL    ABS. BASOPHILS 0.0 0.0 - 0.1 K/UL    ABS. IMM. GRANS. 0.0 0.00 - 0.04 K/UL    DF AUTOMATED     URINALYSIS W/ RFLX MICROSCOPIC    Collection Time: 03/06/18  3:28 PM   Result Value Ref Range    Color YELLOW/STRAW      Appearance CLEAR CLEAR      Specific gravity <1.005 1.003 - 1.030    pH (UA) 7.0 5.0 - 8.0      Protein NEGATIVE  NEG mg/dL    Glucose NEGATIVE  NEG mg/dL    Ketone 15 (A) NEG mg/dL    Bilirubin NEGATIVE  NEG      Blood SMALL (A) NEG      Urobilinogen 0.2 0.2 - 1.0 EU/dL    Nitrites NEGATIVE  NEG      Leukocyte Esterase NEGATIVE  NEG      WBC 0-4 0 - 4 /hpf    RBC 10-20 0 - 5 /hpf    Epithelial cells FEW FEW /lpf    Bacteria NEGATIVE  NEG /hpf    Hyaline cast 2-5 0 - 5 /lpf   LACTIC ACID    Collection Time: 03/06/18  3:52 PM   Result Value Ref Range    Lactic acid 1.7 0.4 - 2.0 MMOL/L   GLUCOSE, POC    Collection Time: 03/06/18  6:14 PM   Result Value Ref Range    Glucose (POC) 95 65 - 100 mg/dL    Performed by [employer] (PCT)    CBC W/O DIFF    Collection Time: 03/07/18  3:23 AM   Result Value Ref Range    WBC 4.8 4.1 - 11.1 K/uL    RBC 4.50 4. 10 - 5.70 M/uL    HGB 12.5 12.1 - 17.0 g/dL    HCT 39.0 36.6 - 50.3 %    MCV 86.7 80.0 - 99.0 FL    MCH 27.8 26.0 - 34.0 PG    MCHC 32.1 30.0 - 36.5 g/dL    RDW 15.7 (H) 11.5 - 14.5 %    PLATELET 403 (L) 375 - 400 K/uL    MPV 11.0 8.9 - 12.9 FL    NRBC 0.0 0  WBC    ABSOLUTE NRBC 0.00 0.00 - 4.10 K/uL   METABOLIC PANEL, BASIC    Collection Time: 03/07/18  3:23 AM   Result Value Ref Range    Sodium 141 136 - 145 mmol/L    Potassium 4.0 3.5 - 5.1 mmol/L    Chloride 108 97 - 108 mmol/L    CO2 26 21 - 32 mmol/L    Anion gap 7 5 - 15 mmol/L    Glucose 118 (H) 65 - 100 mg/dL    BUN 14 6 - 20 MG/DL    Creatinine 0.62 (L) 0.70 - 1.30 MG/DL    BUN/Creatinine ratio 23 (H) 12 - 20      GFR est AA >60 >60 ml/min/1.73m2    GFR est non-AA >60 >60 ml/min/1.73m2    Calcium 7.5 (L) 8.5 - 10.1 MG/DL   GLUCOSE, POC    Collection Time: 03/07/18  5:52 AM   Result Value Ref Range    Glucose (POC) 105 (H) 65 - 100 mg/dL    Performed by Stacy Stein (PCT)         [facility],

## 2018-03-07 NOTE — PROGRESS NOTES
Problem: Falls - Risk of  Goal: *Absence of Falls  Document Gibran Fall Risk and appropriate interventions in the flowsheet.    Outcome: Progressing Towards Goal  Fall Risk Interventions:  Mobility Interventions: Patient to call before getting OOB         Medication Interventions: Assess postural VS orthostatic hypotension, Patient to call before getting OOB    Elimination Interventions: Bed/chair exit alarm, Call light in reach, Patient to call for help with toileting needs    History of Falls Interventions: Bed/chair exit alarm, Room close to nurse's station, Door open when patient unattended

## 2018-03-07 NOTE — PROGRESS NOTES
Occupational Therapy EVALUATION/discharge  Patient: Maggie Savage (36 y.o. male)  Date: 3/7/2018  Primary Diagnosis: Colitis  Hypotension  Severe sepsis (HCC)        Precautions: Enteric        ASSESSMENT:   Based on the objective data described below, the patient presents with mild GW and decreased balance which mainly impacts his ability to ambulate with cane at his mod I baseline. He is otherwise independent with all ADLs at this time and is without further OT needs. Will defer functional mobility to PT. Discharge Recommendations: None I regard to OT  Further Equipment Recommendations for Discharge: none      SUBJECTIVE:   Patient stated I feel fine.     OBJECTIVE DATA SUMMARY:   HISTORY:   Past Medical History:   Diagnosis Date    Aortic stenosis, severe     Arrhythmia     heart murmur    Arthritis     Rheumatoid    CAD (coronary artery disease) 1998    S/P CABG    Cancer (HCC)     Basal cell carcinoma    Chronic pain     from rheumatoid arthritis in knees, elbows fingers    HTN (hypertension)     Hypercholesterolemia     S/P AVR (aortic valve replacement)     23 mm Trifecta with Redo CABG x 1SVG to PDA    S/P CABG x 1 10/13/2014    Redo CABG x 1 SVG to PDA; 'y' from collins of existing OM vein graft    Sarcoidosis     No longer active.  Ulcerative colitis ([facility] [address]     Not active     Past Surgical History:   Procedure Laterality Date    CARDIAC SURG PROCEDURE UNLIST      CABG    HX AORTIC VALVE REPLACEMENT  10/2014    CABG redo X1    HX CATARACT REMOVAL      Left    HX LUMBAR LAMINECTOMY      HX OTHER SURGICAL      skin lesions removed from nose and forehead- basal cell    LAMINECTOMY,LUMBAR  110/2000    PA COLONOSCOPY W/BIOPSY SINGLE/MULTIPLE  12/5/2011            Prior Level of Function/Environment/Context: Independent with ADLs, IADLs and ambulates with a cane.      Expanded or extensive additional review of patient history:     Home Situation  Home Environment: Private residence  # Steps to Enter: 3  Rails to Enter: Yes (left sidel going up)  Wheelchair Ramp: No  One/Two Story Residence: One story  Living Alone: No  Support Systems: Family member(s)  Patient Expects to be Discharged to[de-identified] Private residence  Current DME Used/Available at Home: Cane, straight, Walker, rolling  Tub or Shower Type: Tub/Shower combination (has walk-in as well)  [x]  Right hand dominant   []  Left hand dominant    EXAMINATION OF PERFORMANCE DEFICITS:  Cognitive/Behavioral Status:  Neurologic State: Alert  Orientation Level: Oriented X4  Cognition: Appropriate decision making; Appropriate for age attention/concentration; Appropriate safety awareness; Follows commands  Safety/Judgement: Awareness of environment; Insight into deficits      Hearing:   Auditory  Auditory Impairment: Hard of hearing, bilateral    Vision/Perceptual:    Acuity: Within Defined Limits    Corrective Lenses: Reading glasses    Range of Motion:  AROM: Within functional limits                  Strength:  Strength: Generally decreased, functional                Coordination:  Coordination: Within functional limits            Tone & Sensation:  Tone: Normal  Sensation: Impaired (baseline paresthesias and numbness in RLE)          Balance:  Sitting: Intact  Standing: Impaired (but good for standing ADLs)  Standing - Static: Good  Standing - Dynamic : Good (for ADLs, but Fair for ambulation with cane, 3 LOB L )    Functional Mobility and Transfers for ADLs:  Bed Mobility:  Rolling: Independent  Supine to Sit: Independent  Scooting: Independent    Transfers:  Sit to Stand: Independent  Stand to Sit: Independent  Bed to Chair: Contact guard assistance (ambulating with a cane from opposite side of bed)  Toilet Transfer : Independent    ADL Assessment:  Feeding: Independent    Oral Facial Hygiene/Grooming: Independent    Bathing: Independent    Upper Body Dressing: Independent    Lower Body Dressing: Independent    Toileting: Independent          Functional Measure:  Barthel Index:    Bathin  Bladder: 10  Bowels: 10  Groomin  Dressing: 10  Feeding: 10  Mobility: 10  Stairs: 5  Toilet Use: 10  Transfer (Bed to Chair and Back): 10  Total: 85       Barthel and G-code impairment scale:  Percentage of impairment CH  0% CI  1-19% CJ  20-39% CK  40-59% CL  60-79% CM  80-99% CN  100%   Barthel Score 0-100 100 99-80 79-60 59-40 20-39 1-19   0   Barthel Score 0-20 20 17-19 13-16 9-12 5-8 1-4 0      The Barthel ADL Index: Guidelines  1. The index should be used as a record of what a patient does, not as a record of what a patient could do. 2. The main aim is to establish degree of independence from any help, physical or verbal, however minor and for whatever reason. 3. The need for supervision renders the patient not independent. 4. A patient's performance should be established using the best available evidence. Asking the patient, friends/relatives and nurses are the usual sources, but direct observation and common sense are also important. However direct testing is not needed. 5. Usually the patient's performance over the preceding 24-48 hours is important, but occasionally longer periods will be relevant. 6. Middle categories imply that the patient supplies over 50 per cent of the effort. 7. Use of aids to be independent is allowed. Donnie Vela., Barthel, D.W. (7714). Functional evaluation: the Barthel Index. [facility] (14)2. MARCE Paula, Viridiana Piña., Rachel Klinefelter., [place], [address] [place] [address] (). Measuring the change indisability after inpatient rehabilitation; comparison of the responsiveness of the Barthel Index and Functional [place] Measure. Journal of Neurology, Neurosurgery, and Psychiatry, 66(4), 381-389. Jessy Cruz, [place].KENDAR, LOUISE Major, & Amy Solo MMichaelaA. (2004.) Assessment of post-stroke quality of life in cost-effectiveness studies: The usefulness of the Barthel Index and the EuroQoL-5D.  Quality of Life Research, 13, 427-43         G codes: In compliance with CMSs Claims Based Outcome Reporting, the following G-code set was chosen for this patient based on their primary functional limitation being treated: The outcome measure chosen to determine the severity of the functional limitation was the Barthel Index with a score of 85/100 which was correlated with the impairment scale. ? Self Care:     - CURRENT STATUS: CI - 1%-19% impaired, limited or restricted    - GOAL STATUS: CI - 1%-19% impaired, limited or restricted    - D/C STATUS:  CI - 1%-19% impaired, limited or restricted   Pain:  Pain Scale 1: Numeric (0 - 10)  Pain Intensity 1: 8  Pain Location 1: Back ((chronic))        Pain Intervention(s) 1: Medication (see MAR)  Activity Tolerance:   Orthostatic, but asymptomatic   Please refer to the flowsheet for vital signs taken during this treatment. After treatment:   [x]  Patient left in no apparent distress sitting up in chair  []  Patient left in no apparent distress in bed  [x]  Call bell left within reach  [x]  Nursing notified  []  Caregiver present  []  Bed alarm activated    COMMUNICATION/EDUCATION:   Communication/Collaboration:  [x]      Home safety education was provided and the patient/caregiver indicated understanding. [x]      Patient/family have participated as able and agree with findings and recommendations. []      Patient is unable to participate in plan of care at this time.   Findings and recommendations were discussed with: Physical Therapist    NAVNEET Magallanes/L  Time Calculation: 25 mins

## 2018-03-07 NOTE — PROGRESS NOTES
Asked to see patient. He was followed by Dr. Trenton Jeffrey in the past and sees someone else in the group now. Advised nurse to contact [facility].

## 2018-03-07 NOTE — PROGRESS NOTES
Hospitalist Progress Note    NAME: Yola Hickey   :  1935   MRN:  [id number]       Assessment / Plan:  PanColitis POA- inflammatory versus infectious? R/o C Diff colitis  H/o Ulcerative colitis  Severe Sepsis POA due to above  Hypotension POA - responded to IVF challenge  Lactic acidosis POA- normalized with IVF challenge in ER     -Continue telemetry monitoring  -S/p IVF bolus x 1 in ER, cont IVF at 150ml/hr for now  -Pt initially given stress dose steroid as he is chronically on PO Prednisone for RA and UC. BP has since stabilized. Therefore will discontinue stress dose and restart home Prednisone  -Continue empiric IV flagyl+ PO vancomycin for now  -Check Stool for CDiff- contact precautions till test neg  -GI recs appreciated - spoke with Dr. Whit Jordan   -Diet ordered by GI - hopeful that pt can tolerate it and does not have recurrence of diarrhea which was last experienced at 5pm on   -Hold MTX, Enbrel  -Hold BP med- toprol  -IV morphine for pain management as BP allows     RA  UC  Sarcoidosis     Cont FA  Holding MTX, Enbrel, as above  Pain management  Can consider Rheum consult Dr Maru Harvey if needed     HTN  CAD s/p CABG  AS S/p AVR     Holding metoprolol for hypotension  Cont ASA  Holding statin for now  Appreciate Cardiology evaluation - pt to follow up with Dr. Abril Miranda after dc     Chronic pain syndrome  Chronic Back pain- s/p recent shot to back on friday     Hold PO Percocet, IV morphine as above  PT/OT eval    Body mass index is 18.25 kg/(m^2). Code status: Full  Prophylaxis: Lovenox  Recommended Disposition:  PT, OT, RN     Subjective:     Chief Complaint / Reason for Physician Visit  \"No more diarrhea doc. . And I am feeling better! \". Discussed with RN events overnight.      Review of Systems:  Symptom Y/N Comments  Symptom Y/N Comments   Fever/Chills    Chest Pain n    Poor Appetite n   Edema     Cough n   Abdominal Pain n    Sputum    Joint Pain     SOB/BEY    Pruritis/Rash Nausea/vomit n   Tolerating PT/OT y    Diarrhea n   Tolerating Diet y/n    Constipation    Other       Could NOT obtain due to:      Objective:     VITALS:   Last 24hrs VS reviewed since prior progress note. Most recent are:  Patient Vitals for the past 24 hrs:   Temp Pulse Resp BP SpO2   03/07/18 1158 98 °F (36.7 °C) 65 16 130/67 100 %   03/07/18 1013 - 81 - 97/62 100 %   03/07/18 1004 - 88 - (!) 81/56 -   03/07/18 1000 - 80 - 114/59 100 %   03/07/18 0800 98.4 °F (36.9 °C) 61 16 136/61 100 %   03/07/18 0539 97.2 °F (36.2 °C) 69 18 140/72 100 %   03/07/18 0000 98.3 °F (36.8 °C) 80 16 112/59 97 %   03/06/18 2051 98.1 °F (36.7 °C) 88 18 94/64 98 %   03/06/18 1807 98.5 °F (36.9 °C) 68 18 102/68 98 %   03/06/18 1600 - (!) 101 18 111/67 100 %   03/06/18 1531 - 94 21 99/63 100 %   03/06/18 1300 - 91 21 (!) 115/94 100 %       Intake/Output Summary (Last 24 hours) at 03/07/18 1241  Last data filed at 03/07/18 0620   Gross per 24 hour   Intake                0 ml   Output              400 ml   Net             -400 ml        PHYSICAL EXAM:  General: WD, WN. Alert, cooperative, no acute distress    EENT:  EOMI. Anicteric sclerae. MMM  Resp:  CTA bilaterally, no wheezing or rales. No accessory muscle use  CV:  Regular  rhythm,  No edema  GI:  Soft, Non distended, Non tender.  +Bowel sounds  Neurologic:  Alert and oriented X 3, normal speech   Psych:   Good insight. Not anxious nor agitated  Skin:  No rashes.   No jaundice    Reviewed most current lab test results and cultures  YES  Reviewed most current radiology test results   YES  Review and summation of old records today    NO  Reviewed patient's current orders and MAR    YES  PMH/SH reviewed - no change compared to H&P  ________________________________________________________________________  Care Plan discussed with:    Comments   Patient x    Family      RN x    Care Manager     Consultant  x                      Multidiciplinary team rounds were held today with case manager, nursing, pharmacist and clinical coordinator. Patient's plan of care was discussed; medications were reviewed and discharge planning was addressed. ________________________________________________________________________  Total NON critical care TIME:  35   Minutes    Total CRITICAL CARE TIME Spent:   Minutes non procedure based      Comments   >50% of visit spent in counseling and coordination of care     ________________________________________________________________________  Matt So MD     Procedures: see electronic medical records for all procedures/Xrays and details which were not copied into this note but were reviewed prior to creation of Plan. LABS:  I reviewed today's most current labs and imaging studies.   Pertinent labs include:  Recent Labs      03/07/18   0323  03/06/18   1213   WBC  4.8  7.4   HGB  12.5  15.2   HCT  39.0  47.9   PLT  126*  169     Recent Labs      03/07/18   0323  03/06/18   1213   NA  141  138   K  4.0  3.5   CL  108  101   CO2  26  29   GLU  118*  146*   BUN  14  18   CREA  0.62*  0.83   CA  7.5*  8.7   ALB   --   3.2*   TBILI   --   1.0   SGOT   --   10*   ALT   --   15       Signed: Matt So MD

## 2018-03-07 NOTE — CONSULTS
Gastroenterology Consult   Lord Espinal  1935  [id number]    Referring Physician:    Consult Date: 3/7/2018     Subjective:     Chief Complaint: diarrhea    History of Present Illness: Lord Espinal is a [de-identified] y.o. male who is seen in consultation for acute onset diarrhea with abd pain lasting 3 days but no stool since arriving in ER 17 hours ago. No melena, hematochezia or fever. He had a couple of courses of antibiotics in the past month and stool C diff is pending. He lost 40 lb which he blames on dental work and not being able to chew. Hx ulcerative colitis years ago. Colonoscopy and biopsies normal 2011. Hx AVR but no anticoagulant. .Diffuse colitis on CT. Past Medical History:   Diagnosis Date    Aortic stenosis, severe     Arrhythmia     heart murmur    Arthritis     Rheumatoid    CAD (coronary artery disease) 1998    S/P CABG    Cancer (HCC)     Basal cell carcinoma    Chronic pain     from rheumatoid arthritis in knees, elbows fingers    HTN (hypertension)     Hypercholesterolemia     S/P AVR (aortic valve replacement)     23 mm Trifecta with Redo CABG x 1SVG to PDA    S/P CABG x 1 10/13/2014    Redo CABG x 1 SVG to PDA; 'y' from collins of existing OM vein graft    Sarcoidosis     No longer active.      Ulcerative colitis ([address]     Not active     Past Surgical History:   Procedure Laterality Date    CARDIAC SURG PROCEDURE UNLIST      CABG    HX AORTIC VALVE REPLACEMENT  10/2014    CABG redo X1    HX CATARACT REMOVAL      Left    HX LUMBAR LAMINECTOMY      HX OTHER SURGICAL      skin lesions removed from nose and forehead- basal cell    LAMINECTOMY,LUMBAR  110/2000    LA COLONOSCOPY W/BIOPSY SINGLE/MULTIPLE  12/5/2011           Family History   Problem Relation Age of Onset    Heart Disease Father     Cancer Sister      breast     Diabetes Sister     Cancer Sister      leukemia    Diabetes Sister     Diabetes Mother    Kem Pollen Bladder Disease Mother      Social History   Substance Use Topics    Smoking status: Former Smoker     Packs/day: 1.50     Years: 20.00     Quit date: 1/1/1978    Smokeless tobacco: Never Used    Alcohol use Yes      Comment: rarely      Allergies   Allergen Reactions    Lisinopril Cough    Questran [Cholestyramine (With Sugar)] Other (comments)     Muscle cramps     Current Facility-Administered Medications   Medication Dose Route Frequency    sodium chloride (NS) flush 5-10 mL  5-10 mL IntraVENous PRN    vancomycin 50 mg/mL oral solution (compounded) 125 mg  125 mg Oral Q6H    aspirin chewable tablet 81 mg  81 mg Oral QPM    folic acid (FOLVITE) tablet 1 mg  1 mg Oral DAILY    gabapentin (NEURONTIN) capsule 300 mg  300 mg Oral TID    LORazepam (ATIVAN) tablet 1 mg  1 mg Oral Q6H PRN    hydrocortisone Sod Succ (PF) (SOLU-CORTEF) injection 100 mg  100 mg IntraVENous Q8H    0.9% sodium chloride infusion  150 mL/hr IntraVENous CONTINUOUS    metroNIDAZOLE (FLAGYL) IVPB premix 500 mg  500 mg IntraVENous Q8H    morphine injection 1 mg  1 mg IntraVENous Q3H PRN    sodium chloride (NS) flush 5-10 mL  5-10 mL IntraVENous Q8H    sodium chloride (NS) flush 5-10 mL  5-10 mL IntraVENous PRN    naloxone (NARCAN) injection 0.4 mg  0.4 mg IntraVENous PRN    ondansetron (ZOFRAN) injection 4 mg  4 mg IntraVENous Q4H PRN    enoxaparin (LOVENOX) injection 30 mg  30 mg SubCUTAneous Q24H        Review of Systems:  A detailed 10 organ review of systems is obtained with pertinent positives as listed in the History of Present Illness and Past Medical History. All others are negative. Objective:     Physical Exam:  Visit Vitals    BP 97/62 (BP 1 Location: Left arm, BP Patient Position: Post activity; Sitting)    Pulse 81    Temp 98.4 °F (36.9 °C)    Resp 16    Ht 5' 8\" (1.727 m)    Wt 54.4 kg (120 lb)    SpO2 100%    BMI 18.25 kg/m2        Skin:  Extremities and face reveal no rashes. No russell erythema.  No telangiectasias on the chest wall.  HEENT: Sclerae anicteric. Extra-occular muscles are intact. No oral ulcers. No abnormal pigmentation of the lips. The neck is supple. Cardiovascular: Regular rate and rhythm. No murmurs, gallops, or rubs. PMI nondisplaced. Carotids without bruits. Respiratory:  Comfortable breathing with no accessory muscle use. Clear breath sounds with no wheezes, rales, or rhonchi. GI:  Abdomen nondistended, soft, and nontender. Normal active bowel sounds. No enlargement of the liver or spleen. No masses palpable. Rectal:  Deferred  Musculoskeletal:  No pitting edema of the lower legs. Extremities have good range of motion. No costovertebral tenderness. Neurological:  Gross memory appears intact. Patient is alert and oriented. Psychiatric:  Mood appears appropriate with judgement intact. Lymphatic:  No cervical or supraclavicular adenopathy.     Laboratory:    Recent Results (from the past 24 hour(s))   EKG, 12 LEAD, INITIAL    Collection Time: 03/06/18 12:01 PM   Result Value Ref Range    Ventricular Rate 87 BPM    Atrial Rate 87 BPM    P-R Interval 124 ms    QRS Duration 96 ms    Q-T Interval 374 ms    QTC Calculation (Bezet) 450 ms    Calculated P Axis 80 degrees    Calculated R Axis -74 degrees    Calculated T Axis 66 degrees    Diagnosis       Normal sinus rhythm  Left axis deviation  Pulmonary disease pattern  Abnormal ECG  When compared with ECG of 13-OCT-2014 15:20,  QT has shortened     CULTURE, BLOOD    Collection Time: 03/06/18 12:13 PM   Result Value Ref Range    Special Requests: NO SPECIAL REQUESTS      Culture result: NO GROWTH AFTER 19 HOURS     LACTIC ACID    Collection Time: 03/06/18 12:13 PM   Result Value Ref Range    Lactic acid 2.2 (HH) 0.4 - 2.0 MMOL/L   CULTURE, BLOOD    Collection Time: 03/06/18 12:13 PM   Result Value Ref Range    Special Requests: NO SPECIAL REQUESTS      Culture result: NO GROWTH AFTER 19 HOURS     METABOLIC PANEL, COMPREHENSIVE    Collection Time: 03/06/18 12:13 PM Result Value Ref Range    Sodium 138 136 - 145 mmol/L    Potassium 3.5 3.5 - 5.1 mmol/L    Chloride 101 97 - 108 mmol/L    CO2 29 21 - 32 mmol/L    Anion gap 8 5 - 15 mmol/L    Glucose 146 (H) 65 - 100 mg/dL    BUN 18 6 - 20 MG/DL    Creatinine 0.83 0.70 - 1.30 MG/DL    BUN/Creatinine ratio 22 (H) 12 - 20      GFR est AA >60 >60 ml/min/1.73m2    GFR est non-AA >60 >60 ml/min/1.73m2    Calcium 8.7 8.5 - 10.1 MG/DL    Bilirubin, total 1.0 0.2 - 1.0 MG/DL    ALT (SGPT) 15 12 - 78 U/L    AST (SGOT) 10 (L) 15 - 37 U/L    Alk. phosphatase 67 45 - 117 U/L    Protein, total 7.1 6.4 - 8.2 g/dL    Albumin 3.2 (L) 3.5 - 5.0 g/dL    Globulin 3.9 2.0 - 4.0 g/dL    A-G Ratio 0.8 (L) 1.1 - 2.2     CBC WITH AUTOMATED DIFF    Collection Time: 03/06/18 12:13 PM   Result Value Ref Range    WBC 7.4 4.1 - 11.1 K/uL    RBC 5.50 4. 10 - 5.70 M/uL    HGB 15.2 12.1 - 17.0 g/dL    HCT 47.9 36.6 - 50.3 %    MCV 87.1 80.0 - 99.0 FL    MCH 27.6 26.0 - 34.0 PG    MCHC 31.7 30.0 - 36.5 g/dL    RDW 16.2 (H) 11.5 - 14.5 %    PLATELET 900 587 - 790 K/uL    MPV 10.9 8.9 - 12.9 FL    NRBC 0.0 0  WBC    ABSOLUTE NRBC 0.00 0.00 - 0.01 K/uL    NEUTROPHILS 83 (H) 32 - 75 %    LYMPHOCYTES 12 12 - 49 %    MONOCYTES 4 (L) 5 - 13 %    EOSINOPHILS 1 0 - 7 %    BASOPHILS 0 0 - 1 %    IMMATURE GRANULOCYTES 0 0.0 - 0.5 %    ABS. NEUTROPHILS 6.1 1.8 - 8.0 K/UL    ABS. LYMPHOCYTES 0.9 0.8 - 3.5 K/UL    ABS. MONOCYTES 0.3 0.0 - 1.0 K/UL    ABS. EOSINOPHILS 0.1 0.0 - 0.4 K/UL    ABS. BASOPHILS 0.0 0.0 - 0.1 K/UL    ABS. IMM.  GRANS. 0.0 0.00 - 0.04 K/UL    DF AUTOMATED     URINALYSIS W/ RFLX MICROSCOPIC    Collection Time: 03/06/18  3:28 PM   Result Value Ref Range    Color YELLOW/STRAW      Appearance CLEAR CLEAR      Specific gravity <1.005 1.003 - 1.030    pH (UA) 7.0 5.0 - 8.0      Protein NEGATIVE  NEG mg/dL    Glucose NEGATIVE  NEG mg/dL    Ketone 15 (A) NEG mg/dL    Bilirubin NEGATIVE  NEG      Blood SMALL (A) NEG      Urobilinogen 0.2 0.2 - 1.0 EU/dL Nitrites NEGATIVE  NEG      Leukocyte Esterase NEGATIVE  NEG      WBC 0-4 0 - 4 /hpf    RBC 10-20 0 - 5 /hpf    Epithelial cells FEW FEW /lpf    Bacteria NEGATIVE  NEG /hpf    Hyaline cast 2-5 0 - 5 /lpf   LACTIC ACID    Collection Time: 03/06/18  3:52 PM   Result Value Ref Range    Lactic acid 1.7 0.4 - 2.0 MMOL/L   GLUCOSE, POC    Collection Time: 03/06/18  6:14 PM   Result Value Ref Range    Glucose (POC) 95 65 - 100 mg/dL    Performed by LaTherm Bria (PCT)    CBC W/O DIFF    Collection Time: 03/07/18  3:23 AM   Result Value Ref Range    WBC 4.8 4.1 - 11.1 K/uL    RBC 4.50 4. 10 - 5.70 M/uL    HGB 12.5 12.1 - 17.0 g/dL    HCT 39.0 36.6 - 50.3 %    MCV 86.7 80.0 - 99.0 FL    MCH 27.8 26.0 - 34.0 PG    MCHC 32.1 30.0 - 36.5 g/dL    RDW 15.7 (H) 11.5 - 14.5 %    PLATELET 431 (L) 364 - 400 K/uL    MPV 11.0 8.9 - 12.9 FL    NRBC 0.0 0  WBC    ABSOLUTE NRBC 0.00 0.00 - 0.36 K/uL   METABOLIC PANEL, BASIC    Collection Time: 03/07/18  3:23 AM   Result Value Ref Range    Sodium 141 136 - 145 mmol/L    Potassium 4.0 3.5 - 5.1 mmol/L    Chloride 108 97 - 108 mmol/L    CO2 26 21 - 32 mmol/L    Anion gap 7 5 - 15 mmol/L    Glucose 118 (H) 65 - 100 mg/dL    BUN 14 6 - 20 MG/DL    Creatinine 0.62 (L) 0.70 - 1.30 MG/DL    BUN/Creatinine ratio 23 (H) 12 - 20      GFR est AA >60 >60 ml/min/1.73m2    GFR est non-AA >60 >60 ml/min/1.73m2    Calcium 7.5 (L) 8.5 - 10.1 MG/DL   GLUCOSE, POC    Collection Time: 03/07/18  5:52 AM   Result Value Ref Range    Glucose (POC) 105 (H) 65 - 100 mg/dL    Performed by Tamie Bhagat (PCT)          Assessment/Plan:     Active Problems:    Rheumatoid arthritis involving multiple sites with positive rheumatoid factor ([facility] [address] (11/18/2009)      Overview: Dr. Juliet Arguelles      Ulcerative colitis [facility]) (11/18/2009)      Overview: Last scope 12/5/11 no polyps  Dr. Roberto Carlos Hunt, normal mucosa, no polyps Quite no       symptoms.  Told to repeat colonoscopy between 2016 and 2019      Colitis (3/6/2018) Hypotension (3/6/2018)      Severe sepsis ([facility] [address] (3/6/2018)      C. difficile diarrhea (3/6/2018)         Imp: diarrhea, wt loss r/o C diff  Plan: stool C diff pending. Would do colonoscopy if neg.

## 2018-03-08 NOTE — PROGRESS NOTES
General Daily Progress Note    Admit Date: 3/6/2018  Hospital day 3/8/2018    Subjective:comfortable in bed  )- C diff pos. On Vanco.  Bad taste in mouth from Flagyl. Y  N  [] [x]  Abd Pain:    [] [x]  Nausea:  [] [x] Vomiting:  [] [x]  Diarrhea:  [] []  Constipation:  [] [x]  Melena:  [] [x]  Hematochezia:  [x] []  Tolerating Diet:    Current Facility-Administered Medications   Medication Dose Route Frequency    predniSONE (DELTASONE) tablet 2.5 mg  2.5 mg Oral DAILY    sodium chloride (NS) flush 5-10 mL  5-10 mL IntraVENous PRN    vancomycin 50 mg/mL oral solution (compounded) 125 mg  125 mg Oral Q6H    aspirin chewable tablet 81 mg  81 mg Oral QPM    folic acid (FOLVITE) tablet 1 mg  1 mg Oral DAILY    gabapentin (NEURONTIN) capsule 300 mg  300 mg Oral TID    LORazepam (ATIVAN) tablet 1 mg  1 mg Oral Q6H PRN    0.9% sodium chloride infusion  150 mL/hr IntraVENous CONTINUOUS    morphine injection 1 mg  1 mg IntraVENous Q3H PRN    sodium chloride (NS) flush 5-10 mL  5-10 mL IntraVENous Q8H    sodium chloride (NS) flush 5-10 mL  5-10 mL IntraVENous PRN    naloxone (NARCAN) injection 0.4 mg  0.4 mg IntraVENous PRN    ondansetron (ZOFRAN) injection 4 mg  4 mg IntraVENous Q4H PRN    enoxaparin (LOVENOX) injection 30 mg  30 mg SubCUTAneous Q24H        Review of Systems  Pertinent items are noted in HPI. Objective:     Patient Vitals for the past 8 hrs:   BP Temp Pulse Resp SpO2   03/08/18 0826 143/69 98.3 °F (36.8 °C) 60 16 100 %     03/08 0701 - 03/08 1900  In: -   Out: 450 [Urine:450]  03/06 1901 - 03/08 0700  In: -   Out: 750 [Urine:750]    Physical Exam:   Visit Vitals    /69    Pulse 60    Temp 98.3 °F (36.8 °C)    Resp 16    Ht 5' 8\" (1.727 m)    Wt 54.4 kg (120 lb)    SpO2 100%    BMI 18.25 kg/m2     General appearance: alert, cooperative, no distress, appears stated age  Abdomen: soft, non-tender.  Bowel sounds normal. No masses,  no organomegaly      Data Review   Recent Results (from the past 24 hour(s))   GLUCOSE, POC    Collection Time: 03/08/18  5:39 AM   Result Value Ref Range    Glucose (POC) 85 65 - 100 mg/dL    Performed by Leopold Lodge          Assessment:     Active Problems:    Rheumatoid arthritis involving multiple sites with positive rheumatoid factor ([address] (11/18/2009)      Overview: Dr. Gerardo Slade      Ulcerative colitis [facility]) (11/18/2009)      Overview: Last scope 12/5/11 no polyps  Dr. Kai Lema, normal mucosa, no polyps Quite no       symptoms. Told to repeat colonoscopy between 2016 and 2019      Colitis (3/6/2018)      Hypotension (3/6/2018)      Severe sepsis ([facility] [address] (3/6/2018)      C. difficile diarrhea (3/6/2018)        Plan:     Home on Vanco 125 mg qid 10 days and see me in office 2 weeks. No Flagyl.   Thanks

## 2018-03-08 NOTE — DISCHARGE INSTRUCTIONS
Clostridium Difficile Colitis: Care Instructions  Your Care Instructions    Clostridium difficile (also called C. difficile) are bacteria that can cause swelling and irritation of the large intestine, or colon. This inflammation is also called colitis. It can cause diarrhea, fever, and belly cramps. You may get C. difficile colitis if you take antibiotics. The infection is most common in people who are taking antibiotics while in the hospital. It is also common in older people in hospitals and nursing homes. Severe disease could cause the colon to swell to many times its normal size (toxic megacolon). This can cause death and needs emergency treatment. You may have a swollen belly that is painful or tender, a rapid heartbeat, and a fever. Follow-up care is a key part of your treatment and safety. Be sure to make and go to all appointments, and call your doctor if you are having problems. It's also a good idea to know your test results and keep a list of the medicines you take. How can you care for yourself at home? · Your doctor may give you antibiotics to treat C. difficile colitis. If your doctor prescribes an antibiotic, he or she will give you a different antibiotic than the one that caused your infection. Take your antibiotics as directed. Do not stop taking them just because you feel better. You need to take the full course of antibiotics. · To prevent dehydration, drink plenty of fluids, enough so that your urine is light yellow or clear like water. Choose water and other caffeine-free clear liquids until you feel better. If you have kidney, heart, or liver disease and have to limit fluids, talk with your doctor before you increase the amount of fluids you drink. · Begin eating small amounts of mild foods, if you feel like it. Try yogurt that has live cultures of lactobacillus (check the label). ¨ Avoid spicy foods, fruits, alcohol, and caffeine until 48 hours after all symptoms go away.   ¨ Avoid chewing gum that contains sorbitol. ¨ Avoid dairy products (except for yogurt with lactobacillus) while you have diarrhea and for 3 days after symptoms go away. · To prevent the spread of C. difficile, practice good hygiene. Keep your hands clean by washing them well and often with soap and clean, running water. Alcohol-based hand sanitizers do not kill C. difficile. When should you call for help? Call 911 if:  ? · You passed out (lost consciousness). ?Call your doctor now or seek immediate medical care if:  ? · You have a fever over 101°F or shaking chills. ? · You feel lightheaded or have a fast heart rate. ? · You pass stools that are almost always bloody. ? · You have signs of needing more fluids. You have sunken eyes and a dry mouth, and you pass only a little dark urine. ? · You have severe belly pain with or without bloating. ? · You have severe vomiting and cannot keep down liquids. ? · You are not passing any stools or gas. ? Watch closely for changes in your health, and be sure to contact your doctor if:  ? · You do not get better as expected. Where can you learn more? Go to http://ligia-judi.info/. Enter [phone] [phone] in the search box to learn more about \"Clostridium Difficile Colitis: Care Instructions. \"  Current as of: March 3, 2017  Content Version: 11.4  © 3652-3462 [employer]. Care instructions adapted under license by [employer] (which disclaims liability or warranty for this information). If you have questions about a medical condition or this instruction, always ask your healthcare professional. [address] any warranty or liability for your use of this information.

## 2018-03-08 NOTE — DISCHARGE SUMMARY
Hospitalist Discharge Summary     Patient ID:  Garrett Bourne  [id number]  57 y.o.  1935    PCP on record: Yazmin Salomon MD    Admit date: 3/6/2018  Discharge date and time: 3/8/2018      DISCHARGE DIAGNOSIS:  C diff pan-colitis   H/o Ulcerative colitis  Severe Sepsis POA, resolved  RA  UC  Sarcoidosis  HTN  CAD s/p CABG  AS S/p AVR  Chronic pain syndrome  Chronic Back pain    CONSULTATIONS:  IP CONSULT TO GASTROENTEROLOGY  IP CONSULT TO CARDIOLOGY    Excerpted HPI from H&P of Lyn Azul MD:  Tanna Felty is a [de-identified] y.o.  male who presents with above complains from home ambulatory. Pt presents with CC of worsening Diarrhea x 3 days  H/o associated nausea/vomiting x 2 days  H/o diffuse abdominal soreness x 1 week  H/o recent pain shot to the back on Friday- for pinched nerve  H/o recently completing 2 rounds of PO Abx for skin graft infection (after  Basal cell carcinoma Sx) as per dermatologist.  H/o Ulcerative colitis(cared by GI Dr Yesenia Garcia) & RA (cared by Rheum Dr [place])-- has been in control on PO prednisone, Enbrel & MTX/FA regimen as per pt.     Pt was found to have Hypotension in ER with evidence of diffuse colitis on CT A/P on imaging.       ______________________________________________________________________  DISCHARGE SUMMARY/HOSPITAL COURSE:  for full details see H&P, daily progress notes, labs, consult notes. C diff pan-colitis   H/o Ulcerative colitis  Severe Sepsis POA, resolved  -Pt stable for discharge  -Pt initially given stress dose steroid as he is chronically on PO Prednisone for RA and UC. BP has since stabilized. Therefore will discontinue stress dose and restart home Prednisone  -Was previously on Flagyl and PO Vanc - now to be discharged on Vanco 125mg QID as per GI; rx has been completed by me and sent directly to his Pharmacy listed   -C.  Diff positive  -GI recs appreciated - pt given follow up in 2 weeks  -Pt without any more diarrhea, last episode on 3/6 - tolerating PO without any difficulties       RA  UC  Sarcoidosis  -Resume home meds      HTN  CAD s/p CABG  AS S/p AVR  -Resume home meds  Appreciate Cardiology evaluation - pt to follow up with Dr. Brijesh Miller after dc      Chronic pain syndrome  Chronic Back pain- s/p recent shot to back on friday  -Resume home meds  _______________________________________________________________________  Patient seen and examined by me on discharge day. Pertinent Findings:  Gen:    Not in distress  Chest: Clear lungs  CVS:   Regular rhythm. No edema  Abd:  Soft, not distended, not tender, BS+  Neuro:  Alert, oriented x3  _______________________________________________________________________  DISCHARGE MEDICATIONS:   Current Discharge Medication List      START taking these medications    Details   vancomycin (VANCOCIN) 125 mg capsule Take 1 Cap by mouth four (4) times daily for 10 days. Qty: 40 Cap, Refills: 0         CONTINUE these medications which have NOT CHANGED    Details   LORazepam (ATIVAN) 1 mg tablet Take 1 mg by mouth daily as needed for Anxiety (prior to injection). gabapentin (NEURONTIN) 300 mg capsule Take 300 mg by mouth three (3) times daily. metoprolol succinate (TOPROL-XL) 25 mg XL tablet Take 25 mg by mouth every evening. OTHER by Injection route every three (3) months. Patient receives lumbar epidural steroid injections by Dr. Morris Funez from [facility]      aspirin 81 mg chewable tablet Take 1 Tab by mouth every evening. Qty: 90 Tab, Refills: 3      atorvastatin (LIPITOR) 20 mg tablet Take 1 tablet by mouth  daily  Qty: 90 Tab, Refills: 3      predniSONE (DELTASONE) 2.5 mg tablet Take 2.5 mg by mouth daily. oxycodone-acetaminophen (PERCOCET)  mg per tablet Take 1 tablet by mouth every four (4) hours as needed for Pain. methotrexate sodium (METHOTREXATE, ANTI-RHEUMATIC,) 2.5 mg DsPk Take 20 mg by mouth Every Saturday.       folic acid (FOLVITE) 1 mg tablet Take 1 mg by mouth daily. etanercept (ENBREL) 50 mg/mL (0.98 mL) injection 50 mg by SubCUTAneous route every Sunday. docusate sodium (COLACE) 100 mg capsule Take 100 mg by mouth two (2) times daily as needed for Constipation. STOP taking these medications       polyethylene glycol (MIRALAX) 17 gram/dose powder Comments:   Reason for Stopping:         loperamide (IMMODIUM) 2 mg tablet Comments:   Reason for Stopping:         gabapentin (NEURONTIN) 600 mg tablet Comments:   Reason for Stopping:         polyethylene glycol (MIRALAX) 17 gram/dose powder Comments:   Reason for Stopping:               My Recommended Diet, Activity, Wound Care, and follow-up labs are listed in the patient's Discharge Insturctions which I have personally completed and reviewed. ______________________________________________________________________    Risk of deterioration: Low    Condition at Discharge:  Stable  ______________________________________________________________________    Disposition  Home with family and home health services  ______________________________________________________________________    Care Plan discussed with:   Patient, Family, RN, Care Manager, Consultant    ______________________________________________________________________    Code Status: Full Code  ______________________________________________________________________      Follow up with:   PCP : Mio García MD  Follow-up Information     Follow up With Details Comments [facility] On 3/7/2018 THIS  [address]  IF YOU DO NOT HEAR FROM THEM IN 24-48 HRS, PLEASE CONTACT THEM DIRECTLY [address] [facility]  1st Floor  [facility] [id number] [phone]    Cortez Agrawal III, DO Schedule an appointment as soon as possible for a visit in 1 month  [address]  [address]  [address], [place]  Delroy Goncalves MD In 2 weeks  [address] [place] [address] Drive  [address] [place] [address]  [place]  [phone]                Total time in minutes spent coordinating this discharge (includes going over instructions, follow-up, prescriptions, and preparing report for sign off to her PCP) :  35 minutes    Signed:  Cooper Barraza MD

## 2018-03-09 NOTE — PROGRESS NOTES
Hospital Discharge Follow-Up      Date/Time:  3/12/2018 3:00 PM    Patient was admitted to [facility] on 3/6/18 and discharged on 3/8/18 for C-Diff Pan-Colitis, Severe Sepsis, POA. The physician discharge summary was available at the time of outreach. Patient contacted within 2 business days of discharge. Nurse Navigator(NN) contacted the patient by telephone to perform post hospital discharge assessment. Verified name and  with patient as identifiers. Provided introduction to self, and explanation of the Nurse Navigator role. Inpatient RRAT score: 30    Top challenges reviewed with the provider: reported recent 40 lb wt loss attributed to loss of lower teeth (awaiting arrival of dentures) & intestinal virus. Has Ensure @ home. Please encourage to drink daily if possible till dentures in & able to resume eating solid foods. Taking po Vancomycin. Not on a Probiotic. Consider recommending or eating yogurt. GI f/u scheduled for 3/26/18 with Dr Lewis Pierre. Pt to schedule 1 month Cardio f/u. Inquire if he has done so. No ACP. Verbalized interest in [employer]. Please give educational folder @ 3/15/18 PCP f/u appt. Method of communication with provider :chart routing      Reviewed discharge instructions and red flags with  patient who verbalizes understanding. patient given an opportunity to ask questions and does not have any further questions or concerns at this time. The patient agrees to contact the PCP office for questions related to their [facility]. NN provided contact information for future reference. olid foodSummary of patients top problems:  1. C diff pan-colitis, H/o Ulcerative colitis, Severe Sepsis POA, resolved- initially given stress dose steroid as he is chronically on PO Prednisone for RA and UC. BP has since stabilized. Discontinue stress dose and restart home Prednisone.  Was previously on Flagyl and PO Vanc - now to be discharged on Vanco 125mg QID as per GI; GI follow up in 2 weeks. No more diarrhea, last episode on 3/6 - tolerating PO without any difficulties   2. HTN, CAD s/p CABG, AS S/p AVR- Resume home meds, Cardiology Consult done- pt to follow up with Dr. Krishan Prajapati after d/c.  3. Pt Reported Recent 40 lb Wt Loss- having dental work @ . Loss of lower teeth. Unable to eat solid food. Awaiting arrival of dentures. Home Health orders at discharge: [facility]: [facility]  [facility]    Durable Medical Equipment ordered/company: none  Durable Medical Equipment received: n/a    Barriers to care? dental problem    Medication:   Medication reconciliation was preformed with patient, who verbalizes understanding of administration of home medications. There were no barriers to obtaining medications identified at this time. New medications at discharge include:  START taking these medications     Details   vancomycin (VANCOCIN) 125 mg capsule Take 1 Cap by mouth four (4) times daily for 10 days.   Qty: 40 Cap, Refills: 0          Does the patient have new prescription(s) to last until follow up with prescribing provider: yes  Prescription Medication total: 12 (pharmacy consult for polypharm needed?) no   Medication changes (dose adjustments or discontinued meds):   STOP taking these medications         polyethylene glycol (MIRALAX) 17 gram/dose powder Comments:   Reason for Stopping:            loperamide (IMMODIUM) 2 mg tablet Comments:   Reason for Stopping:            gabapentin (NEURONTIN) 600 mg tablet Comments:   Reason for Stopping:            polyethylene glycol (MIRALAX) 17 gram/dose powder Comments:   Reason for Stopping:            Advance Care Planning:   Does patient have an Advance Directive:  not on file     PCP/Specialist follow up:   Future Appointments  Date Time Provider Laverne Sumner   3/12/2018 To Be Determined Nory Peoples [address] [facility] [place]   3/13/2018 To Be Determined Crivitz Iha [address] [facility]   3/15/2018 To Be Determined Sagrario Wiggins LPN [facility] [address] [facility]   3/15/2018 2:30 PM Belle Garcia  [facility]   3/19/2018 To Be Determined Sagrario Wiggins LPN [facility]   3/22/2018 To Be Determined Sagrario Wiggins LPN [facility]   3/26/2018 To Be Determined Sagrario Wiggins TRAN [facility]   3/29/2018 To Be Determined [facility] Shankar Nunn   3/29/2018 To Be Determined 1 [facility] Shankar Nunn   4/6/2018 10:30 AM Belle Garcia MD [address] Advance Care Plan            3/9/18- pt confirmed does not have an ACP. Discussed [employer] ACP program. Pt agreeable with receiving information packet & reviewing. Advised if interested can schedule appt with a trained Facilitator @ no cost. Plan- message to PCP to give [employer] ACP information packet @ 3/15/18 PCP f/u appt. NN to f/u re scheduling facilitation appt @ future NN WILLIS f/u-ID       Transitions of Care- [address] Coordination to Prevent Complications Post Hospitalization. 3/9/18- pt reported \"feel 80% better\". Appetite coming back. No diarrhea. Vdg ok. Small formed BM today. +Flatus. Filled ABX Rx & taking as prescribed. [facility] nurse initial visit today. SN plan 2x/week for 3 weeks. OT visit on 3/12/18. PCP f/u on 3/15/18 @ 2:30 PM. Cardio f/u in 1 month w/ Dr Rajan Muller. Needs to call to schedule. GI f/u w/ Dr Dalia Mata in 2 weeks. Thinks appt has been made. Plan- pt to complete ABX as prescribed. Pt to contact Cardio office to schedule 1 month f/u. This NN contacted GI office. No appt. Scheduled. Appt scheduled for 3/26/18 @ 3:30 PM [facility]. Pt to be informed of appt date/time.  Weight Loss due to loss of lower teeth, awaiting arrival of dentures (pt-stated)            3/9/18- pt reported recent wt loss of 40 lbs. Having dental work done @ . Missing lower teeth.  Unable to eat solid foods. Having lower dentures made. Awaiting arrival. Additionally wt loss complicated by \"intestinal virus\". Plan- pt plans to contact [facility] to inquire if delivery of dentures can be expedited. Pt has Ensure available @ home. Was encouraged to drink daily if possible till able to eat solid food-ID.

## 2018-03-12 NOTE — ACP (ADVANCE CARE PLANNING)
- No ACP on file. Pt confirmed does not have an ACP document.     - Discussed [employer] ACP program. Pt verbalized interest    - Will message PCP to give pt [employer] information packet @ 3/15/18 PCP f/u appt    - Will f/u with pt re scheduling appt for facilitation

## 2018-03-15 PROBLEM — R65.20 SEVERE SEPSIS (HCC): Status: RESOLVED | Noted: 2018-01-01 | Resolved: 2018-01-01

## 2018-03-15 PROBLEM — K52.9 COLITIS: Status: RESOLVED | Noted: 2018-01-01 | Resolved: 2018-01-01

## 2018-03-15 PROBLEM — A41.9 SEVERE SEPSIS (HCC): Status: RESOLVED | Noted: 2018-01-01 | Resolved: 2018-01-01

## 2018-03-15 PROBLEM — I95.9 HYPOTENSION: Status: RESOLVED | Noted: 2018-01-01 | Resolved: 2018-01-01

## 2018-03-16 NOTE — PATIENT INSTRUCTIONS
You are due for an eye exam next month. If you have to take antibiotic, be sure that you take a probiotic such as Florastor. Make sure the person ordering the antibiotic knows you have had C diff colitis. Return in 4 months; might do hemoglobin A1c I in the office that day           Clostridium Difficile Colitis: Care Instructions  Your Care Instructions    Clostridium difficile (also called C. difficile) are bacteria that can cause swelling and irritation of the large intestine, or colon. This inflammation is also called colitis. It can cause diarrhea, fever, and belly cramps. You may get C. difficile colitis if you take antibiotics. The infection is most common in people who are taking antibiotics while in the hospital. It is also common in older people in hospitals and nursing homes. Severe disease could cause the colon to swell to many times its normal size (toxic megacolon). This can cause death and needs emergency treatment. You may have a swollen belly that is painful or tender, a rapid heartbeat, and a fever. Follow-up care is a key part of your treatment and safety. Be sure to make and go to all appointments, and call your doctor if you are having problems. It's also a good idea to know your test results and keep a list of the medicines you take. How can you care for yourself at home? · Your doctor may give you antibiotics to treat C. difficile colitis. If your doctor prescribes an antibiotic, he or she will give you a different antibiotic than the one that caused your infection. Take your antibiotics as directed. Do not stop taking them just because you feel better. You need to take the full course of antibiotics. · To prevent dehydration, drink plenty of fluids, enough so that your urine is light yellow or clear like water. Choose water and other caffeine-free clear liquids until you feel better.  If you have kidney, heart, or liver disease and have to limit fluids, talk with your doctor before you increase the amount of fluids you drink. · Begin eating small amounts of mild foods, if you feel like it. Try yogurt that has live cultures of lactobacillus (check the label). ¨ Avoid spicy foods, fruits, alcohol, and caffeine until 48 hours after all symptoms go away. ¨ Avoid chewing gum that contains sorbitol. ¨ Avoid dairy products (except for yogurt with lactobacillus) while you have diarrhea and for 3 days after symptoms go away. · To prevent the spread of C. difficile, practice good hygiene. Keep your hands clean by washing them well and often with soap and clean, running water. Alcohol-based hand sanitizers do not kill C. difficile. When should you call for help? Call 911 if:  ? · You passed out (lost consciousness). ?Call your doctor now or seek immediate medical care if:  ? · You have a fever over 101°F or shaking chills. ? · You feel lightheaded or have a fast heart rate. ? · You pass stools that are almost always bloody. ? · You have signs of needing more fluids. You have sunken eyes and a dry mouth, and you pass only a little dark urine. ? · You have severe belly pain with or without bloating. ? · You have severe vomiting and cannot keep down liquids. ? · You are not passing any stools or gas. ? Watch closely for changes in your health, and be sure to contact your doctor if:  ? · You do not get better as expected. Where can you learn more? Go to http://ligia-judi.info/. Enter [phone] in the search box to learn more about \"Clostridium Difficile Colitis: Care Instructions. \"  Current as of: March 3, 2017  Content Version: 11.4  © 2837-9761 [employer]. Care instructions adapted under license by [employer] (which disclaims liability or warranty for this information).  If you have questions about a medical condition or this instruction, always ask your healthcare professional. Mark Ponce disclaims any warranty or liability for your use of this information.

## 2018-03-16 NOTE — PROGRESS NOTES
Urmila [place]  Identified pt with two pt identifiers(name and ). Chief Complaint   Patient presents with   [facility] Follow Up       Reviewed record In preparation for visit and have obtained necessary documentation. Has info on advanced directive but has not filled them out. 1. Have you been to the ER, urgent care clinic or hospitalized since your last visit? [address] 3/6/18    2. Have you seen or consulted any other health care providers outside of the [address] [place] [address] since your last visit? Include any pap smears or colon screening. Home Health    Vitals reviewed with provider.     Health Maintenance reviewed:     Health Maintenance Due   Topic    DTaP/Tdap/Td series (1 - Tdap)    MICROALBUMIN Q1     EYE EXAM RETINAL OR DILATED Q1           Wt Readings from Last 3 Encounters:   18 139 lb (63 kg)   18 120 lb (54.4 kg)   18 120 lb (54.4 kg)        Temp Readings from Last 3 Encounters:   18 98.6 °F (37 °C) (Oral)   18 97.7 °F (36.5 °C) (Temporal)   18 98.5 °F (36.9 °C)        BP Readings from Last 3 Encounters:   18 108/68   18 120/55   18 90/52        Pulse Readings from Last 3 Encounters:   18 63   18 71   18 68        Vitals:    18 1627   BP: 108/68   Pulse: 63   Resp: 20   Temp: 98.6 °F (37 °C)   TempSrc: Oral   SpO2: 97%   Weight: 139 lb (63 kg)   Height: 5' 8\" (1.727 m)   PainSc:   6   PainLoc: Leg          Learning Assessment:   :       Learning Assessment 3/16/2018 2016 2014   PRIMARY LEARNER Patient Patient Patient   HIGHEST LEVEL OF EDUCATION - PRIMARY LEARNER  GRADUATED HIGH SCHOOL OR GED GRADUATED HIGH SCHOOL OR GED GRADUATED HIGH SCHOOL OR GED   BARRIERS PRIMARY LEARNER NONE NONE NONE   CO-LEARNER CAREGIVER No - No   PRIMARY LANGUAGE ENGLISH ENGLISH ENGLISH   LEARNER PREFERENCE PRIMARY DEMONSTRATION LISTENING DEMONSTRATION     - READING READING     - DEMONSTRATION -     - PICTURES -     - VIDEOS -   ANSWERED BY miguel patient patient   RELATIONSHIP SELF SELF SELF        Depression Screening:   :       PHQ over the last two weeks 3/16/2018   Little interest or pleasure in doing things Not at all   Feeling down, depressed or hopeless Several days   Total Score PHQ 2 1        Fall Risk Assessment:   :       Fall Risk Assessment, last 12 mths 10/5/2017   Able to walk? Yes   Fall in past 12 months? No        Abuse Screening:   :       Abuse Screening Questionnaire 3/31/2017 3/1/2016 7/28/2014   Do you ever feel afraid of your partner? N N N   Are you in a relationship with someone who physically or mentally threatens you? N N N   Is it safe for you to go home?  Y Y Y        ADL Screening:   :       ADL Assessment 11/21/2014   Feeding yourself No Help Needed   Getting from bed to chair No Help Needed   Getting dressed Help Needed   Bathing or showering Help Needed   Walk across the room (includes cane/walker) No Help Needed   Using the telphone No Help Needed   Taking your medications No Help Needed   Preparing meals Help Needed   Managing money (expenses/bills) No Help Needed   Moderately strenuous housework (laundry) Help Needed   Shopping for personal items (toiletries/medicines) Help Needed   Shopping for groceries Help Needed   Driving No Help Needed   Climbing a flight of stairs Help Needed   Getting to places beyond walking distances No Help Needed

## 2018-03-16 NOTE — ACP (ADVANCE CARE PLANNING)
With patient's permission advance care planning discussed. Primary SDM would be wife. Secondary SDM would be daughter Luh Seen. He wants no life prolonging treatment if death is imminent. He wants no life prolonging treatment if there is overwhelming, permanent neurologic injury. Reviewed paperwork. Conversation took 4 minutes.

## 2018-03-16 NOTE — PROGRESS NOTES
HISTORY OF PRESENT ILLNESS  John August is a [de-identified] y.o. male. HPI   Mr. Javier Beltrán is a [de-identified]y.o. year old male, he is seen today for Transition of Care services following a hospital discharge for c diff colitis on 3/8. Our office Nurse Navigator performed an outreach to Mr. Waylon Melgar on 3/12 (within 2 business days of discharge) to complete medication reconciliation and a telephonic assessment of his condition. He was admitted on 3/6 after presenting to the ED with a 3 day history of diarrhea. He was hypotensive in the ED. He had taken 2 courses of antibiotic for skin graft infection after excision of BCC. Lactic acid was 2.2, CMP was remarkable for albumin of 3.2, CBC was normal, blood cultures were negative, U/A was normal, C diff DNA was positive, abdominal CT showed pleural calcifications, interstitial lung disease, diffuse mural thickening of the colon, sparing the proximal sigmoid. He was treated initially with Vancomycin plus metronidazole, and discharged to finish a course of vancomycin. He has only a day of therapy remaining. He had atrial fibrillation of short duration that converted to sinus rhythm spontaneously. He has follow up visits with Dr Francisco Javier Nunez and Dr Krishan Prajapati. Stools are now soft and formed, but had none until the past 2 days. No nausea or vomiting. Working with [facility] to hurry along dentures now that implants have healed. He had lost a great deal of weight due to problems eating related to dental work. He is  eating waffles, soups, eggs with crumbled do, applesauce, and pasta. He does not like Ensure.  He has regained 12 lbs since end of Feb.     Patient Active Problem List   Diagnosis Code    Rheumatoid arthritis involving multiple sites with positive rheumatoid factor ([address] M05.79    Sarcoidosis of lung ([address] D86.0    Ulcerative colitis ([address] K51.90    Hypercholesterolemia E78.00    Inguinal hernia K40.90    Asbestos exposure Z77.090    Hypertension, essential I10    Thyroid nodule S63.2    Umbilical hernia Z07.7    S/P AVR (aortic valve replacement) Z95.2    Myalgia and myositis [id number]    Diabetic peripheral neuropathy (HCC) E11.42    Osteoporosis M81.0    Lumbar radiculopathy M54.16    Coronary artery disease involving native coronary artery without angina pectoris I25.10    Essential tremor G25.0    Controlled type 2 diabetes mellitus with diabetic polyneuropathy, without long-term current use of insulin (HCC) E11.42    Advance directive discussed with patient Z71.89    Postlaminectomy syndrome, lumbar region M96.1    Osteoarthritis of spine with radiculopathy, lumbar region M47.26    Right foot drop M21.371    Spinal stenosis, lumbar region, with neurogenic claudication M48.062    Weakness of right lower extremity R29.898    Bilateral carotid artery disease (HCC) I77.9    C. difficile diarrhea A04.72     Past Medical History:   Diagnosis Date    Aortic stenosis, severe     Arrhythmia     heart murmur    Arthritis     Rheumatoid    CAD (coronary artery disease) 1998    S/P CABG    Cancer (HCC)     Basal cell carcinoma    Chronic pain     from rheumatoid arthritis in knees, elbows fingers    HTN (hypertension)     Hypercholesterolemia     S/P AVR (aortic valve replacement)     23 mm Trifecta with Redo CABG x 1SVG to PDA    S/P CABG x 1 10/13/2014    Redo CABG x 1 SVG to PDA; 'y' from collins of existing OM vein graft    Sarcoidosis     No longer active.      Ulcerative colitis ([facility]     Not active     Past Surgical History:   Procedure Laterality Date    CARDIAC SURG PROCEDURE UNLIST      CABG    HX AORTIC VALVE REPLACEMENT  10/2014    CABG redo X1    HX CATARACT REMOVAL      Left    HX LUMBAR LAMINECTOMY      HX OTHER SURGICAL      skin lesions removed from nose and forehead- basal cell    LAMINECTOMY,LUMBAR  110/2000    OR COLONOSCOPY W/BIOPSY SINGLE/MULTIPLE  12/5/2011          Social History     Social History    Marital status:      Spouse name: N/A    Number of children: N/A    Years of education: N/A     Social History Main Topics    Smoking status: Former Smoker     Packs/day: 1.50     Years: 20.00     Quit date: 1/1/1978    Smokeless tobacco: Never Used    Alcohol use Yes      Comment: rarely    Drug use: No    Sexual activity: Not Asked     Other Topics Concern    None     Social History Narrative     Family History   Problem Relation Age of Onset    Heart Disease Father     Cancer Sister      breast     Diabetes Sister     Cancer Sister      leukemia    Diabetes Sister     Diabetes Mother    Pavithra Chan Bladder Disease Mother      Allergies   Allergen Reactions    Lisinopril Cough    Questran [Cholestyramine (With Sugar)] Other (comments)     Muscle cramps     Current Outpatient Prescriptions   Medication Sig Dispense Refill    gabapentin (NEURONTIN) 300 mg capsule Take 1 Cap by mouth three (3) times daily. 270 Cap 3    metoprolol succinate (TOPROL-XL) 25 mg XL tablet Take 25 mg by mouth every evening.  OTHER by Injection route every three (3) months. Patient receives lumbar epidural steroid injections by Dr. Jude Fritz from [facility] aspirin 81 mg chewable tablet Take 1 Tab by mouth every evening. 90 Tab 3    atorvastatin (LIPITOR) 20 mg tablet Take 1 tablet by mouth  daily 90 Tab 3    predniSONE (DELTASONE) 2.5 mg tablet Take 2.5 mg by mouth daily.  oxycodone-acetaminophen (PERCOCET)  mg per tablet Take 1 tablet by mouth every four (4) hours as needed for Pain.  methotrexate sodium (METHOTREXATE, ANTI-RHEUMATIC,) 2.5 mg DsPk Take 20 mg by mouth Every Saturday.  folic acid (FOLVITE) 1 mg tablet Take 1 mg by mouth daily.  etanercept (ENBREL) 50 mg/mL (0.98 mL) injection 50 mg by SubCUTAneous route every Sunday.  docusate sodium (COLACE) 100 mg capsule Take 100 mg by mouth two (2) times daily as needed for Constipation.          ROS  Visit Vitals    /68 (BP 1 Location: Left arm, BP Patient Position: Sitting)    Pulse 63    Temp 98.6 °F (37 °C) (Oral)    Resp 20    Ht 5' 8\" (1.727 m)    Wt 139 lb (63 kg)    SpO2 97%    BMI 21.13 kg/m2     Physical Exam   Constitutional: He is oriented to person, place, and time. He appears well-developed and well-nourished. HENT:   Head: Normocephalic and atraumatic. Eyes: Conjunctivae are normal. Pupils are equal, round, and reactive to light. Neck: Neck supple. Carotid bruit is not present. Cardiovascular: Normal rate, regular rhythm, S1 normal, S2 normal and normal heart sounds. Exam reveals no gallop. No murmur heard. Pulmonary/Chest: Effort normal and breath sounds normal. He has no wheezes. He has no rhonchi. He has no rales. Abdominal: Soft. Normal appearance and bowel sounds are normal. He exhibits no distension, no abdominal bruit and no mass. There is no hepatosplenomegaly. There is no tenderness. Musculoskeletal: He exhibits no edema. Neurological: He is alert and oriented to person, place, and time. Skin: Skin is warm, dry and intact. Psychiatric: He has a normal mood and affect. His behavior is normal.   Nursing note and vitals reviewed. Lab Results   Component Value Date/Time    Sodium 141 03/07/2018 03:23 AM    Potassium 4.0 03/07/2018 03:23 AM    Chloride 108 03/07/2018 03:23 AM    CO2 26 03/07/2018 03:23 AM    Anion gap 7 03/07/2018 03:23 AM    Glucose 118 (H) 03/07/2018 03:23 AM    BUN 14 03/07/2018 03:23 AM    Creatinine 0.62 (L) 03/07/2018 03:23 AM    BUN/Creatinine ratio 23 (H) 03/07/2018 03:23 AM    GFR est AA >60 03/07/2018 03:23 AM    GFR est non-AA >60 03/07/2018 03:23 AM    Calcium 7.5 (L) 03/07/2018 03:23 AM         Results from Hospital Encounter encounter on 03/06/18   CT ABD PELV W CONT   Narrative EXAM:  CT ABD PELV W CONT    INDICATION: Diarrhea and vomiting for 3 days. Previous ulcerative colitis,  chronic sarcoidosis. No abdominal surgery. COMPARISON: CT abdomen/pelvis on 12/19/2017.     CONTRAST: 100 mL of Isovue-370. TECHNIQUE:   Following the uneventful intravenous administration of contrast, thin axial  images were obtained through the abdomen and pelvis. Coronal and sagittal  reconstructions were generated. Oral contrast was not administered. CT dose  reduction was achieved through use of a standardized protocol tailored for this  examination and automatic exposure control for dose modulation. FINDINGS:   LUNG BASES: Pleural calcifications and chronic interstitial lung disease are  unchanged. No pneumonia. INCIDENTALLY IMAGED HEART AND MEDIASTINUM: Unremarkable. LIVER: No mass or biliary dilatation. GALLBLADDER: Unremarkable. SPLEEN: Top normal splenic size is unchanged. Calcification is unchanged. No  mass or infarct. PANCREAS: No mass or ductal dilatation. ADRENALS: Unremarkable. KIDNEYS: No mass or hydronephrosis. Bilateral renal simple cysts are unchanged. STOMACH: Partial distention. SMALL BOWEL: No dilatation or wall thickening. COLON: Near diffuse mural thickening spares the proximal sigmoid colon. No  pneumatosis. No abscess. APPENDIX: Unremarkable. PERITONEUM: No ascites or pneumoperitoneum. RETROPERITONEUM: Aorta is atherosclerotic without aneurysm. REPRODUCTIVE ORGANS: Heterogeneous partially calcified prostatomegaly. URINARY BLADDER: Incompletely distention. BONES: No change. ADDITIONAL COMMENTS: No lymphadenopathy. Impression IMPRESSION:    1. Near diffuse colitis is most likely inflammatory or infectious. Increased  involvement of the colon since last year. 2. No perforation or abscess. ASSESSMENT and PLAN    ICD-10-CM ICD-9-CM    1. Clostridium difficile colitis A04.72 008.45    2. Weight loss R63.4 783.21    3. PAF (paroxysmal atrial fibrillation) ([facility] I48.0 427.31      Diagnoses and all orders for this visit:    1. Clostridium difficile colitis  Resolved.  Emphasized need to convey history of C diff when prescribed antibiotic in future and to take Florastor with antibiotic. 2. Weight loss  Reversing now that mouth is healing. Discussed healthy nutritionally dense foods. 3. PAF (paroxysmal atrial fibrillation) ([address]  Resolved shortly after onset. Felt not likely to persist.     Other orders  -     gabapentin (NEURONTIN) 300 mg capsule; Take 1 Cap by mouth three (3) times daily. Follow-up Disposition:  Return in about 4 months (around 7/16/2018) for HTN, chol, DM. POC A1c   Cancel appt 4/6.  lab results and schedule of future lab studies reviewed with patient  reviewed diet, exercise and weight control  radiology results and schedule of future radiology studies reviewed with patient  I have discussed the diagnosis with the patient and the intended plan as seen in the above orders. Patient is in agreement. The patient has received an after-visit summary and questions were answered concerning future plans. I have discussed side effects and warnings of any new medications with the patient as well.

## 2018-03-16 NOTE — MR AVS SNAPSHOT
[facility] 
 
 
 [address] [address] [phone] Patient: Steve Alonzo MRN: [id number]:[id number] Visit Information Date & Time Provider Department Dept. Phone Encounter #  
 3/16/2018  4:00 PM MD Grabiel Aguirre [phone] [id number] Follow-up Instructions Return in about 4 months (around 7/16/2018) for HTN, chol, DM. POC A1c   Cancel appt 4/6. Your Appointments 4/6/2018 10:30 AM  
ROUTINE CARE with MD Grabiel Aguirre [address] [facility]) Appt Note: Dm htn chol $0cp 10.05.17  
 [address] [facility] [address] [phone]  
  
   
 [address] [facility] [address] Upcoming Health Maintenance Date Due DTaP/Tdap/Td series (1 - Tdap) 1/2/2006 MICROALBUMIN Q1 3/1/2018 EYE EXAM RETINAL OR DILATED Q1 4/6/2018 HEMOGLOBIN A1C Q6M 8/20/2018 FOOT EXAM Q1 10/5/2018 MEDICARE YEARLY EXAM 10/6/2018 LIPID PANEL Q1 2/20/2019 GLAUCOMA SCREENING Q2Y 4/6/2019 Allergies as of 3/16/2018  Review Complete On: 3/16/2018 By: Katelyn Munoz MD  
  
 Severity Noted Reaction Type Reactions Lisinopril  11/18/2009    Cough Questran [Cholestyramine (With Sugar)]  11/18/2009    Other (comments) Muscle cramps Current Immunizations  Reviewed on 3/16/2018 Name Date Influenza High Dose Vaccine PF 10/5/2017, 9/7/2016 Influenza Vaccine 9/29/2015 Influenza Vaccine (Madin [place] Canine Kidney) PF 10/16/2014 10:54 AM  
 Influenza Vaccine Split 10/29/2012, 9/24/2011  1:34 PM, 10/12/2010 Influenza Vaccine Whole 10/10/2009 Pneumococcal Conjugate (PCV-13) 9/29/2015 TD Vaccine 1/1/2006 ZZZ-RETIRED (DO NOT USE) Pneumococcal Vaccine (Unspecified Type) 1/1/2009, 11/14/2002  Reviewed by Leland Garcia LPN on 0/04/0353 at  4:14 PM  
You Were Diagnosed With   
  
 Codes Comments Clostridium difficile colitis    -  Primary ICD-10-CM: A04.72 
ICD-9-CM: 008.45 Vitals BP Pulse Temp Resp Height(growth percentile) Weight(growth percentile) 108/68 (BP 1 Location: Left arm, BP Patient Position: Sitting) 63 98.6 °F (37 °C) (Oral) 20 5' 8\" (1.727 m) 139 lb (63 kg) SpO2 BMI Smoking Status 97% 21.13 kg/m2 Former Smoker BMI and BSA Data Body Mass Index Body Surface Area  
 21.13 kg/m 2 1.74 m 2 Preferred Pharmacy Pharmacy Name Phone [address] [facility], [address] [address] [place] [address] [address] Your Updated Medication List  
  
   
This list is accurate as of 3/16/18  5:07 PM.  Always use your most recent med list.  
  
  
  
  
 aspirin 81 mg chewable tablet Take 1 Tab by mouth every evening. atorvastatin 20 mg tablet Commonly known as:  LIPITOR Take 1 tablet by mouth  daily COLACE 100 mg capsule Generic drug:  docusate sodium Take 100 mg by mouth two (2) times daily as needed for Constipation. ENBREL 50 mg/mL (0.98 mL) injection Generic drug:  etanercept 50 mg by SubCUTAneous route every Sunday. folic acid 1 mg tablet Commonly known as:  [employer] Take 1 mg by mouth daily. gabapentin 300 mg capsule Commonly known as:  NEURONTIN Take 1 Cap by mouth three (3) times daily. METHOTREXATE (ANTI-RHEUMATIC) 2.5 mg tablet dose pack Generic drug:  methotrexate Take 20 mg by mouth Every Saturday. metoprolol succinate 25 mg XL tablet Commonly known as:  TOPROL-XL Take 25 mg by mouth every evening. OTHER  
by Injection route every three (3) months. Patient receives lumbar epidural steroid injections by Dr. Jose Angel Santos from [facility] PERCOCET  mg per tablet Generic drug:  oxyCODONE-acetaminophen Take 1 tablet by mouth every four (4) hours as needed for Pain. predniSONE 2.5 mg tablet Commonly known as:  Lorie Dodson  
 Take 2.5 mg by mouth daily. Prescriptions Sent to Pharmacy Refills  
 gabapentin (NEURONTIN) 300 mg capsule 3 Sig: Take 1 Cap by mouth three (3) times daily. Class: Normal  
 Pharmacy: [address]  #: [phone] Route: Oral  
  
Follow-up Instructions Return in about 4 months (around 7/16/2018) for HTN, chol, DM. POC A1c   Cancel appt 4/6. To-Do List   
 03/19/2018 To Be Determined Appointment with Gela Madrigal LPN at [place] [address]  
  
 03/20/2018 To Be Determined Appointment with Buffy Vilchis at [place] [address]  
  
 03/21/2018 To Be Determined Appointment with Megan Clark at [place] [address]  
  
 03/22/2018 To Be Determined Appointment with Gela Madrigal LPN at [place] [address]  
  
 03/23/2018 To Be Determined Appointment with Buffy Vilchis at [place] [address]  
  
 03/26/2018 To Be Determined Appointment with Gela Madrigal LPN at [place] [address]  
  
 03/27/2018 To Be Determined Appointment with Buffy Vilchis at [place] [address]  
  
 03/29/2018 To Be Determined Appointment with Gustavo Villa at [place] [address]  
  
 03/29/2018 To Be Determined Appointment with Gustavo Villa at [place] [address]  
  
 03/30/2018 To Be Determined Appointment with Buffy Vilchis at [place] [address] Patient Instructions You are due for an eye exam next month. If you have to take antibiotic, be sure that you take a probiotic such as Florastor. Make sure the person ordering the antibiotic knows you have had C diff colitis. Return in 4 months; might do hemoglobin A1c I in the office that day Clostridium Difficile Colitis: Care Instructions Your Care Instructions Clostridium difficile (also called C. difficile) are bacteria that can cause swelling and irritation of the large intestine, or colon. This inflammation is also called colitis. It can cause diarrhea, fever, and belly cramps. You may get C. difficile colitis if you take antibiotics. The infection is most common in people who are taking antibiotics while in the hospital. It is also common in older people in hospitals and nursing homes. Severe disease could cause the colon to swell to many times its normal size (toxic megacolon). This can cause death and needs emergency treatment. You may have a swollen belly that is painful or tender, a rapid heartbeat, and a fever. Follow-up care is a key part of your treatment and safety. Be sure to make and go to all appointments, and call your doctor if you are having problems. It's also a good idea to know your test results and keep a list of the medicines you take. How can you care for yourself at home? · Your doctor may give you antibiotics to treat C. difficile colitis. If your doctor prescribes an antibiotic, he or she will give you a different antibiotic than the one that caused your infection. Take your antibiotics as directed. Do not stop taking them just because you feel better. You need to take the full course of antibiotics. · To prevent dehydration, drink plenty of fluids, enough so that your urine is light yellow or clear like water. Choose water and other caffeine-free clear liquids until you feel better. If you have kidney, heart, or liver disease and have to limit fluids, talk with your doctor before you increase the amount of fluids you drink. · Begin eating small amounts of mild foods, if you feel like it. Try yogurt that has live cultures of lactobacillus (check the label). ¨ Avoid spicy foods, fruits, alcohol, and caffeine until 48 hours after all symptoms go away. ¨ Avoid chewing gum that contains sorbitol. ¨ Avoid dairy products (except for yogurt with lactobacillus) while you have diarrhea and for 3 days after symptoms go away. · To prevent the spread of C. difficile, practice good hygiene. Keep your hands clean by washing them well and often with soap and clean, running water. Alcohol-based hand sanitizers do not kill C. difficile. When should you call for help? Call 911 if: 
? · You passed out (lost consciousness). ?Call your doctor now or seek immediate medical care if: 
? · You have a fever over 101°F or shaking chills. ? · You feel lightheaded or have a fast heart rate. ? · You pass stools that are almost always bloody. ? · You have signs of needing more fluids. You have sunken eyes and a dry mouth, and you pass only a little dark urine. ? · You have severe belly pain with or without bloating. ? · You have severe vomiting and cannot keep down liquids. ? · You are not passing any stools or gas. ? Watch closely for changes in your health, and be sure to contact your doctor if: 
? · You do not get better as expected. Where can you learn more? Go to http://ligia-judi.info/. Enter ([phone] in the search box to learn more about \"Clostridium Difficile Colitis: Care Instructions. \" Current as of: March 3, 2017 Content Version: 11.4 © 1453-2714 [employer]. Care instructions adapted under license by [employer] (which disclaims liability or warranty for this information). If you have questions about a medical condition or this instruction, always ask your healthcare professional. [address] any warranty or liability for your use of this information. Introducing [facility]! Shira Manzano introduces [employer] patient portal. Now you can access parts of your medical record, email your doctor's office, and request medication refills online.    
 
1. In your internet browser, go to [email or web address] 2. Click on the First Time User? Click Here link in the Sign In box. You will see the New Member Sign Up page. 3. Enter your [employer] Launcher Access Code exactly as it appears below. You will not need to use this code after youve completed the sign-up process. If you do not sign up before the expiration date, you must request a new code. · [employer] Access Code: [email or web address] Expires: 6/10/2018  5:23 PM 
 
4. Enter the last four digits of your Social Security Number (xxxx) and Date of Birth (mm/dd/yyyy) as indicated and click Submit. You will be taken to the next sign-up page. 5. Create a DreamFundedt ID. This will be your [employer] Launcher login ID and cannot be changed, so think of one that is secure and easy to remember. 6. Create a [employer] password. You can change your password at any time. 7. Enter your Password Reset Question and Answer. This can be used at a later time if you forget your password. 8. Enter your e-mail address. You will receive e-mail notification when new information is available in [address] 9. Click Sign Up. You can now view and download portions of your medical record. 10. Click the Download Summary menu link to download a portable copy of your medical information. If you have questions, please visit the Frequently Asked Questions section of the [employer] website. Remember, [employer] is NOT to be used for urgent needs. For medical emergencies, dial 911. Now available from your iPhone and Android! Please provide this summary of care documentation to your next provider. Your primary care clinician is listed as Isabel Jackson. If you have any questions after today's visit, please call [phone].

## 2018-03-17 PROBLEM — I48.0 PAF (PAROXYSMAL ATRIAL FIBRILLATION) (HCC): Status: ACTIVE | Noted: 2018-01-01

## 2018-03-30 NOTE — Clinical Note
Dr Ulisses Agarwal can you please sign off on the V/O I gave to [facility] PT for extension of 1 visit/2 weeks. Thank you.

## 2018-03-30 NOTE — PROGRESS NOTES
NN Progress Note    Goals Addressed      Improve Mobility, Prevent Falls (pt-stated)                  3/30/18- spoke with [address] [place] [address] 83. Pt has bad control of RLE & foot drop. Chronic LBP. Difficulty w/ balance & gait. Pt has multiple walkers. Not suitable for outside re home outside physical environment. Visited w/ pt today. Discussed PMD. PT to research PMD most suitable for pt. Will contact this NN with recommendation. This NN to coordinate submission of required [address] paperwork with PCP.  PT requested v/o for extension of [facility] PT visits. 1 visit/2 weeks for pain management, Balance & gait training. V/O given. Plan- NN to collaborate with [facility] PT & PCP re PMD. PCP sign off for [facility] PT extension V/O by NN-ID     3/28/18- pt reported decreased mobility related to chronic LBP & LE weakness. Difficulty w/ balance & gait. Reported uses cane outside & walker in house. NN questioned safety if using cane outside. Pt reported \"walker doesn't work on grass, gravel\". Has gravel driveway & grass on property. Interested in [address] (PMD). Concerned if [address] will cover. Advised to discuss with [facility] PT if would meet [address] PMD eligibiltiy guidelines. Pt verbalized understanding. Plan- pt to discuss PMD with [facility] PT. This NN left message with [facility] office for PT MICAH Holder to contact this NN. Next NN f/u ~ 1 week-ID.

## 2018-03-30 NOTE — PROGRESS NOTES
NN Progress Note    Goals      Advance Care Plan            3/28/18- per 3/16/18 PCP note ACP discussed. Pt verbalized agent choices & medical care decisions. Paperwork given. ACP document not discussed during today's call. Plan- discuss ACP document @ next NN f/u ~ 1 week-ID.    3/9/18- pt confirmed does not have an ACP. Discussed [employer] ACP program. Pt agreeable with receiving information packet & reviewing. Advised if interested can schedule appt with a trained Facilitator @ no cost. Plan- message to PCP to give [employer] ACP information packet @ 3/15/18 PCP f/u appt. NN to f/u re scheduling facilitation appt @ future NN WILLIS f/u-ID       Improve Mobility, Prevent Falls (pt-stated)            3/28/18- pt reported decreased mobility related to chronic LBP & LE weakness. Difficulty w/ balance & gait. Reported uses cane outside & walker in house. NN questioned safety if using cane outside. Pt reported \"walker doesn't work on grass, gravel\". Has gravel driveway & grass on property. Interested in [address] (PMD). Concerned if [facility] will cover. Advised to discuss with [place] [facility] if would meet [facility] PMD eligibiltiy guidelines. Pt verbalized understanding. Plan- pt to discuss PMD with [place] [facility]. This NN left message with [facility] office for PT MICAH Holder to contact this NN. Next NN f/u ~ 1 week-ID.  Prevent Constipation related to Opiod Pain Med  (pt-stated)            3/28/18- pt reported no BM x 3 days. Takes Oxycodone for Chronic Pain. Used to take stool softener prior to hospitalization. Has [employer] brand Stool Softener @ home. Discussed resumption of bowel regimen med. Pt verbalized hesitation due to recent diarrhea. No diarrhea since d/c. Advised ok to resume. Advised if any concern call GI office to confirm ok to resume. Pt verbalized understanding. Plan- to resume bowel regimen stool softener. Pt to contact GI office if any question. Next NN f/u ~1 week-ID.        Transitions of Care- Collaboration & Care Coordination to Prevent Complications Post Hospitalization. 3/28/18- pt reported \"about the same. That's a good thing\". C Diff- no diarrhea since hosp d/c. Completed ABX. Aware needs to report hx +C Diff if ABX prescribed. Need to take Probiotic if ABX prescribed. Attended PCP f/u 3//16/18. Next f/u 7/16/18. Attended GI f/u with Dr Blair Woody 3/26/18. Cardio f/u with Dr Brijesh Miller \"in couple of weeks\". D/C'd from [place] SN/OT. PT continues. Plan- pt to inform of + C Diff hx for any future ABX therapy. Pt to take Probiotic with any future ABX therapy. NN call to [address] office. Next Cardio f/u on 4/13/18 @ 2:30 PM. Continue NN collaboration with pt, PCP, HH, & Specialists as needed for Care Coordination. Next NN f/u ~1 week-ID     3/9/18- pt reported \"feel 80% better\". Appetite coming back. No diarrhea. Vdg ok. Small formed BM today. +Flatus. Filled ABX Rx & taking as prescribed. [facility]. SN plan 2x/week for 3 weeks. OT visit on 3/12/18. PCP f/u on 3/15/18 @ 2:30 PM. Cardio f/u in 1 month w/ Dr Brijesh Miller. Needs to call to schedule. GI f/u w/ Dr Blair Woody in 2 weeks. Thinks appt has been made. Plan- pt to complete ABX as prescribed. Pt to contact Cardio office to schedule 1 month f/u. This NN contacted GI office. No appt. Scheduled. Appt scheduled for 3/26/18 @ 3:30 PM @ [facility] location. Pt to be informed of appt date/time.  Weight Loss due to loss of lower teeth, awaiting arrival of dentures (pt-stated)            3/28/18- pt reported eating a little better. Starting to gain a little wt. Started drinking chocolate Ensure today. [facility] f/u on 3/27/18Next f/u in 8 weeks. Should have dentures by 5/22/18. Plan- pt to f/u @ [facility] as scheduled. Pt to increase intake of soft foods as tolerated, continue with Ensure supplement. Next NN f/u ~ 1 week-ID.    3/9/18- pt reported recent wt loss of 40 lbs. Having dental work done @ [facility]. Missing lower teeth.  Unable to eat solid foods. Having lower dentures made. Awaiting arrival. Additionally wt loss complicated by \"intestinal virus\". Plan- pt plans to contact [facility] to inquire if delivery of dentures can be expedited. Pt has Ensure available @ home. Was encouraged to drink daily if possible till able to eat solid food-ID.

## 2018-04-04 NOTE — Clinical Note
Dr Suad Ramirez- the information bolded in the Mobility Goal, Dx & medical necessity for scooter, is the information that needs to be documented on the order for the Power Scooter, & have the order faxed to the # listed.  PT will work to get the device approved. Thanks.

## 2018-04-04 NOTE — PROGRESS NOTES
NN Progress Note    Goals Addressed             Most Recent     Improve Mobility, Prevent Falls                4/4/18- received message from Mat Lyn \"Iris, After looking through power scooter options for [address] compact travel scooter would be best fit for want he is asking for. If the PCP is in agreement, they can fax an order to us at [phone] and the office can work with [address] for coverage. He would only be using this outside and necessity is based on chronic LBP and R foot drop causing him fall risk with outdoor ambulation. Please let me know if there is anything else I can do to help facilitate this. Thanks, Veronica Ma, PT, DPT, [facility],   [address] [place], [address] [place] [address], [phone]\". Plan: NN to collaborate with PCP & [facility] to submit order for Power Scooter-ID    3/30/18- spoke with [facility] PT Veronica Ma. Pt has bad control of RLE & foot drop. Chronic LBP. Difficulty w/ balance & gait. Pt has multiple walkers. Not suitable for outside re home outside physical environment. Visited w/ pt today. Discussed PMD. PT to research PMD most suitable for pt. Will contact this NN with recommendation. This NN to coordinate submission of required [address] paperwork with PCP.  PT requested v/o for extension of [facility] visits. 1 visit/2 weeks for pain management, Balance & gait training. V/O given. Plan- NN to collaborate with [facility] PT & PCP re PMD. PCP sign off for [facility] extension V/O by NN-ID     3/28/18- pt reported decreased mobility related to chronic LBP & LE weakness. Difficulty w/ balance & gait. Reported uses cane outside & walker in house. NN questioned safety if using cane outside. Pt reported \"walker doesn't work on grass, gravel\". Has gravel driveway & grass on property. Interested in [address] [facility] (PMD). Concerned if [address] will cover. Advised to discuss with [facility] PT if would meet [address] PMD eligibiltiy guidelines.  Pt verbalized understanding. Plan- pt to discuss PMD with [facility] PT. This NN left message with [facility] office for PT MICAH Holder to contact this NN. Next NN f/u ~ 1 week-ID.  Transitions of Care- Collaboration & Care Coordination to Prevent Complications Post Hospitalization. On track (3/28/2018)             4/4/18- attempted to contact pt. Message left. Plan- next NN f/u ~ 1 week-ID    3/28/18- pt reported \"about the same. That's a good thing\". C Diff- no diarrhea since hosp d/c. Completed ABX. Aware needs to report hx +C Diff if ABX prescribed. Need to take Probiotic if ABX prescribed. Attended PCP f/u 3//16/18. Next f/u 7/16/18. Attended GI f/u with Dr Danica Arguelles 3/26/18. Cardio f/u with Dr Rolando Espinoza \"in couple of weeks\". D/C'd from [facility] SN/OT. PT continues. Plan- pt to inform of + C Diff hx for any future ABX therapy. Pt to take Probiotic with any future ABX therapy. NN call to [facility] office. Next Cardio f/u on 4/13/18 @ 2:30 PM. Continue NN collaboration with pt, PCP, HH, & Specialists as needed for Care Coordination. Next NN f/u ~1 week-ID     3/9/18- pt reported \"feel 80% better\". Appetite coming back. No diarrhea. Vdg ok. Small formed BM today. +Flatus. Filled ABX Rx & taking as prescribed. [facility] nurse initial visit today. SN plan 2x/week for 3 weeks. OT visit on 3/12/18. PCP f/u on 3/15/18 @ 2:30 PM. Cardio f/u in 1 month w/ Dr Rolando Espinoza. Needs to call to schedule. GI f/u w/ Dr Danica Arguelles in 2 weeks. Thinks appt has been made. Plan- pt to complete ABX as prescribed. Pt to contact Cardio office to schedule 1 month f/u. This NN contacted GI office. No appt. Scheduled. Appt scheduled for 3/26/18 @ 3:30 PM @ [facility] location. Pt to be informed of appt date/time.

## 2018-08-06 NOTE — TELEPHONE ENCOUNTER
Spoke to daughter, he is eating about 1  Meal a day and drinking fluids. He is weak, advised taking pt to ER to be evaluated. Daughter said she would inform pt, no further questions.

## 2018-08-06 NOTE — TELEPHONE ENCOUNTER
----- Message from Raffaele Aldana sent at 8/6/2018 11:15 AM EDT -----  Regarding: Dr. Ivelisse Andino, Daughter, [phone] advised pt's health is deteriorating; unable to care for himself or be cared for by his wife, Yohannes Bucio [phone] or [phone]. Pt's skin is very thin, can easily tear and bruise while lifting him. Pt has had several falls. After speaking with [facility] [phone] this morning, she was requested to contact his doctor to authorize pt for home care.
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
## 2018-08-10 PROBLEM — R53.1 WEAKNESS: Status: ACTIVE | Noted: 2018-01-01

## 2018-08-10 NOTE — ED NOTES
Report received from Gerardo zamudio, [facility].  MRI consent signed by pt and faxed to MRI  Pt pulled up in bed, family bedside

## 2018-08-10 NOTE — ED NOTES
Bedside shift change report given to [facility] (oncoming nurse) by Gerardo zamudio RN (offgoing nurse). Report included the following information SBAR, Kardex, ED Summary and MAR.

## 2018-08-10 NOTE — ED NOTES
Family reports he has not been eating well x several weeks. Recent GI infection. deteoriating over last 60 days. Not walking much.

## 2018-08-10 NOTE — ED NOTES
Ambulated pt in hallway with RN accompany and walker utilization, oxygen sats remained at 94% or higher throughout. 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  Pt returned to bed with family bedside    MD updated

## 2018-08-10 NOTE — ED TRIAGE NOTES
Pt has had increased weakness over the past year but over last few weeks it got worse and spreading to gen. Weakness and numbness/tingling all over. Has hx of skin cancer x 3 years and feels like since then he has had weakness. IV established and IVF 750ml approx given. Alert and oriented x 4. Pain all over, mainly right leg due to skin cancer tx.

## 2018-08-10 NOTE — ED PROVIDER NOTES
EMERGENCY DEPARTMENT HISTORY AND PHYSICAL EXAM      Date: 8/10/2018  Patient Name: Arleen Severance    History of Presenting Illness     No chief complaint on file. History Provided By: Patient, Patient's Daughter and EMS    HPI: Arleen Severance, 82 y.o. male with PMHx significant for HTN, arthritis, chronic pain, CABG, presents via EMS to the ED with cc of generalized malaise x 3 years. Daughter reports associated symptoms of decrease in appetite, numbness in RLE>LLE x 3 years, and numbness in L arm x 30 days. Per medical records, the pt sees Dr. Mundo Ferrell for lower back pain and has a h/o a R foot drop dating back to August 2017. He is undergoing ALFONSO for symptomatic management. In March, the pt had C.diff colitis and sepsis. Daughter states the pt has had a rapid decline in his health the past 60 days and that his symptoms are being managed by his PCP. Pt is not currently undergoing chemotherapy treatment for his cancer and he states he does not have an oncologist. The pt is living with his wife, to which the daughter notes wife is unable to take care of the pt. She states the pt's decline started when he had skin CA which was removed in December, then developed into a staph infection. She notes the pt has \"never recovered\" since this. Daughter clarified that his R foot drop is chronic. She informs the pt is unable to get to the bathroom by himself and is having greater difficulty ambulating. He is mostly bed bound. Additionally, the pt has had multiple falls due to the numbness in his legs, with his last fall x 2 weeks ago. She informs that the pt had last ambulated before March, but had required the assistance of a walker since December. She states the pt is now bed ridden and is requesting for a home health nurse. He has not been eating much recently to which the pt was brought into his PCP's office and further referred to the ER.  Daughter states they are concerned the pt is dehydrated as he is not eating or drink much. He further states he has been experiencing chronic loose stool once a week. Pt denies any vomiting, CP, abdominal pain, fever, chills, cough, back pain, hematochezia, hematuria, or hematemesis. There are no other complaints, changes, or physical findings at this time. PCP: Madisyn Aguilar MD    Current Outpatient Prescriptions   Medication Sig Dispense Refill    metoprolol succinate (TOPROL-XL) 25 mg XL tablet Take 25 mg by mouth every evening.  aspirin 81 mg chewable tablet Take 1 Tab by mouth every evening. 90 Tab 3    atorvastatin (LIPITOR) 20 mg tablet Take 1 tablet by mouth  daily 90 Tab 3    predniSONE (DELTASONE) 2.5 mg tablet Take 2.5 mg by mouth daily.  oxycodone-acetaminophen (PERCOCET)  mg per tablet Take 1 tablet by mouth every four (4) hours as needed for Pain.  methotrexate sodium (METHOTREXATE, ANTI-RHEUMATIC,) 2.5 mg DsPk Take 20 mg by mouth Every Saturday.  folic acid (FOLVITE) 1 mg tablet Take 1 mg by mouth daily.  etanercept (ENBREL) 50 mg/mL (0.98 mL) injection 50 mg by SubCUTAneous route every Sunday.  docusate sodium (COLACE) 100 mg capsule Take 100 mg by mouth two (2) times daily as needed for Constipation.  gabapentin (NEURONTIN) 300 mg capsule Take 1 Cap by mouth three (3) times daily. 270 Cap 3    OTHER by Injection route every three (3) months.  Patient receives lumbar epidural steroid injections by Dr. Charmaine Smart from [facility]         Past History     Past Medical History:  Past Medical History:   Diagnosis Date    Aortic stenosis, severe     Arrhythmia     heart murmur    Arthritis     Rheumatoid    CAD (coronary artery disease) 1998    S/P CABG    Cancer (HCC)     Basal cell carcinoma    Chronic pain     from rheumatoid arthritis in knees, elbows fingers    HTN (hypertension)     Hypercholesterolemia     S/P AVR (aortic valve replacement)     23 mm Trifecta with Redo CABG x 1SVG to PDA    S/P CABG x 1 10/13/2014 Redo CABG x 1 SVG to PDA; 'y' from collins of existing OM vein graft    Sarcoidosis     No longer active.  Ulcerative colitis ([facility] [address]     Not active       Past Surgical History:  Past Surgical History:   Procedure Laterality Date    CARDIAC SURG PROCEDURE UNLIST      CABG    HX AORTIC VALVE REPLACEMENT  10/2014    CABG redo X1    HX CATARACT REMOVAL      Left    HX LUMBAR LAMINECTOMY      HX OTHER SURGICAL      skin lesions removed from nose and forehead- basal cell    LAMINECTOMY,LUMBAR  110/2000    ID COLONOSCOPY W/BIOPSY SINGLE/MULTIPLE  12/5/2011            Family History:  Family History   Problem Relation Age of Onset    Heart Disease Father     Cancer Sister      breast     Diabetes Sister     Cancer Sister      leukemia    Diabetes Sister     Diabetes Mother    Angela Salas Bladder Disease Mother        Social History:  Social History   Substance Use Topics    Smoking status: Former Smoker     Packs/day: 1.50     Years: 20.00     Quit date: 1/1/1978    Smokeless tobacco: Never Used    Alcohol use Yes      Comment: rarely       Allergies: Allergies   Allergen Reactions    Lisinopril Cough    Questran [Cholestyramine (With Sugar)] Other (comments)     Muscle cramps     Review of Systems   Review of Systems   Constitutional: Positive for appetite change. Negative for chills, fatigue and fever. HENT: Negative for congestion, rhinorrhea and sore throat. Eyes: Negative for pain, discharge and visual disturbance. Respiratory: Negative for cough, chest tightness, shortness of breath and wheezing. Cardiovascular: Negative for chest pain, palpitations and leg swelling. Gastrointestinal: Negative for abdominal pain, blood in stool, constipation, diarrhea, nausea and vomiting. Genitourinary: Negative for dysuria, frequency and hematuria. Musculoskeletal: Positive for arthralgias (+L shoulder). Negative for back pain and myalgias. Skin: Negative for rash.    Neurological: Positive for numbness. Negative for dizziness, weakness, light-headedness and headaches. Psychiatric/Behavioral: Negative. Physical Exam   Physical Exam   Constitutional: He is oriented to person, place, and time. No distress. Thin, frail   HENT:   Head: Normocephalic and atraumatic. Eyes: EOM are normal. Right eye exhibits no discharge. Left eye exhibits no discharge. No scleral icterus. Neck: Normal range of motion. Neck supple. No tracheal deviation present. Cardiovascular: Normal rate, regular rhythm, normal heart sounds and intact distal pulses. Exam reveals no gallop and no friction rub. No murmur heard. 2+ DP pulses BL   Pulmonary/Chest: Effort normal and breath sounds normal. No respiratory distress. He has no wheezes. He has no rales. Abdominal: Soft. He exhibits no distension. There is no tenderness. Musculoskeletal: Normal range of motion. He exhibits no edema. CTL spine without midline tenderness, no step offs   Lymphadenopathy:     He has no cervical adenopathy. Neurological: He is alert and oriented to person, place, and time. Decreased strength L arm and R leg, facial symmetry, normal speech, R foot drop   Skin: Skin is warm and dry. No rash noted. Psychiatric: He has a normal mood and affect. Nursing note and vitals reviewed.     Diagnostic Study Results     Labs -   Recent Results (from the past 12 hour(s))   EKG, 12 LEAD, INITIAL    Collection Time: 08/10/18 11:38 AM   Result Value Ref Range    Ventricular Rate 70 BPM    Atrial Rate 70 BPM    P-R Interval 122 ms    QRS Duration 100 ms    Q-T Interval 418 ms    QTC Calculation (Bezet) 451 ms    Calculated P Axis 79 degrees    Calculated R Axis -70 degrees    Calculated T Axis 84 degrees    Diagnosis       Normal sinus rhythm  Left axis deviation  When compared with ECG of 06-MAR-2018 12:01,  No significant change was found  Confirmed by Kelly Mean ([id number]) on 8/10/2018 3:46:17 PM     CBC WITH AUTOMATED DIFF    Collection Time: 08/10/18 11:52 AM   Result Value Ref Range    WBC 5.5 4.1 - 11.1 K/uL    RBC 3.16 (L) 4.10 - 5.70 M/uL    HGB 8.8 (L) 12.1 - 17.0 g/dL    HCT 28.6 (L) 36.6 - 50.3 %    MCV 90.5 80.0 - 99.0 FL    MCH 27.8 26.0 - 34.0 PG    MCHC 30.8 30.0 - 36.5 g/dL    RDW 15.6 (H) 11.5 - 14.5 %    PLATELET 424 (L) 381 - 400 K/uL    MPV 9.7 8.9 - 12.9 FL    NRBC 0.0 0  WBC    ABSOLUTE NRBC 0.00 0.00 - 0.01 K/uL    NEUTROPHILS 69 32 - 75 %    LYMPHOCYTES 15 12 - 49 %    MONOCYTES 14 (H) 5 - 13 %    EOSINOPHILS 1 0 - 7 %    BASOPHILS 0 0 - 1 %    IMMATURE GRANULOCYTES 1 (H) 0.0 - 0.5 %    ABS. NEUTROPHILS 3.8 1.8 - 8.0 K/UL    ABS. LYMPHOCYTES 0.8 0.8 - 3.5 K/UL    ABS. MONOCYTES 0.8 0.0 - 1.0 K/UL    ABS. EOSINOPHILS 0.0 0.0 - 0.4 K/UL    ABS. BASOPHILS 0.0 0.0 - 0.1 K/UL    ABS. IMM. GRANS. 0.1 (H) 0.00 - 0.04 K/UL    DF AUTOMATED     METABOLIC PANEL, COMPREHENSIVE    Collection Time: 08/10/18 11:52 AM   Result Value Ref Range    Sodium 142 136 - 145 mmol/L    Potassium 4.2 3.5 - 5.1 mmol/L    Chloride 107 97 - 108 mmol/L    CO2 29 21 - 32 mmol/L    Anion gap 6 5 - 15 mmol/L    Glucose 105 (H) 65 - 100 mg/dL    BUN 13 6 - 20 MG/DL    Creatinine 0.60 (L) 0.70 - 1.30 MG/DL    BUN/Creatinine ratio 22 (H) 12 - 20      GFR est AA >60 >60 ml/min/1.73m2    GFR est non-AA >60 >60 ml/min/1.73m2    Calcium 7.8 (L) 8.5 - 10.1 MG/DL    Bilirubin, total 0.5 0.2 - 1.0 MG/DL    ALT (SGPT) 7 (L) 12 - 78 U/L    AST (SGOT) 14 (L) 15 - 37 U/L    Alk.  phosphatase 80 45 - 117 U/L    Protein, total 5.5 (L) 6.4 - 8.2 g/dL    Albumin 2.3 (L) 3.5 - 5.0 g/dL    Globulin 3.2 2.0 - 4.0 g/dL    A-G Ratio 0.7 (L) 1.1 - 2.2     CK W/ CKMB & INDEX    Collection Time: 08/10/18 11:52 AM   Result Value Ref Range    CK 38 (L) 39 - 308 U/L    CK - MB <1.0 <3.6 NG/ML    CK-MB Index Cannot be calculated 0 - 2.5     TROPONIN I    Collection Time: 08/10/18 11:52 AM   Result Value Ref Range    Troponin-I, Qt. <0.05 <0.05 ng/mL   MAGNESIUM    Collection Time: 08/10/18 11:52 AM   Result Value Ref Range    Magnesium 1.7 1.6 - 2.4 mg/dL   PROTHROMBIN TIME + INR    Collection Time: 08/10/18 12:40 PM   Result Value Ref Range    INR 1.1 0.9 - 1.1      Prothrombin time 10.9 9.0 - 11.1 sec   URINALYSIS W/ REFLEX CULTURE    Collection Time: 08/10/18  2:03 PM   Result Value Ref Range    Color DARK YELLOW      Appearance CLEAR CLEAR      Specific gravity 1.028 1.003 - 1.030      pH (UA) 5.0 5.0 - 8.0      Protein NEGATIVE  NEG mg/dL    Glucose NEGATIVE  NEG mg/dL    Ketone NEGATIVE  NEG mg/dL    Blood NEGATIVE  NEG      Urobilinogen 1.0 0.2 - 1.0 EU/dL    Nitrites NEGATIVE  NEG      Leukocyte Esterase NEGATIVE  NEG      WBC 0-4 0 - 4 /hpf    RBC 0-5 0 - 5 /hpf    Epithelial cells FEW FEW /lpf    Bacteria NEGATIVE  NEG /hpf    UA:UC IF INDICATED CULTURE NOT INDICATED BY UA RESULT CNI     BILIRUBIN, CONFIRM    Collection Time: 08/10/18  2:03 PM   Result Value Ref Range    Bilirubin UA, confirm NEGATIVE  NEG     OCCULT BLOOD, STOOL    Collection Time: 08/10/18  3:00 PM   Result Value Ref Range    Occult blood, stool NEGATIVE  NEG         Radiologic Studies -   MRI LUMB SPINE WO CONT   Degenerative spine change and postoperative changes grossly stable compared to  MRI 8/16/2017. No acute discrete herniation or other acute abnormality. Preliminary report was provided by Dr. Aren Garcia, the on-call radiologist, at  20:30    Final report to follow. MRI BRAIN WO CONT   Final Result   Initial Result:     Impression:     IMPRESSION: Stable chronic small vessel ischemic white matter disease. No acute  infarct or mass.         Narrative:     EXAM:  MRI BRAIN WO CONT    INDICATION:  right leg weakness    COMPARISON: MRI 9/25/2011. CONTRAST:  None. TECHNIQUE: Sagittal T1, axial FLAIR, T2,T1 and gradient echo images as well as  coronal T2 weighted images and axial diffusion weighted images of the head were  obtained.     FINDINGS: The ventricular size and configuration are normal. There are some  stable areas of elevated periventricular and subcortical T2 signal elevation  within white matter. There are no new areas of abnormal signal in the cerebral  hemispheres, brainstem or cerebellum. There is no evidence of mass, hemorrhage,  acute infarct or abnormal extra-axial fluid collection.  Normal appearing  flow-voids are present in the vertebral, basilar and carotid artery systems. The  craniocervical junction is normal. The structures at the cranial base including  paranasal sinuses and mastoid air cells are unremarkable. MRI CERV SPINE WO CONT      Mild degenerative spine changes with no canal stenosis or cord signal  abnormality. Preliminary report was provided by Dr. Coral Lei, the on-call radiologist, at  20:27. Final report to follow. CT Results  (Last 48 hours)               08/10/18 1305  CT HEAD WO CONT Final result    Impression:  IMPRESSION: No acute abnormality               Narrative:  EXAM:  CT HEAD WO CONT       INDICATION:   weakness LUE, paresthesias BLEs       COMPARISON: 9/25/2011. CONTRAST:  None. TECHNIQUE: Unenhanced CT of the head was performed using 5 mm images. Brain and   bone windows were generated. CT dose reduction was achieved through use of a   standardized protocol tailored for this examination and automatic exposure   control for dose modulation. FINDINGS:   The ventricles and sulci are normal in size, shape and configuration and   midline. There is no significant white matter disease. There is no intracranial   hemorrhage, extra-axial collection, mass, mass effect or midline shift. The   basilar cisterns are open. No acute infarct is identified. There are   calcifications in the left basal ganglia which have increased slightly since   2011 but this is likely insignificant. The bone windows demonstrate no   abnormalities. The visualized portions of the paranasal sinuses and mastoid air   cells are clear.                Medical Decision Making   I am the first provider for this patient. I reviewed the vital signs, available nursing notes, past medical history, past surgical history, family history and social history. Vital Signs-Reviewed the patient's vital signs. Patient Vitals for the past 12 hrs:   Temp Pulse Resp BP SpO2   08/10/18 1830 - 79 14 111/61 98 %   08/10/18 1745 - 78 17 (!) 116/92 99 %   08/10/18 1700 - 77 14 133/57 99 %   08/10/18 1530 - 77 12 131/55 99 %   08/10/18 1500 - 69 10 123/51 100 %   08/10/18 1455 - 69 12 - 99 %   08/10/18 1445 - 70 15 118/52 100 %   08/10/18 1430 - 69 14 124/49 99 %   08/10/18 1415 - 67 12 115/58 100 %   08/10/18 1403 - - - 136/57 -   08/10/18 1230 - 69 13 106/54 98 %   08/10/18 1215 - 73 18 106/68 97 %   08/10/18 1200 - 73 17 122/68 -   08/10/18 1145 - - - 133/58 100 %   08/10/18 1143 - 69 - - -   08/10/18 1143 - - - 143/55 -   08/10/18 1142 97.6 °F (36.4 °C) - 16 143/55 98 %     Pulse Oximetry Analysis - 100% on RA    Cardiac Monitor:   Rate: 77 bpm  Rhythm: Normal Sinus Rhythm     EKG interpretation: (Preliminary): 1138  Rhythm: normal sinus rhythm; and regular . Rate (approx.): 70; Axis: left axis deviation; ME interval: normal; QRS interval: normal ; ST/T wave: normal.  Written by DEE Medellin, as dictated by Gabriela Gonsalves MD.    Records Reviewed: Nursing Notes, Old Medical Records, Previous electrocardiograms, Ambulance Run Sheet, Previous Radiology Studies and Previous Laboratory Studies    Provider Notes (Medical Decision Making):   DDx: dehydration, DAVIS, electrolyte abnormality, failure to thrive, CVA, ICH, metastases, spinal cord compression, radiculopathy    ED Course:   Initial assessment performed. The patients presenting problems have been discussed, and they are in agreement with the care plan formulated and outlined with them. I have encouraged them to ask questions as they arise throughout their visit.       Procedure Note - Rectal Exam:   3:00 PM  Performed by: Mindi Sever, MD  Rectal exam performed. brown stool was collected. Stool was collected and sent to the lab for Hemoccult testing. The procedure took 1-15 minutes, and pt tolerated well. Written by Jennifer Rachel ED Scribe, as dictated by Mindi Sever, MD.    PROGRESS NOTE:  3:06 PM  [facility] states the pt was ambulatory with a cane in February. CONSULT NOTE:   6:49 PM  Mindi Sever, MD spoke with Dr. Anselmo Lubin,   Specialty: Hospitalist  Discussed pt's hx, disposition, and available diagnostic and imaging results. Reviewed care plans. Consultant will evaluate pt for admission. Written by DEE Mariaibe, as dictated by Mindi Sever, MD.    Critical Care Time:   0    Disposition:  PLAN:  1. Admit    ADMIT NOTE:  6:49 PM  Patient is being admitted to the hospital by Dr. Anselmo Lubin. The results of their tests and reasons for their admission have been discussed with them and/or available family. They convey agreement and understanding for the need to be admitted and for their admission diagnosis. Consultation has been made with the inpatient physician specialist for hospitalization. Diagnosis     Clinical Impression:   1. Failure to thrive in adult    2. General weakness    3. DDD (degenerative disc disease), cervical    4. DDD (degenerative disc disease), lumbar    5. Foot drop, right        Attestations: This note is prepared by Jennifer Rachel, acting as Scribe for Mindi Sever, MD.    Mindi Sever, MD: The scribe's documentation has been prepared under my direction and personally reviewed by me in its entirety. I confirm that the note above accurately reflects all work, treatment, procedures, and medical decision making performed by me.

## 2018-08-10 NOTE — ED NOTES
Straight cath pt for urine. Unsuccessful with first insertion,  Second insertion used a coude, effective  Dark brown urine collected. Bladder drained and noted to be approx 100mls.

## 2018-08-10 NOTE — PROGRESS NOTES
Spiritual Care Assessment/Progress Note  [facility]      NAME: Yamile Dorsey      MRN: [id number]  AGE: 82 y.o.  SEX: male  Gnosticist Affiliation: Christianity   Language: English     8/10/2018           Spiritual Assessment begun in [facility] EMERGENCY DEPT through conversation with:         [x]Patient        [] Family    [] Friend(s)        Reason for Consult: Advance medical directive consult, Emergency Department visit     Spiritual beliefs: (Please include comment if needed)     [] Identifies with a carla tradition:         [] Supported by a carla community:            [] Claims no spiritual orientation:           [] Seeking spiritual identity:                [x] Adheres to an individual form of spirituality:           [] Not able to assess:                           Identified resources for coping:      [x] Prayer                               [] Music                  [] Guided Imagery     [x] Family/friends                 [] Pet visits     [] Devotional reading                         [] Unknown     [] Other:                                             Interventions offered during this visit: (See comments for more details)    Patient Interventions: Advance medical directive consult, Advance medical directive completed, Initial/Spiritual assessment, patient floor, Affirmation of carla, Iconic (affirming the presence of God/Higher Power), Prayer (assurance of)     Family/Friend(s): Advance medical directive consult, Affirmation of carla, Iconic (affirming the presence of God/Higher Power), Prayer (assurance of)     Plan of Care:     [x] Support spiritual and/or cultural needs    [] Support AMD and/or advance care planning process      [] Support grieving process   [] Coordinate Rites and/or Rituals    [] Coordination with community clergy   [] No spiritual needs identified at this time   [] Detailed Plan of Care below (See Comments)  [] Make referral to Music Therapy  [] Make referral to Pet Therapy     [] Make referral to Addiction services  [] Make referral to [facility]  [] Make referral to Spiritual Care Partner  [] No future visits requested        [x] Follow up visits as needed     Comments:    Request by Elmo Umana  to assist with advance medical directive. Explained document to patient and daughter America Maloney, who were present in the room. Patient is , but wanted to name his daughter as his decision maker. Assisted patient in completing Section I A-Health Care decision maker portion of the document. Made copy for patients chart and attached it to clipboard outside patient's room. Returned original document to the patient. Medical Power of Vicky is daughter, Nelia Gutierrez. Javy Clifton \"Valeria\". Her telephone number is [phone]. Initiated spiritual assessment. Patient acknowledged having good spiritual support and was receptive to assurance of prayer. No other needs expressed at this time.        Tito Lindsey, MPS, [address] [facility], [address] [place] [address] Paging Service  [phone]

## 2018-08-10 NOTE — ACP (ADVANCE CARE PLANNING)
Request by Lizy Gomez  to assist with advance medical directive. Explained document to patient and daughter Berenice Neville, who were present in the room. Patient is , but wanted to name his daughter as his decision maker. Assisted patient in completing Section I A-Health Care decision maker portion of the document. Made copy for patients chart and attached it to clipboard outside patient's room. Returned original document to the patient. Medical Power of Pat Mera is daughter, Reford Hussein. Thierry Sotomayor \"Valeria\". Her telephone number is [phone].       JANNA Salmon, [facility], [address] [place] [address] Avenue    [address] [place] [address] Road Paging Service  [phone]

## 2018-08-10 NOTE — ROUTINE PROCESS

## 2018-08-10 NOTE — PROGRESS NOTES
Physical Therapy    Rounded with Dr. Zeke Travis on this pt who comes to the ED with c/c weakness. Per MD, pt's family states he has been basically bed bound with significant decline since May 2018. Home health notes from March/April state pt was mainly limited by his back pain but was able to amb with [facility] with CGA to S and was able to do outside activity but had increased pain next day. PT note from inpt admission in March 2018 state he was able to amb with [facility] with CGA 60'. He does have long standing issues with R ankle weakness but per MD, it has worsened from 3/5 to 0/5 dorsiflexion. MD is pursuing further workup given all of review and decline. Should PT eval be needed, please consult as appropriate.     Cheryl Mayers PT

## 2018-08-10 NOTE — PROGRESS NOTES
Date of previous inpatient admission/ ED visit? N/A    What brought the patient back to ED? Gradually weaker, decreased appetite, unable to get out of bed without assistance since May    Did patient decline recommended services during last admission/ ED visit (if yes, what)? Patient has had [address] in the past - daughter working with patient's Moriah E Maria Dolores Thapa healthcare manager for in home services    Has patient seen a provider since their last inpatient admission/ED visit (if yes, when)? Patient has been followed by [address] - last visit - 4/13/2018. Telephone call to PCP on 8/6/2018 - PCP sent patient to ED to determine medical needs and assist with declaring patient home bound for insurance home bound services. CM Interventions:  From previous inpatient admission/ED visit:  From current inpatient admission/ED visit:    CM in to speak to patient regarding home situation and plan of care. CM introduced self and role of CM. Patient is alert and oriented x 3 and has daughter and \"adopted daughter\" present in room. Patient's wife is primary caregiver, but is home due to her exhaustion with caring for patient. Patient has had [address] since 3/2018 and has had continued gradual decline in his strength and appetite - therefore, unable to get out of bed and has been wearing depends. Wife has had to provide all care with assistance of daughters and friends. Currently, wife is unable to provide ongoing care. Family has spoken to patient's insurance provider - [employer] and have been informed that patient could receive up to 8 hours/day of personal care at least 5 days/week under his insurance. He would need to have MD declare that patient is home bound. Patient states he does not have an Advanced Directive or MPOA designated. He would like to speak to [facility] to complete this paperwork.   CM contacted  who will come to room to provide services requested. Daughter - Silvio Cordero - [phone] - is assisting patient and her mother with obtaining personal care at home. CM will continue to follow patient in ED for continued needs and evaluation for possible admission. Care Management Interventions  PCP Verified by CM:  Yes (Dr. Main Kelsey - [phone])  Transition of Care Consult (CM Consult): Discharge Planning (ED BIANCA evaluation for anticipated needs and coordination of care)  Discharge Durable Medical Equipment: No (Patient has rolling walker, rollator, bedside commode)  Confirm Follow Up Transport: Family (May need Stretcher transport if cannot get into private vehicle)    Colleen Jessica, RN, BSN, [address] [place] [address]  [phone]

## 2018-08-10 NOTE — IP AVS SNAPSHOT
[address] [facility] [address] 
[phone] Patient: Alex Acuna MRN: [id number] ZYK:[id number] About your hospitalization You were admitted on:  August 10, 2018 You last received care in the:  [facility] CARDIOPULMONARY CARE You were discharged on:  August 21, 2018 Why you were hospitalized Your primary diagnosis was:  Not on File Your diagnoses also included:  Weakness, Falls Follow-up Information Follow up With Details Comments Contact Info [address] [facility] [place], [place]   [address] [place] [address] 83. 
[phone] Penny Wiseman MD In 2 weeks  [address] [facility] [address] Gastrointestinal [address] [address] 
[phone] Peter Lugo MD In 5 days  [address] [id number] [phone] Discharge Orders None A check juwan indicates which time of day the medication should be taken. My Medications START taking these medications Instructions Each Dose to Equal  
 Morning Noon Evening Bedtime  
 mesalamine 400 mg Cdti capsule Commonly known as:  DELZICOL Your next dose is:  today Take 2 Caps by mouth three (3) times daily for 30 days. 800 mg  
    
   
   
   
  
 polyethylene glycol 17 gram packet Commonly known as:  Ronne Emmanuel Take 1 Packet by mouth daily as needed. 17 g  
    
   
   
   
  
 potassium chloride SR 10 mEq tablet Commonly known as:  KLOR-CON 10 Your next dose is:  Tomorrow Take 1 Tab by mouth daily. Do not crush, break or chew. Swallow whole. 10 mEq  
    
   
   
   
  
 vancomycin 50 mg/mL oral solution (compounded) Your next dose is: Today Take 2.5 mL by mouth every six (6) hours for 10 days. 125 mg CHANGE how you take these medications Instructions Each Dose to Equal  
 Morning Noon Evening Bedtime * predniSONE 10 mg tablet Commonly known as:  Lory Wiggins What changed: You were already taking a medication with the same name, and this prescription was added. Make sure you understand how and when to take each. 5 tabs daily for 3 days 4 tabs daily for 3 days  3 tabs daily for 3 days  2 tabs daily for 3 days  1 tab   daily for 3 days * predniSONE 2.5 mg tablet Commonly known as:  Lory Wiggins Start taking on:  9/5/2018 What changed:  Another medication with the same name was added. Make sure you understand how and when to take each. Take 1 Tab by mouth daily. 2.5 mg  
    
   
   
   
  
 * Notice: This list has 2 medication(s) that are the same as other medications prescribed for you. Read the directions carefully, and ask your doctor or other care provider to review them with you. CONTINUE taking these medications Instructions Each Dose to Equal  
 Morning Noon Evening Bedtime  
 aspirin 81 mg chewable tablet Your next dose is: Today Take 1 Tab by mouth every evening. 81 mg  
    
   
   
  
   
  
 atorvastatin 20 mg tablet Commonly known as:  LIPITOR Take 1 tablet by mouth  daily COLACE 100 mg capsule Generic drug:  docusate sodium Take 100 mg by mouth two (2) times daily as needed for Constipation. 100 mg ENBREL 50 mg/mL (0.98 mL) injection Generic drug:  etanercept 50 mg by SubCUTAneous route every Sunday. 50 mg  
    
   
   
   
  
 folic acid 1 mg tablet Commonly known as:  Google Your next dose is:  Tomorrow Take 1 mg by mouth daily. 1 mg  
    
  
   
   
   
  
 gabapentin 300 mg capsule Commonly known as:  NEURONTIN Your next dose is: Today Take 1 Cap by mouth three (3) times daily. 300 mg METHOTREXATE (ANTI-RHEUMATIC) 2.5 mg tablet dose pack Generic drug:  methotrexate Take 20 mg by mouth Every Saturday. 20 mg  
    
   
   
   
  
 OTHER  
   
 by Injection route every three (3) months. Patient receives lumbar epidural steroid injections by Dr. Venice Cheung from [address] [place] [address]  mg per tablet Commonly known as:  PERCOCET Take 1 Tab by mouth every four (4) hours as needed for Pain. Max Daily Amount: 6 Tabs. 1 Tab STOP taking these medications   
 metoprolol succinate 25 mg XL tablet Commonly known as:  TOPROL-XL Where to Get Your Medications Information on where to get these meds will be given to you by the nurse or doctor. ! Ask your nurse or doctor about these medications  
  mesalamine 400 mg Cdti capsule  
 oxyCODONE-acetaminophen  mg per tablet  
 polyethylene glycol 17 gram packet  
 potassium chloride SR 10 mEq tablet  
 predniSONE 10 mg tablet  
 predniSONE 2.5 mg tablet  
 vancomycin 50 mg/mL oral solution (compounded) Opioid Education Prescription Opioids: What You Need to Know: 
 
Prescription opioids can be used to help relieve moderate-to-severe pain and are often prescribed following a surgery or injury, or for certain health conditions. These medications can be an important part of treatment but also come with serious risks. Opioids are strong pain medicines. Examples include hydrocodone, oxycodone, fentanyl, and morphine. Heroin is an example of an illegal opioid. It is important to work with your health care provider to make sure you are getting the safest, most effective care. WHAT ARE THE RISKS AND SIDE EFFECTS OF OPIOID USE? Prescription opioids carry serious risks of addiction and overdose, especially with prolonged use. An opioid overdose, often marked by slow breathing, can cause sudden death. The use of prescription opioids can have a number of side effects as well, even when taken as directed. · Tolerance-meaning you might need to take more of a medication for the same pain relief · Physical dependence-meaning you have symptoms of withdrawal when the medication is stopped. Withdrawal symptoms can include nausea, sweating, chills, diarrhea, stomach cramps, and muscle aches. Withdrawal can last up to several weeks, depending on which drug you took and how long you took it. · Increased sensitivity to pain · Constipation · Nausea, vomiting, and dry mouth · Sleepiness and dizziness · Confusion · Depression · Low levels of testosterone that can result in lower sex drive, energy, and strength · Itching and sweating RISKS ARE GREATER WITH:      
· History of drug misuse, substance use disorder, or overdose · Mental health conditions (such as depression or anxiety) · Sleep apnea · Older age (72 years or older) · Pregnancy Avoid alcohol while taking prescription opioids. Also, unless specifically advised by your health care provider, medications to avoid include: · Benzodiazepines (such as Xanax or Valium) · Muscle relaxants (such as Soma or Flexeril) · Hypnotics (such as Ambien or Lunesta) · Other prescription opioids KNOW YOUR OPTIONS Talk to your health care provider about ways to manage your pain that don't involve prescription opioids. Some of these options may actually work better and have fewer risks and side effects. Options may include: 
· Pain relievers such as acetaminophen, ibuprofen, and naproxen · Some medications that are also used for depression or seizures · Physical therapy and exercise · Counseling to help patients learn how to cope better with triggers of pain and stress. · Application of heat or cold compress · Massage therapy · Relaxation techniques Be Informed Make sure you know the name of your medication, how much and how often to take it, and its potential risks & side effects.  
 
IF YOU ARE PRESCRIBED OPIOIDS FOR PAIN: 
 · Never take opioids in greater amounts or more often than prescribed. Remember the goal is not to be pain-free but to manage your pain at a tolerable level. · Follow up with your primary care provider to: · Work together to create a plan on how to manage your pain. · Talk about ways to help manage your pain that don't involve prescription opioids. · Talk about any and all concerns and side effects. · Help prevent misuse and abuse. · Never sell or share prescription opioids · Help prevent misuse and abuse. · Store prescription opioids in a secure place and out of reach of others (this may include visitors, children, friends, and family). · Safely dispose of unused/unwanted prescription opioids: Find your community drug take-back program or your pharmacy mail-back program, or flush them down the toilet, following guidance from the Food and Drug Administration (www.fda.gov/Drugs/ResourcesForYou). · Visit www.cdc.gov/drugoverdose to learn about the risks of opioid abuse and overdose. · If you believe you may be struggling with addiction, tell your health care provider and ask for guidance or call [address] [place], [place] [address] at 6-789-745-XZHN. Discharge Instructions None [employer] Announcement We are excited to announce that we are making your provider's discharge notes available to you in [employer]. You will see these notes when they are completed and signed by the physician that discharged you from your recent hospital stay. If you have any questions or concerns about any information you see in [employer], please call the Health Information Department where you were seen or reach out to your Primary Care Provider for more information about your plan of care. Introducing hospitals & HEALTH SERVICES!    
 [employer] introduces [employer] patient portal. Now you can access parts of your medical record, email your doctor's office, and request medication refills online. 1. In your internet browser, go to [email or web address] 2. Click on the First Time User? Click Here link in the Sign In box. You will see the New Member Sign Up page. 3. Enter your Highlighter Access Code exactly as it appears below. You will not need to use this code after youve completed the sign-up process. If you do not sign up before the expiration date, you must request a new code. · Highlighter Access Code: [email or web address] Expires: 9/9/2018  9:46 AM 
 
4. Enter the last four digits of your Social Security Number (xxxx) and Date of Birth (mm/dd/yyyy) as indicated and click Submit. You will be taken to the next sign-up page. 5. Create a Highlighter ID. This will be your Highlighter login ID and cannot be changed, so think of one that is secure and easy to remember. 6. Create a Highlighter password. You can change your password at any time. 7. Enter your Password Reset Question and Answer. This can be used at a later time if you forget your password. 8. Enter your e-mail address. You will receive e-mail notification when new information is available in [address] 9. Click Sign Up. You can now view and download portions of your medical record. 10. Click the Download Summary menu link to download a portable copy of your medical information. If you have questions, please visit the Frequently Asked Questions section of the Highlighter website. Remember, Highlighter is NOT to be used for urgent needs. For medical emergencies, dial 911. Now available from your iPhone and Android! Introducing Anthony Qiu As a [facility] patient, I wanted to make you aware of our electronic visit tool called Anthony Qiu. [place] [facility] 24/[facility] allows you to connect within minutes with a medical provider 24 hours a day, seven days a week via a mobile device or tablet or logging into a secure website from your computer. You can access [employer]Launcher from anywhere in the [place]. A virtual visit might be right for you when you have a simple condition and feel like you just dont want to get out of bed, or cant get away from work for an appointment, when your regular [employer] provider is not available (evenings, weekends or holidays), or when youre out of town and need minor care. Electronic visits cost only $49 and if the [employer] [facility]/[employer] provider determines a prescription is needed to treat your condition, one can be electronically transmitted to a nearby pharmacy*. Please take a moment to enroll today if you have not already done so. The enrollment process is free and takes just a few minutes. To enroll, please download the [employer] 24/7 jj to your tablet or phone, or visit [email or web address] to enroll on your computer. And, as an [address] [place] [address] patient with a [employer] account, the results of your visits will be scanned into your electronic medical record and your primary care provider will be able to view the scanned results. We urge you to continue to see your regular [employer] provider for your ongoing medical care. And while your primary care provider may not be the one available when you seek a CureLauncher virtual visit, the peace of mind you get from getting a real diagnosis real time can be priceless. For more information on CureLauncher, view our Frequently Asked Questions (FAQs) at [email or web address]. Sincerely, 
 
Nathalie Donis MD 
Chief Medical Officer [facility] Ginna Leonard *:  certain medications cannot be prescribed via CureLauncher Unresulted Labs-Please follow up with your PCP about these lab tests Order Current Status LYME AB, IGG & IGM BY WB, CSF In process CULTURE, CSF W GRAM STAIN Preliminary result Providers Seen During Your Hospitalization Provider Specialty Primary office phone Mindi Sever, MD Emergency Medicine [phone] Mauricio Austin MD Internal Medicine [phone] Cecelia Toledo MD Internal Medicine [phone] Derrick Mitchell MD Internal Medicine [phone] Your Primary Care Physician (PCP) Primary Care Physician Office Phone Office Fax 0184 [facility], [address] [place] [address] Road [phone] You are allergic to the following Allergen Reactions Lisinopril Cough Questran (Cholestyramine (With Sugar)) Other (comments) Muscle cramps Recent Documentation Height Weight BMI Smoking Status 1.74 m 52.2 kg 17.23 kg/m2 Former Smoker Emergency Contacts Name Discharge Info Relation Home Work Mobile [address] CAREGIVER [3] Spouse [3] [phone] Aj Conde (Poa) UNABLE TO CHOOSE [5] Child [2] [phone] Patient Belongings The following personal items are in your possession at time of discharge: 
  Dental Appliances: Lowers, Uppers  Visual Aid: None      Home Medications: Sent home   Jewelry: With patient  Clothing: None    Other Valuables: None Please provide this summary of care documentation to your next provider. Signatures-by signing, you are acknowledging that this After Visit Summary has been reviewed with you and you have received a copy. Patient Signature:  ____________________________________________________________ Date:  ____________________________________________________________  
  
Kiera Galicia Provider Signature:  ____________________________________________________________ Date:  ____________________________________________________________

## 2018-08-11 NOTE — ROUTINE PROCESS
Bedside and Verbal shift change report given to Zaida Jordan (oncoming nurse) by Rios Knight (offgoing nurse). Report included the following information SBAR, Kardex, Intake/Output and MAR.     Zone Phone:   [phone]      Significant changes during shift:  Oncology consulted        Patient Information    Amrik Rivera  82 y.o.  8/10/2018 11:37 AM by Bridgette Smith MD. Amrik Rivera was admitted from Home    Problem List    Patient Active Problem List    Diagnosis Date Noted    Weakness 08/10/2018    Falls 08/10/2018    PAF (paroxysmal atrial fibrillation) ([address] 03/17/2018    C. difficile diarrhea 03/06/2018    Bilateral carotid artery disease ([address] 10/05/2017    Postlaminectomy syndrome, lumbar region 08/28/2017    Right foot drop 08/28/2017    Spinal stenosis, lumbar region, with neurogenic claudication 08/28/2017    Osteoarthritis of spine with radiculopathy, lumbar region 08/09/2017    Weakness of right lower extremity 08/09/2017    Advance directive discussed with patient 09/11/2016    Controlled type 2 diabetes mellitus with diabetic polyneuropathy, without long-term current use of insulin ([address] 04/01/2016    Essential tremor 01/21/2016    Coronary artery disease involving native coronary artery without angina pectoris 04/21/2015    Lumbar radiculopathy 02/11/2015    Myalgia and myositis 01/16/2015    Diabetic peripheral neuropathy ([address] 01/16/2015    Osteoporosis 01/16/2015    S/P AVR (aortic valve replacement) 11/78/5038    Umbilical hernia 50/61/4831    Thyroid nodule 10/05/2011    Rheumatoid arthritis involving multiple sites with positive rheumatoid factor ([address] 11/18/2009    Sarcoidosis of lung ([address] 11/18/2009    Ulcerative colitis ([address] 11/18/2009    Hypercholesterolemia 11/18/2009    Inguinal hernia 11/18/2009    Asbestos exposure 11/18/2009    Hypertension, essential 11/18/2009     Past Medical History:   Diagnosis Date    Aortic stenosis, severe     Arrhythmia     heart murmur    Arthritis     Rheumatoid    CAD (coronary artery disease) 1998    S/P CABG    Cancer (HCC)     Basal cell carcinoma    Chronic pain     from rheumatoid arthritis in knees, elbows fingers    HTN (hypertension)     Hypercholesterolemia     S/P AVR (aortic valve replacement)     23 mm Trifecta with Redo CABG x 1SVG to PDA    S/P CABG x 1 10/13/2014    Redo CABG x 1 SVG to PDA; 'y' from collins of existing OM vein graft    Sarcoidosis     No longer active.  Ulcerative colitis ([facility]     Not active           Activity Status:    OOB to Chair Yes  Ambulated this shift Yes   Bed Rest No    DVT prophylaxis:    DVT prophylaxis Med- Yes  DVT prophylaxis SCD or JAMES- No     Wounds: (If Applicable)    Wounds- Yes    Location scabs, bruises    Patient Safety:    Falls Score Total Score: 4  Safety Level_______  Bed Alarm On? No  Sitter?  No    Plan for upcoming shift: safety, monitor pain        Discharge Plan: No     Active Consults:  IP CONSULT TO PALLIATIVE CARE - PROVIDER  IP CONSULT TO PALLIATIVE CARE - PROVIDER  IP CONSULT TO ONCOLOGY

## 2018-08-11 NOTE — ROUTINE PROCESS
Bedside and Verbal shift change report given to RADHA Enriquez(oncoming nurse) by Neno Zamora RN (offgoing nurse). Report included the following information SBAR, Kardex, Recent Results and Med Rec Status.

## 2018-08-11 NOTE — PROGRESS NOTES
Initial Nutrition Assessment:    INTERVENTIONS/RECOMMENDATIONS:   · Meals/Snacks: General/healthful diet: Continue Regular diet  · Supplements: Commercial supplement: Ensure enlive BID and magic cup daily    ASSESSMENT:   Patient medically noted for falls and weakness. PMH for RA, ulcerative colitis, HTN, DM, and skin cancer. Therapy entering room to work with patient at time of attempted visit. Significant weight loss of 26.8% noted over the past year. Reports of decreased appetite and intake at home. Underweight BMI. Will add ensure and magic cups for patient to try. Continue liberalized diet. Will monitor intake and acceptance of ONS. Diet Order: Regular  % Eaten:  No data found. Pertinent Medications: [x]Reviewed []Other: Folic Acid, Mag-ox, Melatonin, Miralax, Prednisone   Pertinent Labs: [x]Reviewed []Other: BG   Food Allergies: [x]None []Other   Last BM: 8/10   [x]Active     []Hyperactive  []Hypoactive       [] Absent BS  Skin:    [x] Intact   [] Incision  [] Breakdown: [] Edema []Other:    Anthropometrics:   Height: 5' 8.5\" (174 cm) Weight: 52.2 kg (115 lb)   IBW (%IBW):   ( ) UBW (%UBW):   (  %)   Last Weight Metrics:  Weight Loss Metrics 8/10/2018 3/16/2018 3/9/2018 3/7/2018 3/6/2018 2/20/2018 10/5/2017   Today's Wt 115 lb 139 lb 120 lb 120 lb - 127 lb 160 lb   BMI 17.23 kg/m2 21.13 kg/m2 18.25 kg/m2 - 18.25 kg/m2 18.22 kg/m2 22.96 kg/m2       BMI: Body mass index is 17.23 kg/(m^2). This BMI is indicative of:   [x]Underweight    []Normal    []Overweight    [] Obesity   [] Extreme Obesity (BMI>40)     Estimated Nutrition Needs (Based on):   1749 Kcals/day (BMR (1207) x 1.3AF +300kcal) , 68 g (1.3 g/kg bw) Protein  Carbohydrate:  At Least 130 g/day  Fluids: 1750 mL/day (1ml/kcal)    Pt expected to meet estimated nutrient needs: [x]Yes []No    NUTRITION DIAGNOSES:   Problem:  Unintended weight loss      Etiology: related to decreased appetite     Signs/Symptoms: as evidenced by 26.8% weight loss x 1 year      NUTRITION INTERVENTIONS:  Meals/Snacks: General/healthful diet   Supplements: Commercial supplement              GOAL:   PO intake >50% of meals/supplements next 2-4 days    LEARNING NEEDS (Diet, Food/Nutrient-Drug Interaction):    [x] None Identified   [] Identified and Education Provided/Documented   [] Identified and Pt declined/was not appropriate     Cultural, Christian, OR Ethnic Dietary Needs:    [x] None Identified   [] Identified and Addressed     [x] Interdisciplinary Care Plan Reviewed/Documented    [x] Discharge Planning:  Regular diet + ONS TID     MONITORING /EVALUATION:   Food/Nutrient Intake Outcomes:  Total energy intake  Physical Signs/Symptoms Outcomes: Weight/weight change, Electrolyte and renal profile, Glucose profile    NUTRITION RISK:    [x] High              [] Moderate           []  Low  []  Minimal/Uncompromised    PT SEEN FOR:    [x]  MD Consult: []Calorie Count      []Diabetic Diet Education        []Diet Education     []Electrolyte Management     [x]General Nutrition Management and Supplements     []Management of Tube Feeding     []TPN Recommendations    [x]  RN Referral:  [x]MST score >=2     []Enteral/Parenteral Nutrition PTA     []Pregnant: Gestational DM or Multigestation     []Pressure Ulcer/Wound Care needs        [x]  Low BMI  []  CRISTAL Kennedy  Pager [phone]                 Weekend Pager [phone]

## 2018-08-11 NOTE — PROGRESS NOTES
Problem: Mobility Impaired (Adult and Pediatric)  Goal: *Acute Goals and Plan of Care (Insert Text)  Physical Therapy Goals  Initiated 8/11/2018  1. Patient will move from supine to sit and sit to supine  in bed with independence within 7 day(s). 2.  Patient will transfer from bed to chair and chair to bed with modified independence using the least restrictive device within 7 day(s). 3.  Patient will perform sit to stand with modified independence within 7 day(s). 4.  Patient will ambulate with modified independence for 50 feet with the least restrictive device within 7 day(s). physical Therapy EVALUATION  Patient: Sixto Burrell (36 y.o. male)  Date: 8/11/2018  Primary Diagnosis: Weakness  Falls        Precautions:   Contact, Fall (c diff)    ASSESSMENT :  Based on the objective data described below, the patient presents with generalized weakness, decreased activity tolerance, impaired balance and altered gait. Pt reports for the last 3 months being very weak and only getting out of bed to go to the bathroom. He lives with his wife who is in poor health as well. He was received in supine and cleared by nursing to mobilize. He required overall min/mod A to come to the EOB x 1. Good sitting balance noted. Sit<>stand was performed with min A and RW in front. Noted hypertonic hamstrings limited full knee extension. He was able to ambulated in to the bathroom and then to the chair, fatigued by the end of this limited activity. He was left sitting up in the chair and encouraged to stay up for 30 minutes to 1 hour. Concerned with safety of pt and wife at home, recommending SNF this time. Also noted palliative and oncology consults in. Patient will benefit from skilled intervention to address the above impairments.   Patients rehabilitation potential is considered to be Fair  Factors which may influence rehabilitation potential include:   []         None noted  []         Mental ability/status  [x] Medical condition  [x]         Home/family situation and support systems  []         Safety awareness  []         Pain tolerance/management  []         Other:      PLAN :  Recommendations and Planned Interventions:  [x]           Bed Mobility Training             []    Neuromuscular Re-Education  [x]           Transfer Training                   []    Orthotic/Prosthetic Training  [x]           Gait Training                         []    Modalities  [x]           Therapeutic Exercises           []    Edema Management/Control  [x]           Therapeutic Activities            [x]    Patient and Family Training/Education  []           Other (comment):    Frequency/Duration: Patient will be followed by physical therapy  3 times a week to address goals. Discharge Recommendations: [facility]  Further Equipment Recommendations for Discharge: TBD     SUBJECTIVE:   Patient stated I have felt weak ever since this skin cancer on my leg.     OBJECTIVE DATA SUMMARY:   HISTORY:    Past Medical History:   Diagnosis Date    Aortic stenosis, severe     Arrhythmia     heart murmur    Arthritis     Rheumatoid    CAD (coronary artery disease) 1998    S/P CABG    Cancer (HCC)     Basal cell carcinoma    Chronic pain     from rheumatoid arthritis in knees, elbows fingers    HTN (hypertension)     Hypercholesterolemia     S/P AVR (aortic valve replacement)     23 mm Trifecta with Redo CABG x 1SVG to PDA    S/P CABG x 1 10/13/2014    Redo CABG x 1 SVG to PDA; 'y' from collins of existing OM vein graft    Sarcoidosis     No longer active.      Ulcerative colitis ([facility] [address]     Not active     Past Surgical History:   Procedure Laterality Date    CARDIAC SURG PROCEDURE UNLIST      CABG    HX AORTIC VALVE REPLACEMENT  10/2014    CABG redo X1    HX CATARACT REMOVAL      Left    HX LUMBAR LAMINECTOMY      HX OTHER SURGICAL      skin lesions removed from nose and forehead- basal cell    LAMINECTOMY,LUMBAR  110/2000    MN COLONOSCOPY W/BIOPSY SINGLE/MULTIPLE  12/5/2011          Prior Level of Function/Home Situation: pt lives at home with wife who is in poor health as well. They are \"making it work\" per pt. He has been in the bed and only getting up to the bathroom recently. Personal factors and/or comorbidities impacting plan of care: fatigue    Home Situation  Home Environment: Private residence  # Steps to Enter: 2  Rails to Enter: Yes  Hand Rails : Right  One/Two Story Residence: One story  Living Alone: No  Support Systems: Family member(s), Spouse/Significant Other/Partner (wife drives but not in good health, he helps her)  Patient Expects to be Discharged to[de-identified] Private residence  Current DME Used/Available at Home: Commode, bedside, Walker, rolling, Shower chair, Grab bars, Cane, straight  Tub or Shower Type: Tub    EXAMINATION/PRESENTATION/DECISION MAKING:   Critical Behavior:  Neurologic State: Alert  Orientation Level: Appropriate for age  Cognition: Follows commands     Hearing: Auditory  Auditory Impairment: None  Skin:  Black scabs all over  Edema: none  Range Of Motion:  AROM: Generally decreased, functional (limited L shoulder, limtied knee extension bilaterally)           PROM: Generally decreased, functional           Strength:    Strength: Generally decreased, functional                    Tone & Sensation:   Tone: Normal              Sensation: Impaired (tingling all over)               Coordination:  Coordination: Within functional limits  Vision:      Functional Mobility:  Bed Mobility:  Rolling: Contact guard assistance  Supine to Sit: Minimum assistance;Assist x1     Scooting: Independent  Transfers:  Sit to Stand: Minimum assistance; Additional time  Stand to Sit: Minimum assistance                       Balance:   Sitting: Intact  Standing: Impaired  Standing - Static: Fair;Constant support  Standing - Dynamic : Fair  Ambulation/Gait Training:  Distance (ft): 20 Feet (ft)  Assistive Device: Gait belt;Walker, rolling  Ambulation - Level of Assistance: Contact guard assistance        Gait Abnormalities: Decreased step clearance;Shuffling gait        Base of Support: Widened     Speed/Leanna: Pace decreased (<100 feet/min); Shuffled  Step Length: Left shortened;Right shortened                  Functional Measure:  Barthel Index:    Bathin  Bladder: 10  Bowels: 10  Groomin  Dressin  Feeding: 10  Mobility: 0  Stairs: 0  Toilet Use: 5  Transfer (Bed to Chair and Back): 10  Total: 55       Barthel and G-code impairment scale:  Percentage of impairment CH  0% CI  1-19% CJ  20-39% CK  40-59% CL  60-79% CM  80-99% CN  100%   Barthel Score 0-100 100 99-80 79-60 59-40 20-39 1-19   0   Barthel Score 0-20 20 17-19 13-16 9-12 5-8 1-4 0      The Barthel ADL Index: Guidelines  1. The index should be used as a record of what a patient does, not as a record of what a patient could do. 2. The main aim is to establish degree of independence from any help, physical or verbal, however minor and for whatever reason. 3. The need for supervision renders the patient not independent. 4. A patient's performance should be established using the best available evidence. Asking the patient, friends/relatives and nurses are the usual sources, but direct observation and common sense are also important. However direct testing is not needed. 5. Usually the patient's performance over the preceding 24-48 hours is important, but occasionally longer periods will be relevant. 6. Middle categories imply that the patient supplies over 50 per cent of the effort. 7. Use of aids to be independent is allowed. Harlee Cowden., Barthel, D.W. ([id number]). Functional evaluation: the Barthel Index. [address] [facility] (14)2. MARCE Guido, Spenser Sepulveda, Adolfo Maurer, [address] [place] [address] ().  Measuring the change indisability after inpatient rehabilitation; comparison of the responsiveness of the Barthel Index and Functional Perry Measure. Journal of Neurology, Neurosurgery, and Psychiatry, 66(4), 793-824. SHREE Pérez, LOUISE Major, & Jessica Parks M.A. (2004.) Assessment of post-stroke quality of life in cost-effectiveness studies: The usefulness of the Barthel Index and the EuroQoL-5D. Quality of Life Research, 13, 362-41         G codes: In compliance with CMSs Claims Based Outcome Reporting, the following G-code set was chosen for this patient based on their primary functional limitation being treated: The outcome measure chosen to determine the severity of the functional limitation was the barthel with a score of 55/100 which was correlated with the impairment scale. ? Mobility - Walking and Moving Around:     - CURRENT STATUS: CK - 40%-59% impaired, limited or restricted    - GOAL STATUS: CJ - 20%-39% impaired, limited or restricted    - D/C STATUS:  ---------------To be determined---------------      Physical Therapy Evaluation Charge Determination   History Examination Presentation Decision-Making   MEDIUM  Complexity : 1-2 comorbidities / personal factors will impact the outcome/ POC  MEDIUM Complexity : 3 Standardized tests and measures addressing body structure, function, activity limitation and / or participation in recreation  MEDIUM Complexity : Evolving with changing characteristics  Other outcome measures barthel  MEDIUM      Based on the above components, the patient evaluation is determined to be of the following complexity level: MEDIUM    Pain:  Pain Scale 1: Numeric (0 - 10)  Pain Intensity 1: 9              Activity Tolerance:   WFL, fatigued  Please refer to the flowsheet for vital signs taken during this treatment.   After treatment:  Working with OT  [x]         Patient left in no apparent distress sitting up in chair  []         Patient left in no apparent distress in bed  [x]         Call bell left within reach  [x]         Nursing notified  []         Caregiver present  [] Bed alarm activated    COMMUNICATION/EDUCATION:   The patients plan of care was discussed with: Occupational Therapist and Registered Nurse. [x]         Fall prevention education was provided and the patient/caregiver indicated understanding. []         Patient/family have participated as able in goal setting and plan of care. [x]         Patient/family agree to work toward stated goals and plan of care. []         Patient understands intent and goals of therapy, but is neutral about his/her participation. []         Patient is unable to participate in goal setting and plan of care.     Thank you for this referral.  Manda Sanchez, PT, DPT   Time Calculation: 20 mins

## 2018-08-11 NOTE — H&P
Hospitalist Admission Note    NAME: Abran Escamilla   :  1935   MRN:  [id number]     Date/Time:  8/10/2018 9:07 PM    Patient PCP: Madisyn Aguilar MD  ______________________________________________________________________  Given the patient's current clinical presentation, I have a high level of concern for decompensation if discharged from the emergency department. Complex decision making was performed, which includes reviewing the patient's available past medical records, laboratory results, and x-ray films. My assessment of this patient's clinical condition and my plan of care is as follows. Assessment / Plan:    generalized malaise /failure to thrive/wt loss   likely progression of all his chronic medical problems including skin ca  Ct head ngt  ivf  Labs/tsh  Pt/ot  Mri back/brain  Ua/xrays  Oncology  Palliative   replace lytes    Anemia h/h lower than 3/18  Labs  Occult blood    RA  UC  Sarcoidosis   Cont FA  Holding MTX, Enbrel, PO Pred as above  Pain management  No need for stress doses at this time     HTN  CAD s/p CABG  AS S/p AVR    cont metoprolol   Cont ASA  Hold statin weakness/myalgias        Chronic pain syndrome  Chronic Back pain- home meds PO Percocet, Neurontin and  diluaidid prn  PT/OT eval     Failure to thrive-  Nutrition to see   ivf   Code Status: Full for now     Surrogate Decision Maker: wife Corazon Moreno # [phone] (cell)/dtr     DVT Prophylaxis: Heparin  GI Prophylaxis: not indicated     Baseline:uses walker,most of time is bedridden             Subjective:   CHIEF COMPLAINT: weakness/poor po intake  HISTORY OF PRESENT ILLNESS:     Kamille Snyder is a 82 y.o. male with a complex PMHx significant for HTN, arthritis, chronic pain, CABG,basal cell ca  presents via EMS to the ED with cc of generalized malaise x 3 years. Daughter reports associated symptom of decrease in appetite, numbness in RLE>LLE x 3 years, and numbness in L arm x 30 days.  Per medical records, the pt sees Dr. Robert Sifuentes for lower back pain and has a h/o a R foot drop dating back to August 2017. the pt had multiple falls due to the numbness in his legs, with his last fall x 2 weeks ago. No loc. No neck injury. per family his appetite is very poor now. Pt is declining very fast. Loosing  wt. Pt denies any vomiting, CP, abdominal pain, fever, chills, cough, back pain, hematochezia, hematuria, or hematemesis. We were asked to admit for work up and evaluation of the above problems. Past Medical History:   Diagnosis Date    Aortic stenosis, severe     Arrhythmia     heart murmur    Arthritis     Rheumatoid    CAD (coronary artery disease) 1998    S/P CABG    Cancer (HCC)     Basal cell carcinoma    Chronic pain     from rheumatoid arthritis in knees, elbows fingers    HTN (hypertension)     Hypercholesterolemia     S/P AVR (aortic valve replacement)     23 mm Trifecta with Redo CABG x 1SVG to PDA    S/P CABG x 1 10/13/2014    Redo CABG x 1 SVG to PDA; 'y' from collins of existing OM vein graft    Sarcoidosis     No longer active.      Ulcerative colitis ([facility] [address]     Not active        Past Surgical History:   Procedure Laterality Date    CARDIAC SURG PROCEDURE UNLIST      CABG    HX AORTIC VALVE REPLACEMENT  10/2014    CABG redo X1    HX CATARACT REMOVAL      Left    HX LUMBAR LAMINECTOMY      HX OTHER SURGICAL      skin lesions removed from nose and forehead- basal cell    LAMINECTOMY,LUMBAR  110/2000    VA COLONOSCOPY W/BIOPSY SINGLE/MULTIPLE  12/5/2011            Social History   Substance Use Topics    Smoking status: Former Smoker     Packs/day: 1.50     Years: 20.00     Quit date: 1/1/1978    Smokeless tobacco: Never Used    Alcohol use Yes      Comment: rarely        Family History   Problem Relation Age of Onset    Heart Disease Father     Cancer Sister      breast     Diabetes Sister    Lm Ruben Cancer Sister      leukemia    Diabetes Sister     Diabetes Mother    Qi Brefarhan Bladder Disease Mother      Allergies   Allergen Reactions    Lisinopril Cough    Questran [Cholestyramine (With Sugar)] Other (comments)     Muscle cramps        Prior to Admission medications    Medication Sig Start Date End Date Taking? Authorizing Provider   metoprolol succinate (TOPROL-XL) 25 mg XL tablet Take 25 mg by mouth every evening. Yes Rosa Maria Pierce MD   aspirin 81 mg chewable tablet Take 1 Tab by mouth every evening. 2/20/18  Yes Becca Otoole MD   atorvastatin (LIPITOR) 20 mg tablet Take 1 tablet by mouth  daily 9/14/17  Yes Becca Otoole MD   predniSONE (DELTASONE) 2.5 mg tablet Take 2.5 mg by mouth daily. 7/21/17  Yes Historical Provider   oxycodone-acetaminophen (PERCOCET)  mg per tablet Take 1 tablet by mouth every four (4) hours as needed for Pain. Yes Historical Provider   methotrexate sodium (METHOTREXATE, ANTI-RHEUMATIC,) 2.5 mg DsPk Take 20 mg by mouth Every Saturday. 8/31/10  Yes Historical Provider   folic acid (FOLVITE) 1 mg tablet Take 1 mg by mouth daily. Yes Historical Provider   etanercept (ENBREL) 50 mg/mL (0.98 mL) injection 50 mg by SubCUTAneous route every Sunday. Yes Historical Provider   docusate sodium (COLACE) 100 mg capsule Take 100 mg by mouth two (2) times daily as needed for Constipation. Yes Historical Provider   gabapentin (NEURONTIN) 300 mg capsule Take 1 Cap by mouth three (3) times daily. 3/16/18   Becca Otoole MD   OTHER by Injection route every three (3) months. Patient receives lumbar epidural steroid injections by Dr. Leilani Chau from 14 Fabby Pierce MD       REVIEW OF SYSTEMS:     I am not able to complete the review of systems because:    The patient is intubated and sedated    The patient has altered mental status due to his acute medical problems    The patient has baseline aphasia from prior stroke(s)    The patient has baseline dementia and is not reliable historian    The patient is in acute medical distress and unable to provide information           Total of 12 systems reviewed as follows:       POSITIVE= underlined text  Negative = text not underlined  General:  fever, chills, sweats, generalized weakness, weight loss/gain,      loss of appetite   Eyes:    blurred vision, eye pain, loss of vision, double vision  ENT:    rhinorrhea, pharyngitis   Respiratory:   cough, sputum production, SOB, BEY, wheezing, pleuritic pain   Cardiology:   chest pain, palpitations, orthopnea, PND, edema, syncope   Gastrointestinal:  abdominal pain , N/V, diarrhea, dysphagia, constipation, bleeding   Genitourinary:  frequency, urgency, dysuria, hematuria, incontinence   Muskuloskeletal :  arthralgia, myalgia, back pain  Hematology:  easy bruising, nose or gum bleeding, lymphadenopathy   Dermatological: rash, ulceration, pruritis, color change / jaundice  Endocrine:   hot flashes or polydipsia   Neurological:  headache, dizziness, confusion, focal weakness, paresthesia,     Speech difficulties, memory loss, gait difficulty  Psychological: Feelings of anxiety, depression, agitation    Objective:   VITALS:    Visit Vitals    /61    Pulse 79    Temp 97.6 °F (36.4 °C)    Resp 14    Ht 5' 8.5\" (1.74 m)    Wt 52.2 kg (115 lb)    SpO2 98%    BMI 17.23 kg/m2       PHYSICAL EXAM:    General:    Alert, cooperative, no distress, appears much older than stated age. Thin  Very cachectic with temporal waisting. HEENT: Atraumatic, anicteric sclerae, pink conjunctivae     No oral ulcers, mucosa moist, throat dry , dentition poor  Neck:  Supple, symmetrical,  thyroid: non tender  Lungs:   decrease bs  bilaterally. No Wheezing or Rhonchi. No rales. Chest wall:  No tenderness  No Accessory muscle use. Heart:   Regular  rhythm,  No  murmur   No edema  Abdomen:   Soft, non-tender. Not distended. Bowel sounds normal  Extremities: No cyanosis. No clubbing,      Skin turgor normal, Capillary refill normal, Radial dial pulse 2+  Skin:     + pale.   Not Jaundiced  No rashes   Psych:  Poor insight. Not depressed. Not anxious or agitated. Neurologic: EOMs intact. No facial asymmetry. No aphasia or slurred speech. Decreased strength L arm and R leg, facial symmetry, normal speech, R foot drop  old for patient   _______________________________________________________________________  Care Plan discussed with:    Comments   Patient x    Family  x    RN x    Care Manager                    Consultant:  x    _______________________________________________________________________  Expected  Disposition:   Home with Family    HH/PT/OT/RN    SNF/LTC x   [place]    ________________________________________________________________________  TOTAL TIME:  61  Minutes    Critical Care Provided     Minutes non procedure based      Comments    x Reviewed previous records   >50% of visit spent in counseling and coordination of care x Discussion with patient and/or family and questions answered       ________________________________________________________________________  Signed: Baldemar Cook MD    Procedures: see electronic medical records for all procedures/Xrays and details which were not copied into this note but were reviewed prior to creation of Plan.     LAB DATA REVIEWED:    Recent Results (from the past 24 hour(s))   EKG, 12 LEAD, INITIAL    Collection Time: 08/10/18 11:38 AM   Result Value Ref Range    Ventricular Rate 70 BPM    Atrial Rate 70 BPM    P-R Interval 122 ms    QRS Duration 100 ms    Q-T Interval 418 ms    QTC Calculation (Bezet) 451 ms    Calculated P Axis 79 degrees    Calculated R Axis -70 degrees    Calculated T Axis 84 degrees    Diagnosis       Normal sinus rhythm  Left axis deviation  When compared with ECG of 06-MAR-2018 12:01,  No significant change was found  Confirmed by Tanja Corona ([id number]) on 8/10/2018 3:46:17 PM     CBC WITH AUTOMATED DIFF    Collection Time: 08/10/18 11:52 AM   Result Value Ref Range    WBC 5.5 4.1 - 11.1 K/uL    RBC 3.16 (L) 4.10 - 5.70 M/uL    HGB 8.8 (L) 12.1 - 17.0 g/dL    HCT 28.6 (L) 36.6 - 50.3 %    MCV 90.5 80.0 - 99.0 FL    MCH 27.8 26.0 - 34.0 PG    MCHC 30.8 30.0 - 36.5 g/dL    RDW 15.6 (H) 11.5 - 14.5 %    PLATELET 856 (L) 368 - 400 K/uL    MPV 9.7 8.9 - 12.9 FL    NRBC 0.0 0  WBC    ABSOLUTE NRBC 0.00 0.00 - 0.01 K/uL    NEUTROPHILS 69 32 - 75 %    LYMPHOCYTES 15 12 - 49 %    MONOCYTES 14 (H) 5 - 13 %    EOSINOPHILS 1 0 - 7 %    BASOPHILS 0 0 - 1 %    IMMATURE GRANULOCYTES 1 (H) 0.0 - 0.5 %    ABS. NEUTROPHILS 3.8 1.8 - 8.0 K/UL    ABS. LYMPHOCYTES 0.8 0.8 - 3.5 K/UL    ABS. MONOCYTES 0.8 0.0 - 1.0 K/UL    ABS. EOSINOPHILS 0.0 0.0 - 0.4 K/UL    ABS. BASOPHILS 0.0 0.0 - 0.1 K/UL    ABS. IMM. GRANS. 0.1 (H) 0.00 - 0.04 K/UL    DF AUTOMATED     METABOLIC PANEL, COMPREHENSIVE    Collection Time: 08/10/18 11:52 AM   Result Value Ref Range    Sodium 142 136 - 145 mmol/L    Potassium 4.2 3.5 - 5.1 mmol/L    Chloride 107 97 - 108 mmol/L    CO2 29 21 - 32 mmol/L    Anion gap 6 5 - 15 mmol/L    Glucose 105 (H) 65 - 100 mg/dL    BUN 13 6 - 20 MG/DL    Creatinine 0.60 (L) 0.70 - 1.30 MG/DL    BUN/Creatinine ratio 22 (H) 12 - 20      GFR est AA >60 >60 ml/min/1.73m2    GFR est non-AA >60 >60 ml/min/1.73m2    Calcium 7.8 (L) 8.5 - 10.1 MG/DL    Bilirubin, total 0.5 0.2 - 1.0 MG/DL    ALT (SGPT) 7 (L) 12 - 78 U/L    AST (SGOT) 14 (L) 15 - 37 U/L    Alk.  phosphatase 80 45 - 117 U/L    Protein, total 5.5 (L) 6.4 - 8.2 g/dL    Albumin 2.3 (L) 3.5 - 5.0 g/dL    Globulin 3.2 2.0 - 4.0 g/dL    A-G Ratio 0.7 (L) 1.1 - 2.2     CK W/ CKMB & INDEX    Collection Time: 08/10/18 11:52 AM   Result Value Ref Range    CK 38 (L) 39 - 308 U/L    CK - MB <1.0 <3.6 NG/ML    CK-MB Index Cannot be calculated 0 - 2.5     TROPONIN I    Collection Time: 08/10/18 11:52 AM   Result Value Ref Range    Troponin-I, Qt. <0.05 <0.05 ng/mL   MAGNESIUM    Collection Time: 08/10/18 11:52 AM   Result Value Ref Range    Magnesium 1.7 1.6 - 2.4 mg/dL   PROTHROMBIN TIME + INR Collection Time: 08/10/18 12:40 PM   Result Value Ref Range    INR 1.1 0.9 - 1.1      Prothrombin time 10.9 9.0 - 11.1 sec   URINALYSIS W/ REFLEX CULTURE    Collection Time: 08/10/18  2:03 PM   Result Value Ref Range    Color DARK YELLOW      Appearance CLEAR CLEAR      Specific gravity 1.028 1.003 - 1.030      pH (UA) 5.0 5.0 - 8.0      Protein NEGATIVE  NEG mg/dL    Glucose NEGATIVE  NEG mg/dL    Ketone NEGATIVE  NEG mg/dL    Blood NEGATIVE  NEG      Urobilinogen 1.0 0.2 - 1.0 EU/dL    Nitrites NEGATIVE  NEG      Leukocyte Esterase NEGATIVE  NEG      WBC 0-4 0 - 4 /hpf    RBC 0-5 0 - 5 /hpf    Epithelial cells FEW FEW /lpf    Bacteria NEGATIVE  NEG /hpf    UA:UC IF INDICATED CULTURE NOT INDICATED BY UA RESULT CNI     BILIRUBIN, CONFIRM    Collection Time: 08/10/18  2:03 PM   Result Value Ref Range    Bilirubin UA, confirm NEGATIVE  NEG     OCCULT BLOOD, STOOL    Collection Time: 08/10/18  3:00 PM   Result Value Ref Range    Occult blood, stool NEGATIVE  NEG

## 2018-08-11 NOTE — CONSULTS
Oncology Inpatient Consult    Subjective:     Amrik Rivera is a 82 y.o. 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 +; We were unable to reach pt for an apt  He presents now with FTT; lost about 40 lbs, weak, has been immobile for the last year; no appetite; He has not seen his dermatologist ,Dr Becca Dickerson; he has UC, and RA followed by Rheumatology and GI; on MTX and embrel; He denies fever, chills or night sweats; Ct from 3/3028 shows colitis    Past Medical History:   Diagnosis Date    Aortic stenosis, severe     Arrhythmia     heart murmur    Arthritis     Rheumatoid    CAD (coronary artery disease) 1998    S/P CABG    Cancer (HCC)     Basal cell carcinoma    Chronic pain     from rheumatoid arthritis in knees, elbows fingers    HTN (hypertension)     Hypercholesterolemia     S/P AVR (aortic valve replacement)     23 mm Trifecta with Redo CABG x 1SVG to PDA    S/P CABG x 1 10/13/2014    Redo CABG x 1 SVG to PDA; 'y' from collins of existing OM vein graft    Sarcoidosis     No longer active.      Ulcerative colitis ([address]     Not active     Past Surgical History:   Procedure Laterality Date    CARDIAC SURG PROCEDURE UNLIST      CABG    HX AORTIC VALVE REPLACEMENT  10/2014    CABG redo X1    HX CATARACT REMOVAL      Left    HX LUMBAR LAMINECTOMY      HX OTHER SURGICAL      skin lesions removed from nose and forehead- basal cell    LAMINECTOMY,LUMBAR  110/2000    NE COLONOSCOPY W/BIOPSY SINGLE/MULTIPLE  12/5/2011           Family History   Problem Relation Age of Onset    Heart Disease Father     Cancer Sister      breast     Diabetes Sister     Cancer Sister      leukemia    Diabetes Sister     Diabetes Mother    Daisha Anderson Bladder Disease Mother      Social History   Substance Use Topics    Smoking status: Former Smoker     Packs/day: 1.50     Years: 20.00     Quit date: 1/1/1978    Smokeless tobacco: Never Used    Alcohol use Yes      Comment: rarely      Current Facility-Administered Medications   Medication Dose Route Frequency Provider Last Rate Last Dose    0.9% sodium chloride infusion  100 mL/hr IntraVENous CONTINUOUS Sebastian Wilkins  mL/hr at 08/11/18 1415 100 mL/hr at 08/11/18 1415    magnesium oxide (MAG-OX) tablet 400 mg  400 mg Oral BID Sebastian Wilkins MD   400 mg at 08/11/18 0825    aspirin chewable tablet 81 mg  81 mg Oral QPM Sebastian Wilkins MD        docusate sodium (COLACE) capsule 100 mg  100 mg Oral BID PRN Sebastian Wilkins MD        folic acid (FOLVITE) tablet 1 mg  1 mg Oral DAILY Sebastian Wilkins MD   1 mg at 08/11/18 0825    gabapentin (NEURONTIN) capsule 300 mg  300 mg Oral TID Sebastian Wilkins MD   300 mg at 08/11/18 0825    metoprolol succinate (TOPROL-XL) XL tablet 25 mg  25 mg Oral QPM Ellouise Alpers, MD        oxyCODONE-acetaminophen (PERCOCET 10)  mg per tablet 1 Tab  1 Tab Oral Q4H PRN Sebastian Wilkins MD   1 Tab at 08/11/18 1415    predniSONE (DELTASONE) tablet 2.5 mg  2.5 mg Oral DAILY Sebastian Wilkins MD   2.5 mg at 08/11/18 0825    sodium chloride (NS) flush 5-10 mL  5-10 mL IntraVENous Q8H Sebastian Wilkins MD   10 mL at 08/11/18 1415    sodium chloride (NS) flush 5-10 mL  5-10 mL IntraVENous PRN Sebastian Wilkins MD        HYDROmorphone (PF) (DILAUDID) injection 0.2 mg  0.2 mg IntraVENous Q4H PRN Sebastian Wilkins MD        [facility]) injection 0.4 mg  0.4 mg IntraVENous PRN Sebastian Wilkins MD        ondansetron [facility]) injection 4 mg  4 mg IntraVENous Q4H PRN Sebastian Wilkins MD        heparin (porcine) injection 5,000 Units  5,000 Units SubCUTAneous Q12H Sebastian Wilkins MD   5,000 Units at 08/11/18 1008    polyethylene glycol (MIRALAX) packet 17 g  17 g Oral DAILY Sebastian Wilkins MD  melatonin tablet 3 mg  3 mg Oral QHS Steve Gao MD   3 mg at 08/10/18 7112        Allergies   Allergen Reactions    Lisinopril Cough    Questran [Cholestyramine (With Sugar)] Other (comments)     Muscle cramps        Review of Systems:  Pertinent items are noted in the History of Present Illness. Objective:     Patient Vitals for the past 8 hrs:   BP Temp Pulse Resp SpO2   18 1128 126/75 98.1 °F (36.7 °C) 71 18 100 %   18 0812 154/89 98.3 °F (36.8 °C) 92 18 97 %     Temp (24hrs), Av.3 °F (36.8 °C), Min:98.1 °F (36.7 °C), Max:98.6 °F (37 °C)         Physical Exam:   Awake,no icterus  No neck LN  Lungs cta  Heart regular  Abdomen no SM  Ext no edema  No adenopathy    Lab/Data Review:  Recent Results (from the past 24 hour(s))   OCCULT BLOOD, STOOL    Collection Time: 08/10/18  3:00 PM   Result Value Ref Range    Occult blood, stool NEGATIVE  NEG     METABOLIC PANEL, COMPREHENSIVE    Collection Time: 18  3:12 AM   Result Value Ref Range    Sodium 139 136 - 145 mmol/L    Potassium 3.6 3.5 - 5.1 mmol/L    Chloride 103 97 - 108 mmol/L    CO2 29 21 - 32 mmol/L    Anion gap 7 5 - 15 mmol/L    Glucose 89 65 - 100 mg/dL    BUN 12 6 - 20 MG/DL    Creatinine 0.74 0.70 - 1.30 MG/DL    BUN/Creatinine ratio 16 12 - 20      GFR est AA >60 >60 ml/min/1.73m2    GFR est non-AA >60 >60 ml/min/1.73m2    Calcium 8.6 8.5 - 10.1 MG/DL    Bilirubin, total 0.7 0.2 - 1.0 MG/DL    ALT (SGPT) 8 (L) 12 - 78 U/L    AST (SGOT) 12 (L) 15 - 37 U/L    Alk. phosphatase 106 45 - 117 U/L    Protein, total 6.7 6.4 - 8.2 g/dL    Albumin 2.9 (L) 3.5 - 5.0 g/dL    Globulin 3.8 2.0 - 4.0 g/dL    A-G Ratio 0.8 (L) 1.1 - 2.2     CBC WITH AUTOMATED DIFF    Collection Time: 18  3:12 AM   Result Value Ref Range    WBC 10.8 4.1 - 11.1 K/uL    RBC 4.89 4. 10 - 5.70 M/uL    HGB 13.4 12.1 - 17.0 g/dL    HCT 43.9 36.6 - 50.3 %    MCV 89.8 80.0 - 99.0 FL    MCH 27.4 26.0 - 34.0 PG    MCHC 30.5 30.0 - 36.5 g/dL    RDW 15.8 (H) 11.5 - 14.5 %    PLATELET 512 303 - 440 K/uL    MPV 10.3 8.9 - 12.9 FL    NRBC 0.0 0  WBC    ABSOLUTE NRBC 0.00 0.00 - 0.01 K/uL    NEUTROPHILS 66 32 - 75 %    LYMPHOCYTES 19 12 - 49 %    MONOCYTES 14 (H) 5 - 13 %    EOSINOPHILS 1 0 - 7 %    BASOPHILS 0 0 - 1 %    IMMATURE GRANULOCYTES 1 (H) 0.0 - 0.5 %    ABS. NEUTROPHILS 7.1 1.8 - 8.0 K/UL    ABS. LYMPHOCYTES 2.0 0.8 - 3.5 K/UL    ABS. MONOCYTES 1.5 (H) 0.0 - 1.0 K/UL    ABS. EOSINOPHILS 0.1 0.0 - 0.4 K/UL    ABS. BASOPHILS 0.0 0.0 - 0.1 K/UL    ABS. IMM.  GRANS. 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

## 2018-08-11 NOTE — PROGRESS NOTES
Problem: Self Care Deficits Care Plan (Adult)  Goal: *Acute Goals and Plan of Care (Insert Text)  Occupational Therapy Goals  Initiated 8/11/2018  1. Patient will perform grooming seated EOB with modified independence within 7 day(s). 2.  Patient will perform upper body dressing with moderate assistance  within 7 day(s). 3.  Patient will perform lower body dressing with moderate assistance  within 7 day(s). 4.  Patient will perform toilet transfers with supervision/set-up within 7 day(s). 5.  Patient will perform all aspects of toileting with minimal assistance/contact guard assist within 7 day(s). 6.  Patient will participate in upper extremity therapeutic exercise/activities with independence for 5 minutes within 7 day(s). 7.  Patient will utilize energy conservation techniques during functional activities with verbal cues within 7 day(s). Occupational Therapy EVALUATION  Patient: Tosin Stephens (13 y.o. male)  Date: 8/11/2018  Primary Diagnosis: Weakness  Falls        Precautions:   Contact, Fall (c diff)    ASSESSMENT : pending MRI  Based on the objective data described below, the patient presents with generalized weakness with FTT presenting with total body weakness and L sided numbness(total L side of his body per patient) and weakness for the past 3 months, completing OP workup with no answers thus far, impaired L UE sensation and AROM with poor functional reach and use, R UE limited ROM but functional, setup for self feeding, max A to set up for UB ADLs, total A for all LB ADLs, patient's wife does LB dressing but is not in good health herself per patient, will trial use of AE. Patient mod to min A for functional mobility requiring boost of toilet. Will need SNF vs HH at discharge. L UE Fugl-Olivia UE stroke recovery- 29/66, moderately severe impairment     Patient will benefit from skilled intervention to address the above impairments.   Patients rehabilitation potential is considered to be Fair  Factors which may influence rehabilitation potential include:   []             None noted  []             Mental ability/status  [x]             Medical condition  []             Home/family situation and support systems  []             Safety awareness  []             Pain tolerance/management  []             Other:      PLAN :  Recommendations and Planned Interventions:  [x]               Self Care Training                  [x]        Therapeutic Activities  [x]               Functional Mobility Training    []        Cognitive Retraining  [x]               Therapeutic Exercises           [x]        Endurance Activities  [x]               Balance Training                   [x]        Neuromuscular Re-Education  []               Visual/Perceptual Training     [x]   Home Safety Training  [x]               Patient Education                 []        Family Training/Education  []               Other (comment):    Frequency/Duration: Patient will be followed by occupational therapy 3 times a week to address goals. Discharge Recommendations: [facility] and To Be Determined  Further Equipment Recommendations for Discharge: TBD     SUBJECTIVE:   Patient stated It has been like this for 3 months, really have been getting weak since my skin cancer treatment.     OBJECTIVE DATA SUMMARY:   HISTORY:   Past Medical History:   Diagnosis Date    Aortic stenosis, severe     Arrhythmia     heart murmur    Arthritis     Rheumatoid    CAD (coronary artery disease) 1998    S/P CABG    Cancer (HCC)     Basal cell carcinoma    Chronic pain     from rheumatoid arthritis in knees, elbows fingers    HTN (hypertension)     Hypercholesterolemia     S/P AVR (aortic valve replacement)     23 mm Trifecta with Redo CABG x 1SVG to PDA    S/P CABG x 1 10/13/2014    Redo CABG x 1 SVG to PDA; 'y' from collins of existing OM vein graft    Sarcoidosis     No longer active.      Ulcerative colitis ([facility] [address]     Not active Past Surgical History:   Procedure Laterality Date    CARDIAC SURG PROCEDURE UNLIST      CABG    HX AORTIC VALVE REPLACEMENT  10/2014    CABG redo X1    HX CATARACT REMOVAL      Left    HX LUMBAR LAMINECTOMY      HX OTHER SURGICAL      skin lesions removed from nose and forehead- basal cell    LAMINECTOMY,LUMBAR  110/2000    [place] COLONOSCOPY W/BIOPSY SINGLE/MULTIPLE  12/5/2011            Prior Level of Function/Environment/Context: lives with wife who is in fair health herself, assist with LB dressing, bathing and some IADLs, able to ambulate around home with R foot droop without brace, RA limiting some joints, using RW and SPC, but recently poor sedentary lifestyle dueto in ability to tolerate up for any length of time  Occupations in which the patient is/was successful, what are the barriers preventing that success:   Performance Patterns (routines, roles, habits, and rituals):   Personal Interests and/or values:   Expanded or extensive additional review of patient history:     Home Situation  Home Environment: Private residence  # Steps to Enter: 2  Rails to Enter: Yes  Hand Rails : Right  One/Two Story Residence: One story  Living Alone: No  Support Systems: Family member(s), Spouse/Significant Other/Partner (wife drives but not in good health, he helps her)  Patient Expects to be Discharged to[de-identified] Private residence  Current DME Used/Available at Home: Commode, bedside, Walker, rolling, Shower chair, Grab bars, Cane, straight  Tub or Shower Type: Tub    Hand dominance: Right    EXAMINATION OF PERFORMANCE DEFICITS:  Cognitive/Behavioral Status:  Neurologic State: Alert  Orientation Level: Appropriate for age  Cognition: Follows commands             Skin: intact, multiple brusing B UE    Edema: none    Hearing:   Auditory  Auditory Impairment: None    Vision/Perceptual:                    NT                 Range of Motion:  L shoulder flexion to 45*~ flexion, intact elbow flexion and hand but weak  R shoulder flexion to 90*~ flexion, intact elbow-hand to mouth  AROM: Generally decreased, functional (limited L shoulder, limtied knee extension bilaterally)  PROM: Generally decreased, functional                      Strength:  L UE 1/5-2/5 distal to proximal  R UE 2/5-3/5 proximal to distal  Strength: Generally decreased, functional                Coordination:  Coordination: Within functional limits  Fine Motor Skills-Upper: Left Intact; Right Intact; Comment (weak)         Tone & Sensation:    Tone: Normal  Sensation: Impaired (tingling all over) L side                      Balance:  Sitting: Intact  Standing: Impaired  Standing - Static: Fair;Constant support  Standing - Dynamic : Fair    Functional Mobility and Transfers for ADLs:  Bed Mobility:  Rolling: Contact guard assistance  Supine to Sit: Minimum assistance;Assist x1  Scooting: Independent    Transfers:  Sit to Stand: Minimum assistance; Additional time  Stand to Sit: Minimum assistance  Toilet Transfer : Moderate assistance (BSC)    ADL Assessment:  Feeding: Setup    Oral Facial Hygiene/Grooming: Minimum assistance    Bathing: Maximum assistance poor endurance    Upper Body Dressing: Total assistance    Lower Body Dressing: Total assistance wife completes at home    Toileting: Total assistance needing to balance on RW with B UE for support                ADL Intervention and task modifications:         Completed OT evaluation and ADLs seated EOB and standing as able with RW for balance. Educated on safety and endurance training with encouragement for full participation in ADLs while in hospital. Good to fair understanding noted. Patient instructed and indicated understanding the benefits of maintaining activity tolerance, functional mobility, and independence with self care tasks during acute stay  to ensure safe return home and to baseline.  Encouraged patient to increase frequency and duration OOB, be out of bed for all meals, perform daily ADLs (as approved by RN/MD regarding bathing etc), and performing functional mobility to/from bathroom. Therapeutic Exercise:  encouraged OOB and full participation with ADLs to improve strength and endurance. Functional Measure:  Barthel Index:    Bathin  Bladder: 10  Bowels: 10  Groomin  Dressin  Feeding: 10  Mobility: 0  Stairs: 0  Toilet Use: 5  Transfer (Bed to Chair and Back): 10  Total: 55       Barthel and G-code impairment scale:  Percentage of impairment CH  0% CI  1-19% CJ  20-39% CK  40-59% CL  60-79% CM  80-99% CN  100%   Barthel Score 0-100 100 99-80 79-60 59-40 20-39 1-19   0   Barthel Score 0-20 20 17-19 13-16 9-12 5-8 1-4 0      The Barthel ADL Index: Guidelines  1. The index should be used as a record of what a patient does, not as a record of what a patient could do. 2. The main aim is to establish degree of independence from any help, physical or verbal, however minor and for whatever reason. 3. The need for supervision renders the patient not independent. 4. A patient's performance should be established using the best available evidence. Asking the patient, friends/relatives and nurses are the usual sources, but direct observation and common sense are also important. However direct testing is not needed. 5. Usually the patient's performance over the preceding 24-48 hours is important, but occasionally longer periods will be relevant. 6. Middle categories imply that the patient supplies over 50 per cent of the effort. 7. Use of aids to be independent is allowed. Hugo Lewis., Barthel, D.W. (3931). Functional evaluation: the Barthel Index. [address] [facility] (14)2. MARCE Baptiste, Luis A Mclean., Mulugeta Avendaño., Adolfo, [address] [place] [address] (). Measuring the change indisability after inpatient rehabilitation; comparison of the responsiveness of the Barthel Index and Functional [place] Measure.  Journal of Neurology, Neurosurgery, and Psychiatry, 66(4), [phone]. SHREE Hook, LOUISE Major, & Jorge Luis Morales M.A. (2004.) Assessment of post-stroke quality of life in cost-effectiveness studies: The usefulness of the Barthel Index and the EuroQoL-5D.  Quality of Life Research, 13, 427-43   Fugl-Olivia Assessment of Motor Recovery after Stroke:     Reflex Activity  Flexors/Biceps/Fingers: Can be elicited  Extensors/Triceps: Can be elicited  Reflex Subtotal: 4    Volitional Movement Within Synergies  Shoulder Retraction: Partial  Shoulder Elevation: Partial  Shoulder Abduction (90 degrees): Partial  Shoulder External Rotation: Partial  Elbow Flexion: Partial  Forearm Supination: Full  Shoulder Adduction/Internal Rotation: Full  Elbow Extension: Partial  Forearm Pronation: Partial  Subtotal: 11    Volitional Movement Mixing Synergies  Hand to Lumbar Spine: Partial  Shoulder Flexion (0-90 degrees): None  Pronation-Supination: Partial  Subtotal: 2    Volitional Movement With Little or No Synergy  Shoulder Abduction (0-90 degrees): Partial  Shoulder Flexion ( degrees): None  Pronation/Supination: None  Subtotal : 1    Normal Reflex Activity  Biceps, Triceps, Finger Flexors: None  Subtotal : 0    Upper Extremity Total   Upper Extremity Total: 18    Wrist  Stability at 15 Degree Dorsiflexion: Partial  Repeated Dorsiflexion/ Volar Flexion: Partial  Stability at 15 Degree Dorsiflexion: None  Repeated Dorsiflexion/ Volar Flexion: None  Circumduction: Partial  Wrist Total: 3    Hand  Mass Flexion: Partial  Mass Extension: Partial  Grasp A: Partial  Grasp B: Partial  Grasp C: Partial  Grasp D: Partial  Grasp E: Partial  Hand Total: 7    Coordination/Speed  Tremor: Slight  Dysmetria: Marked  Time: >5s  Coordination/Speed Total : 1    Total A-D  Total A-D (Motor Function): 29/66       Percentage of impairment CH  0% CI  1-19% CJ  20-39% CK  40-59% CL  60-79% CM  80-99% CN  100%   Fugl-Olivia score: 0-66 66 53-65 39-52 26-38 13-25 1-12   0      This is a reliable/valid measure of arm function after a neurological event. It has established value to characterize functional status and for measuring spontaneous and therapy-induced recovery; tests proximal and distal motor functions. Fugl-Olivia Assessment  UE scores recorded between five and 30 days post neurologic event can be used to predict UE recovery at six months post neurologic event. Severe = 0-21 points   Moderately Severe = 22-33 points   Moderate = 34-47 points   Mild = 48-66 points  LORENA Zhou, CLYDE Jacobs, & JONATHAN Yee (1992). Measurement of motor recovery after stroke: Outcome assessment and sample size requirements. Stroke, 23, pp. 8956-7370.   --------------------------------------------------------------------------------------------------------------------------------------------------------------------  MCID:  Stroke:   Shaka Atkinson et al, 2001; n = 171; mean age 79 (5) years; assessed within 16 (12) days of stroke, Acute Stroke)  FMA Motor Scores from Admission to Discharge   10 point increase in FMA Upper Extremity = 1.5 change in discharge FIM   10 point increase in FMA Lower Extremity = 1.9 change in discharge FIM  MDC:   Stroke:   Braulio Villanueva et al, 2008, n = 14, mean age = 59.9 (14.6) years, assessed on average 14 (6.5) months post stroke, Chronic Stroke)   FMA = 5.2 points for the Upper Extremity portion of the assessment           G codes: In compliance with CMSs Claims Based Outcome Reporting, the following G-code set was chosen for this patient based on their primary functional limitation being treated: The outcome measure chosen to determine the severity of the functional limitation was the barthel index with a score of 29/66 which was correlated with the impairment scale. ?  Self Care:     - CURRENT STATUS: CK - 40%-59% impaired, limited or restricted    - GOAL STATUS: CJ - 20%-39% impaired, limited or restricted    - D/C STATUS:  ---------------To be determined---------------     Occupational Therapy Evaluation Charge Determination   History Examination Decision-Making   MEDIUM Complexity : Expanded review of history including physical, cognitive and psychosocial  history  HIGH Complexity : 5 or more performance deficits relating to physical, cognitive , or psychosocial skils that result in activity limitations and / or participation restrictions MEDIUM Complexity : Patient may present with comorbidities that affect occupational performnce. Miniml to moderate modification of tasks or assistance (eg, physical or verbal ) with assesment(s) is necessary to enable patient to complete evaluation       Based on the above components, the patient evaluation is determined to be of the following complexity level: MEDIUM  Pain:  Pain Scale 1: Numeric (0 - 10)  Pain Intensity 1: 8              Activity Tolerance:   Poor endurance and tolerance  Please refer to the flowsheet for vital signs taken during this treatment. After treatment:   [x] Patient left in no apparent distress sitting up in chair  [] Patient left in no apparent distress in bed  [x] Call bell left within reach  [x] Nursing notified  [] Caregiver present  [] Bed alarm activated    COMMUNICATION/EDUCATION:   The patients plan of care was discussed with: Physical Therapist and Registered Nurse. [x] Home safety education was provided and the patient/caregiver indicated understanding. [x] Patient/family have participated as able in goal setting and plan of care. [x] Patient/family agree to work toward stated goals and plan of care. [] Patient understands intent and goals of therapy, but is neutral about his/her participation. [] Patient is unable to participate in goal setting and plan of care. This patients plan of care is appropriate for delegation to [facility].     Thank you for this referral.  Zana Gamble, OT  Time Calculation: 26 mins

## 2018-08-11 NOTE — PROGRESS NOTES
Hospitalist Progress Note    NAME: Sol Hammonds   :  1935   MRN:  [id number]       Assessment / Plan:  Failure to thrive   Generalized weakness  Skin cancer  -UA neg for infection  -head CT and CXR neg for acute process  -MRI head showed small chronic small vessel ischemic white matter disease. No acute infarct or mass.  -cont' IVF  -nutrition consulted  -oncology consulted    Back pain  -MRI C/L spine showed degenerative spine changes with no canal stenosis or cord signal abnormality. No hernation or acute abnormality  -consult PT/OT    CAD s/p CABG  AS s/p AVR  HTN  - cont' BB with holding parameters  -cont' ASA  -statin on hold due to myalgias    Hx of RA  Hx of ulcerative colitis  Hx of sarcoidosis  -cont' prednisone as above  -cont' current pain management  -enbrel and MTX on hold    Hx of Cdiff  -completed treatment. No diarrhea here    Malnutrition, Body mass index is 17.23 kg/(m^2). Code status: Full  Prophylaxis: Hep SQ  Recommended Disposition: SNF/LTC     Subjective:     Chief Complaint / Reason for Physician Visit  Pt seen at bedside. He has no complaints other than wishing to go home soon. Poor appetite remains. Discussed with RN events overnight. Review of Systems:  Symptom Y/N Comments  Symptom Y/N Comments   Fever/Chills n   Chest Pain n    Poor Appetite    Edema     Cough    Abdominal Pain n    Sputum    Joint Pain     SOB/BEY n   Pruritis/Rash     Nausea/vomit    Tolerating PT/OT     Diarrhea    Tolerating Diet     Constipation    Other       Could NOT obtain due to:      Objective:     VITALS:   Last 24hrs VS reviewed since prior progress note.  Most recent are:  Patient Vitals for the past 24 hrs:   Temp Pulse Resp BP SpO2   18 1128 98.1 °F (36.7 °C) 71 18 126/75 100 %   18 0812 98.3 °F (36.8 °C) 92 18 154/89 97 %   18 0005 98.6 °F (37 °C) 82 16 106/57 97 %   08/10/18 2201 98.2 °F (36.8 °C) 76 16 135/76 99 %   08/10/18 1830 - 79 14 111/61 98 % 08/10/18 1745 - 78 17 (!) 116/92 99 %   08/10/18 1700 - 77 14 133/57 99 %   08/10/18 1530 - 77 12 131/55 99 %   08/10/18 1500 - 69 10 123/51 100 %   08/10/18 1455 - 69 12 - 99 %   08/10/18 1445 - 70 15 118/52 100 %   08/10/18 1430 - 69 14 124/49 99 %   08/10/18 1415 - 67 12 115/58 100 %   08/10/18 1403 - - - 136/57 -   08/10/18 1230 - 69 13 106/54 98 %   08/10/18 1215 - 73 18 106/68 97 %   08/10/18 1200 - 73 17 122/68 -       Intake/Output Summary (Last 24 hours) at 08/11/18 1148  Last data filed at 08/10/18 1400   Gross per 24 hour   Intake                0 ml   Output              100 ml   Net             -100 ml        PHYSICAL EXAM:  General: WD, WN. Alert, cooperative, no acute distress    EENT:  EOMI. Anicteric sclerae. MMM  Resp:  CTA bilaterally, no wheezing or rales. No accessory muscle use  CV:  Regular  rhythm,  No edema  GI:  Soft, Non distended, Non tender.  +BS  Neurologic:  Alert and oriented X 3, normal speech  Psych:   Not anxious nor agitated  Skin:  No rashes. No jaundice    Reviewed most current lab test results and cultures  YES  Reviewed most current radiology test results   YES  Review and summation of old records today    NO  Reviewed patient's current orders and MAR    YES  PMH/SH reviewed - no change compared to H&P  ________________________________________________________________________  Care Plan discussed with:    Comments   Patient x    Family      RN x    Care Manager     Consultant                        Multidiciplinary team rounds were held today with , nursing, pharmacist and clinical coordinator. Patient's plan of care was discussed; medications were reviewed and discharge planning was addressed.      ________________________________________________________________________  Total NON critical care TIME:  35   Minutes    Total CRITICAL CARE TIME Spent:   Minutes non procedure based      Comments   >50% of visit spent in counseling and coordination of care ________________________________________________________________________  Coral Benedict MD     Procedures: see electronic medical records for all procedures/Xrays and details which were not copied into this note but were reviewed prior to creation of Plan. LABS:  I reviewed today's most current labs and imaging studies.   Pertinent labs include:  Recent Labs      08/11/18   0312  08/10/18   1152   WBC  10.8  5.5   HGB  13.4  8.8*   HCT  43.9  28.6*   PLT  302  132*     Recent Labs      08/11/18   0312  08/10/18   1240  08/10/18   1152   NA  139   --   142   K  3.6   --   4.2   CL  103   --   107   CO2  29   --   29   GLU  89   --   105*   BUN  12   --   13   CREA  0.74   --   0.60*   CA  8.6   --   7.8*   MG   --    --   1.7   ALB  2.9*   --   2.3*   TBILI  0.7   --   0.5   SGOT  12*   --   14*   ALT  8*   --   7*   INR   --   1.1   --        Signed: Coral Benedict MD

## 2018-08-12 NOTE — PROGRESS NOTES
Oncology Progress Note     Admit Date: 8/10/2018        Interval History  Pt remains weak, weight loss, poor appetite; no pain; occasional loose stools      Subjective:         Current Facility-Administered Medications   Medication Dose Route Frequency    morphine injection 1 mg  1 mg IntraVENous Q6H PRN    acetaminophen (TYLENOL) tablet 325 mg  325 mg Oral Q6H PRN    0.9% sodium chloride infusion  100 mL/hr IntraVENous CONTINUOUS    aspirin chewable tablet 81 mg  81 mg Oral QPM    docusate sodium (COLACE) capsule 100 mg  100 mg Oral BID PRN    folic acid (FOLVITE) tablet 1 mg  1 mg Oral DAILY    gabapentin (NEURONTIN) capsule 300 mg  300 mg Oral TID    metoprolol succinate (TOPROL-XL) XL tablet 25 mg  25 mg Oral QPM    oxyCODONE-acetaminophen (PERCOCET 10)  mg per tablet 1 Tab  1 Tab Oral Q4H PRN    predniSONE (DELTASONE) tablet 2.5 mg  2.5 mg Oral DAILY    sodium chloride (NS) flush 5-10 mL  5-10 mL IntraVENous Q8H    sodium chloride (NS) flush 5-10 mL  5-10 mL IntraVENous PRN    naloxone (NARCAN) injection 0.4 mg  0.4 mg IntraVENous PRN    ondansetron (ZOFRAN) injection 4 mg  4 mg IntraVENous Q4H PRN    heparin (porcine) injection 5,000 Units  5,000 Units SubCUTAneous Q12H    polyethylene glycol (MIRALAX) packet 17 g  17 g Oral DAILY    melatonin tablet 3 mg  3 mg Oral QHS        Review of Systems:  Pertinent items are noted in HPI.     Objective:     Patient Vitals for the past 8 hrs:   BP Temp Pulse Resp SpO2   18 0731 118/63 97.4 °F (36.3 °C) 62 18 99 %   18 0436 123/63 98.6 °F (37 °C) 68 20 100 %       Temp (24hrs), Av.2 °F (36.8 °C), Min:97.4 °F (36.3 °C), Max:98.6 °F (37 °C)           Physical Exam:  Sleeping, awakens  Cachectic male  No icterus  Lungs cta  Heart regular  Abdomen soft  Ext no edema    Labs:    Recent Results (from the past 24 hour(s))   METABOLIC PANEL, BASIC    Collection Time: 18  4:33 AM   Result Value Ref Range    Sodium 140 136 - 145 mmol/L Potassium 3.5 3.5 - 5.1 mmol/L    Chloride 105 97 - 108 mmol/L    CO2 27 21 - 32 mmol/L    Anion gap 8 5 - 15 mmol/L    Glucose 88 65 - 100 mg/dL    BUN 7 6 - 20 MG/DL    Creatinine 0.49 (L) 0.70 - 1.30 MG/DL    BUN/Creatinine ratio 14 12 - 20      GFR est AA >60 >60 ml/min/1.73m2    GFR est non-AA >60 >60 ml/min/1.73m2    Calcium 7.7 (L) 8.5 - 10.1 MG/DL   CBC WITH AUTOMATED DIFF    Collection Time: 08/12/18  4:33 AM   Result Value Ref Range    WBC 7.7 4.1 - 11.1 K/uL    RBC 3.74 (L) 4.10 - 5.70 M/uL    HGB 10.3 (L) 12.1 - 17.0 g/dL    HCT 33.5 (L) 36.6 - 50.3 %    MCV 89.6 80.0 - 99.0 FL    MCH 27.5 26.0 - 34.0 PG    MCHC 30.7 30.0 - 36.5 g/dL    RDW 15.6 (H) 11.5 - 14.5 %    PLATELET 833 219 - 114 K/uL    MPV 10.0 8.9 - 12.9 FL    NRBC 0.0 0  WBC    ABSOLUTE NRBC 0.00 0.00 - 0.01 K/uL    NEUTROPHILS 63 32 - 75 %    LYMPHOCYTES 19 12 - 49 %    MONOCYTES 17 (H) 5 - 13 %    EOSINOPHILS 1 0 - 7 %    BASOPHILS 0 0 - 1 %    IMMATURE GRANULOCYTES 1 (H) 0.0 - 0.5 %    ABS. NEUTROPHILS 4.8 1.8 - 8.0 K/UL    ABS. LYMPHOCYTES 1.4 0.8 - 3.5 K/UL    ABS. MONOCYTES 1.3 (H) 0.0 - 1.0 K/UL    ABS. EOSINOPHILS 0.1 0.0 - 0.4 K/UL    ABS. BASOPHILS 0.0 0.0 - 0.1 K/UL    ABS. IMM. GRANS. 0.1 (H) 0.00 - 0.04 K/UL    DF AUTOMATED     LD    Collection Time: 08/12/18  4:33 AM   Result Value Ref Range     85 - 241 U/L       Assessment:     Active Problems/Plan:  NHL ? bx in 2/2018 shows NHL Cd 10+ but Ct scan show no adenopathy; discussed with pt    Melanoma pt had a scalp melanoma removed 3 yrs ago; Ct shows no evidence of metastatic disease    Anorexia/ weight loss/FTT no evidence of malignancy identified; may be related to multiple autoimmune diseases? Ct scan suggest active colitis; would got GI to see him regarding the status of his inflammatory bowel disease;     Anemia will do w/u with iron, spep, light chains;   Dr Jeffrey Neighbours will f/u in am

## 2018-08-12 NOTE — PROGRESS NOTES
Hospitalist Progress Note    NAME: Peggy Cade   :  1935   MRN:  [id number]       Assessment / Plan:  Failure to thrive   Generalized weakness  Hx of melanoma cancer s/p removal  -UA neg for infection  -head CT and CXR neg for acute process  -MRI head showed small chronic small vessel ischemic white matter disease. No acute infarct or mass. -CT a/p suggestive of chronic proctocolitis  -cont' IVF  -consult GI. Has been seen by Dr Latrell Reyes in the past  -nutrition consulted  -oncology consulted    Back pain  -MRI C/L spine showed degenerative spine changes with no canal stenosis or cord signal abnormality. No hernation or acute abnormality  -consult PT/OT, recommended SNF    CAD s/p CABG  AS s/p AVR  HTN  - cont' BB with holding parameters  -cont' ASA  -statin on hold due to myalgias    Hx of RA  Hx of ulcerative colitis  Hx of sarcoidosis  -cont' prednisone as above  -cont' current pain management  -enbrel and MTX on hold    Hx of Cdiff  -completed treatment. No diarrhea here    Malnutrition, Body mass index is 17.23 kg/(m^2). I discussed with pt's wife today and updated her on the plans. Pt's wife states she is in agreement with pt going to SNF as she is unable to provide care for him at home due to her back pain. Code status: Full  Prophylaxis: Hep SQ  Recommended Disposition: SNF/LTC     Subjective:     Chief Complaint / Reason for Physician Visit  Pt seen at bedside. Pt sleeping in bed but easily arousable. He wants to go home    Discussed with RN events overnight. Review of Systems:  Symptom Y/N Comments  Symptom Y/N Comments   Fever/Chills n   Chest Pain n    Poor Appetite    Edema     Cough    Abdominal Pain n    Sputum    Joint Pain     SOB/BEY n   Pruritis/Rash     Nausea/vomit    Tolerating PT/OT     Diarrhea    Tolerating Diet     Constipation    Other       Could NOT obtain due to:      Objective:     VITALS:   Last 24hrs VS reviewed since prior progress note.  Most recent are:  Patient Vitals for the past 24 hrs:   Temp Pulse Resp BP SpO2   08/12/18 1242 97.5 °F (36.4 °C) 61 18 109/64 97 %   08/12/18 0731 97.4 °F (36.3 °C) 62 18 118/63 99 %   08/12/18 0436 98.6 °F (37 °C) 68 20 123/63 100 %   08/12/18 0415 98.6 °F (37 °C) 72 20 (!) 144/95 97 %   08/12/18 0006 98 °F (36.7 °C) 65 16 111/61 98 %   08/11/18 1551 98.2 °F (36.8 °C) 73 18 114/68 98 %     No intake or output data in the 24 hours ending 08/12/18 1247     PHYSICAL EXAM:  General: WD, WN. Alert, cooperative, no acute distress    EENT:  EOMI. Anicteric sclerae. MMM  Resp:  CTA bilaterally, no wheezing or rales. No accessory muscle use  CV:  Regular  rhythm,  No edema  GI:  Soft, Non distended, Non tender.  +BS  Neurologic:  Alert and oriented X 3, normal speech  Psych:   Not anxious nor agitated  Skin:  No rashes. No jaundice    Reviewed most current lab test results and cultures  YES  Reviewed most current radiology test results   YES  Review and summation of old records today    NO  Reviewed patient's current orders and MAR    YES  PMH/SH reviewed - no change compared to H&P  ________________________________________________________________________  Care Plan discussed with:    Comments   Patient x    Family  x Pt's wife   RN x    Care Manager     Consultant                        Multidiciplinary team rounds were held today with , nursing, pharmacist and clinical coordinator. Patient's plan of care was discussed; medications were reviewed and discharge planning was addressed.      ________________________________________________________________________  Total NON critical care TIME:  35   Minutes    Total CRITICAL CARE TIME Spent:   Minutes non procedure based      Comments   >50% of visit spent in counseling and coordination of care     ________________________________________________________________________  Hillary Moncada MD     Procedures: see electronic medical records for all procedures/Xrays and details which were not copied into this note but were reviewed prior to creation of Plan. LABS:  I reviewed today's most current labs and imaging studies.   Pertinent labs include:  Recent Labs      08/12/18   0433  08/11/18   0312  08/10/18   1152   WBC  7.7  10.8  5.5   HGB  10.3*  13.4  8.8*   HCT  33.5*  43.9  28.6*   PLT  188  302  132*     Recent Labs      08/12/18   0433  08/11/18   0312  08/10/18   1240  08/10/18   1152   NA  140  139   --   142   K  3.5  3.6   --   4.2   CL  105  103   --   107   CO2  27  29   --   29   GLU  88  89   --   105*   BUN  7  12   --   13   CREA  0.49*  0.74   --   0.60*   CA  7.7*  8.6   --   7.8*   MG   --    --    --   1.7   ALB   --   2.9*   --   2.3*   TBILI   --   0.7   --   0.5   SGOT   --   12*   --   14*   ALT   --   8*   --   7*   INR   --    --   1.1   --        Signed: Gerardo Hardy MD

## 2018-08-12 NOTE — ROUTINE PROCESS
Bedside and Verbal shift change report given to Calixto Hung (oncoming nurse) by Gardenia España (offgoing nurse).  Report included the following information SBAR, Kardex, Intake/Output and MAR.     Zone Phone:   [phone]        Significant changes during shift:  GI consulted           Patient Information     Perry Sabillon  82 y.o.  8/10/2018 11:37 AM by Sparkle Roe MD. Perry Sabillon was admitted from Home     Problem List          Patient Active Problem List     Diagnosis Date Noted    Weakness 08/10/2018    Falls 08/10/2018    PAF (paroxysmal atrial fibrillation) ([address] 03/17/2018    C. difficile diarrhea 03/06/2018    Bilateral carotid artery disease ([address] 10/05/2017    Postlaminectomy syndrome, lumbar region 08/28/2017    Right foot drop 08/28/2017    Spinal stenosis, lumbar region, with neurogenic claudication 08/28/2017    Osteoarthritis of spine with radiculopathy, lumbar region 08/09/2017    Weakness of right lower extremity 08/09/2017    Advance directive discussed with patient 09/11/2016    Controlled type 2 diabetes mellitus with diabetic polyneuropathy, without long-term current use of insulin ([address] 04/01/2016    Essential tremor 01/21/2016    Coronary artery disease involving native coronary artery without angina pectoris 04/21/2015    Lumbar radiculopathy 02/11/2015    Myalgia and myositis 01/16/2015    Diabetic peripheral neuropathy ([address] 01/16/2015    Osteoporosis 01/16/2015    S/P AVR (aortic valve replacement) 40/16/6840    Umbilical hernia 33/03/9504    Thyroid nodule 10/05/2011    Rheumatoid arthritis involving multiple sites with positive rheumatoid factor ([address] 11/18/2009    Sarcoidosis of lung ([address] 11/18/2009    Ulcerative colitis ([address] 11/18/2009    Hypercholesterolemia 11/18/2009    Inguinal hernia 11/18/2009    Asbestos exposure 11/18/2009    Hypertension, essential 11/18/2009           Past Medical History:   Diagnosis Date    Aortic stenosis, severe      Arrhythmia       heart murmur    Arthritis       Rheumatoid    CAD (coronary artery disease) 1998     S/P CABG    Cancer (HCC)       Basal cell carcinoma    Chronic pain       from rheumatoid arthritis in knees, elbows fingers    HTN (hypertension)      Hypercholesterolemia      S/P AVR (aortic valve replacement)       23 mm Trifecta with Redo CABG x 1SVG to PDA    S/P CABG x 1 10/13/2014     Redo CABG x 1 SVG to PDA; 'y' from collins of existing OM vein graft    Sarcoidosis       No longer active.  Ulcerative colitis [address] [facility]       Not active               Activity Status:     OOB to Chair Yes  Ambulated this shift Yes   Bed Rest No     DVT prophylaxis:     DVT prophylaxis Med- Yes  DVT prophylaxis SCD or JAMES- No      Wounds: (If Applicable)     Wounds- Yes     Location scabs, bruises     Patient Safety:     Falls Score Total Score: 4  Safety Level_______  Bed Alarm On? No  Sitter?  No     Plan for upcoming shift: safety, monitor pain           Discharge Plan: No      Active Consults:  IP CONSULT TO PALLIATIVE CARE - PROVIDER  IP CONSULT TO PALLIATIVE CARE - PROVIDER  IP CONSULT TO ONCOLOGY

## 2018-08-12 NOTE — PROGRESS NOTES
Bedside and Verbal shift change report given to Zoe (oncoming nurse) by Calixto Hung (offgoing nurse). Report included the following information SBAR, Kardex, Intake/Output and MAR.     Zone Phone:   [phone]        Significant changes during shift:  pt with generalized whole body pain that he describes as whole body numbness and tingling.  Dilaudid given with minimal  relief           Patient Information     Perry Sabillon  82 y.o.  8/10/2018 11:37 AM by Sparkle Roe MD. Perry Sabillon was admitted from Home     Problem List          Patient Active Problem List     Diagnosis Date Noted    Weakness 08/10/2018    Falls 08/10/2018    PAF (paroxysmal atrial fibrillation) ([address] 03/17/2018    C. difficile diarrhea 03/06/2018    Bilateral carotid artery disease ([address] 10/05/2017    Postlaminectomy syndrome, lumbar region 08/28/2017    Right foot drop 08/28/2017    Spinal stenosis, lumbar region, with neurogenic claudication 08/28/2017    Osteoarthritis of spine with radiculopathy, lumbar region 08/09/2017    Weakness of right lower extremity 08/09/2017    Advance directive discussed with patient 09/11/2016    Controlled type 2 diabetes mellitus with diabetic polyneuropathy, without long-term current use of insulin ([address] 04/01/2016    Essential tremor 01/21/2016    Coronary artery disease involving native coronary artery without angina pectoris 04/21/2015    Lumbar radiculopathy 02/11/2015    Myalgia and myositis 01/16/2015    Diabetic peripheral neuropathy ([address] 01/16/2015    Osteoporosis 01/16/2015    S/P AVR (aortic valve replacement) 41/45/3435    Umbilical hernia 28/26/4274    Thyroid nodule 10/05/2011    Rheumatoid arthritis involving multiple sites with positive rheumatoid factor ([address] 11/18/2009    Sarcoidosis of lung ([address] 11/18/2009    Ulcerative colitis ([address] 11/18/2009    Hypercholesterolemia 11/18/2009    Inguinal hernia 11/18/2009    Asbestos exposure 11/18/2009    Hypertension, essential 11/18/2009           Past Medical History:   Diagnosis Date    Aortic stenosis, severe      Arrhythmia       heart murmur    Arthritis       Rheumatoid    CAD (coronary artery disease) 1998     S/P CABG    Cancer (HCC)       Basal cell carcinoma    Chronic pain       from rheumatoid arthritis in knees, elbows fingers    HTN (hypertension)      Hypercholesterolemia      S/P AVR (aortic valve replacement)       23 mm Trifecta with Redo CABG x 1SVG to PDA    S/P CABG x 1 10/13/2014     Redo CABG x 1 SVG to PDA; 'y' from collins of existing OM vein graft    Sarcoidosis       No longer active.  Ulcerative colitis ([facility] [address]       Not active               Activity Status:     OOB to Chair Yes  Ambulated this shift Yes   Bed Rest No     DVT prophylaxis:     DVT prophylaxis Med- Yes  DVT prophylaxis SCD or JAMES- No      Wounds: (If Applicable)     Wounds- Yes     Location scabs, bruises     Patient Safety:     Falls Score Total Score: 4  Safety Level_______  Bed Alarm On? No  Sitter?  No     Plan for upcoming shift: safety, pain control           Discharge Plan: No      Active Consults:  IP CONSULT TO PALLIATIVE CARE - PROVIDER  IP CONSULT TO PALLIATIVE CARE - PROVIDER  IP CONSULT TO ONCOLOGY

## 2018-08-13 NOTE — PROGRESS NOTES
Rapid responce called at 0728, arrived at 0730. Placed on portable monitor while on Telemetry. Heart rate variable, placed pacer pads on and paced with rate of 70 and MA of 30 to capture. Placed on NRB by RT. IV fluids infusing at 100/hr. See Dr. Erich Apgar 's note. Arrived to room while placing Pacer on patient. FSBS obtained prior to arrival, results 100. Transferred at 0800 to CCU with monitor pacing patient and oxygen with Manager and self moving patient via bed.   7060  Remains alert, intrinsic rate 57. IV bolus of 500 cc ordered for hypotension.

## 2018-08-13 NOTE — PROGRESS NOTES
Critical care interdisciplinary rounds held on 08/13/2018. Following members present, Pharmacy, Diabetes Treatment, Case Management, Respiratory Therapy, Clinical Care Lead and Nutrition. Led by ARMANI Sim RN and Dr. Batsheva Jessica. Plan of care discussed. See clinical pathway for plan of care and interventions and desired outcomes.

## 2018-08-13 NOTE — PROGRESS NOTES
1200 Report received from [facility]. Assumed care of patient. Assessment as documented. Family at bedside, patient eating lunch. 1410 BP reading 77/44, recycled for 85/32, called to Dr. Deniz Emanuel,  bolus over one hour ordered. 1415 Bolus started. 1540 BP up to 99/45.

## 2018-08-13 NOTE — CONSULTS
Palliative Medicine Consult    Patient Name: Baldomero Bennett  YOB: 1935    Date of Initial Consult: 8/13/2018  Reason for Consult: Goalds of care discussion is setting of ES disease  Requesting Provider: Dr. Jim Tovar  Primary Care Physician: Isaiah Sotomayor MD      SUMMARY:   Baldomero Bennett is a 82 y.o. with a past history of melanoma on scalp and basal cell carcinoma, anemia, RA, Sarcoidosis, HTN, CAD s/p CABG, AS s/p AVR, chronic pain and ulcerative colitis , who was admitted on 8/10/2018 from home with a diagnosis of Failure to Thrive    Patient presented to ER w/ cc of worsening chronic malaise and decreased appetite, w/ 40 lb weight loss x 3 years. He also has had chronic numbness left arm and in bilateral  lower extremities x 3 years as well as right foot drop since 8/2017, LE numbness and foot drop has resulted in multiple falls. Course of hospitalization: Oncology following, found pathology from 2/2018 w/ + Basal cell lymphoma, apparently provider was unable to reach patient to give results. He does not show adenopathy and CT shows no evidence of mets. Ct does suggest active colitis,think anorexia/weight loss/ftt may be related to multiple autoimmune diseases,  recommended GI consult Patient developed severe bradycardia w/ syncope, cardiology following. Neuro also following due to unknown etiology of progressive weakness in left UE and bilateral lower extremity    Current medical issues leading to Palliative Medicine involvement include: Goals of care Discussion in setting of ES Disease. PALLIATIVE DIAGNOSES:   1. Goals of care Discussion-   2. Advanced Care planning discussion  3. Pain  4. SOB  5. Malnutrtion, unspecified  6. debility       PLAN:   1.  Goals of Care Discussion- Mr. Serafina Frankel stated that he would like to go home, but he stated that he may go to rehab at time of discharge, He stated he plans to follow up with neurology after discharge, hopes someone can help him with numbness and weakness in his extremities. 2. Advanced care Planning Discussion-  · Patient stated that he thinks he would want CPR, for them to try and get heart started, he remains a fill code. · Signed advanced health care directive in EMR, dated 8/10/18, patient designated Katy Keating (daughter) as his mPOA. [phone]  · Patient is  to Dat Tellez, however wife is NOT his legal health care decision maker. Her Cell is [phone], home number [phone].  3. Pain- Current regimen is percocet  1 po q 4 hours PRN, severe pain. Has received x4 over past 24 hours. ( had dilaudid 0.2mg IV q 4 hours PRN, d/c on 8/12, but had received 3 doses that day at 00, 04 and 08) (on 8/12-8/13 had order for  morphine 1mg q 6 hours PRN, received x 3 over 24 hour period, 8/13 at 01 and 8/12 at 12 and 18)  4. Shortness of Breath-Has improved, tolerating room air. 5. Malnutrition, unspecified/Poor appetite- patient reported improved appetite during course of hospitalization, however he is thin and frail, had weight loss prior to hospitalization, albumin 2.9.  6. Debility- secondary to progressive weakness and chronic lower extremity numbness and pain bilaterally. 7. Initial consult note routed to primary continuity provider  8. Communicated plan of care with: Palliative IDT       GOALS OF CARE / TREATMENT PREFERENCES:   [====Goals of Care====]  GOALS OF CARE:  Patient/Health Care Proxy Stated Goals: Prolong life      TREATMENT PREFERENCES:   Code Status: Prior    Advance Care Planning:  Advance Care Planning 8/11/2018   Patient's Healthcare Decision Maker is: Named in scanned ACP document   Primary Decision Maker Name Jeremiah Coloradoo   \"Valeria\"  daughter   Primary Decision Maker Phone Number [phone]   Primary Decision Maker Relationship to Patient Adult child   Confirm Advance Directive Yes, on file       Medical Interventions: Full interventions  Other Instructions:   Artificially Administered Nutrition:  (did not address at time of visit)      The palliative care team has discussed with patient / health care proxy about goals of care / treatment preferences for patient.  [====Goals of Care====]         HISTORY:     History obtained from: medical records, patient    CHIEF COMPLAINT: waiting to go home  HPI/SUBJECTIVE:    The patient is:   [x] Verbal and participatory  [] Non-participatory due to:   Patient reported has constant, chronic pain, has had increased pain in left arm/shoulder over past couple of weeks, reported not much has helped pain in past. Numbness and pain has affected walking, which is getting increasingly difficult, has falls due to loss of sensation and weakness. Reported had decreased appetite w/ weight loss, but stated it is better since being hospitalizwd. Stated he is sleeping OK , denies nausea/vomiting, denies fever. Is frustrated no diagnosis, dont know whats causing the problems. Clinical Pain Assessment (nonverbal scale for severity on nonverbal patients):   Clinical Pain Assessment  Severity: 7  Location: left shoulder/arm and bilateral lower extremities  Duration: lower extremities x 3 years, left arm approx. 1 month  Effect: weakness, falls, difficulty sleeping  Factors: touch  Frequency: chronic/constant    Adult Nonverbal Pain Scale  Face: Occasional grimace, tearing, frowning, wrinkled forehead  Activity (Movement): Lying quietly, normal position  Guarding: Lying quietly, no positioning of hands over areas of body  Physiology (Vital Signs): Stable vital signs  Respiratory: Baseline RR/SpO2 compliant with ventilator  Total Score: 0       FUNCTIONAL ASSESSMENT:     Palliative Performance Scale (PPS):  PPS: 40       PSYCHOSOCIAL/SPIRITUAL SCREENING:     Advance Care Planning:  Advance Care Planning 8/11/2018   Patient's Healthcare Decision Maker is: Named in scanned ACP document   Primary Decision Maker Name Candi Ricardo   \"Valeria\"  daughter Primary Decision Maker Phone Number [phone]   Primary Decision Maker Relationship to Patient Adult child   Confirm Advance Directive Yes, on file        Any spiritual / Druze concerns:  [] Yes /  [x] No    Caregiver Burnout:  [] Yes /  [x] No /  [] No Caregiver Present      Anticipatory grief assessment:   [x] Normal  / [] Maladaptive       ESAS Anxiety: Anxiety: 0    ESAS Depression: Depression: 0        REVIEW OF SYSTEMS:     Positive and pertinent negative findings in ROS are noted above in HPI. The following systems were [x] reviewed / [] unable to be reviewed as noted in HPI  Other findings are noted below. Systems: constitutional, ears/nose/mouth/throat, respiratory, gastrointestinal, genitourinary, musculoskeletal, integumentary, neurologic, psychiatric, endocrine. Positive findings noted below. Modified ESAS Completed by: provider   Fatigue: 4 Drowsiness: 0   Depression: 0 Pain: 7   Anxiety: 0 Nausea: 0   Anorexia: 0 Dyspnea: 0     Constipation: No     Stool Occurrence(s): 2        PHYSICAL EXAM:     From RN flowsheet:  Wt Readings from Last 3 Encounters:   09/07/18 102 lb (46.3 kg)   09/06/18 106 lb (48.1 kg)   08/28/18 127 lb (57.6 kg)     Blood pressure 99/51, pulse 69, temperature 98.1 °F (36.7 °C), resp. rate 18, height 5' 8.5\" (1.74 m), weight 115 lb (52.2 kg), SpO2 97 %.     Pain Scale 1: Numeric (0 - 10)  Pain Intensity 1: 0  Pain Onset 1: chronic  Pain Location 1: Shoulder  Pain Orientation 1: Left  Pain Description 1: Aching  Pain Intervention(s) 1: Medication (see MAR)  Last bowel movement, if known:     Constitutional: patient alert, pleasant, elderly frail appearing, NAD  Eyes: pupils equal, anicteric  ENMT: no nasal discharge, moist mucous membranes  Cardiovascular: regular rhythm, distal pulses intact  Respiratory: breathing not labored, symmetric  Gastrointestinal: soft non-tender, +bowel sounds  Musculoskeletal: no deformity, no tenderness to palpation  Skin: warm, dry  Neurologic: following commands, decreased strength and movement in left upper and lower extremity  Psychiatric: full affect, no hallucinations  Other:       HISTORY:     Active Problems:    Falls (8/10/2018)      Past Medical History:   Diagnosis Date    Aortic stenosis, severe     Arrhythmia     heart murmur    Arthritis     Rheumatoid    CAD (coronary artery disease) 1998    S/P CABG    Cancer (HCC)     Basal cell carcinoma    Chronic pain     from rheumatoid arthritis in knees, elbows fingers    HTN (hypertension)     Hypercholesterolemia     S/P AVR (aortic valve replacement)     23 mm Trifecta with Redo CABG x 1SVG to PDA    S/P CABG x 1 10/13/2014    Redo CABG x 1 SVG to PDA; 'y' from collins of existing OM vein graft    Sarcoidosis     No longer active.  Ulcerative colitis [facility])     Not active      Past Surgical History:   Procedure Laterality Date    CARDIAC SURG PROCEDURE UNLIST      CABG    COLONOSCOPY N/A 8/17/2018    COLONOSCOPY  performed [facility]    HX AORTIC VALVE REPLACEMENT  10/2014    CABG redo X1    HX CATARACT REMOVAL      Left    HX LUMBAR LAMINECTOMY      HX OTHER SURGICAL      skin lesions removed from nose and forehead- basal cell    LAMINECTOMY,LUMBAR  110/2000    IN COLONOSCOPY W/BIOPSY SINGLE/MULTIPLE  12/5/2011           Family History   Problem Relation Age of Onset    Heart Disease Father     Cancer Sister      breast     Diabetes Sister     Cancer Sister      leukemia    Diabetes Sister     Diabetes Mother    Jennifer Goetzvi Bladder Disease Mother       History reviewed, no pertinent family history.   Social History   Substance Use Topics    Smoking status: Former Smoker     Packs/day: 1.50     Years: 20.00     Quit date: 1/1/1978    Smokeless tobacco: Never Used    Alcohol use Yes      Comment: rarely     Allergies   Allergen Reactions    Lisinopril Cough    Questran [Cholestyramine (With Sugar)] Other (comments)     Muscle cramps      No current facility-administered medications for this encounter. Current Outpatient Prescriptions   Medication Sig    oxyCODONE-acetaminophen (PERCOCET)  mg per tablet Take 1 Tab by mouth every four (4) hours as needed for Pain. Max Daily Amount: 6 Tabs.  mesalamine (DELZICOL) 400 mg cdti capsule Take 2 Caps by mouth three (3) times daily for 30 days.  polyethylene glycol (MIRALAX) 17 gram packet Take 1 Packet by mouth daily as needed. (Patient taking differently: Take 1 Packet by mouth daily as needed (CONSTIPATION). )    potassium chloride SR (KLOR-CON 10) 10 mEq tablet Take 1 Tab by mouth daily. Do not crush, break or chew. Swallow whole.  predniSONE (DELTASONE) 10 mg tablet 5 tabs daily for 3 days  4 tabs daily for 3 days   3 tabs daily for 3 days   2 tabs daily for 3 days   1 tab   daily for 3 days    predniSONE (DELTASONE) 2.5 mg tablet Take 1 Tab by mouth daily.  aspirin 81 mg chewable tablet Take 1 Tab by mouth every evening.  methotrexate sodium (METHOTREXATE, ANTI-RHEUMATIC,) 2.5 mg DsPk Take 20 mg by mouth Every Saturday.  folic acid (FOLVITE) 1 mg tablet Take 1 mg by mouth daily.  etanercept (ENBREL) 50 mg/mL (0.98 mL) injection 50 mg by SubCUTAneous route every Sunday.  docusate sodium (COLACE) 100 mg capsule Take 100 mg by mouth two (2) times daily as needed for Constipation.  vancomycin (VANCOCIN) 125 mg capsule Take 125 mg by mouth every six (6) hours.  metoprolol succinate (TOPROL-XL) 25 mg XL tablet     atorvastatin (LIPITOR) 20 mg tablet TAKE 1 TABLET BY MOUTH  DAILY    gabapentin (NEURONTIN) 300 mg capsule Take 1 Cap by mouth three (3) times daily.  OTHER by Injection route every three (3) months.  Patient receives lumbar epidural steroid injections by Dr. Leilani Chau from [address] [place] [address]:     Lab Results   Component Value Date/Time    WBC 3.5 (L) 08/20/2018 01:59 AM    HGB 8.9 (L) 08/20/2018 01:59 AM    PLATELET 473 08/20/2018 01:59 AM     Lab Results   Component Value Date/Time    Sodium 143 09/06/2018 03:23 PM    Potassium 4.4 09/06/2018 03:23 PM    Chloride 97 09/06/2018 03:23 PM    CO2 23 09/06/2018 03:23 PM    BUN 13 09/06/2018 03:23 PM    Creatinine 0.67 (L) 09/06/2018 03:23 PM    Calcium 9.2 09/06/2018 03:23 PM    Magnesium 2.0 08/20/2018 01:59 AM    Phosphorus 2.7 08/14/2018 05:25 AM      Lab Results   Component Value Date/Time    AST (SGOT) 12 09/06/2018 03:23 PM    Alk. phosphatase 87 09/06/2018 03:23 PM    Protein, total 6.7 09/06/2018 03:23 PM    Albumin 4.0 09/06/2018 03:23 PM    Globulin 2.6 08/14/2018 05:25 AM     Lab Results   Component Value Date/Time    INR 1.1 08/10/2018 12:40 PM    Prothrombin time 10.9 08/10/2018 12:40 PM    aPTT 31.7 (H) 10/13/2014 03:00 PM      Lab Results   Component Value Date/Time    Ferritin 140 08/14/2018 05:25 AM      Lab Results   Component Value Date/Time    pH 7.42 10/13/2014 05:42 PM    PCO2 38 10/13/2014 05:42 PM    PO2 203 (H) 10/13/2014 05:42 PM     No components found for: [place]   Lab Results   Component Value Date/Time    CK 38 (L) 08/10/2018 11:52 AM    CK - MB <1.0 08/10/2018 11:52 AM                Total time: 50 minCounseling / coordination time, spent as noted above: 35 min  > 50% counseling / coordination?: yes    Prolonged service was provided for  []30 min   []75 min in face to face time in the presence of the patient, spent as noted above. Time Start:   Time End:   Note: this can only be billed with 75369 (initial) or 04904 (follow up). If multiple start / stop times, list each separately.

## 2018-08-13 NOTE — PROGRESS NOTES
Occupational Therapy Note    Chart reviewed and noted RRT called this morning. Patient transferred to CCU.  Will defer and follow up for OT session as medically stable.      Braulio Price OT

## 2018-08-13 NOTE — PROGRESS NOTES
[facility]  Pulmonary, Critical Care, and Sleep Medicine    Name: Lizz Brooks MRN: [id number]   : 1935 Hospital: [facility]   Date: 2018        IMPRESSION:   · Acute severe bradycardia  · Failure to thrive  · Recent C diff with chronic appearing proctocolitis on CT  · H/O B cell lymphoma - ?treated  · H/O melanoma of scalp  · Chronic back pain  · CAD with h/o CABG and AVR  · H/O ulcerative colitis - on MTX and Enbrel   · H/O sarcoidosis - no details available, no signs of active sarcoidosis on imaging  · DM  · Severe malnutrition       PLAN:   · O2 as needed  · Transcutaneous pacing as needed, currently HR has improved, now appears to be afib, EKG pending  · Cardiology consult  · Oncology evaluation ongoing  · Monitor for diarrhea  · Insulin   · DVT prophylaxis  · Palliative care consult     Subjective/Interval History:   I have reviewed the flowsheet and previous days notes. Admitted 8-10-18 for failure to thrive and severe chronic back pain. Recent C diff. Losing weight. Today developed severe bradycardia with long pauses. Denies any complaints at this time. Review of Systems   Constitutional: Positive for fatigue. HENT: Negative. Eyes: Negative. Respiratory: Negative. Cardiovascular: Negative. Gastrointestinal: Negative. Objective:   Vital Signs:    Visit Vitals    /70    Pulse 73    Temp 98 °F (36.7 °C)    Resp 18    Ht 5' 8.5\" (1.74 m)    Wt 52.2 kg (115 lb)    SpO2 100%  Comment: non rebreather    BMI 17.23 kg/m2       O2 Device: Room air       Temp (24hrs), Av.9 °F (36.6 °C), Min:97.5 °F (36.4 °C), Max:98.1 °F (36.7 °C)       Intake/Output:   Last shift:         Last 3 shifts:    No intake or output data in the 24 hours ending 18 0811     Physical Exam   Constitutional: He appears cachectic. He appears ill. No distress. HENT:   Head: Normocephalic and atraumatic.    Mouth/Throat: No oropharyngeal exudate. Eyes: No scleral icterus. Cardiovascular: An irregular rhythm present. Bradycardia present. Murmur heard. Pulmonary/Chest: No respiratory distress. He has no wheezes. He has no rales. Abdominal: Soft. Bowel sounds are normal. He exhibits no distension. There is no tenderness. Musculoskeletal: He exhibits no edema. Skin: Skin is warm and dry. No rash noted. Data:   Labs:  Recent Labs      08/13/18   0332  08/12/18   0433  08/11/18   0312   WBC  5.7  7.7  10.8   HGB  10.3*  10.3*  13.4   HCT  34.1*  33.5*  43.9   PLT  220  188  302     Recent Labs      08/13/18   0332  08/12/18   0433  08/11/18   0312  08/10/18   1240  08/10/18   1152   NA  142  140  139   --   142   K  3.7  3.5  3.6   --   4.2   CL  108  105  103   --   107   CO2  27  27  29   --   29   GLU  83  88  89   --   105*   BUN  8  7  12   --   13   CREA  0.45*  0.49*  0.74   --   0.60*   CA  7.4*  7.7*  8.6   --   7.8*   MG   --    --    --    --   1.7   ALB   --    --   2.9*   --   2.3*   TBILI   --    --   0.7   --   0.5   SGOT   --    --   12*   --   14*   ALT   --    --   8*   --   7*   INR   --    --    --   1.1   --      No results for input(s): PH, PCO2, PO2, HCO3, FIO2 in the last 72 hours.     Imaging:  I have personally reviewed the patients radiographs:  Negative chest CT (other than pleural plaques), diffuse chronic proctocolitis        Conrad Lieberman MD

## 2018-08-13 NOTE — PROGRESS NOTES
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 He is saddened that 2 of his doctors have retired (Dr. America Delarosa and Dr. Latrell Reyes). He saw Dr. Latrell Reyes 1-1.5 months ago and has not yet established a relationship with a new GI doc. Moved to ICU for bradycardia - now with transcutaneous pacemaker. Subjective:         Current Facility-Administered Medications   Medication Dose Route Frequency    mupirocin (BACTROBAN) 2 % ointment   Both Nostrils BID    hydrocortisone Sod Succ (PF) (SOLU-CORTEF) injection 50 mg  50 mg IntraVENous Q8H    sodium chloride 0.9 % bolus infusion 250 mL  250 mL IntraVENous ONCE    acetaminophen (TYLENOL) tablet 325 mg  325 mg Oral Q6H PRN    0.9% sodium chloride infusion  100 mL/hr IntraVENous CONTINUOUS    aspirin chewable tablet 81 mg  81 mg Oral QPM    docusate sodium (COLACE) capsule 100 mg  100 mg Oral BID PRN    folic acid (FOLVITE) tablet 1 mg  1 mg Oral DAILY    gabapentin (NEURONTIN) capsule 300 mg  300 mg Oral TID    oxyCODONE-acetaminophen (PERCOCET 10)  mg per tablet 1 Tab  1 Tab Oral Q4H PRN    sodium chloride (NS) flush 5-10 mL  5-10 mL IntraVENous Q8H    sodium chloride (NS) flush 5-10 mL  5-10 mL IntraVENous PRN    naloxone (NARCAN) injection 0.4 mg  0.4 mg IntraVENous PRN    ondansetron (ZOFRAN) injection 4 mg  4 mg IntraVENous Q4H PRN    heparin (porcine) injection 5,000 Units  5,000 Units SubCUTAneous Q12H    polyethylene glycol (MIRALAX) packet 17 g  17 g Oral DAILY    melatonin tablet 3 mg  3 mg Oral QHS        Review of Systems:  Pertinent items are noted in HPI.     Objective:     Patient Vitals for the past 8 hrs:   BP Temp Pulse Resp SpO2   08/13/18 1540 91/41 - 63 16 100 %   08/13/18 1510 (!) 89/33 - 87 (!) 31 100 %   08/13/18 1440 (!) 83/38 - 64 11 100 %   18 1430 (!) 76/33 - 63 10 99 %   18 1420 (!) 76/32 - 64 15 99 %   18 1402 (!) 85/32 - 67 18 100 %   18 1400 (!) 77/44 - 74 22 100 %   18 1340 99/44 - 66 18 100 %   18 1113 (!) 95/34 - (!) 56 15 100 %   18 1110 (!) 84/32 - (!) 56 16 100 %   18 1103 (!) 93/29 - (!) 55 18 100 %   18 1100 (!) 85/33 - (!) 56 20 100 %   18 1050 (!) 87/31 - (!) 54 15 100 %   18 1040 (!) 101/38 - 65 14 100 %   18 1035 (!) 112/37 - 60 15 100 %   18 1030 (!) 87/25 - (!) 57 25 100 %   18 1020 (!) 97/32 - (!) 58 15 100 %   18 1010 (!) 93/30 - (!) 56 20 100 %   18 1000 (!) 86/27 - (!) 55 14 100 %   18 0950 (!) 79/25 - (!) 55 18 100 %   18 0940 (!) 84/29 - (!) 56 14 100 %   18 0934 (!) 74/40 - (!) 54 15 100 %   18 0931 (!) 83/28 - (!) 55 18 100 %   18 0904 108/56 98.8 °F (37.1 °C) (!) 38 16 93 %   18 0900 - - (!) 54 16 100 %   18 0800 109/42 - 72 18 100 %       Temp (24hrs), Av.1 °F (36.7 °C), Min:97.9 °F (36.6 °C), Max:98.8 °F (37.1 °C)       0701 -  1900  In: 951.7 [P.O.:150; I.V.:801.7]  Out: -     Physical Exam:  Sleeping, awakens easily  Cachectic male  HEENT: NC, AT, pupils round without icterus  Neck: no adenopathy, no JVD, no thyromegaly  Lungs cta  Heart regular rate and rhythm. Abdomen soft, nontender, normoactive BS, -HSM appreciated  Ext no edema. Scarring on right leg. Skin - multiple keratoses  No adenopathy appreciated.      Labs:    Recent Results (from the past 24 hour(s))   HAPTOGLOBIN    Collection Time: 18  3:32 AM   Result Value Ref Range    Haptoglobin 289 (H) 30 - 200 mg/dL   RETICULOCYTE COUNT    Collection Time: 18  3:32 AM   Result Value Ref Range    Reticulocyte count 0.8 0.7 - 2.1 %    Absolute Retic Cnt. 0.0290 0.0260 - 6.4589 M/ul   METABOLIC PANEL, BASIC    Collection Time: 18  3:32 AM   Result Value Ref Range    Sodium 142 136 - 145 mmol/L Potassium 3.7 3.5 - 5.1 mmol/L    Chloride 108 97 - 108 mmol/L    CO2 27 21 - 32 mmol/L    Anion gap 7 5 - 15 mmol/L    Glucose 83 65 - 100 mg/dL    BUN 8 6 - 20 MG/DL    Creatinine 0.45 (L) 0.70 - 1.30 MG/DL    BUN/Creatinine ratio 18 12 - 20      GFR est AA >60 >60 ml/min/1.73m2    GFR est non-AA >60 >60 ml/min/1.73m2    Calcium 7.4 (L) 8.5 - 10.1 MG/DL   CBC WITH AUTOMATED DIFF    Collection Time: 08/13/18  3:32 AM   Result Value Ref Range    WBC 5.7 4.1 - 11.1 K/uL    RBC 3.78 (L) 4.10 - 5.70 M/uL    HGB 10.3 (L) 12.1 - 17.0 g/dL    HCT 34.1 (L) 36.6 - 50.3 %    MCV 90.2 80.0 - 99.0 FL    MCH 27.2 26.0 - 34.0 PG    MCHC 30.2 30.0 - 36.5 g/dL    RDW 15.8 (H) 11.5 - 14.5 %    PLATELET 378 099 - 399 K/uL    MPV 10.2 8.9 - 12.9 FL    NRBC 0.0 0  WBC    ABSOLUTE NRBC 0.00 0.00 - 0.01 K/uL    NEUTROPHILS 68 32 - 75 %    LYMPHOCYTES 15 12 - 49 %    MONOCYTES 15 (H) 5 - 13 %    EOSINOPHILS 1 0 - 7 %    BASOPHILS 0 0 - 1 %    IMMATURE GRANULOCYTES 1 (H) 0.0 - 0.5 %    ABS. NEUTROPHILS 3.9 1.8 - 8.0 K/UL    ABS. LYMPHOCYTES 0.9 0.8 - 3.5 K/UL    ABS. MONOCYTES 0.9 0.0 - 1.0 K/UL    ABS. EOSINOPHILS 0.1 0.0 - 0.4 K/UL    ABS. BASOPHILS 0.0 0.0 - 0.1 K/UL    ABS. IMM.  GRANS. 0.0 0.00 - 0.04 K/UL    DF AUTOMATED     DIRECT CHERELLE    Collection Time: 08/13/18  3:32 AM   Result Value Ref Range    MARTÍN Poly NEG    GLUCOSE, POC    Collection Time: 08/13/18  7:33 AM   Result Value Ref Range    Glucose (POC) 100 65 - 100 mg/dL    Performed by [facility]    EKG, 12 LEAD, INITIAL    Collection Time: 08/13/18  8:05 AM   Result Value Ref Range    Ventricular Rate 72 BPM    Atrial Rate 72 BPM    P-R Interval 146 ms    QRS Duration 86 ms    Q-T Interval 404 ms    QTC Calculation (Bezet) 442 ms    Calculated P Axis 95 degrees    Calculated R Axis -71 degrees    Calculated T Axis 68 degrees    Diagnosis       Normal sinus rhythm  Left axis deviation    Confirmed by Clarissa Donald ([id number]) on 8/13/2018 8:56:21 AM Assessment:     Active Problems/Plan:  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 Dunn Fearing to my office sent by Dr. Kevin Ochoa indicated that he had been treating him for a number of non-melanoma skin cancers for the prior 5 years. Anorexia/ weight loss/FTT no evidence of malignancy identified; may be related to multiple autoimmune diseases. CT scan suggest active colitis; would got GI to see him regarding the status of his inflammatory bowel disease; Anemia - haptoglobin high. b12 and folate checked in 2015 - normal. Will repeat along with other studies for deficiency states. Lerry Kathy SPEP and free light chains pending. MARTÍN negative. Hx of RA, ulcerative colitis and sarcoidosis - on prednisone, has been on embrel and MTX in past.     CAD s/p CABG, AS s/p AVR - currently with bradycardia.

## 2018-08-13 NOTE — CONSULTS
CARDIOLOGY CONSULT    Patient ID:  Patient: oB Aguirre  MRN: [id number]  Age: 82 y.o.  : 1935    Date of  Admission: 8/10/2018 11:37 AM   PCP:  Purvi Ya MD   Usual cardiologist:  Scott Mendieta III,     Assessment:     1. Bradycardia with syncope, documented 7 sec pause on tele. Probably vasovagal.  Occurred after severe back pain. 2. Paroxysmal atrial fibrillation in 3/2018. 3. Coronary artery disease s/p remote redo CABG (1v SVG-PDA) 10/214.  had SVG-LCx/OM, LIMA-LAD, and SVG-PDA. Negative Cardiolite stress  w/ EF 59%. Echo 2014 w/ EF 60%, 9mmHg gradient across AVR, no AI. 4. Aortic stenosis s/p redo AVR (#23 Trifecta bp AVR) 10/14  5. 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 6. Pancolitis w/ h/o ulcerative colitis. 7. Rheumatoid arthritis, on immunomodulating therapy. 8. Sarcoidosis diagnosis. 9. Hypertension. 10. Hyperlipidemia. 11. Mild bilateral carotid disease. 12. Diabetes type 2.  13. Chronic back pain. 14. Severe protein-calorie malnutrition. Plan:     1. No beta-blocker. Last dose 8/12 PM.  2. Atropine PRN recurrent bradycardia. He has external pacing pads in place with a  backup of 40 bpm.  3. No statin. 4. OK with ASA. Would try to avoid a permanent pacemaker if possible given the risk of infection, though will watch him today and see how he does. If HR is OK, maybe back to the floor tomorrow from a rhythm standpoint. Got IVF for hypotension, can give dobutamine or other HR-raising inotrope for this if persistent. Would need to be evaluated. [x]       High complexity decision making was performed in this patient at high risk for decompensation with multiple organ involvement. Bo Aguirre is a 82 y.o. male with a history of CAD, CABG, bioprosthetic aortic valve replacement and redo CABG, here with failure to thrive, generalized weakness. He was diagnosed with B cell lymphoma in 2018.   I was contacted this AM due to an episode of bradycardia associated with syncope. His heart rate was as low as the 20-30's and a pause of 7 seconds was reported. He was transferred to the ICU for external pacing, which was done transiently. His heart rate restored itself to the 50's. He denies dizziness, syncope, palpitations, chest pain. He has chronic BEY. Lying flat in bed. Appetite is poor. Has had significant weight loss. Multiple falls. Per rapid response documentation:  \"Upon entrance to pt room, Pt had complaints of sudden back pain 9/10, which was odd because he was just given percocet approx 40 mins earlier for generalized body pain that he rated 5/10. While in the room tele called stating that the pt HR dropped to 27 bmp with a 5 second pause prior to his heart rate increasing to back to 58 bmp. One minute later, tele called again stating that the pt's HR dropped to 34 bmp with a 7.5 second pause. RR called at this time as patient, as the pt had an episode of seizure like activity in which his eyes rolled in the back of his head, with bilateral upper extremity tremors and delayed responses. During this time pt was still oriented x 3.     During the RR, DR. Anand Ribera deemed it appropriate for the pt to be transfer to a higher level of care due to pt's condition. \"        Past Medical History:   Diagnosis Date    Aortic stenosis, severe     Arrhythmia     heart murmur    Arthritis     Rheumatoid    CAD (coronary artery disease) 1998    S/P CABG    Cancer (HCC)     Basal cell carcinoma    Chronic pain     from rheumatoid arthritis in knees, elbows fingers    HTN (hypertension)     Hypercholesterolemia     S/P AVR (aortic valve replacement)     23 mm Trifecta with Redo CABG x 1SVG to PDA    S/P CABG x 1 10/13/2014    Redo CABG x 1 SVG to PDA; 'y' from collins of existing OM vein graft    Sarcoidosis     No longer active.      Ulcerative colitis [facility])     Not active        Past Surgical History: Procedure Laterality Date    CARDIAC SURG PROCEDURE UNLIST      CABG    HX AORTIC VALVE REPLACEMENT  10/2014    CABG redo X1    HX CATARACT REMOVAL      Left    HX LUMBAR LAMINECTOMY      HX OTHER SURGICAL      skin lesions removed from nose and forehead- basal cell    LAMINECTOMY,LUMBAR  110/2000    ID COLONOSCOPY W/BIOPSY SINGLE/MULTIPLE  12/5/2011            Social History   Substance Use Topics    Smoking status: Former Smoker     Packs/day: 1.50     Years: 20.00     Quit date: 1/1/1978    Smokeless tobacco: Never Used    Alcohol use Yes      Comment: rarely        Family History   Problem Relation Age of Onset    Heart Disease Father     Cancer Sister      breast     Diabetes Sister     Cancer Sister      leukemia    Diabetes Sister     Diabetes Mother    [employer] Bladder Disease Mother         Allergies   Allergen Reactions    Lisinopril Cough    Questran [Cholestyramine (With Sugar)] Other (comments)     Muscle cramps          Current Facility-Administered Medications   Medication Dose Route Frequency    mupirocin (BACTROBAN) 2 % ointment   Both Nostrils BID    acetaminophen (TYLENOL) tablet 325 mg  325 mg Oral Q6H PRN    0.9% sodium chloride infusion  100 mL/hr IntraVENous CONTINUOUS    aspirin chewable tablet 81 mg  81 mg Oral QPM    docusate sodium (COLACE) capsule 100 mg  100 mg Oral BID PRN    folic acid (FOLVITE) tablet 1 mg  1 mg Oral DAILY    gabapentin (NEURONTIN) capsule 300 mg  300 mg Oral TID    oxyCODONE-acetaminophen (PERCOCET 10)  mg per tablet 1 Tab  1 Tab Oral Q4H PRN    predniSONE (DELTASONE) tablet 2.5 mg  2.5 mg Oral DAILY    sodium chloride (NS) flush 5-10 mL  5-10 mL IntraVENous Q8H    sodium chloride (NS) flush 5-10 mL  5-10 mL IntraVENous PRN    naloxone (NARCAN) injection 0.4 mg  0.4 mg IntraVENous PRN    ondansetron (ZOFRAN) injection 4 mg  4 mg IntraVENous Q4H PRN    heparin (porcine) injection 5,000 Units  5,000 Units SubCUTAneous Q12H    polyethylene glycol (MIRALAX) packet 17 g  17 g Oral DAILY    melatonin tablet 3 mg  3 mg Oral QHS       Review of Symptoms:  See HPI as well. General: negative for fever, chills, sweats, weakness  Eyes: negative for blurred vision, eye pain, loss of vision, diplopia   Ear Nose and Throat: negative for rhinorrhea, pharyngitis, otalgia, tinnitus, speech or swallowing difficulties   Respiratory: negative for cough, sputum production, wheezing, BEY, pleuritic pain   Cardiology: negative for chest pain, palpitations, orthopnea, PND, edema  Gastrointestinal: +colitis, negative for abdominal pain, N/V, dysphagia, visible bleeding   Genitourinary: negative for frequency, urgency, dysuria, hematuria   Muskuloskeletal : negative for arthralgia, myalgia   Hematology: +lymphoma, negative for easy bruising, bleeding  Dermatological: negative for rash, ulceration  Endocrine: negative for hot flashes or polydipsia   Neurological: negative for headache, dizziness, confusion, focal weakness, paresthesia, memory loss, gait disturbance   Psychological: negative for anxiety, depression, agitation       Objective:      Physical Exam:  Temp (24hrs), Av °F (36.7 °C), Min:97.5 °F (36.4 °C), Max:98.8 °F (37.1 °C)    Patient Vitals for the past 8 hrs:   Pulse   18 0940 (!) 56   18 0934 (!) 54   18 0931 (!) 55   18 0904 (!) 38   18 0900 (!) 54   18 0740 73   18 0328 (!) 59    Patient Vitals for the past 8 hrs:   Resp   18 0940 14   18 0934 15   18 0931 18   18 0904 16   18 0900 16   18 0328 18    Patient Vitals for the past 8 hrs:   BP   18 0940 (!) 84/29   18 0934 (!) 74/40   18 0931 (!) 83/28   18 0904 108/56   18 0740 118/70   18 0328 123/70      No intake or output data in the 24 hours ending 18 0903    Nondiaphoretic, not in acute distress. Tired-appearing.   No scleral icterus, mucous membranes moist, conjuctivae pink, no xanthelasma. Unlabored, clear to auscultation bilaterally anteriorly, symmetric air movement. Regular slightly bradycardic rhythm, no murmur, pericardial rub, knock, or gallop. No JVD or peripheral edema. No carotid bruit. Palpable radial and DP pulses bilaterally. Abdomen, soft, nontender, nondistended. No abdominal bruit or pulstatile masses. Extremities without cyanosis or clubbing. Muscle tone and bulk normal.  Skin warm and dry. No rashes or ulcers. Neuro grossly nonfocal.  No tremor. Awakens, appropriate. CARDIOGRAPHICS and STUDIES, I reviewed:    Telemetry:  Sinus bradycardia 50's. (I reviewed the tele from the floor earlier. Profound sinus bradycardia with sinus pause, lack of junctional or ventricular escape at the time. On the chart.)    EC/13  Sinus rhythm, left axis deviation.          Labs:  Recent Labs      08/10/18   1152   CPK  38*   CKMB  <1.0   CKNDX  Cannot be calculated   TROIQ  <0.05     Lab Results   Component Value Date/Time    Cholesterol, total 146 2018 12:15 PM    HDL Cholesterol 36 (L) 2018 12:15 PM    LDL, calculated 82 2018 12:15 PM    Triglyceride 142 2018 12:15 PM    CHOL/HDL Ratio 4.1 2014 10:44 AM     Recent Labs      08/10/18   1240   INR  1.1   PTP  10.9      Recent Labs      18   0332  18   0433  18   0312  08/10/18   1152   NA  142  140  139  142   K  3.7  3.5  3.6  4.2   CL  108  105  103  107   CO2  27  27  29  29   BUN  8  7  12  13   CREA  0.45*  0.49*  0.74  0.60*   GLU  83  88  89  105*   CA  7.4*  7.7*  8.6  7.8*   ALB   --    --   2.9*  2.3*   WBC  5.7  7.7  10.8  5.5   HGB  10.3*  10.3*  13.4  8.8*   HCT  34.1*  33.5*  43.9  28.6*   PLT  220  188  302  132*     Recent Labs      18   0312  08/10/18   1152   SGOT  12*  14*   AP  106  80   TP  6.7  5.5*   ALB  2.9*  2.3*   GLOB  3.8  3.2     No components found for: GLPOC  No results for input(s): PH, PCO2, PO2 in the last 72 hours.         Lawyer Dipika MD  8/13/2018

## 2018-08-13 NOTE — PROGRESS NOTES
PT note:    Chart reviewed and noted RRT called this morning. Patient transferred to CCU. Will defer and follow up for PT session as medically stable.      Alexandro Arias, PT, DPT

## 2018-08-13 NOTE — PROGRESS NOTES
Bedside and Verbal shift change report given to Zoe (oncoming nurse) by Enma (offgoing nurse). Report included the following information SBAR, Kardex, Intake/Output and MAR.      Zone Phone:   [phone]          Significant changes during shift:  pt with generalized whole body pain that he describes as whole body numbness and tingling.  Dilaudid given with minimal  relief              Patient Information      Baldomero Bennett  82 y.o.  8/10/2018 11:37 AM by Carmelo Guerra MD. Zackary Sewell admitted from Home      Problem List              Patient Active Problem List      Diagnosis Date Noted    Weakness 08/10/2018    Falls 08/10/2018    PAF (paroxysmal atrial fibrillation) ([address] 03/17/2018    C. difficile diarrhea 03/06/2018    Bilateral carotid artery disease ([address] 10/05/2017    Postlaminectomy syndrome, lumbar region 08/28/2017    Right foot drop 08/28/2017    Spinal stenosis, lumbar region, with neurogenic claudication 08/28/2017    Osteoarthritis of spine with radiculopathy, lumbar region 08/09/2017    Weakness of right lower extremity 08/09/2017    Advance directive discussed with patient 09/11/2016    Controlled type 2 diabetes mellitus with diabetic polyneuropathy, without long-term current use of insulin ([address] 04/01/2016    Essential tremor 01/21/2016    Coronary artery disease involving native coronary artery without angina pectoris 04/21/2015    Lumbar radiculopathy 02/11/2015    Myalgia and myositis 01/16/2015    Diabetic peripheral neuropathy ([address] 01/16/2015    Osteoporosis 01/16/2015    S/P AVR (aortic valve replacement) 63/60/8973    Umbilical hernia 33/89/9989    Thyroid nodule 10/05/2011    Rheumatoid arthritis involving multiple sites with positive rheumatoid factor ([address] 11/18/2009    Sarcoidosis of lung ([address] 11/18/2009    Ulcerative colitis ([address] 11/18/2009    Hypercholesterolemia 11/18/2009    Inguinal hernia 11/18/2009    Asbestos exposure 11/18/2009  Hypertension, essential 11/18/2009               Past Medical History:   Diagnosis Date    Aortic stenosis, severe       Arrhythmia         heart murmur    Arthritis         Rheumatoid    CAD (coronary artery disease) 1998      S/P CABG    Cancer (HCC)         Basal cell carcinoma    Chronic pain         from rheumatoid arthritis in knees, elbows fingers    HTN (hypertension)       Hypercholesterolemia       S/P AVR (aortic valve replacement)         23 mm Trifecta with Redo CABG x 1SVG to PDA    S/P CABG x 1 10/13/2014      Redo CABG x 1 SVG to PDA; 'y' from collins of existing OM vein graft    Sarcoidosis         No longer active.  Ulcerative colitis [address]         Not active                   Activity Status:      OOB to Chair Yes  Ambulated this shift Yes   Bed Rest No      DVT prophylaxis:      DVT prophylaxis Med- Yes  DVT prophylaxis SCD or JAMES- No       Wounds: (If Applicable)      Wounds- Yes      Location scabs, bruises      Patient Safety:      Falls Score Total Score: 4  Safety Level_______  Bed Alarm On? No  Sitter?  No      Plan for upcoming shift: safety, pain control              Discharge Plan: No       Active Consults:  IP CONSULT TO PALLIATIVE CARE - PROVIDER  IP CONSULT TO PALLIATIVE CARE - PROVIDER  IP CONSULT TO ONCOLOGY

## 2018-08-13 NOTE — PROGRESS NOTES
1000  BP slowly improving, now 86/27. Saline bolus infusing. Patient napping at intervals. 1100  BP now 85/33.  1113  BP 95/34. Patient now awake, alert; scheduled meds given. 1126  Medicated with percocet  mg po x 1 tab for generalized pain, including \"back and right leg. \"    1130  Bedside and verbal report given to Cr Andujar RN.

## 2018-08-13 NOTE — CDMP QUERY
Account Number: [de-identified]  MRN: [id number]  Patient: Octavia Harmon  Created: [id number]  Clinician Name: Abebe Estrada RN, CCDS   Dr. Moody Luis :  Do you concur with pulmonologist assessment of severe malnutrition. Please clarify & document the following condition & include in your DC summary.       => Severe Malnutrition POA in the setting of low BMI<19, weight loss, poor appetite, & FTT  => Other explanation of clinical findings  => Clinically Undetermined (no explanation for clinical findings)    The medical record reflects the following clinical findings, treatment, and risk factors. Risk Factors:  advanced age w/ poor po intake, generalized malaise x 3 yrs, decrease in appetite, BMI 17 kg/m2  Clinical Indicators: Thin  Very cachectic with temporal waisting  Treatment: Nutrition consult, BMi <19, palliative consult     Please clarify and document your clinical opinion in the progress notes and discharge summary including the definitive and/or presumptive diagnosis, (suspected or probable), related to the above clinical findings. Please include clinical findings supporting your diagnosis.   Thank Mame Bethea RN, CCDS

## 2018-08-13 NOTE — PROGRESS NOTES
Care Management:    Patient on 100% non-rebreather , transferred to ICU with bradycardia and transcutaneous pacing as needed. He has C diff, Rheumatoid Arthritis and Ulcerative Arthritis. Pt and OT consulted but unable to work with patient at this time. Chart reviewed and we will cont to follow for discharge needs as appropriate. Robert Carreno [facility] [phone]    Addendum: Britta PAGE made aware via email of admit.

## 2018-08-13 NOTE — PROGRESS NOTES
Upon entrance to pt room, Pt had complaints of sudden back pain 9/10, which was odd because he was just given percocet approx 40 mins earlier for generalized body pain that he rated 5/10. While in the room tele called stating that the pt HR dropped to 27 bmp with a 5 second pause prior to his heart rate increasing to back to 58 bmp. One minute later, tele called again stating that the pt's HR dropped to 34 bmp with a 7.5 second pause. RR called at this time as patient, as the pt had an episode of seizure like activity in which his eyes rolled in the back of his head, with bilateral upper extremity tremors and delayed responses. During this time pt was still oriented x 3. During the RR, DR. Roro Smith deemed it appropriate for the pt to be transfer to a higher level of care due to pt's condition. approx 0800, report called to [employer]. SBAR discussed and all questions answered.

## 2018-08-13 NOTE — PROGRESS NOTES
Hospitalist Progress Note    NAME: Gary Dobbs   :  1935   MRN:  [id number]       Assessment / Plan:  RT called at ~7:30AM for bradycardia  -HR ~30s. Upon arrival, pt is awake and appropriately conversant. SBP stable at 117  -transcutaneous pacing   -stop metoprolol XL  -obtain EKG  -transfer for CCU for pacing.  -cont' IVF, discussed with RT team  -cardiology consulted. Discussed with Dr Ernestina Pulido    Hypotension  -IVF, stress dose steroids    Failure to thrive   Generalized weakness  Hx of melanoma cancer s/p removal  -UA neg for infection  -head CT and CXR neg for acute process  -MRI head showed small chronic small vessel ischemic white matter disease. No acute infarct or mass. -CT a/p suggestive of chronic proctocolitis  -cont' IVF  -consult GI. Has been seen by Dr Kade Rivera in the past   -nutrition consulted  -oncology consulted    Back pain  -MRI C/L spine showed degenerative spine changes with no canal stenosis or cord signal abnormality. No hernation or acute abnormality  -consult PT/OT, recommended SNF    CAD s/p CABG  AS s/p AVR  HTN  - stop BB  -cont' ASA  -statin on hold due to myalgias    Hx of RA  Hx of ulcerative colitis  Hx of sarcoidosis  -on chronic prednisone, now on stress dose steroids  -cont' current pain management  -enbrel and MTX on hold    Hx of Cdiff  -completed treatment. No diarrhea here    Severe Malnutrition, Body mass index is 17.23 kg/(m^2). Code status: Full  Prophylaxis: Hep SQ  Recommended Disposition: SNF/LTC     Subjective:     Chief Complaint / Reason for Physician Visit  RT called at 7:30 AM for bradycardia with sinus pause. Pt awake and alert. Pacer pads place and paced with rate of 70s. Discussed with RN events overnight.      Review of Systems:  Symptom Y/N Comments  Symptom Y/N Comments   Fever/Chills n   Chest Pain n    Poor Appetite    Edema     Cough    Abdominal Pain n    Sputum    Joint Pain     SOB/BEY n   Pruritis/Rash     Nausea/vomit Tolerating PT/OT     Diarrhea    Tolerating Diet     Constipation    Other       Could NOT obtain due to:      Objective:     VITALS:   Last 24hrs VS reviewed since prior progress note. Most recent are:  Patient Vitals for the past 24 hrs:   Temp Pulse Resp BP SpO2   08/13/18 0740 - 73 - 118/70 100 %   08/13/18 0328 98.1 °F (36.7 °C) (!) 59 18 123/70 99 %   08/12/18 2313 97.9 °F (36.6 °C) (!) 55 18 110/60 99 %   08/12/18 1959 97.9 °F (36.6 °C) 63 16 107/56 99 %   08/12/18 1454 97.8 °F (36.6 °C) 61 16 102/57 98 %   08/12/18 1242 97.5 °F (36.4 °C) 61 18 109/64 97 %     No intake or output data in the 24 hours ending 08/13/18 0759     PHYSICAL EXAM:  General: WD, WN. Alert, cooperative, no acute distress    EENT:  EOMI. Anicteric sclerae. MMM  Resp:  CTA bilaterally, no wheezing or rales. No accessory muscle use  CV:  Sinus bradycardia,  No edema  GI:  Soft, Non distended, Non tender.  +BS  Neurologic:  Alert and oriented X 3, normal speech  Psych:   Not anxious nor agitated  Skin:  No rashes. No jaundice    Reviewed most current lab test results and cultures  YES  Reviewed most current radiology test results   YES  Review and summation of old records today    NO  Reviewed patient's current orders and MAR    YES  PMH/SH reviewed - no change compared to H&P  ________________________________________________________________________  Care Plan discussed with:    Comments   Patient x    Family      RN x    Care Manager     Consultant                        Multidiciplinary team rounds were held today with , nursing, pharmacist and clinical coordinator. Patient's plan of care was discussed; medications were reviewed and discharge planning was addressed.      ________________________________________________________________________  Total NON critical care TIME:     Minutes    Total CRITICAL CARE TIME Spent:  45  Minutes non procedure based      Comments   >50% of visit spent in counseling and coordination of care     ________________________________________________________________________  Teddy Kerns MD     Procedures: see electronic medical records for all procedures/Xrays and details which were not copied into this note but were reviewed prior to creation of Plan. LABS:  I reviewed today's most current labs and imaging studies.   Pertinent labs include:  Recent Labs      08/13/18   0332  08/12/18   0433  08/11/18   0312   WBC  5.7  7.7  10.8   HGB  10.3*  10.3*  13.4   HCT  34.1*  33.5*  43.9   PLT  220  188  302     Recent Labs      08/13/18   0332  08/12/18   0433  08/11/18   0312  08/10/18   1240  08/10/18   1152   NA  142  140  139   --   142   K  3.7  3.5  3.6   --   4.2   CL  108  105  103   --   107   CO2  27  27  29   --   29   GLU  83  88  89   --   105*   BUN  8  7  12   --   13   CREA  0.45*  0.49*  0.74   --   0.60*   CA  7.4*  7.7*  8.6   --   7.8*   MG   --    --    --    --   1.7   ALB   --    --   2.9*   --   2.3*   TBILI   --    --   0.7   --   0.5   SGOT   --    --   12*   --   14*   ALT   --    --   8*   --   7*   INR   --    --    --   1.1   --        Signed: Teddy Kerns MD

## 2018-08-14 NOTE — PROGRESS NOTES
0730 Report received from night nurses, Rodger Holloway, RN and RADHA Weiss. Assumed care of patient.

## 2018-08-14 NOTE — PROGRESS NOTES
TRANSFER - IN REPORT:    Verbal report received from kenney (gerald) on Wilfredo Rodriguez  being received from CCU (unit) for routine progression of care      Report consisted of patients Situation, Background, Assessment and   Recommendations(SBAR). Information from the following report(s) SBAR, Kardex, ED Summary, Procedure Summary, Intake/Output, MAR and Recent Results was reviewed with the receiving nurse. Opportunity for questions and clarification was provided. Assessment completed upon patients arrival to unit and care assumed.

## 2018-08-14 NOTE — PROGRESS NOTES
Primary Nurse Mackenzie Mtz RN and RADHA jeffers performed a dual skin assessment on this patient Impairment noted- see wound doc flow sheet    BUEs: dry, ecchymotic, flaky, scattered scabbed abrasions  BLEs: scattered minor ecchymosis, scattered scabbed abrasions  -toes and heels pink to red and blanching    Sacrum: red and blanching  L hip: red patch approximately 2\"x2\" and blanching  R hip: small red patch blanches in 4-5 seconds. Foams applied to all red patches on/near sacrum/hips    Scattered flaky patches on face/head.      Freddy score is 13

## 2018-08-14 NOTE — PROGRESS NOTES
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 He is saddened that 2 of his doctors have retired (Dr. Smitha Moody and Dr. Faheem Adnino). He saw Dr. Faheem Andino 1-1.5 months ago and has not yet established a relationship with a new GI doc. Moved to ICU for bradycardia and transcutaneous pacemaker applied. Doing better and now moved to step down unit. Subjective:         Current Facility-Administered Medications   Medication Dose Route Frequency    0.9% sodium chloride infusion  75 mL/hr IntraVENous CONTINUOUS    mupirocin (BACTROBAN) 2 % ointment   Both Nostrils BID    hydrocortisone Sod Succ (PF) (SOLU-CORTEF) injection 50 mg  50 mg IntraVENous Q8H    acetaminophen (TYLENOL) tablet 325 mg  325 mg Oral Q6H PRN    aspirin chewable tablet 81 mg  81 mg Oral QPM    docusate sodium (COLACE) capsule 100 mg  100 mg Oral BID PRN    folic acid (FOLVITE) tablet 1 mg  1 mg Oral DAILY    gabapentin (NEURONTIN) capsule 300 mg  300 mg Oral TID    oxyCODONE-acetaminophen (PERCOCET 10)  mg per tablet 1 Tab  1 Tab Oral Q4H PRN    sodium chloride (NS) flush 5-10 mL  5-10 mL IntraVENous Q8H    sodium chloride (NS) flush 5-10 mL  5-10 mL IntraVENous PRN    naloxone (NARCAN) injection 0.4 mg  0.4 mg IntraVENous PRN    ondansetron (ZOFRAN) injection 4 mg  4 mg IntraVENous Q4H PRN    heparin (porcine) injection 5,000 Units  5,000 Units SubCUTAneous Q12H    polyethylene glycol (MIRALAX) packet 17 g  17 g Oral DAILY    melatonin tablet 3 mg  3 mg Oral QHS        Review of Systems:  Pertinent items are noted in HPI.     Objective:     Patient Vitals for the past 8 hrs:   BP Temp Pulse Resp SpO2   08/14/18 1048 123/49 98.4 °F (36.9 °C) 62 18 100 %   08/14/18 0945 138/51 97.8 °F (36.6 °C) 70 20 100 %   08/14/18 0800 124/54 97.9 °F (36.6 °C) 70 24 100 %   18 0700 109/51 - 75 17 100 %   18 0600 101/42 - 60 19 100 %   18 0500 98/48 - (!) 59 15 100 %       Temp (24hrs), Av.9 °F (36.6 °C), Min:97.7 °F (36.5 °C), Max:98.4 °F (36.9 °C)       07 -  1900  In: 400 [P.O.:200; I.V.:200]  Out: 350 [Urine:350]    Physical Exam:  Sleeping, awakens easily  Cachectic male  HEENT: NC, AT, pupils round without icterus  Neck: no adenopathy, no JVD, no thyromegaly  Lungs cta  Heart regular rate and rhythm. Abdomen soft, nontender, normoactive BS, -HSM appreciated  Ext no edema. Scarring on right leg. Skin - multiple keratoses  No adenopathy appreciated. Labs:    Recent Results (from the past 24 hour(s))   MAGNESIUM    Collection Time: 18  5:25 AM   Result Value Ref Range    Magnesium 2.0 1.6 - 2.4 mg/dL   PHOSPHORUS    Collection Time: 18  5:25 AM   Result Value Ref Range    Phosphorus 2.7 2.6 - 4.7 MG/DL   CBC WITH AUTOMATED DIFF    Collection Time: 18  5:25 AM   Result Value Ref Range    WBC 3.1 (L) 4.1 - 11.1 K/uL    RBC 3.55 (L) 4.10 - 5.70 M/uL    HGB 9.7 (L) 12.1 - 17.0 g/dL    HCT 31.4 (L) 36.6 - 50.3 %    MCV 88.5 80.0 - 99.0 FL    MCH 27.3 26.0 - 34.0 PG    MCHC 30.9 30.0 - 36.5 g/dL    RDW 15.7 (H) 11.5 - 14.5 %    PLATELET 290 022 - 299 K/uL    MPV 10.4 8.9 - 12.9 FL    NRBC 0.0 0  WBC    ABSOLUTE NRBC 0.00 0.00 - 0.01 K/uL    NEUTROPHILS 66 32 - 75 %    LYMPHOCYTES 23 12 - 49 %    MONOCYTES 9 5 - 13 %    EOSINOPHILS 0 0 - 7 %    BASOPHILS 0 0 - 1 %    IMMATURE GRANULOCYTES 2 (H) 0.0 - 0.5 %    ABS. NEUTROPHILS 2.0 1.8 - 8.0 K/UL    ABS. LYMPHOCYTES 0.7 (L) 0.8 - 3.5 K/UL    ABS. MONOCYTES 0.3 0.0 - 1.0 K/UL    ABS. EOSINOPHILS 0.0 0.0 - 0.4 K/UL    ABS. BASOPHILS 0.0 0.0 - 0.1 K/UL    ABS. IMM.  GRANS. 0.1 (H) 0.00 - 0.04 K/UL    DF AUTOMATED      RBC COMMENTS ANISOCYTOSIS  1+        RBC COMMENTS OVALOCYTES  1+       METABOLIC PANEL, COMPREHENSIVE    Collection Time: 18  5:25 AM Result Value Ref Range    Sodium 144 136 - 145 mmol/L    Potassium 3.8 3.5 - 5.1 mmol/L    Chloride 110 (H) 97 - 108 mmol/L    CO2 27 21 - 32 mmol/L    Anion gap 7 5 - 15 mmol/L    Glucose 151 (H) 65 - 100 mg/dL    BUN 9 6 - 20 MG/DL    Creatinine 0.39 (L) 0.70 - 1.30 MG/DL    BUN/Creatinine ratio 23 (H) 12 - 20      GFR est AA >60 >60 ml/min/1.73m2    GFR est non-AA >60 >60 ml/min/1.73m2    Calcium 7.0 (L) 8.5 - 10.1 MG/DL    Bilirubin, total 0.4 0.2 - 1.0 MG/DL    ALT (SGPT) 7 (L) 12 - 78 U/L    AST (SGOT) 11 (L) 15 - 37 U/L    Alk. 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 Nothing further needs to be done at this time with regard to this entity. The letter sent to office introducing Mr. Brennen Taylor to my office sent by Dr. Celso Shay indicated that he had been treating him for a number of non-melanoma skin cancers for the prior 5 years. Anorexia/ weight loss/FTT no evidence of malignancy identified; may be related to multiple autoimmune diseases. CT scan suggest active colitis. Will need GI followup. Anemia - haptoglobin high. b12 and folate checked in 2015 - normal. Current B12, folate and ferritin okay. Zoe Barrio SPEP and free light chains pending. MARTÍN negative.       Hx of RA, ulcerative colitis and sarcoidosis - on prednisone, has been on embrel and MTX in past. MTX may be contributing to his anemia. CAD s/p CABG, AS s/p AVR - currently with bradycardia.

## 2018-08-14 NOTE — CONSULTS
GI Consultation Note Emerson De Paz)    NAME: Lexx Israel : 1935 MRN: [id number]   ATTG: Dr. Kylie Howard PCP: Marlene Riggs MD  Date/Time:  2018 12:25 PM  Subjective:   REASON FOR CONSULT:        Mayela Ambriz is a 82 y.o.  W male who I was asked to see for abnormal CT scan showing features of proctocolitis, in the setting of chronic ulcerative colitis. Last seen by Dr. Angelo Rodriguez a few months ago. Now, feeling okay. Denies diarrhea, but says stool is soft. No blood in stool. Has not been on biologic thearpy for UC, but he's been on enbrel and methotrexate of late, for RA mostly. Methotrexate is often effective for IBD, so it may have been helping control things. He's eating well, but endorsees weight loss. Last colonoscopy in , without signs of colitis      Past Medical History:   Diagnosis Date    Aortic stenosis, severe     Arrhythmia     heart murmur    Arthritis     Rheumatoid    CAD (coronary artery disease)     S/P CABG    Cancer (HCC)     Basal cell carcinoma    Chronic pain     from rheumatoid arthritis in knees, elbows fingers    HTN (hypertension)     Hypercholesterolemia     S/P AVR (aortic valve replacement)     23 mm Trifecta with Redo CABG x 1SVG to PDA    S/P CABG x 1 10/13/2014    Redo CABG x 1 SVG to PDA; 'y' from collins of existing OM vein graft    Sarcoidosis     No longer active.      Ulcerative colitis ([address]     Not active      Past Surgical History:   Procedure Laterality Date    CARDIAC SURG PROCEDURE UNLIST      CABG    HX AORTIC VALVE REPLACEMENT  10/2014    CABG redo X1    HX CATARACT REMOVAL      Left    HX LUMBAR LAMINECTOMY      HX OTHER SURGICAL      skin lesions removed from nose and forehead- basal cell    LAMINECTOMY,LUMBAR  110/    [place] COLONOSCOPY W/BIOPSY SINGLE/MULTIPLE  2011          Social History   Substance Use Topics    Smoking status: Former Smoker     Packs/day: 1.50     Years: 20.00     Quit date: 1978    Smokeless tobacco: Never Used    Alcohol use Yes      Comment: rarely      Family History   Problem Relation Age of Onset    Heart Disease Father     Cancer Sister      breast     Diabetes Sister    Jory.Darter Cancer Sister      leukemia    Diabetes Sister     Diabetes Mother    Brigitte Cords Bladder Disease Mother       Allergies   Allergen Reactions    Lisinopril Cough    Questran [Cholestyramine (With Sugar)] Other (comments)     Muscle cramps      Home Medications:  Prior to Admission Medications   Prescriptions Last Dose Informant Patient Reported? Taking? OTHER Unknown at Unknown time Other Yes No   Sig: by Injection route every three (3) months. Patient receives lumbar epidural steroid injections by Dr. Lacey Montana [facility]   aspirin 81 mg chewable tablet 2018 at Unknown time Self No Yes   Sig: Take 1 Tab by mouth every evening. atorvastatin (LIPITOR) 20 mg tablet 2018 at Unknown time Other No Yes   Sig: Take 1 tablet by mouth  daily   docusate sodium (COLACE) 100 mg capsule 7/10/2018 at Unknown time Self Yes Yes   Sig: Take 100 mg by mouth two (2) times daily as needed for Constipation. etanercept (ENBREL) 50 mg/mL (0.98 mL) injection 8/3/2018 at Unknown time Self Yes Yes   Si mg by SubCUTAneous route every . folic acid (FOLVITE) 1 mg tablet 8/10/2018 at Unknown time Other Yes Yes   Sig: Take 1 mg by mouth daily. gabapentin (NEURONTIN) 300 mg capsule   No No   Sig: Take 1 Cap by mouth three (3) times daily. methotrexate sodium (METHOTREXATE, ANTI-RHEUMATIC,) 2.5 mg DsPk 8/3/2018 at Unknown time Self Yes Yes   Sig: Take 20 mg by mouth Every Saturday. metoprolol succinate (TOPROL-XL) 25 mg XL tablet 2018 at Unknown time Other Yes Yes   Sig: Take 25 mg by mouth every evening. oxycodone-acetaminophen (PERCOCET)  mg per tablet 8/10/2018 at 1100 Other Yes Yes   Sig: Take 1 tablet by mouth every four (4) hours as needed for Pain.    predniSONE (DELTASONE) 2.5 mg tablet 2018 at Unknown time Other Yes Yes Sig: Take 2.5 mg by mouth daily.       Facility-Administered Medications: None     Hospital medications:  Current Facility-Administered Medications   Medication Dose Route Frequency    0.9% sodium chloride infusion  75 mL/hr IntraVENous CONTINUOUS    mupirocin (BACTROBAN) 2 % ointment   Both Nostrils BID    hydrocortisone Sod Succ (PF) (SOLU-CORTEF) injection 50 mg  50 mg IntraVENous Q8H    acetaminophen (TYLENOL) tablet 325 mg  325 mg Oral Q6H PRN    aspirin chewable tablet 81 mg  81 mg Oral QPM    docusate sodium (COLACE) capsule 100 mg  100 mg Oral BID PRN    folic acid (FOLVITE) tablet 1 mg  1 mg Oral DAILY    gabapentin (NEURONTIN) capsule 300 mg  300 mg Oral TID    oxyCODONE-acetaminophen (PERCOCET 10)  mg per tablet 1 Tab  1 Tab Oral Q4H PRN    sodium chloride (NS) flush 5-10 mL  5-10 mL IntraVENous Q8H    sodium chloride (NS) flush 5-10 mL  5-10 mL IntraVENous PRN    naloxone (NARCAN) injection 0.4 mg  0.4 mg IntraVENous PRN    ondansetron (ZOFRAN) injection 4 mg  4 mg IntraVENous Q4H PRN    heparin (porcine) injection 5,000 Units  5,000 Units SubCUTAneous Q12H    polyethylene glycol (MIRALAX) packet 17 g  17 g Oral DAILY    melatonin tablet 3 mg  3 mg Oral QHS     REVIEW OF SYSTEMS:     []     Unable to obtain  ROS due to  []    mental status change  []    sedated   []    intubated   [x]    Total of 11 systems reviewed as follows:  Const:  negative fever, negative chills, negative weight loss  Eyes:   negative diplopia or visual changes, negative eye pain  ENT:   negative coryza, negative sore throat  Resp:   negative cough, hemoptysis, dyspnea  Cards:  negative for chest pain, palpitations, lower extremity edema  :  negative for frequency, dysuria and hematuria  Skin:   negative for rash and pruritus  Heme:  negative for easy bruising and gum/nose bleeding  MS:  negative for myalgias, arthralgias, back pain and muscle weakness  Neurolo:  negative for headaches, dizziness, vertigo, memory problems   Psych:  negative for feelings of anxiety, depression     Pertinent Positives include :    Objective:   VITALS:    Visit Vitals    /49 (BP 1 Location: Right arm, BP Patient Position: At rest)    Pulse 62    Temp 98.4 °F (36.9 °C)    Resp 18    Ht 5' 8.5\" (1.74 m)    Wt 52.2 kg (115 lb)    SpO2 100%    BMI 17.23 kg/m2     Temp (24hrs), Av.9 °F (36.6 °C), Min:97.7 °F (36.5 °C), Max:98.4 °F (36.9 °C)    PHYSICAL EXAM:   General:    Alert, cooperative, no distress, appears stated age. Head:   Normocephalic, without obvious abnormality, atraumatic. Eyes:   Conjunctivae clear, anicteric sclerae. Pupils are equal  Nose:  Nares normal. No drainage or sinus tenderness. Throat:    Lips, mucosa, and tongue normal.  No Thrush  Neck:  Supple, symmetrical,  no adenopathy, thyroid: non tender  Back:    Symmetric,  No CVA tenderness. Lungs:   CTA bilaterally. No wheezing/rhonchi/rales. Chest wall:  No tenderness or deformity. No Accessory muscle use. Heart:   Regular rate and rhythm,  no murmur, rub or gallop. Abdomen:   Soft, minimal suprapubic ttp, Not distended. Bowel sounds normal. No masses  Extremities: Atraumatic, No cyanosis. No edema. No clubbing  Skin:     Texture, turgor normal. No rashes/lesions/jaundice  Lymph: Cervical, supraclavicular normal.  Psych:  Good insight. Not depressed. Not anxious or agitated. Neurologic: EOMs intact. No facial asymmetry. No aphasia or slurred speech. Normal   strength, A/O X 3.      LAB DATA REVIEWED:    Recent Results (from the past 48 hour(s))   HAPTOGLOBIN    Collection Time: 18  3:32 AM   Result Value Ref Range    Haptoglobin 289 (H) 30 - 200 mg/dL   RETICULOCYTE COUNT    Collection Time: 18  3:32 AM   Result Value Ref Range    Reticulocyte count 0.8 0.7 - 2.1 %    Absolute Retic Cnt. 0.0290 0.0260 - 5.6497 M/ul   METABOLIC PANEL, BASIC    Collection Time: 18  3:32 AM   Result Value Ref Range    Sodium 142 136 - 145 mmol/L    Potassium 3.7 3.5 - 5.1 mmol/L    Chloride 108 97 - 108 mmol/L    CO2 27 21 - 32 mmol/L    Anion gap 7 5 - 15 mmol/L    Glucose 83 65 - 100 mg/dL    BUN 8 6 - 20 MG/DL    Creatinine 0.45 (L) 0.70 - 1.30 MG/DL    BUN/Creatinine ratio 18 12 - 20      GFR est AA >60 >60 ml/min/1.73m2    GFR est non-AA >60 >60 ml/min/1.73m2    Calcium 7.4 (L) 8.5 - 10.1 MG/DL   CBC WITH AUTOMATED DIFF    Collection Time: 08/13/18  3:32 AM   Result Value Ref Range    WBC 5.7 4.1 - 11.1 K/uL    RBC 3.78 (L) 4.10 - 5.70 M/uL    HGB 10.3 (L) 12.1 - 17.0 g/dL    HCT 34.1 (L) 36.6 - 50.3 %    MCV 90.2 80.0 - 99.0 FL    MCH 27.2 26.0 - 34.0 PG    MCHC 30.2 30.0 - 36.5 g/dL    RDW 15.8 (H) 11.5 - 14.5 %    PLATELET 624 951 - 532 K/uL    MPV 10.2 8.9 - 12.9 FL    NRBC 0.0 0  WBC    ABSOLUTE NRBC 0.00 0.00 - 0.01 K/uL    NEUTROPHILS 68 32 - 75 %    LYMPHOCYTES 15 12 - 49 %    MONOCYTES 15 (H) 5 - 13 %    EOSINOPHILS 1 0 - 7 %    BASOPHILS 0 0 - 1 %    IMMATURE GRANULOCYTES 1 (H) 0.0 - 0.5 %    ABS. NEUTROPHILS 3.9 1.8 - 8.0 K/UL    ABS. LYMPHOCYTES 0.9 0.8 - 3.5 K/UL    ABS. MONOCYTES 0.9 0.0 - 1.0 K/UL    ABS. EOSINOPHILS 0.1 0.0 - 0.4 K/UL    ABS. BASOPHILS 0.0 0.0 - 0.1 K/UL    ABS. IMM.  GRANS. 0.0 0.00 - 0.04 K/UL    DF AUTOMATED     DIRECT CHERELLE    Collection Time: 08/13/18  3:32 AM   Result Value Ref Range    MARTÍN Poly NEG    GLUCOSE, POC    Collection Time: 08/13/18  7:33 AM   Result Value Ref Range    Glucose (POC) 100 65 - 100 mg/dL    Performed by [facility]    EKG, 12 LEAD, INITIAL    Collection Time: 08/13/18  8:05 AM   Result Value Ref Range    Ventricular Rate 72 BPM    Atrial Rate 72 BPM    P-R Interval 146 ms    QRS Duration 86 ms    Q-T Interval 404 ms    QTC Calculation (Bezet) 442 ms    Calculated P Axis 95 degrees    Calculated R Axis -71 degrees    Calculated T Axis 68 degrees    Diagnosis       Normal sinus rhythm  Left axis deviation    Confirmed by Fernandez Henry ([id number]) on 8/13/2018 8:56:21 AM MAGNESIUM    Collection Time: 08/14/18  5:25 AM   Result Value Ref Range    Magnesium 2.0 1.6 - 2.4 mg/dL   PHOSPHORUS    Collection Time: 08/14/18  5:25 AM   Result Value Ref Range    Phosphorus 2.7 2.6 - 4.7 MG/DL   CBC WITH AUTOMATED DIFF    Collection Time: 08/14/18  5:25 AM   Result Value Ref Range    WBC 3.1 (L) 4.1 - 11.1 K/uL    RBC 3.55 (L) 4.10 - 5.70 M/uL    HGB 9.7 (L) 12.1 - 17.0 g/dL    HCT 31.4 (L) 36.6 - 50.3 %    MCV 88.5 80.0 - 99.0 FL    MCH 27.3 26.0 - 34.0 PG    MCHC 30.9 30.0 - 36.5 g/dL    RDW 15.7 (H) 11.5 - 14.5 %    PLATELET 658 574 - 535 K/uL    MPV 10.4 8.9 - 12.9 FL    NRBC 0.0 0  WBC    ABSOLUTE NRBC 0.00 0.00 - 0.01 K/uL    NEUTROPHILS 66 32 - 75 %    LYMPHOCYTES 23 12 - 49 %    MONOCYTES 9 5 - 13 %    EOSINOPHILS 0 0 - 7 %    BASOPHILS 0 0 - 1 %    IMMATURE GRANULOCYTES 2 (H) 0.0 - 0.5 %    ABS. NEUTROPHILS 2.0 1.8 - 8.0 K/UL    ABS. LYMPHOCYTES 0.7 (L) 0.8 - 3.5 K/UL    ABS. MONOCYTES 0.3 0.0 - 1.0 K/UL    ABS. EOSINOPHILS 0.0 0.0 - 0.4 K/UL    ABS. BASOPHILS 0.0 0.0 - 0.1 K/UL    ABS. IMM. GRANS. 0.1 (H) 0.00 - 0.04 K/UL    DF AUTOMATED      RBC COMMENTS ANISOCYTOSIS  1+        RBC COMMENTS OVALOCYTES  1+       METABOLIC PANEL, COMPREHENSIVE    Collection Time: 08/14/18  5:25 AM   Result Value Ref Range    Sodium 144 136 - 145 mmol/L    Potassium 3.8 3.5 - 5.1 mmol/L    Chloride 110 (H) 97 - 108 mmol/L    CO2 27 21 - 32 mmol/L    Anion gap 7 5 - 15 mmol/L    Glucose 151 (H) 65 - 100 mg/dL    BUN 9 6 - 20 MG/DL    Creatinine 0.39 (L) 0.70 - 1.30 MG/DL    BUN/Creatinine ratio 23 (H) 12 - 20      GFR est AA >60 >60 ml/min/1.73m2    GFR est non-AA >60 >60 ml/min/1.73m2    Calcium 7.0 (L) 8.5 - 10.1 MG/DL    Bilirubin, total 0.4 0.2 - 1.0 MG/DL    ALT (SGPT) 7 (L) 12 - 78 U/L    AST (SGOT) 11 (L) 15 - 37 U/L    Alk.  phosphatase 72 45 - 117 U/L    Protein, total 4.6 (L) 6.4 - 8.2 g/dL    Albumin 2.0 (L) 3.5 - 5.0 g/dL    Globulin 2.6 2.0 - 4.0 g/dL    A-G Ratio 0.8 (L) 1.1 - 2.2 VITAMIN B12    Collection Time: 08/14/18  5:25 AM   Result Value Ref Range    Vitamin B12 614 193 - 986 pg/mL   FOLATE    Collection Time: 08/14/18  5:25 AM   Result Value Ref Range    Folate 23.0 (H) 5.0 - 21.0 ng/mL   FERRITIN    Collection Time: 08/14/18  5:25 AM   Result Value Ref Range    Ferritin 140 26 - 388 NG/ML     IMAGING RESULTS:  CT A/P: proctocolitis, chronic, but possibly slightly worse than previous imaging. Assessment/Plan:      Active Problems:    Weakness (8/10/2018)      Falls (8/10/2018)    81 yo M with chronic UC, and some worsening inflammation of late and the abnormal CT, looser stool, failure to thrive. Needs another colonoscopy, with weight loss, may proceed with EGD at the same time.  Plan for these as an outpatient.   ___________________________________________________  RECOMMENDATIONS:      -- outpatient follow up for EGD/Colonoscopy  -- stress dose steroids adequate for UC treatment for now  -- May do better from a colitis standpoint (and should help RA) with a different biologic, like adalimumab (humira) for instance, but would need insurance approval prior and need to consider the increased risk of skin cancer in patients on similar therapy  -- work up and treatment choice would depend on colonoscopy and findings  -- will schedule directly for outpatient EGD/Colonoscopy    Discussed Code Status:    [x]    Full Code      []    DNR    ___________________________________________________  Care Plan discussed with:    [x]    Patient   []    Family   []    Nursing   []    Attending  Total Time :  45   minutes   ___________________________________________________  GI: Demetri Maloney MD

## 2018-08-14 NOTE — PROGRESS NOTES
[facility]  Pulmonary, Critical Care, and Sleep Medicine    Name: Peggy Cade MRN: [id number]   : 1935 Hospital: [facility]   Date: 2018        IMPRESSION:   · Acute severe bradycardia-Per Cards felt to be vagal. Hemodynamics have been stable. · Failure to thrive  · His appetite seems to slowly be improving. · Recent C diff with chronic appearing proctocolitis on CT  · H/O B cell lymphoma - ?treated  · H/O melanoma of scalp  · Chronic back pain  · CAD with h/o CABG and AVR  · H/O ulcerative colitis - on MTX and Enbrel   · H/O sarcoidosis - no details available, no signs of active sarcoidosis on imaging  · DM      PLAN:   · O2 as needed  · Cardiology consult follwoing  · Oncology evaluation ongoing per Dr. Jennifer Leslie. · Insulin   · DVT prophylaxis  · Palliative care consult  · Will place orders for transfer to 2nd floor tele. Subjective/Interval History:   Last 24 hrs: Pt has no acute complaints when seen this am. NO chest pain, no back pain, no abdominal pain. Denies any dizziness. NO issues last night. Rest of 10 point ROS was negative. execept as below. I have reviewed the flowsheet and previous days notes. Admitted 8-10-18 for failure to thrive and severe chronic back pain. Recent C diff. Losing weight. Today developed severe bradycardia with long pauses. Denies any complaints at this time. Review of Systems   Constitutional: Positive for fatigue. HENT: Negative. Eyes: Negative. Respiratory: Negative. Cardiovascular: Negative. Gastrointestinal: Negative.       Objective:   Vital Signs:    Visit Vitals    /51    Pulse 75    Temp 97.7 °F (36.5 °C)    Resp 17    Ht 5' 8.5\" (1.74 m)    Wt 52.2 kg (115 lb)    SpO2 100%    BMI 17.23 kg/m2       O2 Device: Nasal cannula   O2 Flow Rate (L/min): 2 l/min   Temp (24hrs), Av °F (36.7 °C), Min:97.7 °F (36.5 °C), Max:98.8 °F (37.1 °C)       Intake/Output:   Last shift:         Last 3 shifts: 08/12 1901 - 08/14 0700  In: 8130 [P.O.:250; I.V.:7880]  Out: 350 [Urine:350]    Intake/Output Summary (Last 24 hours) at 08/14/18 0813  Last data filed at 08/14/18 0600   Gross per 24 hour   Intake          2951.67 ml   Output              350 ml   Net          2601.67 ml        Physical Exam   Constitutional: He appears cachectic. He appears ill. No distress. HENT:   Head: Normocephalic and atraumatic. Mouth/Throat: No oropharyngeal exudate. Eyes: No scleral icterus. Cardiovascular: Normal rate. An irregular rhythm present. Murmur heard. Pulmonary/Chest: No respiratory distress. He has no wheezes. He has no rales. Abdominal: Soft. Bowel sounds are normal. He exhibits no distension. There is no tenderness. Musculoskeletal: He exhibits no edema. Skin: Skin is warm and dry. No rash noted. Data:   Labs:  Recent Labs      08/14/18   0525 08/13/18   0332  08/12/18   0433   WBC  3.1*  5.7  7.7   HGB  9.7*  10.3*  10.3*   HCT  31.4*  34.1*  33.5*   PLT  187  220  188     Recent Labs      08/14/18   0525 08/13/18   0332  08/12/18   0433   NA  144  142  140   K  3.8  3.7  3.5   CL  110*  108  105   CO2  27  27  27   GLU  151*  83  88   BUN  9  8  7   CREA  0.39*  0.45*  0.49*   CA  7.0*  7.4*  7.7*   MG  2.0   --    --    PHOS  2.7   --    --    ALB  2.0*   --    --    TBILI  0.4   --    --    SGOT  11*   --    --    ALT  7*   --    --      No results for input(s): PH, PCO2, PO2, HCO3, FIO2 in the last 72 hours.     Imaging:  I have personally reviewed the patients radiographs:  Negative chest CT (other than pleural plaques), diffuse chronic proctocolitis        Jaci Dhaliwal MD

## 2018-08-14 NOTE — PROGRESS NOTES
Problem: Self Care Deficits Care Plan (Adult)  Goal: *Acute Goals and Plan of Care (Insert Text)  Occupational Therapy Goals  Initiated 8/11/2018  1. Patient will perform grooming seated EOB with modified independence within 7 day(s). 2.  Patient will perform upper body dressing with moderate assistance  within 7 day(s). 3.  Patient will perform lower body dressing with moderate assistance  within 7 day(s). 4.  Patient will perform toilet transfers with supervision/set-up within 7 day(s). 5.  Patient will perform all aspects of toileting with minimal assistance/contact guard assist within 7 day(s). 6.  Patient will participate in upper extremity therapeutic exercise/activities with independence for 5 minutes within 7 day(s). 7.  Patient will utilize energy conservation techniques during functional activities with verbal cues within 7 day(s). Occupational Therapy TREATMENT  Patient: Alex Acuna (16 y.o. male)  Date: 8/14/2018  Diagnosis: Weakness  Falls <principal problem not specified>       Precautions: Contact, Fall (c diff)  Chart, occupational therapy assessment, plan of care, and goals were reviewed. ASSESSMENT:  Pt was cleared to be seen for therapy and all VSS and pt was on RA. Pt needed verbal encouragement to work with therapy due to pt was tired. He was willing to sit up on EOb and was mod assist of 2 for supine to sit and once he was sitting he was able to sit with no assist.  Pts VSS stable in sitting and he stood with min assist of 2 and was able to side step to the [facility] with CGA of 2. Tried to have pt sit up in chair and he politely refused, stating that he was tired and had a lot of things going on today. Pt has functional range in BUE and his strength was functional and noted to have increased edema in RUE. Pt was left supine in bed with call bell with in reach and recommend that pt have further therapy at discharge at rehab at [facility].   Progression toward goals:  [x] Improving appropriately and progressing toward goals  []       Improving slowly and progressing toward goals  []       Not making progress toward goals and plan of care will be adjusted     PLAN:  Patient continues to benefit from skilled intervention to address the above impairments. Continue treatment per established plan of care. Discharge Recommendations:  Skilled Nursing Facility  Further Equipment Recommendations for Discharge:  tbd     SUBJECTIVE:   Patient stated I really don't want to sit up, I am so tired today. Jose F Kraft    OBJECTIVE DATA SUMMARY:   Cognitive/Behavioral Status:  Neurologic State: Alert  Orientation Level: Appropriate for age;Oriented X4  Cognition: Appropriate decision making; Follows commands  Perception: Appears intact  Perseveration: No perseveration noted  Safety/Judgement: Awareness of environment    Functional Mobility and Transfers for ADLs:  Bed Mobility:  Rolling: Minimum assistance; Additional time  Supine to Sit: Moderate assistance;Assist x2  Scooting: Contact guard assistance    Transfers:  Sit to Stand: Contact guard assistance;Minimum assistance;Assist x2          Balance:  Sitting: Impaired  Sitting - Static: Good (unsupported)  Sitting - Dynamic: Not tested  Standing: Impaired  Standing - Static: Fair;Constant support  Standing - Dynamic : Fair (using RW to side step to head of bed)    ADL Intervention:  Feeding  Feeding Assistance: Supervision/set-up     pt is max for ADLs       Cognitive Retraining  Safety/Judgement: Awareness of environment      Pain:  Pain Scale 1: Numeric (0 - 10)  Pain Intensity 1: 2  Pain Location 1: Back;Leg  Pain Orientation 1: Right  Pain Description 1: Aching;Numb;Stabbing  Pain Intervention(s) 1: Medication (see MAR)  Activity Tolerance:   Vss/ poor  Please refer to the flowsheet for vital signs taken during this treatment.   After treatment:   [] Patient left in no apparent distress sitting up in chair  [x] Patient left in no apparent distress in bed  [x] Call bell left within reach  [x] Nursing notified  [] Caregiver present  [] Bed alarm activated    COMMUNICATION/COLLABORATION:   The patients plan of care was discussed with: Physical Therapist and Registered Nurse    Paulie Cannon OT  Time Calculation: 17 mins

## 2018-08-14 NOTE — PROGRESS NOTES
Problem: Pressure Injury - Risk of  Goal: *Prevention of pressure injury  Document Freddy Scale and appropriate interventions in the flowsheet. Outcome: Progressing Towards Goal  Pressure Injury Interventions:  Sensory Interventions: Assess changes in LOC, Chair cushion, Check visual cues for pain, Discuss PT/OT consult with provider, Float heels, Keep linens dry and wrinkle-free, Maintain/enhance activity level, Minimize linen layers, Monitor skin under medical devices, Pad between skin to skin, Turn and reposition approx. every two hours (pillows and wedges if needed)    Moisture Interventions: Absorbent underpads, Apply protective barrier, creams and emollients, Limit adult briefs, Maintain skin hydration (lotion/cream), Minimize layers, Moisture barrier    Activity Interventions: Chair cushion, Increase time out of bed, PT/OT evaluation    Mobility Interventions: Float heels, PT/OT evaluation, Turn and reposition approx.  every two hours(pillow and wedges)    Nutrition Interventions: Document food/fluid/supplement intake, Discuss nutritional consult with provider, Offer support with meals,snacks and hydration    Friction and Shear Interventions: Apply protective barrier, creams and emollients, Lift sheet, HOB 30 degrees or less, Minimize layers, Transferring/repositioning devices

## 2018-08-14 NOTE — PROGRESS NOTES
Nutrition Assessment:    RECOMMENDATIONS:   Continue Regular diet and supplements    ASSESSMENT:   Chart reviewed, case discussed during CCU rounds. Pt off the floor at time of first visit attempt. At time of second visit attempt he was working with PT/OT. Per RN flowsheet's and progress notes his appetite is improving, he consumed 100% of his lunch tray today. Labs reviewed, WNL. BM noted on Sunday. Current intake is likely adequate to meet est needs. Unable to say whether pt meets criteria for malnutrition until physical assessment and interview is completed. Dietitians Intervention(s)/Plan(s): Continue diet and supplements   SUBJECTIVE/OBJECTIVE:   Pt working with PT/OT   Diet Order: Regular, Other (comment) (Ensure BID + Magic Cup daily )  % Eaten:  Patient Vitals for the past 72 hrs:   % Diet Eaten   08/14/18 1357 100 %   08/14/18 0918 50 %     Pertinent Medications:folvite, solucortef, miralax; [email or web address]). Chemistries:  Lab Results   Component Value Date/Time    Sodium 144 08/14/2018 05:25 AM    Potassium 3.8 08/14/2018 05:25 AM    Chloride 110 (H) 08/14/2018 05:25 AM    CO2 27 08/14/2018 05:25 AM    Anion gap 7 08/14/2018 05:25 AM    Glucose 151 (H) 08/14/2018 05:25 AM    BUN 9 08/14/2018 05:25 AM    Creatinine 0.39 (L) 08/14/2018 05:25 AM    BUN/Creatinine ratio 23 (H) 08/14/2018 05:25 AM    GFR est AA >60 08/14/2018 05:25 AM    GFR est non-AA >60 08/14/2018 05:25 AM    Calcium 7.0 (L) 08/14/2018 05:25 AM    Albumin 2.0 (L) 08/14/2018 05:25 AM      Anthropometrics: Height: 5' 8.5\" (174 cm) Weight: 52.2 kg (115 lb)   []bed scale    []stated   [x]unknown (8/10)     IBW (%IBW):   ( ) UBW (%UBW):   (  %)    BMI: Body mass index is 17.23 kg/(m^2). This BMI is indicative of:  [x]Underweight   []Normal   []Overweight   [] Obesity   [] Extreme Obesity (BMI>40)  Estimated Nutrition Needs (Based on): 1749 Kcals/day (BMR (1207) x 1.3AF +300kcal) , 68 g (1.3 g/kg bw) Protein  Carbohydrate:  At [facility]- 130 g/day  Fluids: 1800 mL/day    Last BM: 8/12   []Active     []Hyperactive  []Hypoactive       [] Absent   BS  Skin:    [] Intact   [] Incision  [] Breakdown   [] DTI   [x] Tears/Excoriation/Abrasion  []Edema [] Other: Wt Readings from Last 30 Encounters:   08/10/18 52.2 kg (115 lb)   08/10/18 52 kg (114 lb 10.2 oz)   03/16/18 63 kg (139 lb)   03/09/18 54.4 kg (120 lb)   03/07/18 54.4 kg (120 lb)   02/20/18 57.6 kg (127 lb)   10/05/17 72.6 kg (160 lb)   08/01/17 71.2 kg (157 lb)   03/31/17 73.5 kg (162 lb)   09/07/16 69.4 kg (153 lb)   05/02/16 66.2 kg (146 lb)   03/01/16 66.9 kg (147 lb 8 oz)   01/21/16 69.4 kg (153 lb)   08/26/15 67.6 kg (149 lb)   05/05/15 73.9 kg (163 lb)   04/21/15 72.8 kg (160 lb 8 oz)   01/16/15 67.6 kg (149 lb)   11/21/14 68.7 kg (151 lb 8 oz)   11/13/14 67.7 kg (149 lb 3.2 oz)   11/12/14 67.5 kg (148 lb 12.8 oz)   11/11/14 64 kg (141 lb)   10/17/14 72.1 kg (158 lb 14.4 oz)   10/03/14 72.1 kg (159 lb)   09/18/14 73.5 kg (162 lb)   09/12/14 73.5 kg (162 lb)   07/28/14 72.6 kg (160 lb)   03/17/14 79.8 kg (176 lb)   04/29/13 76.7 kg (169 lb)   10/29/12 79.4 kg (175 lb)   04/24/12 81.6 kg (180 lb)      NUTRITION DIAGNOSES:   Problem:  Unintended weight loss      Etiology: related to decreased appetite     Signs/Symptoms: as evidenced by 26.8% weight loss x 1 year    Previous dx re: unintended wt loss continues.      NUTRITION INTERVENTIONS:  Meals/Snacks: General/healthful diet   Supplements: Commercial supplement              GOAL:   Pt will consume >50% of meals/supplements in 2-3 days    NUTRITION MONITORING AND EVALUATION   Previous Goal: PO intake >50% of meals/supplements next 2-4 days   Previous Goal Met: Yes   Previous Recommendations Implemented: Yes   Cultural, Hinduism, or Ethnic Dietary Needs: None   LEARNING NEEDS (Diet, Food/Nutrient-Drug Interaction):    [x] None Identified   [] Identified and Education Provided/Documented   [] Identified and Pt declined/was not appropriate [x] Interdisciplinary Care Plan Reviewed/Documented    [x] Participated in Discharge Planning: Regular diet + PO supplements 2-3 x day    [x] Interdisciplinary Rounds     NUTRITION RISK:    [x] High              [] Moderate           []  Low  []  Minimal/Uncompromised      Edilson Mai, [address] [place] [address]   Pager [phone]  Weekend Pager [phone]

## 2018-08-14 NOTE — PROGRESS NOTES
Shift summary    Pt remained alert and oriented throughout shift. Received pain medication x1. Pt remained on  2L nasal cannula. Pt with pacing pads and monitor overnight. Pt in sinus or sinus aaron. Pt night otherwise uneventful.

## 2018-08-14 NOTE — PROGRESS NOTES
Physical Therapy Goals  Initiated 8/11/2018  1. Patient will move from supine to sit and sit to supine  in bed with independence within 7 day(s). 2.  Patient will transfer from bed to chair and chair to bed with modified independence using the least restrictive device within 7 day(s). 3.  Patient will perform sit to stand with modified independence within 7 day(s). 4.  Patient will ambulate with modified independence for 50 feet with the least restrictive device within 7 day(s). physical Therapy TREATMENT  Patient: Dorothy Gonzalez (4 y.o. male)  Date: 8/14/2018  Diagnosis: Weakness  Falls <principal problem not specified>       Precautions: Contact, Fall (c diff)  Chart, physical therapy assessment, plan of care and goals were reviewed. ASSESSMENT: Patient reporting increased fatigue and weakness requiring maximal encouragement for participation in therapy today. Patient requiring min to mod A of 2 for bed mobility. Once sitting EOB patient demonstrating good static sitting balance. He was able to perform transfers with CGA/min A of 2 and side stepped to head of bed using RW for support. R foot drop noted which patient reports has been present for 3 years. Encouraged patient to be OOB to chair as tolerated. Recommend SNF following discharge to improve overall functional independence prior to returning to home. Patient reports that his wife is not in good health. They will likely benefit from increased help once patient is home. PLAN:  Patient continues to benefit from skilled intervention to address the above impairments. Continue treatment per established plan of care. Discharge Recommendations:  [facility]  Further Equipment Recommendations for Discharge:  TBD by rehab     SUBJECTIVE:   Patient stated I'm so tired I really don't want to do anything.     OBJECTIVE DATA SUMMARY:   Critical Behavior:  Neurologic State: Alert, Appropriate for age  Orientation Level: Oriented X4  Cognition: Follows commands     Functional Mobility Training:  Bed Mobility:  Rolling: Minimum assistance; Additional time  Supine to Sit: Moderate assistance;Assist x2     Scooting: Contact guard assistance        Transfers:  Sit to Stand: Contact guard assistance;Minimum assistance;Assist x2  Stand to Sit: Contact guard assistance;Minimum assistance;Assist x2                             Balance:  Sitting: Impaired  Sitting - Static: Good (unsupported)  Sitting - Dynamic: Not tested  Standing: Impaired  Standing - Static: Fair;Constant support  Standing - Dynamic : Fair (using RW to side step to head of bed)  Ambulation/Gait Training:      Patient able to side step 2 feet to head of bed using RW for support; R foot dropped noted with subsequent steppage gait; CGA/min A of 2 with no overt LOB noted                Pain:  Pain Scale 1: Numeric (0 - 10)  Pain Intensity 1: 2  Pain Location 1: Back;Leg  Pain Orientation 1: Right  Pain Description 1: Aching;Numb;Stabbing  Pain Intervention(s) 1: Medication (see MAR)  Activity Tolerance:   VSS  Please refer to the flowsheet for vital signs taken during this treatment.   After treatment:   []    Patient left in no apparent distress sitting up in chair  [x]    Patient left in no apparent distress in bed  [x]    Call bell left within reach  [x]    Nursing notified  []    Caregiver present  [x]    Bed alarm activated    COMMUNICATION/COLLABORATION:   The patients plan of care was discussed with: Occupational Therapist and Registered Nurse    Can Carlos, PT   Time Calculation: 17 mins

## 2018-08-14 NOTE — PROGRESS NOTES
Bedside and Verbal shift change report given to Donnie Desouza RN (oncoming nurse). Report included the following information SBAR, Kardex, ED Summary, Procedure Summary, Intake/Output, MAR and Recent Results. SHIFT SUMMARY:  Uneventful shift.

## 2018-08-14 NOTE — PROGRESS NOTES
TRANSFER - OUT REPORT:    Verbal report given to Rafal Alba kirt Burrell  being transferred to [facility] for routine progression of care       Report consisted of patients Situation, Background, Assessment and   Recommendations(SBAR). Information from the following report(s) SBAR, Intake/Output, MAR and Cardiac Rhythm SR was reviewed with the receiving nurse. Lines:   Peripheral IV 08/10/18 Left Hand (Active)   Site Assessment Clean, dry, & intact 8/14/2018  8:00 AM   Phlebitis Assessment 0 8/14/2018  8:00 AM   Infiltration Assessment 0 8/14/2018  8:00 AM   Dressing Status Clean, dry, & intact 8/14/2018  8:00 AM   Dressing Type Transparent 8/14/2018  4:00 AM   Hub Color/Line Status Pink; Infusing 8/14/2018  4:00 AM   Action Taken Open ports on tubing capped 8/14/2018  4:00 AM   Alcohol Cap Used Yes 8/14/2018  4:00 AM        Opportunity for questions and clarification was provided.       Patient transported with:   Monitor  Registered Nurse

## 2018-08-14 NOTE — PROGRESS NOTES
Hospitalist Progress Note    NAME: Lizz Brooks   :  1935   MRN:  [id number]       Assessment / Plan:  Severe bradycardia  -transferred to ICU on  for transcutaneous pacing prn. HR remains stable today. 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 No acute infarct or mass. -CT a/p suggestive of chronic proctocolitis  -cont' IVF  -consult GI. Has been seen by Dr Eugenia Padilla in the past   -Dr Valentino Kung evaluating pt, ongoing work up    Back pain  -MRI C/L spine showed degenerative spine changes with no canal stenosis or cord signal abnormality. No hernation or acute abnormality  -consult PT/OT, recommended SNF    CAD s/p CABG  AS s/p AVR  HTN  - stop BB  -cont' ASA  -statin on hold due to myalgias    Hx of RA  Hx of ulcerative colitis  Hx of sarcoidosis  -on chronic prednisone, now on stress dose steroids  -cont' current pain management  -enbrel and MTX on hold, pending GI's consultation    Hx of Cdiff  -completed treatment. No diarrhea here    Severe Malnutrition, Body mass index is 17.23 kg/(m^2). Code status: Full  Prophylaxis: Hep SQ  Recommended Disposition: SNF/LTC     Subjective:     Chief Complaint / Reason for Physician Visit  Pt seen after he was transferred to [facility] floor. He denies complaints. Wants to go home. States appetite is improving    Discussed with RN events overnight.      Review of Systems:  Symptom Y/N Comments  Symptom Y/N Comments   Fever/Chills n   Chest Pain n    Poor Appetite    Edema Cough    Abdominal Pain n    Sputum    Joint Pain     SOB/BEY n   Pruritis/Rash     Nausea/vomit    Tolerating PT/OT     Diarrhea    Tolerating Diet     Constipation    Other       Could NOT obtain due to:      Objective:     VITALS:   Last 24hrs VS reviewed since prior progress note. Most recent are:  Patient Vitals for the past 24 hrs:   Temp Pulse Resp BP SpO2   08/14/18 1048 98.4 °F (36.9 °C) 62 18 123/49 100 %   08/14/18 0945 97.8 °F (36.6 °C) 70 20 138/51 100 %   08/14/18 0800 97.9 °F (36.6 °C) 70 24 124/54 100 %   08/14/18 0700 - 75 17 109/51 100 %   08/14/18 0600 - 60 19 101/42 100 %   08/14/18 0500 - (!) 59 15 98/48 100 %   08/14/18 0400 97.7 °F (36.5 °C) 60 18 (!) 97/38 100 %   08/14/18 0300 - 62 - 115/53 100 %   08/14/18 0200 - 77 28 108/42 100 %   08/14/18 0100 - (!) 55 15 91/43 100 %   08/14/18 0000 97.9 °F (36.6 °C) (!) 56 14 93/49 100 %   08/13/18 2300 - 63 19 (!) 87/38 100 %   08/13/18 2200 - 65 23 (!) 82/41 100 %   08/13/18 2100 - 67 19 (!) 85/37 100 %   08/13/18 2000 98.1 °F (36.7 °C) 70 22 94/44 100 %   08/13/18 1900 - 63 19 (!) 84/47 100 %   08/13/18 1800 - 62 18 (!) 83/37 100 %   08/13/18 1730 - 68 18 (!) 81/36 -   08/13/18 1700 - 65 17 93/44 100 %   08/13/18 1600 97.7 °F (36.5 °C) 61 17 92/40 100 %   08/13/18 1540 - 63 16 91/41 100 %   08/13/18 1510 - 87 (!) 31 (!) 89/33 100 %   08/13/18 1440 - 64 11 (!) 83/38 100 %   08/13/18 1430 - 63 10 (!) 76/33 99 %   08/13/18 1420 - 64 15 (!) 76/32 99 %   08/13/18 1402 - 67 18 (!) 85/32 100 %   08/13/18 1400 - 74 22 (!) 77/44 100 %   08/13/18 1340 - 66 18 99/44 100 %   08/13/18 1200 97.7 °F (36.5 °C) 60 13 107/44 100 %       Intake/Output Summary (Last 24 hours) at 08/14/18 1126  Last data filed at 08/14/18 0918   Gross per 24 hour   Intake             2900 ml   Output              700 ml   Net             2200 ml        PHYSICAL EXAM:  General: WD, WN. Alert, cooperative, no acute distress    EENT:  EOMI. Anicteric sclerae.  MMM  Resp:  CTA bilaterally, no wheezing or rales. No accessory muscle use  CV:  Sinus bradycardia,  No edema  GI:  Soft, Non distended, Non tender.  +BS  Neurologic:  Alert and oriented X 3, normal speech  Psych:   Not anxious nor agitated  Skin:  No rashes. No jaundice    Reviewed most current lab test results and cultures  YES  Reviewed most current radiology test results   YES  Review and summation of old records today    NO  Reviewed patient's current orders and MAR    YES  PMH/SH reviewed - no change compared to H&P  ________________________________________________________________________  Care Plan discussed with:    Comments   Patient x    Family      RN x    Care Manager x    Consultant                       x Multidiciplinary team rounds were held today with , nursing, pharmacist and clinical coordinator. Patient's plan of care was discussed; medications were reviewed and discharge planning was addressed. ________________________________________________________________________  Total NON critical care TIME:  35   Minutes    Total CRITICAL CARE TIME Spent:    Minutes non procedure based      Comments   >50% of visit spent in counseling and coordination of care     ________________________________________________________________________  Stephanie Patrick MD     Procedures: see electronic medical records for all procedures/Xrays and details which were not copied into this note but were reviewed prior to creation of Plan. LABS:  I reviewed today's most current labs and imaging studies.   Pertinent labs include:  Recent Labs      08/14/18   0525  08/13/18   0332  08/12/18   0433   WBC  3.1*  5.7  7.7   HGB  9.7*  10.3*  10.3*   HCT  31.4*  34.1*  33.5*   PLT  187  220  188     Recent Labs      08/14/18   0525  08/13/18   0332  08/12/18   0433   NA  144  142  140   K  3.8  3.7  3.5   CL  110*  108  105   CO2  27  27  27   GLU  151*  83  88   BUN  9  8  7   CREA  0.39*  0.45*  0.49*   CA  7.0*  7.4*  7.7*   MG  2.0   -- --    PHOS  2.7   --    --    ALB  2.0*   --    --    TBILI  0.4   --    --    SGOT  11*   --    --    ALT  7*   --    --        Signed: Edward Rain MD

## 2018-08-15 NOTE — CONSULTS
NEUROLOGY NOTE     No chief complaint on file. Reason for Consult  I have been asked by Sean Griffin MD to see the patient in neurological consultation to render advice and opinion regarding left sided weakness. HPI  Lucila Barfield is a 82 y.o. male. Pt does have hx of rheumatoid arthritis. Neurology consulted because of left upper and lower ext weakness. He states that symptoms started around 3 weeks ago and it started with left leg weakness and numbness that progressed to the left arm and the left side of the face. Symptoms have been progressive. No modifying factors. ROS  A ten system review of constitutional, cardiovascular, respiratory, musculoskeletal, endocrine, skin, SHEENT, genitourinary, psychiatric and neurologic systems was obtained and is unremarkable except as stated in HPI     PMH  Past Medical History:   Diagnosis Date    Aortic stenosis, severe     Arrhythmia     heart murmur    Arthritis     Rheumatoid    CAD (coronary artery disease) 1998    S/P CABG    Cancer (HCC)     Basal cell carcinoma    Chronic pain     from rheumatoid arthritis in knees, elbows fingers    HTN (hypertension)     Hypercholesterolemia     S/P AVR (aortic valve replacement)     23 mm Trifecta with Redo CABG x 1SVG to PDA    S/P CABG x 1 10/13/2014    Redo CABG x 1 SVG to PDA; 'y' from collins of existing OM vein graft    Sarcoidosis     No longer active.      Ulcerative colitis ([facility] [address]     Not active       FH  Family History   Problem Relation Age of Onset    Heart Disease Father     Cancer Sister      breast     Diabetes Sister     Cancer Sister      leukemia    Diabetes Sister     Diabetes Mother    Tatiana Hernandez Bladder Disease Mother        SH  Social History     Social History    Marital status:      Spouse name: N/A    Number of children: N/A    Years of education: N/A     Social History Main Topics    Smoking status: Former Smoker     Packs/day: 1.50     Years: 20.00     Quit date: 1/1/1978    Smokeless tobacco: Never Used    Alcohol use Yes      Comment: rarely    Drug use: No    Sexual activity: Not on file     Other Topics Concern    Not on file     Social History Narrative       ALLERGIES  Allergies   Allergen Reactions    Lisinopril Cough    Questran [Cholestyramine (With Sugar)] Other (comments)     Muscle cramps       PHYSICAL EXAM  EXAMINATION:   Patient Vitals for the past 24 hrs:   Temp Pulse Resp BP SpO2   08/15/18 1048 97.7 °F (36.5 °C) 66 16 113/48 100 %   08/15/18 0742 97.8 °F (36.6 °C) 71 16 (!) 109/36 91 %   08/15/18 0300 98.5 °F (36.9 °C) 73 16 104/68 98 %   08/14/18 2250 98.6 °F (37 °C) 69 16 120/61 98 %   08/14/18 2006 98.8 °F (37.1 °C) 75 16 100/61 96 %   08/14/18 1512 98.2 °F (36.8 °C) 75 16 111/53 97 %   08/14/18 1452 - 79 - 121/54 96 %   08/14/18 1449 - 78 - 107/46 -        General:   General appearance: Pt is in no acute distress   Distal pulses are preserved  Fundoscopic exam: attempted    Neurological Examination:   Mental Status:  AAO x3. Speech is fluent. Follows commands, has normal fund of knowledge, attention, short term recall, comprehension and insight. Cranial Nerves: Visual fields are full. PERRL, Extraocular movements are full. Facial sensation intact. Facial movement intact. Hearing intact to conversation. Palate elevates symmetrically. Shoulder shrug symmetric. Tongue midline. Motor: Strength is 4/5 in LE with right foot drop. RUE is 5/5 and LUE is 2/5 . Normal tone. No atrophy. Sensation: Decreased PP distally in LE and LUE    Reflexes: DTRs2+ in RUE. Absent in LUE and bilateral lower ext. Coordination/Cerebellar: Intact to finger-nose-finger on the right    Gait: deferred    Skin: No significant bruising or lacerations.     LAB DATA REVIEWED:    Recent Results (from the past 24 hour(s))   METABOLIC PANEL, BASIC    Collection Time: 08/15/18  3:17 AM   Result Value Ref Range    Sodium 147 (H) 136 - 145 mmol/L    Potassium 3.6 3.5 - 5.1 mmol/L    Chloride 114 (H) 97 - 108 mmol/L    CO2 27 21 - 32 mmol/L    Anion gap 6 5 - 15 mmol/L    Glucose 135 (H) 65 - 100 mg/dL    BUN 13 6 - 20 MG/DL    Creatinine 0.35 (L) 0.70 - 1.30 MG/DL    BUN/Creatinine ratio 37 (H) 12 - 20      GFR est AA >60 >60 ml/min/1.73m2    GFR est non-AA >60 >60 ml/min/1.73m2    Calcium 7.3 (L) 8.5 - 10.1 MG/DL   CBC WITH AUTOMATED DIFF    Collection Time: 08/15/18  3:17 AM   Result Value Ref Range    WBC 2.4 (L) 4.1 - 11.1 K/uL    RBC 3.23 (L) 4.10 - 5.70 M/uL    HGB 9.0 (L) 12.1 - 17.0 g/dL    HCT 28.9 (L) 36.6 - 50.3 %    MCV 89.5 80.0 - 99.0 FL    MCH 27.9 26.0 - 34.0 PG    MCHC 31.1 30.0 - 36.5 g/dL    RDW 15.8 (H) 11.5 - 14.5 %    PLATELET 738 839 - 048 K/uL    MPV 10.4 8.9 - 12.9 FL    NRBC 0.0 0  WBC    ABSOLUTE NRBC 0.00 0.00 - 0.01 K/uL    NEUTROPHILS 60 32 - 75 %    LYMPHOCYTES 27 12 - 49 %    MONOCYTES 10 5 - 13 %    EOSINOPHILS 0 0 - 7 %    BASOPHILS 0 0 - 1 %    IMMATURE GRANULOCYTES 3 (H) 0.0 - 0.5 %    ABS. NEUTROPHILS 1.5 (L) 1.8 - 8.0 K/UL    ABS. LYMPHOCYTES 0.6 (L) 0.8 - 3.5 K/UL    ABS. MONOCYTES 0.2 0.0 - 1.0 K/UL    ABS. EOSINOPHILS 0.0 0.0 - 0.4 K/UL    ABS. BASOPHILS 0.0 0.0 - 0.1 K/UL    ABS. IMM. GRANS. 0.1 (H) 0.00 - 0.04 K/UL    DF AUTOMATED      RBC COMMENTS ANISOCYTOSIS  1+        RBC COMMENTS OVALOCYTES  1+            Imaging review:  MRI Brain  Stable chronic small vessel ischemic white matter disease. No acute  infarct or mass. MRI C spine  Examination is partially limited by patient motion. There is mild  cervical spondylosis    MRI L spine  Degenerative changes are largely unchanged from 8/16/2017      HOME MEDS  Prior to Admission Medications   Prescriptions Last Dose Informant Patient Reported? Taking? OTHER Unknown at Unknown time Other Yes No   Sig: by Injection route every three (3) months.  Patient receives lumbar epidural steroid injections by Dr[facility] Kiko [facility]   aspirin 81 mg chewable tablet 8/9/2018 at Unknown time Self No Yes   Sig: Take 1 Tab by mouth every evening. atorvastatin (LIPITOR) 20 mg tablet 2018 at Unknown time Other No Yes   Sig: Take 1 tablet by mouth  daily   docusate sodium (COLACE) 100 mg capsule 7/10/2018 at Unknown time Self Yes Yes   Sig: Take 100 mg by mouth two (2) times daily as needed for Constipation. etanercept (ENBREL) 50 mg/mL (0.98 mL) injection 8/3/2018 at Unknown time Self Yes Yes   Si mg by SubCUTAneous route every . folic acid (FOLVITE) 1 mg tablet 8/10/2018 at Unknown time Other Yes Yes   Sig: Take 1 mg by mouth daily. gabapentin (NEURONTIN) 300 mg capsule   No No   Sig: Take 1 Cap by mouth three (3) times daily. methotrexate sodium (METHOTREXATE, ANTI-RHEUMATIC,) 2.5 mg DsPk 8/3/2018 at Unknown time Self Yes Yes   Sig: Take 20 mg by mouth Every Saturday. metoprolol succinate (TOPROL-XL) 25 mg XL tablet 2018 at Unknown time Other Yes Yes   Sig: Take 25 mg by mouth every evening. oxycodone-acetaminophen (PERCOCET)  mg per tablet 8/10/2018 at 1100 Other Yes Yes   Sig: Take 1 tablet by mouth every four (4) hours as needed for Pain. predniSONE (DELTASONE) 2.5 mg tablet 2018 at Unknown time Other Yes Yes   Sig: Take 2.5 mg by mouth daily.       Facility-Administered Medications: None       CURRENT MEDS  Current Facility-Administered Medications   Medication Dose Route Frequency    hydrocortisone Sod Succ (PF) (SOLU-CORTEF) injection 50 mg  50 mg IntraVENous Q12H    dextrose 5% infusion  75 mL/hr IntraVENous CONTINUOUS    mupirocin (BACTROBAN) 2 % ointment   Both Nostrils BID    aspirin chewable tablet 81 mg  81 mg Oral QPM    folic acid (FOLVITE) tablet 1 mg  1 mg Oral DAILY    gabapentin (NEURONTIN) capsule 300 mg  300 mg Oral TID    sodium chloride (NS) flush 5-10 mL  5-10 mL IntraVENous Q8H    heparin (porcine) injection 5,000 Units  5,000 Units SubCUTAneous Q12H    polyethylene glycol (MIRALAX) packet 17 g  17 g Oral DAILY    melatonin tablet 3 mg  3 mg Oral QHS       IMPRESSION:  Abran Escamilla is a 82 y.o. male who presents with acute onset progressive left sided numbness. Exam is positive for areflexia of the left upper ext and significant weakness of the LUE. Pt does have rheumatoid arthritis. Imaging of the spine is negative. Suspect peripheral etiology ? brachial plexopathy. Will start off with getting emg/ncs. Blood work to look for a secondary cause     RECOMMENDATIONS:  1. EMG/NCS  2. Blood work    Might need MRI L brachial plexus and if negative, will need a nerve and muscle biopsy. Thank you very much for this consultation.      Miles Brown MD  Neurologist

## 2018-08-15 NOTE — PROGRESS NOTES
Follow-up visit with patient on [address] [facility] in response to new palliative care consult. Patient indicated he is coping well with the hospitalization but is hoping that he will be able to be discharged soon. Patient stated an appreciation for 's visit and clarified that he has no needs at this time. Patient is aware of  availability as needed and desired. Chaplain Barbara Amin M.Div.    Paging Service [phone])

## 2018-08-15 NOTE — PROGRESS NOTES
CARDIOLOGY Progress Note    Patient ID:  Patient: Baldomero Bennett  MRN: [id number]  Age: 82 y.o.  : 1935    Date of  Admission: 8/10/2018 11:37 AM   PCP:  Isaiah Sotomayor MD   Usual cardiologist:  Raymond Corrales III,     Assessment:     1. Bradycardia with syncope, documented 7 sec pause on tele . Probably vasovagal.  Occurred after severe back pain. NO RECURRENCE.  2. Paroxysmal atrial fibrillation in 3/2018. 3. Coronary artery disease s/p remote redo CABG (1v SVG-PDA) 10/214.  had SVG-LCx/OM, LIMA-LAD, and SVG-PDA. Negative Cardiolite stress  w/ EF 59%. Echo 2014 w/ EF 60%, 9mmHg gradient across AVR, no AI. 4. Aortic stenosis s/p redo AVR (#23 Trifecta bp AVR) 10/14  5. 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 6. Pancolitis w/ h/o ulcerative colitis. 7. Rheumatoid arthritis, on immunomodulating therapy. 8. Sarcoidosis diagnosis. 9. Hypertension. 10. Hyperlipidemia. 11. Mild bilateral carotid disease. 12. Diabetes type 2.  13. Chronic back pain. 14. Severe protein-calorie malnutrition. Plan:     1. No beta-blocker. Last dose 812 PM.  2. No statin. 3. OK with ASA. HR and BP better. I'd be conservative here. No pacemaker recommended at this time. No additional recommendations. [x]       High complexity decision making was performed in this patient    Baldomero Bennett is a 82 y.o. male with a history of CAD, CABG, bioprosthetic aortic valve replacement and redo CABG, here with failure to thrive, generalized weakness. He was diagnosed with B cell lymphoma in 2018. I was contacted this AM due to an episode of bradycardia associated with syncope . His heart rate was as low as the 20-30's and a pause of 7 seconds was reported. He was transferred to the ICU for external pacing, which was done transiently. His heart rate restored itself by . TODAY:  He denies dizziness, syncope, palpitations, chest pain.   He has chronic BEY.  Lying flat in bed. Appetite is poor.       Allergies   Allergen Reactions    Lisinopril Cough    Questran [Cholestyramine (With Sugar)] Other (comments)     Muscle cramps          Current Facility-Administered Medications   Medication Dose Route Frequency    0.9% sodium chloride infusion  75 mL/hr IntraVENous CONTINUOUS    mupirocin (BACTROBAN) 2 % ointment   Both Nostrils BID    hydrocortisone Sod Succ (PF) (SOLU-CORTEF) injection 50 mg  50 mg IntraVENous Q8H    acetaminophen (TYLENOL) tablet 325 mg  325 mg Oral Q6H PRN    aspirin chewable tablet 81 mg  81 mg Oral QPM    docusate sodium (COLACE) capsule 100 mg  100 mg Oral BID PRN    folic acid (FOLVITE) tablet 1 mg  1 mg Oral DAILY    gabapentin (NEURONTIN) capsule 300 mg  300 mg Oral TID    oxyCODONE-acetaminophen (PERCOCET 10)  mg per tablet 1 Tab  1 Tab Oral Q4H PRN    sodium chloride (NS) flush 5-10 mL  5-10 mL IntraVENous Q8H    sodium chloride (NS) flush 5-10 mL  5-10 mL IntraVENous PRN    naloxone (NARCAN) injection 0.4 mg  0.4 mg IntraVENous PRN    ondansetron (ZOFRAN) injection 4 mg  4 mg IntraVENous Q4H PRN    heparin (porcine) injection 5,000 Units  5,000 Units SubCUTAneous Q12H    polyethylene glycol (MIRALAX) packet 17 g  17 g Oral DAILY    melatonin tablet 3 mg  3 mg Oral QHS       Review of Symptoms:    General: negative for fever, chills, sweats, weakness  Gastrointestinal: +colitis, negative for abdominal pain, N/V, dysphagia, visible bleeding   Hematology: +lymphoma, negative for easy bruising, bleeding  agitation       Objective:      Physical Exam:  Temp (24hrs), Av.2 °F (36.8 °C), Min:97.7 °F (36.5 °C), Max:98.8 °F (37.1 °C)    Patient Vitals for the past 8 hrs:   Pulse   18 2250 69   18 75   18 1512 75    Patient Vitals for the past 8 hrs:   Resp   18 2250 16   18 16   18 1512 16    Patient Vitals for the past 8 hrs:   BP   18 2250 120/61   18 100/61   18 1512 111/53          Intake/Output Summary (Last 24 hours) at 18 2254  Last data filed at 18 1729   Gross per 24 hour   Intake             1350 ml   Output              350 ml   Net             1000 ml       Nondiaphoretic, not in acute distress. No scleral icterus, mucous membranes moist, conjuctivae pink, no xanthelasma. Unlabored, clear to auscultation bilaterally anteriorly, symmetric air movement. Regular slightly bradycardic rhythm, no murmur. Palpable radial pulses bilaterally. Abdomen, soft, nontender, nondistended. Extremities without cyanosis or clubbing. Muscle tone and bulk normal.  Skin warm and dry. No rashes or ulcers. Neuro grossly nonfocal.  No tremor. More awake today, appropriate. CARDIOGRAPHICS and STUDIES, I reviewed:    Telemetry:  Sinus rhythm, no significant bradycardia. EC/13  Sinus rhythm, left axis deviation. Labs:  No results for input(s): CPK, CKMB, CKNDX, TROIQ in the last 72 hours. No lab exists for component: CPKMB  Lab Results   Component Value Date/Time    Cholesterol, total 146 2018 12:15 PM    HDL Cholesterol 36 (L) 2018 12:15 PM    LDL, calculated 82 2018 12:15 PM    Triglyceride 142 2018 12:15 PM    CHOL/HDL Ratio 4.1 2014 10:44 AM     No results for input(s): INR, PTP, APTT in the last 72 hours.     No lab exists for component: Aman Steinberg   Recent Labs      18   0518   0332  18   0433   NA  144  142  140   K  3.8  3.7  3.5   CL  110*  108  105   CO2  27  27  27   BUN  9  8  7   CREA  0.39*  0.45*  0.49*   GLU  151*  83  88   PHOS  2.7   --    --    CA  7.0*  7.4*  7.7*   ALB  2.0*   --    --    WBC  3.1*  5.7  7.7   HGB  9.7*  10.3*  10.3*   HCT  31.4*  34.1*  33.5*   PLT  187  220  188     Recent Labs      18   0525  18   0332   SGOT  11*   --    AP  72   --    TP  4.6*  4.7*   ALB  2.0*   --    GLOB  2.6   --      No components found for: GLPOC  No results for input(s): PH, PCO2, PO2 in the last 72 hours.         Jamee Corona MD  8/14/2018

## 2018-08-15 NOTE — PROGRESS NOTES
Hospitalist Progress Note    NAME: Gabriella Grayson   :  1935   MRN:  [id number]       Assessment / Plan:  Severe bradycardia, resolved  -transferred to ICU on  for transcutaneous pacing prn. HR remains stable today. 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 No acute infarct or mass. -CT a/p suggestive of chronic proctocolitis  -EGD/CSCOPE per GI, inpt vs outpt?  -on stress dose steroids  -Dr Crispin Khan evaluating pt, ongoing work up  -neurology consulted for L side weakness    Back pain  -MRI C/L spine showed degenerative spine changes with no canal stenosis or cord signal abnormality. No hernation or acute abnormality  -consult PT/OT, recommended SNF    CAD s/p CABG  AS s/p AVR  HTN  - stop BB  -cont' ASA  -statin on hold due to myalgias    Hx of RA  Hx of ulcerative colitis  Hx of sarcoidosis  -on chronic prednisone, now on stress dose steroids  -cont' current pain management  -enbrel and MTX on hold  -GI evaluation appreciated, EGD/CScope inpt vs outpt? Hx of Cdiff  -completed treatment. No diarrhea here    Severe Malnutrition, Body mass index is 17.23 kg/(m^2). Code status: Full  Prophylaxis: Hep SQ  Recommended Disposition: SNF/LTC     Subjective:     Chief Complaint / Reason for Physician Visit  Pt seen at bedside. Appetite improving. Discussed with RN events overnight.      Review of Systems:  Symptom Y/N Comments  Symptom Y/N Comments   Fever/Chills n   Chest Pain n    Poor Appetite    Edema     Cough    Abdominal Pain n    Sputum    Joint Pain     SOB/BEY n   Pruritis/Rash     Nausea/vomit    Tolerating PT/OT     Diarrhea    Tolerating Diet y    Constipation    Other       Could NOT obtain due to:      Objective:     VITALS:   Last 24hrs VS reviewed since prior progress note. Most recent are:  Patient Vitals for the past 24 hrs:   Temp Pulse Resp BP SpO2   08/15/18 1048 97.7 °F (36.5 °C) 66 16 113/48 100 %   08/15/18 0742 97.8 °F (36.6 °C) 71 16 (!) 109/36 91 %   08/15/18 0300 98.5 °F (36.9 °C) 73 16 104/68 98 %   08/14/18 2250 98.6 °F (37 °C) 69 16 120/61 98 %   08/14/18 2006 98.8 °F (37.1 °C) 75 16 100/61 96 %   08/14/18 1512 98.2 °F (36.8 °C) 75 16 111/53 97 %   08/14/18 1452 - 79 - 121/54 96 %   08/14/18 1449 - 78 - 107/46 -       Intake/Output Summary (Last 24 hours) at 08/15/18 1317  Last data filed at 08/15/18 0943   Gross per 24 hour   Intake              710 ml   Output                0 ml   Net              710 ml        PHYSICAL EXAM:  General: WD, WN. Alert, cooperative, no acute distress    EENT:  EOMI. Anicteric sclerae. MMM  Resp:  CTA bilaterally, no wheezing or rales. No accessory muscle use  CV:  Sinus bradycardia,  No edema  GI:  Soft, Non distended, Non tender.  +BS  Neurologic:  Alert and oriented X 3, normal speech  Psych:   Not anxious nor agitated  Skin:  No rashes.   No jaundice    Reviewed most current lab test results and cultures  YES  Reviewed most current radiology test results   YES  Review and summation of old records today    NO  Reviewed patient's current orders and MAR    YES  PMH/SH reviewed - no change compared to H&P  ________________________________________________________________________  Care Plan discussed with:    Comments   Patient x    Family      RN x    Care Manager x    Consultant                       x Multidiciplinary team rounds were held today with , nursing, pharmacist and clinical coordinator. Patient's plan of care was discussed; medications were reviewed and discharge planning was addressed. ________________________________________________________________________  Total NON critical care TIME:  35   Minutes    Total CRITICAL CARE TIME Spent:    Minutes non procedure based      Comments   >50% of visit spent in counseling and coordination of care     ________________________________________________________________________  Edward Rain MD     Procedures: see electronic medical records for all procedures/Xrays and details which were not copied into this note but were reviewed prior to creation of Plan. LABS:  I reviewed today's most current labs and imaging studies.   Pertinent labs include:  Recent Labs      08/15/18   0317  08/14/18   0525  08/13/18   0332   WBC  2.4*  3.1*  5.7   HGB  9.0*  9.7*  10.3*   HCT  28.9*  31.4*  34.1*   PLT  196  187  220     Recent Labs      08/15/18   0317  08/14/18   0525  08/13/18   0332   NA  147*  144  142   K  3.6  3.8  3.7   CL  114*  110*  108   CO2  27  27  27   GLU  135*  151*  83   BUN  13  9  8   CREA  0.35*  0.39*  0.45*   CA  7.3*  7.0*  7.4*   MG   --   2.0   --    PHOS   --   2.7   --    ALB   --   2.0*   --    TBILI   --   0.4   --    SGOT   --   11*   --    ALT   --   7*   --        Signed: Edward Rain MD

## 2018-08-15 NOTE — PROGRESS NOTES
GI Progress Note Shalonda Comings)  NAME:Ramon Mcneil GKO:[id number] [facility]:[id number]   ATTG: Dr. Bria Mooney  PCP: Nilda Nguyen MD  Date/Time:  8/15/2018 4:05 P    Reason for following: UC, abnormal CT    Assessment:   · Ulcerative colitis   · Abnormal Ct, left-sided colitis     Plan:   · Clear liquid  · NPO p mN  · Bowel prep today  · Colonoscopy tomorrow     Subjective:   Feels okay really. Concerns about mobility and bowel prep at home. Can likely get in for colonoscopy tomorrow    Review of Systems:  Symptom Y/N Comments  Symptom Y/N Comments   Fever/Chills n   Chest Pain n    Cough n   Headaches n    Sputum n   Joint Pain n    SOB/BEY n   Pruritis/Rash n    Tolerating Diet y   Other       Could NOT obtain due to:      Objective:   VITALS:   Last 24hrs VS reviewed since prior progress note. Most recent are:  Visit Vitals    BP (!) 110/38 (BP 1 Location: Right arm, BP Patient Position: At rest)    Pulse 73    Temp 97.9 °F (36.6 °C)    Resp 16    Ht 5' 8.5\" (1.74 m)    Wt 52.2 kg (115 lb)    SpO2 97%    BMI 17.23 kg/m2       Intake/Output Summary (Last 24 hours) at 08/15/18 1605  Last data filed at 08/15/18 1353   Gross per 24 hour   Intake              760 ml   Output              200 ml   Net              560 ml     PHYSICAL EXAM:  General: WD, WN. Alert, cooperative, no acute distress    HEENT: NC, Atraumatic. Anicteric sclerae. Lungs:  CTA Bilaterally. No Wheezing/Rhonchi/Rales. Heart:  Regular  rhythm,  No murmur (), No Rubs, No Gallops  Abdomen: Soft, Non distended, Non tender.  +Bowel sounds, no HSM  Extremities: No c/c/e  Neurologic:  Alert and oriented X 3. Psych:   Good insight. Not anxious nor agitated.     Lab and Radiology Data Reviewed: (see below)    Medications Reviewed: (see below)  PMH/SH reviewed - no change compared to H&P  ________________________________________________________________________  Total time spent with patient: 15 minutes ________________________________________________________________________  Care Plan discussed with:  Patient x   Family  x   RN               Consultant:  amari Richmond MD     Procedures: see electronic medical records for all procedures/Xrays and details which were not copied into this note but were reviewed prior to creation of Plan. LABS:  Recent Labs      08/15/18   0317  08/14/18   0525   WBC  2.4*  3.1*   HGB  9.0*  9.7*   HCT  28.9*  31.4*   PLT  196  187     Recent Labs      08/15/18   0317  08/14/18   0525  08/13/18   0332   NA  147*  144  142   K  3.6  3.8  3.7   CL  114*  110*  108   CO2  27  27  27   BUN  13  9  8   CREA  0.35*  0.39*  0.45*   GLU  135*  151*  83   CA  7.3*  7.0*  7.4*   MG   --   2.0   --    PHOS   --   2.7   --      Recent Labs      08/14/18 0525 08/13/18 0332   SGOT  11*   --    AP  72   --    TP  4.6*  4.7*   ALB  2.0*   --    GLOB  2.6   --      No results for input(s): INR, PTP, APTT in the last 72 hours. No lab exists for component: INREXT   Recent Labs      08/14/18   0525   FERR  140      Lab Results   Component Value Date/Time    Folate 23.0 (H) 08/14/2018 05:25 AM     No results for input(s): PH, PCO2, PO2 in the last 72 hours. No results for input(s): CPK, CKMB in the last 72 hours.     No lab exists for component: TROPONINI  Lab Results   Component Value Date/Time    Color DARK YELLOW 08/10/2018 02:03 PM    Appearance CLEAR 08/10/2018 02:03 PM    Specific gravity 1.028 08/10/2018 02:03 PM    Specific gravity <1.005 03/06/2018 03:28 PM    pH (UA) 5.0 08/10/2018 02:03 PM    Protein NEGATIVE  08/10/2018 02:03 PM    Glucose NEGATIVE  08/10/2018 02:03 PM    Ketone NEGATIVE  08/10/2018 02:03 PM    Bilirubin NEGATIVE  03/06/2018 03:28 PM    Urobilinogen 1.0 08/10/2018 02:03 PM    Nitrites NEGATIVE  08/10/2018 02:03 PM    Leukocyte Esterase NEGATIVE  08/10/2018 02:03 PM    Epithelial cells FEW 08/10/2018 02:03 PM    Bacteria NEGATIVE  08/10/2018 02:03 PM    WBC 0-4 08/10/2018 02:03 PM    RBC 0-5 08/10/2018 02:03 PM       MEDICATIONS:  Current Facility-Administered Medications   Medication Dose Route Frequency    hydrocortisone Sod Succ (PF) (SOLU-CORTEF) injection 50 mg  50 mg IntraVENous Q12H    dextrose 5% infusion  75 mL/hr IntraVENous CONTINUOUS    PEG 3350-Electrolytes (GO-LYTELY) SUSPENSION 4,000 mL  4,000 mL Oral ONCE    mupirocin (BACTROBAN) 2 % ointment   Both Nostrils BID    acetaminophen (TYLENOL) tablet 325 mg  325 mg Oral Q6H PRN    aspirin chewable tablet 81 mg  81 mg Oral QPM    docusate sodium (COLACE) capsule 100 mg  100 mg Oral BID PRN    folic acid (FOLVITE) tablet 1 mg  1 mg Oral DAILY    gabapentin (NEURONTIN) capsule 300 mg  300 mg Oral TID    oxyCODONE-acetaminophen (PERCOCET 10)  mg per tablet 1 Tab  1 Tab Oral Q4H PRN    sodium chloride (NS) flush 5-10 mL  5-10 mL IntraVENous Q8H    sodium chloride (NS) flush 5-10 mL  5-10 mL IntraVENous PRN    naloxone (NARCAN) injection 0.4 mg  0.4 mg IntraVENous PRN    ondansetron (ZOFRAN) injection 4 mg  4 mg IntraVENous Q4H PRN    heparin (porcine) injection 5,000 Units  5,000 Units SubCUTAneous Q12H    polyethylene glycol (MIRALAX) packet 17 g  17 g Oral DAILY    melatonin tablet 3 mg  3 mg Oral QHS

## 2018-08-15 NOTE — PROGRESS NOTES
Reason for Admission:   Generalized Weakness - Failure to Thrive                RRAT Score:    39             Resources/supports as identified by patient/family:    Pt resides with wife in single story home. Pt has two daughters: Leida Arellano who reside close to Pt and his wife who are involved in Pt care. Top Challenges facing patient (as identified by patient/family and CM): Finances/Medication cost?     Pt reported finances are stable. Transportation? Pt reported his wife or daughters are able to assist with transportation needs upon D/C. Support system or lack thereof? Pt has Pt wife and two daughters who are involved in Pt care. Living arrangements? Pt resides with his wife in single story home. Self-care/ADLs/Cognition? Pt reported utilizing a walker for mobility. Pt has a cane at home. Per Pt           Current Advanced Directive/Advance Care Plan:  Pt is full code [address] on file                          Plan for utilizing home health:    Pt desires to utilize home health upon D/C. Likelihood of readmission: Pt has adequate social support, however his mobility presents as a concern. Pt desires to return home with home care which may not provide the level of care Pt requires. Recommendations have been made for a family meeting to discuss D/C planning further and options for D/C to include SNF vs HH with additional care in the home. Pt daughter reported per Pt insurance Pt is eligible for 35 hours of home care. Transition of Care Plan:      TBD based upon Pt and Pt family preference.

## 2018-08-15 NOTE — PROGRESS NOTES
Problem: Mobility Impaired (Adult and Pediatric)  Goal: *Acute Goals and Plan of Care (Insert Text)  Physical Therapy Goals  Initiated 8/11/2018  1. Patient will move from supine to sit and sit to supine  in bed with independence within 7 day(s). 2.  Patient will transfer from bed to chair and chair to bed with modified independence using the least restrictive device within 7 day(s). 3.  Patient will perform sit to stand with modified independence within 7 day(s). 4.  Patient will ambulate with modified independence for 50 feet with the least restrictive device within 7 day(s). physical Therapy TREATMENT  Patient: Gary Dobbs (88 y.o. male)  Date: 8/15/2018  Diagnosis: Weakness  Falls <principal problem not specified>       Precautions: Contact, Fall (c diff)  Chart, physical therapy assessment, plan of care and goals were reviewed. ASSESSMENT:  Patient received supine in bed and agreeable to participate in therapy. Pt tolerated session well and making progress towards goals. Patient with complaints of L hand pain and throbbing. Patient with weakness in LUE that started weeks ago. Pt with baseline R foot drop from pinched nerve in low back per pt report. Head CT on this admission negative for acute process. MD in room after session to obtain detailed history regarding LUE weakness/impairement. Patient completed supine to sit with min assist at trunk to assume sitting EOB. Pt was able to bring bilateral LEs off edge of bed independently. Patient performed sit<>stand with RW with min assist and had difficulty lifting LUE onto the RW, but was able to complete without physical assistance. Pt ambulated within the room ~24 feet with RW with min assist. Noted R foot drop and L knee remained in flexed position. Applied tactile cueing at L quad during stance phase to facilitate knee extension during stance phase. Patient remained seated in chair with all needs met.  Patient will benefit from SNF placement upon discharge to continue therapy in efforts to reach highest level of independence, reduce risk of falls, and reduce caregiver burden. Progression toward goals:  [x]    Improving appropriately and progressing toward goals  []    Improving slowly and progressing toward goals  []    Not making progress toward goals and plan of care will be adjusted     PLAN:  Patient continues to benefit from skilled intervention to address the above impairments. Continue treatment per established plan of care. Discharge Recommendations:  [facility]  Further Equipment Recommendations for Discharge:  tbd     SUBJECTIVE:   Patient stated My foot has been like that for years.   Wife expressed that she is the primary caregiver for the patient and it had become difficult providing assistance for him at home recently.      OBJECTIVE DATA SUMMARY:   Critical Behavior:  Neurologic State: Alert, Appropriate for age  Orientation Level: Oriented X4  Cognition: Follows commands  Safety/Judgement: Awareness of environment  Functional Mobility Training:  Bed Mobility:     Supine to Sit: Minimum assistance              Transfers:  Sit to Stand: Minimum assistance  Stand to Sit: Minimum assistance           Balance:  Sitting: Intact  Standing: Impaired  Standing - Static: Fair  Standing - Dynamic : Fair  Ambulation/Gait Training:  Distance (ft): 24 Feet (ft)  Assistive Device: Gait belt;Walker, rolling  Ambulation - Level of Assistance: Contact guard assistance        Gait Abnormalities: Decreased step clearance;Shuffling gait (L knee in flexed position, R foot drop)        Base of Support: Center of gravity altered;Shift to left     Speed/Leanna: Pace decreased (<100 feet/min)  Step Length: Right shortened;Left shortened       Pain:  Pain Scale 1: Numeric (0 - 10)  Pain Intensity 1: 0  Pain Location 1: Hand  Pain Orientation 1: Left  Pain Description 1: Throbbing  Pain Intervention(s) 1: Medication (see MAR)  Activity Tolerance:   Fair. VSS  Please refer to the flowsheet for vital signs taken during this treatment.   After treatment:   [x]    Patient left in no apparent distress sitting up in chair  []    Patient left in no apparent distress in bed  [x]    Call bell left within reach  [x]    Nursing notified  [x]    Caregiver present  [x]    Bed alarm activated    COMMUNICATION/COLLABORATION:   The patients plan of care was discussed with: Occupational Therapist and Registered Nurse    Radha Street, PT, DPT   Time Calculation: 20 mins

## 2018-08-15 NOTE — PROGRESS NOTES
Problem: Pressure Injury - Risk of  Goal: *Prevention of pressure injury  Document Freddy Scale and appropriate interventions in the flowsheet. Outcome: Progressing Towards Goal  Pressure Injury Interventions:  Sensory Interventions: Assess changes in LOC, Chair cushion, Check visual cues for pain, Discuss PT/OT consult with provider, Float heels, Keep linens dry and wrinkle-free, Maintain/enhance activity level, Minimize linen layers, Monitor skin under medical devices, Pad between skin to skin, Turn and reposition approx.  every two hours (pillows and wedges if needed)    Moisture Interventions: Absorbent underpads, Apply protective barrier, creams and emollients, Moisture barrier, Minimize layers, Maintain skin hydration (lotion/cream), Limit adult briefs    Activity Interventions: Chair cushion, Increase time out of bed, PT/OT evaluation    Mobility Interventions: Chair cushion, HOB 30 degrees or less, PT/OT evaluation    Nutrition Interventions: Document food/fluid/supplement intake, Discuss nutritional consult with provider, Offer support with meals,snacks and hydration    Friction and Shear Interventions: Apply protective barrier, creams and emollients, Feet elevated on foot rest, Foam dressings/transparent film/skin sealants, Transferring/repositioning devices, Minimize layers, Lift sheet

## 2018-08-15 NOTE — PROGRESS NOTES
Bedside and Verbal shift change report given to Torin Yoder RN (oncoming nurse). Report included the following information SBAR, Kardex, ED Summary, Procedure Summary, Intake/Output, MAR and Recent Results. SHIFT SUMMARY:  Uneventful shift. Pt very weak, but is able to stand with walker and assist of 2, then walk with walker and assist of 1.

## 2018-08-15 NOTE — PROGRESS NOTES
CARDIOLOGY Progress Note    Patient ID:  Patient: Gary Dobbs  MRN: [id number]  Age: 82 y.o.  : 1935    Date of  Admission: 8/10/2018 11:37 AM   PCP:  Ronit Rush MD   Usual cardiologist:  Daily Herman III, DO    Assessment:     1. Bradycardia with syncope, documented 7 sec pause on tele . Probably vasovagal.  Occurred after severe back pain. NO RECURRENCE.  2. Paroxysmal atrial fibrillation in 3/2018. 3. Coronary artery disease s/p remote redo CABG (1v SVG-PDA) 10/214.  had SVG-LCx/OM, LIMA-LAD, and SVG-PDA. Negative Cardiolite stress  w/ EF 59%. Echo 2014 w/ EF 60%, 9mmHg gradient across AVR, no AI. 4. Aortic stenosis s/p redo AVR (#23 Trifecta bp AVR) 10/14  5. 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 6. Pancolitis w/ h/o ulcerative colitis. 7. Rheumatoid arthritis, on immunomodulating therapy. 8. Sarcoidosis diagnosis. 9. Hypertension. 10. Hyperlipidemia. 11. Mild bilateral carotid disease. 12. Diabetes type 2.  13. Chronic back pain. 14. Severe protein-calorie malnutrition. Plan:     1. No beta-blocker. Last dose 8/12 PM.  2. If he has recurrent symptomatic bradycardia, would give atropine IV. 3. No statin. 4. OK with ASA. HR and BP better. I'd be conservative here. No pacemaker recommended at this time. [x]       High complexity decision making was performed in this patient    Gary Dobbs is a 82 y.o. male with a history of CAD, CABG, bioprosthetic aortic valve replacement and redo CABG, here with failure to thrive, generalized weakness. He was diagnosed with B cell lymphoma in 2018. I was contacted this AM due to an episode of bradycardia associated with syncope . His heart rate was as low as the 20-30's and a pause of 7 seconds was reported. He was transferred to the ICU for external pacing, which was done transiently. His heart rate restored itself by .     TODAY:  He denies dizziness, syncope, palpitations, chest pain. He has chronic BEY. Lying flat in bed. Appetite is poor. GI and neurology consultants have recommended further testing.       Allergies   Allergen Reactions    Lisinopril Cough    Questran [Cholestyramine (With Sugar)] Other (comments)     Muscle cramps          Current Facility-Administered Medications   Medication Dose Route Frequency    hydrocortisone Sod Succ (PF) (SOLU-CORTEF) injection 50 mg  50 mg IntraVENous Q12H    dextrose 5% infusion  75 mL/hr IntraVENous CONTINUOUS    mupirocin (BACTROBAN) 2 % ointment   Both Nostrils BID    acetaminophen (TYLENOL) tablet 325 mg  325 mg Oral Q6H PRN    aspirin chewable tablet 81 mg  81 mg Oral QPM    docusate sodium (COLACE) capsule 100 mg  100 mg Oral BID PRN    folic acid (FOLVITE) tablet 1 mg  1 mg Oral DAILY    gabapentin (NEURONTIN) capsule 300 mg  300 mg Oral TID    oxyCODONE-acetaminophen (PERCOCET 10)  mg per tablet 1 Tab  1 Tab Oral Q4H PRN    sodium chloride (NS) flush 5-10 mL  5-10 mL IntraVENous Q8H    sodium chloride (NS) flush 5-10 mL  5-10 mL IntraVENous PRN    naloxone (NARCAN) injection 0.4 mg  0.4 mg IntraVENous PRN    ondansetron (ZOFRAN) injection 4 mg  4 mg IntraVENous Q4H PRN    heparin (porcine) injection 5,000 Units  5,000 Units SubCUTAneous Q12H    polyethylene glycol (MIRALAX) packet 17 g  17 g Oral DAILY    melatonin tablet 3 mg  3 mg Oral QHS       Review of Symptoms:    General: negative for fever, chills, sweats, weakness  Gastrointestinal: +colitis, negative for abdominal pain, N/V, dysphagia, visible bleeding   Hematology: +lymphoma, negative for easy bruising, bleeding  agitation       Objective:      Physical Exam:  Temp (24hrs), Av.2 °F (36.8 °C), Min:97.7 °F (36.5 °C), Max:98.8 °F (37.1 °C)    Patient Vitals for the past 8 hrs:   Pulse   08/15/18 1530 73   08/15/18 1048 66   08/15/18 0742 71    Patient Vitals for the past 8 hrs:   Resp   08/15/18 1530 16   08/15/18 1048 16 08/15/18 0742 16    Patient Vitals for the past 8 hrs:   BP   08/15/18 1530 (!) 110/38   08/15/18 1048 113/48   08/15/18 0742 (!) 109/36          Intake/Output Summary (Last 24 hours) at 08/15/18 1539  Last data filed at 08/15/18 1353   Gross per 24 hour   Intake              760 ml   Output              200 ml   Net              560 ml       Nondiaphoretic, not in acute distress. No scleral icterus, mucous membranes moist, conjuctivae pink, no xanthelasma. Unlabored, clear to auscultation bilaterally anteriorly, symmetric air movement. Regular slightly bradycardic rhythm, no murmur. Palpable radial pulses bilaterally. Abdomen, soft, nontender, nondistended. Extremities without cyanosis or clubbing. Muscle tone and bulk normal.  Skin warm and dry. No rashes or ulcers. Neuro grossly nonfocal.  No tremor. More awake today, appropriate. CARDIOGRAPHICS and STUDIES, I reviewed:    Telemetry:  Sinus rhythm, no significant bradycardia. EC/13  Sinus rhythm, left axis deviation. Labs:  No results for input(s): CPK, CKMB, CKNDX, TROIQ in the last 72 hours. No lab exists for component: CPKMB  Lab Results   Component Value Date/Time    Cholesterol, total 146 2018 12:15 PM    HDL Cholesterol 36 (L) 2018 12:15 PM    LDL, calculated 82 2018 12:15 PM    Triglyceride 142 2018 12:15 PM    CHOL/HDL Ratio 4.1 2014 10:44 AM     No results for input(s): INR, PTP, APTT in the last 72 hours.     No lab exists for component: Timbo Newhall   Recent Labs      08/15/18   0317  18   0525  18   0332   NA  147*  144  142   K  3.6  3.8  3.7   CL  114*  110*  108   CO2  27  27  27   BUN  13  9  8   CREA  0.35*  0.39*  0.45*   GLU  135*  151*  83   PHOS   --   2.7   --    CA  7.3*  7.0*  7.4*   ALB   --   2.0*   --    WBC  2.4*  3.1*  5.7   HGB  9.0*  9.7*  10.3*   HCT  28.9*  31.4*  34.1*   PLT  196  187  220     Recent Labs      18   0525  18   0332   SGOT 11*   --    AP  72   --    TP  4.6*  4.7*   ALB  2.0*   --    GLOB  2.6   --      No components found for: GLPOC  No results for input(s): PH, PCO2, PO2 in the last 72 hours.         John Solis MD  8/15/2018

## 2018-08-15 NOTE — PROGRESS NOTES
Problem: Self Care Deficits Care Plan (Adult)  Goal: *Acute Goals and Plan of Care (Insert Text)  Occupational Therapy Goals  Initiated 8/11/2018  1. Patient will perform grooming seated EOB with modified independence within 7 day(s). 2.  Patient will perform upper body dressing with moderate assistance  within 7 day(s). 3.  Patient will perform lower body dressing with moderate assistance  within 7 day(s). 4.  Patient will perform toilet transfers with supervision/set-up within 7 day(s). 5.  Patient will perform all aspects of toileting with minimal assistance/contact guard assist within 7 day(s). 6.  Patient will participate in upper extremity therapeutic exercise/activities with independence for 5 minutes within 7 day(s). 7.  Patient will utilize energy conservation techniques during functional activities with verbal cues within 7 day(s). Occupational Therapy TREATMENT  Patient: Serina Riggins (82 y.o. male)  Date: 8/15/2018  Diagnosis: Weakness  Falls <principal problem not specified>       Precautions: Contact, Fall (c diff)  Chart, occupational therapy assessment, plan of care, and goals were reviewed. ASSESSMENT:  Wife was present this session. Pt was unable to provide accurate report of PLF. Pts wife reports that pts left UE weakness, numbness was noted ~3 months ago. Pt also reports weakness in left LE and noted to have left facial droop. Pt reports that the whole left side of his body is less senstive to touch. It does not appear that pt had neuro work up on onset of symptoms (wife reports that pts weakness was gradual over time and she did not notice it at first). Pt was more functional prior to this and wife was tearful as she is the sole caregiver and hopes that pt could do more for himself with therapy. Pt performed be mobility with min assist.  Min assist for sit to stand from edge of bed with verbal cues for safety.   Pt mobilzed to bathroom with CGA to transfer to standard commode. Verbal cues for hand placement. Pt managed gown in standing with min assist. Moderate assist for sit to stand from standard toilet using left grab bar. Pt returned to bedside chair at end of session. Noted that pt has a one finger width sublux in left shoulder and reported UE pain. Placed pillow under pts left UE for joint protection and educated pt and his on joint protection and positioning for left UE. Noted left hand is very swollen and retrograde massage performed (wife and pt were also educated on this/pt had IV in this hand previous and wife reported that hand was not swollen at home). With sensory testing pt had decreased light touch on left UE from hand to elbow. Dr Jennifer Mendenhall arrived at end of session and was informed about pts hemiparesis and decreased sensation, facial droop on left side of body that is new as of 3 months ago. Pt was left with physican to discuss this further. Pt also reports that his left vision is impaired since onset of left weakness/numbness. Did not formally test today. Recommend SNF for rehab at discharge. Progression toward goals:  [x]       Improving appropriately and progressing toward goals  []       Improving slowly and progressing toward goals  []       Not making progress toward goals and plan of care will be adjusted     PLAN:  Patient continues to benefit from skilled intervention to address the above impairments. Continue treatment per established plan of care. Discharge Recommendations:  [facility]  Further Equipment Recommendations for Discharge:  TBD     SUBJECTIVE:   Patient stated My arm does not work right. Nicole Kelsey    OBJECTIVE DATA SUMMARY:   Cognitive/Behavioral Status:  Neurologic State: Alert;Confused  Orientation Level: Oriented to person;Oriented to place; Disoriented to situation;Disoriented to time  Cognition: Follows commands  Perception: Appears intact  Perseveration: No perseveration noted  Safety/Judgement: Fall prevention    Functional Mobility and Transfers for ADLs:  Bed Mobility:  Supine to Sit: Minimum assistance    Transfers:  Sit to Stand: Minimum assistance          Balance:  Sitting: Intact  Standing: Impaired  Standing - Static: Fair  Standing - Dynamic : Fair    ADL Intervention:                                Toileting  Clothing Management: Minimum assistance (gown in standing)    Cognitive Retraining  Safety/Judgement: Fall prevention    Therapeutic Exercises:   Educated on AROM/AAROM left UE and verbally on towel slides  Pain:  Pain Scale 1: Numeric (0 - 10)  Pain Intensity 1: 5  Pain Location 1: Back;Hand  Pain Orientation 1: Left;Posterior  Pain Description 1: Aching  Pain Intervention(s) 1: Medication (see MAR)  Activity Tolerance:     Please refer to the flowsheet for vital signs taken during this treatment.   After treatment:   [x] Patient left in no apparent distress sitting up in chair  [] Patient left in no apparent distress in bed  [x] Call bell left within reach  [x] Nursing notified  [x] Caregiver present  [x] Bed alarm activated    COMMUNICATION/COLLABORATION:   The patients plan of care was discussed with: Physical Therapist, Registered Nurse, Physician and pt and his wife    Edenilson Hand OTR/L  Time Calculation: 31 mins

## 2018-08-15 NOTE — PROGRESS NOTES
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 His appetite has picked up a bit. In talking to him and his wife, about three months ago, he abruptly developed numbness in left face and left arm which rapidly progressed to weakness of his left face and arm with mild weakness of his left leg. 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 He apparently brought his deficits to the attention of his PCP and perhaps his rheumatologist (unclear in talking to him how much he reported) and he indicates much blood work was done but no CT or MRI. Because of his mobility issues, he has had minimal subsequent interactions with health care providers. The CT of the head without contrast and MRI of the brain without contrast done on 8/10/18  report \"Stable chronic small vessel ischemic white matter disease. No acute infarct or mass. \"       Subjective:         Current Facility-Administered Medications   Medication Dose Route Frequency    0.9% sodium chloride infusion  75 mL/hr IntraVENous CONTINUOUS    mupirocin (BACTROBAN) 2 % ointment   Both Nostrils BID    hydrocortisone Sod Succ (PF) (SOLU-CORTEF) injection 50 mg  50 mg IntraVENous Q8H    acetaminophen (TYLENOL) tablet 325 mg  325 mg Oral Q6H PRN    aspirin chewable tablet 81 mg  81 mg Oral QPM    docusate sodium (COLACE) capsule 100 mg  100 mg Oral BID PRN    folic acid (FOLVITE) tablet 1 mg  1 mg Oral DAILY    gabapentin (NEURONTIN) capsule 300 mg  300 mg Oral TID    oxyCODONE-acetaminophen (PERCOCET 10)  mg per tablet 1 Tab  1 Tab Oral Q4H PRN    sodium chloride (NS) flush 5-10 mL  5-10 mL IntraVENous Q8H    sodium chloride (NS) flush 5-10 mL  5-10 mL IntraVENous PRN    naloxone (NARCAN) injection 0.4 mg  0.4 mg IntraVENous PRN    ondansetron (ZOFRAN) injection 4 mg  4 mg IntraVENous Q4H PRN    heparin (porcine) injection 5,000 Units  5,000 Units SubCUTAneous Q12H    polyethylene glycol (MIRALAX) packet 17 g  17 g Oral DAILY    melatonin tablet 3 mg  3 mg Oral QHS        Review of Systems:  Pertinent items are noted in HPI. o/w negative for 11 organ systems. Objective:     Patient Vitals for the past 8 hrs:   BP Temp Pulse Resp SpO2   08/15/18 1048 113/48 97.7 °F (36.5 °C) 66 16 100 %   08/15/18 0742 (!) 109/36 97.8 °F (36.6 °C) 71 16 91 %       Temp (24hrs), Av.3 °F (36.8 °C), Min:97.7 °F (36.5 °C), Max:98.8 °F (37.1 °C)      08/15 0701 - 08/15 1900  In: 61 [P.O.:60]  Out: -     Physical Exam:  Sitting up in chair  Thin appearing male in NAD  HEENT: NC, AT, pupils round without icterus  Neck: no adenopathy, no JVD, no thyromegaly  Lungs cta  Heart regular rate and rhythm. Abdomen soft, nontender, normoactive BS, -HSM appreciated  Ext no edema. Scarring on right leg. Skin - multiple keratoses  No adenopathy appreciated. Neuro: mild facial droop on left with loss of crisp nasolabial fold. LUE resting on pillow - can not lift voluntarily without using right hand. Left leg he can move although somewhat weaker. Right foot turns inward.      Labs:    Recent Results (from the past 24 hour(s))   METABOLIC PANEL, BASIC    Collection Time: 08/15/18  3:17 AM   Result Value Ref Range    Sodium 147 (H) 136 - 145 mmol/L    Potassium 3.6 3.5 - 5.1 mmol/L    Chloride 114 (H) 97 - 108 mmol/L    CO2 27 21 - 32 mmol/L    Anion gap 6 5 - 15 mmol/L    Glucose 135 (H) 65 - 100 mg/dL    BUN 13 6 - 20 MG/DL    Creatinine 0.35 (L) 0.70 - 1.30 MG/DL    BUN/Creatinine ratio 37 (H) 12 - 20      GFR est AA >60 >60 ml/min/1.73m2    GFR est non-AA >60 >60 ml/min/1.73m2    Calcium 7.3 (L) 8.5 - 10.1 MG/DL   CBC WITH AUTOMATED DIFF    Collection Time: 08/15/18  3:17 AM   Result Value Ref Range    WBC 2.4 (L) 4.1 - 11.1 K/uL    RBC 3.23 (L) 4.10 - 5.70 M/uL    HGB 9.0 (L) 12.1 - 17.0 g/dL    HCT 28.9 (L) 36.6 - 50.3 %    MCV 89.5 80.0 - 99.0 FL    MCH 27.9 26.0 - 34.0 PG    MCHC 31.1 30.0 - 36.5 g/dL    RDW 15.8 (H) 11.5 - 14.5 %    PLATELET 059 857 - 380 K/uL    MPV 10.4 8.9 - 12.9 FL    NRBC 0.0 0  WBC    ABSOLUTE NRBC 0.00 0.00 - 0.01 K/uL    NEUTROPHILS 60 32 - 75 %    LYMPHOCYTES 27 12 - 49 %    MONOCYTES 10 5 - 13 %    EOSINOPHILS 0 0 - 7 %    BASOPHILS 0 0 - 1 %    IMMATURE GRANULOCYTES 3 (H) 0.0 - 0.5 %    ABS. NEUTROPHILS 1.5 (L) 1.8 - 8.0 K/UL    ABS. LYMPHOCYTES 0.6 (L) 0.8 - 3.5 K/UL    ABS. MONOCYTES 0.2 0.0 - 1.0 K/UL    ABS. EOSINOPHILS 0.0 0.0 - 0.4 K/UL    ABS. BASOPHILS 0.0 0.0 - 0.1 K/UL    ABS. IMM. GRANS. 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 Nothing further needs to be done at this time with regard to this entity. The letter sent to office introducing Mr. Obrien Gave to my office sent by Dr. Arlene Leach indicated that he had been treating him for a number of non-melanoma skin cancers for the prior 5 years. Anorexia/ weight loss/FTT no evidence of malignancy identified; may be related to multiple autoimmune diseases. CT scan suggest active colitis. Dr. Emily Gutierrez has seen him and given his issues with mobility, is checking to see if perhaps colonoscopy can be done in-house if schedule will accommodate. Left sided weakness - not sure what happened 3 months ago. His brain imaging studies do not show obvious infarct as best I can tell but he clearly had an event and is in need of a diagnosis. Given the left sided weakness and the right foot drop, will see if neurology can help clarify while he is here - it is very difficult for him to get into office so outpatient workup limited. Anemia - haptoglobin high.   b12 and 25-May-2017 18:00 folate checked in 2015 - normal. Current B12, folate and ferritin okay. Allyson Ku SPEP and free light chains pending. MARTÍN negative. Hx of RA, ulcerative colitis and sarcoidosis - on prednisone, has been on embrel and MTX in past. MTX may be contributing to his anemia. CAD s/p CABG, AS s/p AVR - currently with bradycardia.

## 2018-08-15 NOTE — PROGRESS NOTES
Problem: Falls - Risk of  Goal: *Absence of Falls  Document Gibran Fall Risk and appropriate interventions in the flowsheet.    Outcome: Progressing Towards Goal  Fall Risk Interventions:  Mobility Interventions: Bed/chair exit alarm, Communicate number of staff needed for ambulation/transfer, Patient to call before getting OOB, OT consult for ADLs, PT Consult for mobility concerns, PT Consult for assist device competence, Strengthening exercises (ROM-active/passive)    Mentation Interventions: Bed/chair exit alarm    Medication Interventions: Bed/chair exit alarm, Patient to call before getting OOB, Teach patient to arise slowly    Elimination Interventions: Bed/chair exit alarm, Call light in reach, Patient to call for help with toileting needs, Toileting schedule/hourly rounds    History of Falls Interventions: Bed/chair exit alarm

## 2018-08-16 NOTE — PROGRESS NOTES
CARDIOLOGY Progress Note    Patient ID:  Patient: Mansoor Solis  MRN: [id number]  Age: 82 y.o.  : 1935    Date of  Admission: 8/10/2018 11:37 AM   PCP:  Delores Dakins, MD   Usual cardiologist:  Nikhil Elias III,     Assessment:     1. Bradycardia with syncope, documented 7 sec pause on tele . Probably vasovagal.  Occurred after severe back pain. NO RECURRENCE.  2. Paroxysmal atrial fibrillation in 3/2018. 3. Coronary artery disease s/p remote redo CABG (1v SVG-PDA) 10/214.  had SVG-LCx/OM, LIMA-LAD, and SVG-PDA. Negative Cardiolite stress  w/ EF 59%. Echo 2014 w/ EF 60%, 9mmHg gradient across AVR, no AI. 4. Aortic stenosis s/p redo AVR (#23 Trifecta bp AVR) 10/14  5. 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 6. Pancolitis w/ h/o ulcerative colitis. 7. Rheumatoid arthritis, on immunomodulating therapy. 8. Sarcoidosis diagnosis. 9. Hypertension. 10. Hyperlipidemia. 11. Mild bilateral carotid disease. 12. Diabetes type 2.  13. Chronic back pain. 14. Severe protein-calorie malnutrition. Plan:     1. No beta-blocker. Last dose 8/12 PM.  2. If he has recurrent symptomatic bradycardia, would give atropine IV. 3. No statin. 4. OK with ASA. HR and BP stable. Some mild sinus bradycardia during sleep is OK. I'd be conservative here. No pacemaker recommended at this time. I'll sign off, please call for any questions or issues. [x]       High complexity decision making was performed in this patient    Mansoor Solis is a 82 y.o. male with a history of CAD, CABG, bioprosthetic aortic valve replacement and redo CABG, here with failure to thrive, generalized weakness. He was diagnosed with B cell lymphoma in 2018. I was contacted this AM due to an episode of bradycardia associated with syncope . His heart rate was as low as the 20-30's and a pause of 7 seconds was reported.   He was transferred to the ICU for external pacing, which was done transiently. His heart rate restored itself by . TODAY:  He denies dizziness, syncope, palpitations, chest pain. He has chronic BEY. Lying flat in bed. Appetite is poor. GI and neurology consultants have recommended further testing, underway.       Allergies   Allergen Reactions    Lisinopril Cough    Questran [Cholestyramine (With Sugar)] Other (comments)     Muscle cramps          Current Facility-Administered Medications   Medication Dose Route Frequency    bisacodyl (DULCOLAX) tablet 10 mg  10 mg Oral Q12H    hydrocortisone Sod Succ (PF) (SOLU-CORTEF) injection 50 mg  50 mg IntraVENous Q12H    mupirocin (BACTROBAN) 2 % ointment   Both Nostrils BID    acetaminophen (TYLENOL) tablet 325 mg  325 mg Oral Q6H PRN    aspirin chewable tablet 81 mg  81 mg Oral QPM    docusate sodium (COLACE) capsule 100 mg  100 mg Oral BID PRN    folic acid (FOLVITE) tablet 1 mg  1 mg Oral DAILY    gabapentin (NEURONTIN) capsule 300 mg  300 mg Oral TID    oxyCODONE-acetaminophen (PERCOCET 10)  mg per tablet 1 Tab  1 Tab Oral Q4H PRN    sodium chloride (NS) flush 5-10 mL  5-10 mL IntraVENous Q8H    sodium chloride (NS) flush 5-10 mL  5-10 mL IntraVENous PRN    naloxone (NARCAN) injection 0.4 mg  0.4 mg IntraVENous PRN    ondansetron (ZOFRAN) injection 4 mg  4 mg IntraVENous Q4H PRN    heparin (porcine) injection 5,000 Units  5,000 Units SubCUTAneous Q12H    polyethylene glycol (MIRALAX) packet 17 g  17 g Oral DAILY    melatonin tablet 3 mg  3 mg Oral QHS       Review of Symptoms:    General: negative for fever, chills, sweats, weakness  Gastrointestinal: +colitis, negative for abdominal pain, N/V, dysphagia, visible bleeding   Hematology: +lymphoma, negative for easy bruising, bleeding  agitation       Objective:      Physical Exam:  Temp (24hrs), Av °F (36.7 °C), Min:97.6 °F (36.4 °C), Max:98.6 °F (37 °C)    Patient Vitals for the past 8 hrs:   Pulse   18 1100 67   18 0745 (!) 54    Patient Vitals for the past 8 hrs:   Resp   18 1100 18   18 0745 18    Patient Vitals for the past 8 hrs:   BP   18 1100 103/49   18 0745 123/64          Intake/Output Summary (Last 24 hours) at 18 1401  Last data filed at 18 1255   Gross per 24 hour   Intake          1658.75 ml   Output              480 ml   Net          1178.75 ml       Nondiaphoretic, not in acute distress. No scleral icterus, mucous membranes moist, conjuctivae pink, no xanthelasma. Unlabored, clear to auscultation bilaterally anteriorly, symmetric air movement. Regular slightly bradycardic rhythm, no murmur. Palpable radial pulses bilaterally. Abdomen, soft, nontender, nondistended. Extremities without cyanosis or clubbing. Muscle tone and bulk normal.  Skin warm and dry. No rashes or ulcers. Neuro grossly nonfocal.  No tremor. More awake today, appropriate. CARDIOGRAPHICS and STUDIES, I reviewed:    Telemetry:  Sinus rhythm, no significant bradycardia. EC/13  Sinus rhythm, left axis deviation. Labs:  No results for input(s): CPK, CKMB, CKNDX, TROIQ in the last 72 hours. No lab exists for component: CPKMB  Lab Results   Component Value Date/Time    Cholesterol, total 146 2018 12:15 PM    HDL Cholesterol 36 (L) 2018 12:15 PM    LDL, calculated 82 2018 12:15 PM    Triglyceride 142 2018 12:15 PM    CHOL/HDL Ratio 4.1 2014 10:44 AM     No results for input(s): INR, PTP, APTT in the last 72 hours.     No lab exists for component: INREXT, INREXT   Recent Labs      18   1120  08/15/18   0317  18   0525   NA  140  147*  144   K  2.9*  3.6  3.8   CL  105  114*  110*   CO2  29  27  27   BUN  9  13  9   CREA  0.51*  0.35*  0.39*   GLU  122*  135*  151*   PHOS   --    --   2.7   CA  7.3*  7.3*  7.0*   ALB   --    --   2.0*   WBC   --   2.4*  3.1*   HGB   --   9.0*  9.7*   HCT   --   28.9*  31.4*   PLT   --   196  187     Recent Labs 08/14/18   0525   SGOT  11*   AP  72   TP  4.6*   ALB  2.0*   GLOB  2.6     No components found for: GLPOC  No results for input(s): PH, PCO2, PO2 in the last 72 hours.         Humberto Mace MD  8/16/2018

## 2018-08-16 NOTE — PROGRESS NOTES
Chart reviewed. Attempted to see pt this AM for PT intervention however pt politely refusing participation with therapy/OOB mobility. Pt requesting he be allowed to rest, stating he \"had a lot going on today\" and was scheduled for additional testing this afternoon. Despite education regarding importance of participation with therapy/OOB mobility, pt continued to decline, requesting therapy return tomorrow. Will defer per pt request however continue to follow.      Ruth Asencio, PT, DPT

## 2018-08-16 NOTE — PROGRESS NOTES
Spoke with Dr. Davis Thorne who states we will cancel patient's colonoscopy today. Ann Reyes, patient's primary nurse has been made aware.

## 2018-08-16 NOTE — PROGRESS NOTES
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 He apparently brought his deficits to the attention of his PCP and perhaps his rheumatologist (unclear in talking to him how much he reported) and he indicates much blood work was done but no CT or MRI. Because of his mobility issues, he has had minimal subsequent interactions with health care providers. The CT of the head without contrast and MRI of the brain without contrast done on 8/10/18  report \"Stable chronic small vessel ischemic white matter disease. No acute infarct or mass. \"     I clarified again with him today that the timing was 3 months ago for the onset of his symptoms and inquired as to whether it was just his arm or his arm and face or more extensive left sided involvement. He indicates the arm is the worse but his left face feels weak and numb and he constantly moves his lower jaw forward (much like one would try to do on a plane when equalizing pressures) to try to regain normal function. His leg is weak but he is still able to use it.        Subjective:         Current Facility-Administered Medications   Medication Dose Route Frequency    bisacodyl (DULCOLAX) tablet 10 mg  10 mg Oral Q12H    hydrocortisone Sod Succ (PF) (SOLU-CORTEF) injection 50 mg  50 mg IntraVENous Q12H    dextrose 5% infusion  75 mL/hr IntraVENous CONTINUOUS    mupirocin (BACTROBAN) 2 % ointment   Both Nostrils BID    acetaminophen (TYLENOL) tablet 325 mg  325 mg Oral Q6H PRN    aspirin chewable tablet 81 mg  81 mg Oral QPM    docusate sodium (COLACE) capsule 100 mg  100 mg Oral BID PRN    folic acid (FOLVITE) tablet 1 mg  1 mg Oral DAILY    gabapentin (NEURONTIN) capsule 300 mg  300 mg Oral TID    oxyCODONE-acetaminophen (PERCOCET 10)  mg per tablet 1 Tab  1 Tab Oral Q4H PRN    sodium chloride (NS) flush 5-10 mL  5-10 mL IntraVENous Q8H    sodium chloride (NS) flush 5-10 mL  5-10 mL IntraVENous PRN    naloxone (NARCAN) injection 0.4 mg  0.4 mg IntraVENous PRN    ondansetron (ZOFRAN) injection 4 mg  4 mg IntraVENous Q4H PRN    heparin (porcine) injection 5,000 Units  5,000 Units SubCUTAneous Q12H    polyethylene glycol (MIRALAX) packet 17 g  17 g Oral DAILY    melatonin tablet 3 mg  3 mg Oral QHS        Review of Systems:  Pertinent items are noted in HPI. o/w negative for 11 organ systems. Objective:     Patient Vitals for the past 8 hrs:   BP Temp Pulse Resp SpO2   18 1100 103/49 98.6 °F (37 °C) 67 18 96 %   18 0745 123/64 98.1 °F (36.7 °C) (!) 54 18 95 %       Temp (24hrs), Av °F (36.7 °C), Min:97.6 °F (36.4 °C), Max:98.6 °F (37 °C)       0701 -  1900  In: 240 [P.O.:240]  Out: -     Physical Exam:  Sitting up in chair  Thin appearing male in NAD  HEENT: NC, AT, pupils round without icterus  Neck: no adenopathy, no JVD, no thyromegaly  Lungs cta  Heart regular rate and rhythm. Abdomen soft, nontender, normoactive BS, -HSM appreciated  Ext no edema. Scarring on right leg. Skin - multiple keratoses  No adenopathy appreciated. Neuro:  LUE resting on pillow - can not lift voluntarily without using right hand. Left leg he can move although somewhat weaker. Right foot turns inward.      Labs:    Recent Results (from the past 24 hour(s))   T4, FREE    Collection Time: 18 12:52 AM   Result Value Ref Range    T4, Free 1.1 0.8 - 1.5 NG/DL   CRP, HIGH SENSITIVITY    Collection Time: 18 12:52 AM   Result Value Ref Range    CRP, High sensitivity >9.5 mg/L   RHEUMATOID FACTOR, QT    Collection Time: 18 12:52 AM   Result Value Ref Range    Rheumatoid factor 46 (H) <15 IU/mL PARANEOPLASTIC ABS W/REFLEX    Collection Time: 08/16/18 12:52 AM   Result Value Ref Range    Anti-Hu Ab PENDING Titer    Anti-Ri Ab PENDING Titer   METABOLIC PANEL, BASIC    Collection Time: 08/16/18 11:20 AM   Result Value Ref Range    Sodium 140 136 - 145 mmol/L    Potassium 2.9 (L) 3.5 - 5.1 mmol/L    Chloride 105 97 - 108 mmol/L    CO2 29 21 - 32 mmol/L    Anion gap 6 5 - 15 mmol/L    Glucose 122 (H) 65 - 100 mg/dL    BUN 9 6 - 20 MG/DL    Creatinine 0.51 (L) 0.70 - 1.30 MG/DL    BUN/Creatinine ratio 18 12 - 20      GFR est AA >60 >60 ml/min/1.73m2    GFR est non-AA >60 >60 ml/min/1.73m2    Calcium 7.3 (L) 8.5 - 10.1 MG/DL       Assessment:     Active Problems/Plan:  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 Nothing further needs to be done at this time with regard to this diagnosis. The letter sent to office introducing Mr. Yue Rodriguez to my office sent by Dr. Angeles Mcdermott indicated that he had been treating him for a number of non-melanoma skin cancers for the prior 5 years. 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 CT scan suggest active colitis. Dr. Isaura Dickey has seen him and given his issues with mobility, is checking to see if perhaps colonoscopy can be done in-house if schedule will accommodate. Left sided weakness - not sure what happened 3 months ago. His brain imaging studies do not show obvious infarct as best I can tell but he clearly had an event and is in need of a diagnosis. Neuro evaluation underway. Anemia - haptoglobin high. b12 and folate checked in 2015 - normal. Current B12, folate and ferritin okay. Mabeline Sink SPEP negative for M protein and free light chain ratio normal.  MARTÍN negative.       Hx of RA, ulcerative colitis and sarcoidosis - on prednisone, has been on embrel and MTX in past. MTX may be contributing to his anemia. CAD s/p CABG, AS s/p AVR - currently with bradycardia.

## 2018-08-16 NOTE — PROCEDURES
[facility] at [address] [facility] [address] [facility], [address] [facility]  Tel [phone]     Fax [phone]  Electrodiagnostic Study Report  Test Date:  2018    Patient: Venancio Bishop  Age: 82   UV#:   Ref Phys: Yue Osullivan M.D. Sex: Male  Physician: Dilcia Aquino MD   Height: ' \"      : 1935  DOS: 2018       CHIEF COMPLAINTS:  Patient is a 82year-old male who presents with progressive left arm weakness and numbness. Query cervical radiculopathy or brachial plexopathy.      EMG & NCV Findings:    Nerve Conduction Studies  Anti Sensory Summary Table     Site NR Peak (ms) Norm Peak (ms) P-T Amp (µV) Norm O-P Amp Site1 Site2 Dist (cm)   Right Lat Ante Brach Cutan Anti Sensory (Lat Forearm)   Lat Biceps NR  <2.5  >6 Lat Biceps Lat Forearm 10.0   Left Med Ante Brach Cutan Anti Sensory (Med Forearm)   Elbow NR  <2.6  >3 Elbow Med Forearm 10.0   L LAC NR          Left Median Anti Sensory (2nd Digit)   Wrist NR  <4  >13 Wrist 2nd Digit 14.0   Right Median Anti Sensory (2nd Digit)   Wrist    3.8 <4 12.6 >13 Wrist 2nd Digit 14.0   Left Radial Anti Sensory (Base 1st Digit)   Wrist    2.9 <2.8 4.6 >11 Wrist Base 1st Digit 10.0   Left Sup Fibular Anti Sensory (Lat ankle)  31.9°C   Lower leg NR  <4.6  >4 Lower leg Lat ankle 10.0   Left Sural Anti Sensory (Lat Mall)  32°C   Calf NR  <4.5  >4.0 Calf Lat Mall 14.0   Left Ulnar Anti Sensory (5th Digit)   Wrist NR  <4.0  >9 Wrist 5th Digit 14.0   Right Ulnar Anti Sensory (5th Digit)   Wrist    4.0 <4.0 23.8 >9 Wrist 5th Digit 14.0      Motor Summary Table     Site NR Onset (ms) Norm Onset (ms) O-P Amp (mV) Norm O-P Amp P-T Amp (mV) Site1 Site2 Dist (cm) Lei (m/s)   Left Fibular Motor (Ext Dig Brev)  31.9°C   Ankle    3.7 <6.5 0.4 >1.1 0.6 Ankle Ext Dig Brev 8.0    B Fib    11.2  0.4  0.6 B Fib Ankle 29.5 39   Poplt    13.7  0.3  0.5 Poplt B Fib 10.0 40   Left Median Motor (Abd Poll Brev)   Wrist    4.5 <4.5 0.6 >4.1 0.9 Wrist Abd Poll Brev 8.0    Elbow    10.5  0.6  0.8 Elbow Wrist 25.0 42   Right Median Motor (Abd Poll Brev)   Wrist    4.0 <4.5 4.3 >4.1 6.8 Wrist Abd Poll Brev 8.0    Elbow    9.4  3.8  6.1 Elbow Wrist 23.5 44   Left Tibial Motor (Abd Velez Brev)  32°C   Ankle    3.5 <6.1 0.9 >1.1 1.5 Ankle Abd Velez Brev 8.0    Knee    14.1  0.3  0.6 Knee Ankle 40.0 38   Left Ulnar Motor (Abd Dig Minimi)   Wrist    4.0 <3.1 1.5 >7.0 2.1 Wrist Abd Dig Minimi 8.0    B Elbow    8.4  1.4  2.1 B Elbow Wrist 19.5 44   A Elbow    10.6  1.4  2.1 A Elbow B Elbow 10.0 45   Right Ulnar Motor (Abd Dig Minimi)   Wrist    3.4 <3.1 4.7 >7.0 6.8 Wrist Abd Dig Minimi 8.0    B Elbow    7.0  4.3  6.3 B Elbow Wrist 18.0 50   A Elbow    9.1  4.0  5.8 A Elbow B Elbow 10.0 48     F Wave Studies     NR F-Lat (ms) Lat Norm (ms) L-R F-Lat (ms) L-R Lat Norm   Left Tibial (Mrkrs) (Abd Hallucis)  32.2°C      69.54 <56  <5.7   Left Ulnar (Mrkrs) (Abd Dig Min)      34.01 <32  <2.5     EMG     Side Muscle Nerve Root Ins Act Fibs Psw Recrt Duration Amp Poly Comment   Left 1stDorInt Ulnar C8-T1 Nml Nml Nml Reduced Incr Incr Nml    Left PronatorTeres Median C6-7 Incr 2+ 2+ Reduced Incr Incr Nml    Left Triceps Radial C6-7-8 Incr 2+ 2+ Reduced Incr Incr Nml    Left Biceps Musculocut C5-6 Incr 2+ 2+ Reduced Incr Incr Nml    Left Deltoid Axillary C5-6 Incr 1+ 1+ Reduced Incr Incr Nml    Right 1stDorInt Ulnar C8-T1 Nml Nml Nml Nml Nml Nml Nml    Right Biceps Musculocut C5-6 Nml Nml Nml Nml Nml Nml Nml    Left AntTibialis Dp Br Peron L4-5 Nml Nml Nml Nml Nml Nml Nml    Left VastusLat Femoral L2-4 Nml Nml Nml Nml Nml Nml Nml        Waveforms:                                           Summary:  Nerve conduction studies of the bilateral upper and the left lower extremities were remarkable for absent left medial and bilateral lateral antebrachial cutaneous responses. The left median, peroneal , sural and ulnar sensory responses were absent as well.  The left peroneal, median, tibial and ulnar motor amplitudes were low. Needle electrode examination of the bilateral upper and the left lower extremities was remarkable for large motor unit potentials, reduced recruitment and abnormality spontaneous activity was seen in all the left upper extremity muscles. Impression: This is an abnormal study. There is electrophysiological evidence of:  1. A left brachial plexopathy; and  2. A length dependent axonal polyneuropathy.              __________________  Miles Brown M.D.

## 2018-08-16 NOTE — PROGRESS NOTES
CM spoke with Pt in regards to discharge recommendation for SNF. CM discussed with Pt the benefits of discharging to SNF. Pt appeared receptive but stated that he would like CM to discuss recommendations with his wife. CM contacted Pt's wife who is the caregiver. CM discussed SNF recommendation with wife. Wife was receptive stating that she believes that Pt needs to build strength and improve mobility before discharging home. Pt wife stated that she has been having difficulty with assisting Pt by herself. Pt's wife requested that referral be sent to [facility] and [facility] and [facility]. CM inquired about Pt having PCA benefits with insurance. Pt wife confirmed, stated that Pt was approved for 35 hours a week. CM spoke with Pt on what was discussed with wife. Referrals were sent via [employer]. Pt was accepted to [facility] and [facility]. CM notified attending of acceptance. Attending stated that Pt will likely discharge over the weekend. CM will continue to follow-up with discharge planning.      Lori Schneider, [facility], [address] [place] [address]  [phone]

## 2018-08-16 NOTE — PROGRESS NOTES
Pt finished approximately half of bowel prep for colonoscopy, stated he could not drink any more as it was choking him up and made him want to vomit.

## 2018-08-16 NOTE — PROGRESS NOTES
Hospitalist Progress Note    NAME: Lucila Barfield   :  1935   MRN:  [id number]       Assessment / Plan:  Severe bradycardia, resolved  -transferred to ICU on  for transcutaneous pacing prn. HR remains stable today. Cardiology felt it was due to vagal   -stop metoprolol XL  -appreciate cardiology following    Hx of RA  Hx of ulcerative colitis  Hx of sarcoidosis  -on chronic prednisone, now on stress dose steroids  -cont' current pain management  -enbrel and MTX on hold  -GI evaluation appreciated, EGD delayed until tomorrow. 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 No acute infarct or mass. -CT a/p suggestive of chronic proctocolitis  -EGD/CSCOPE per GI, inpt vs outpt?  -on stress dose steroids  -Dr Alis Reese evaluating pt, ongoing work up  -neurology consulted for L side weakness, plan for EMG today    Back pain  -MRI C/L spine showed degenerative spine changes with no canal stenosis or cord signal abnormality. No hernation or acute abnormality  -consult PT/OT, recommended SNF    CAD s/p CABG  AS s/p AVR  HTN  - stop BB  -cont' ASA  -statin on hold due to myalgias    Hx of Cdiff  -completed treatment. No diarrhea here    Severe Malnutrition, Body mass index is 17.23 kg/(m^2). Code status: Full  Prophylaxis: Hep SQ  Recommended Disposition: SNF/LTC     Subjective:     Chief Complaint / Reason for Physician Visit  Pt seen at bedside. No acute issues. Was not able to tolerate bowel prep overnight. Discussed with RN events overnight. Review of Systems:  Symptom Y/N Comments  Symptom Y/N Comments   Fever/Chills n   Chest Pain n    Poor Appetite    Edema     Cough    Abdominal Pain n    Sputum    Joint Pain     SOB/BEY n   Pruritis/Rash     Nausea/vomit    Tolerating PT/OT     Diarrhea    Tolerating Diet y    Constipation    Other       Could NOT obtain due to:      Objective:     VITALS:   Last 24hrs VS reviewed since prior progress note. Most recent are:  Patient Vitals for the past 24 hrs:   Temp Pulse Resp BP SpO2   08/16/18 0745 98.1 °F (36.7 °C) (!) 54 18 123/64 95 %   08/16/18 0313 97.6 °F (36.4 °C) (!) 58 18 112/52 93 %   08/15/18 2309 98 °F (36.7 °C) 72 18 129/60 95 %   08/15/18 1923 98 °F (36.7 °C) (!) 59 16 115/57 100 %   08/15/18 1530 97.9 °F (36.6 °C) 73 16 (!) 110/38 97 %   08/15/18 1048 97.7 °F (36.5 °C) 66 16 113/48 100 %       Intake/Output Summary (Last 24 hours) at 08/16/18 0938  Last data filed at 08/16/18 9528   Gross per 24 hour   Intake          1578.75 ml   Output              680 ml   Net           898.75 ml        PHYSICAL EXAM:  General: WD, WN. Alert, cooperative, no acute distress    EENT:  EOMI. Anicteric sclerae. MMM  Resp:  CTA bilaterally, no wheezing or rales. No accessory muscle use  CV:  Sinus bradycardia,  No edema  GI:  Soft, Non distended, Non tender.  +BS  Neurologic:  Alert and oriented X 3, normal speech  Psych:   Not anxious nor agitated  Skin:  No rashes.   No jaundice    Reviewed most current lab test results and cultures  YES  Reviewed most current radiology test results   YES  Review and summation of old records today    NO  Reviewed patient's current orders and MAR    YES  PMH/SH reviewed - no change compared to H&P  ________________________________________________________________________  Care Plan discussed with:    Comments   Patient x    Family      RN x    Care Manager x    Consultant                       x Multidiciplinary team rounds were held today with , nursing, pharmacist and clinical coordinator. Patient's plan of care was discussed; medications were reviewed and discharge planning was addressed. ________________________________________________________________________  Total NON critical care TIME:  35   Minutes    Total CRITICAL CARE TIME Spent:    Minutes non procedure based      Comments   >50% of visit spent in counseling and coordination of care     ________________________________________________________________________  Neville Jimenez MD     Procedures: see electronic medical records for all procedures/Xrays and details which were not copied into this note but were reviewed prior to creation of Plan. LABS:  I reviewed today's most current labs and imaging studies.   Pertinent labs include:  Recent Labs      08/15/18   0317  08/14/18   0525   WBC  2.4*  3.1*   HGB  9.0*  9.7*   HCT  28.9*  31.4*   PLT  196  187     Recent Labs      08/15/18   0317  08/14/18   0525   NA  147*  144   K  3.6  3.8   CL  114*  110*   CO2  27  27   GLU  135*  151*   BUN  13  9   CREA  0.35*  0.39*   CA  7.3*  7.0*   MG   --   2.0   PHOS   --   2.7   ALB   --   2.0*   TBILI   --   0.4   SGOT   --   11*   ALT   --   7*       Signed: Neville Jimenez MD

## 2018-08-16 NOTE — ROUTINE PROCESS

## 2018-08-16 NOTE — PROGRESS NOTES
GI Progress Note Mohit Pulling)  NAME:Ramon Willams :1935 DPA:[id number]   ATTG: Dr. Karyle Beaver  PCP: Becca Otoole MD  Date/Time:  2018 4:05 P    Reason for following: UC, abnormal CT    Assessment:   · Ulcerative colitis  - not currently on meds, previously on methotrexate  · Left sided weakness, poor mobility  · Abnormal CT, left-sided colitis  · CAD, weight loss, failure to thrive, AS s/p AVR, HTN, RA, sarcoidosis     Plan:   · Clear liquid again, then NPO p MN  · miralax 119 gm + 32 oz gatorade today  · dulcolax  · Colonoscopy delayed until tomorrow     Subjective:   Could only drink about half of bowel prep. Gagged and it was disgusting and he had to stop. When endo called to bring him down for his colonoscopy this AM, he still had not had a BM. Procedure cancelled. Reviewed with patient, can prep today and plan for tomorrow. Review of Systems:  Symptom Y/N Comments  Symptom Y/N Comments   Fever/Chills n   Chest Pain n    Cough n   Headaches n    Sputum n   Joint Pain n    SOB/BEY n   Pruritis/Rash n    Tolerating Diet y   Other       Could NOT obtain due to:      Objective:   VITALS:   Last 24hrs VS reviewed since prior progress note. Most recent are:  Visit Vitals    /64 (BP 1 Location: Right arm, BP Patient Position: At rest)    Pulse (!) 54    Temp 98.1 °F (36.7 °C)    Resp 18    Ht 5' 8.5\" (1.74 m)    Wt 52.2 kg (115 lb)    SpO2 95%    BMI 17.23 kg/m2       Intake/Output Summary (Last 24 hours) at 18 0824  Last data filed at 18 3819   Gross per 24 hour   Intake          1578.75 ml   Output              680 ml   Net           898.75 ml     PHYSICAL EXAM:  General: WD, WN. Alert, lean, cooperative, no acute distress    HEENT: NC, Atraumatic. Anicteric sclerae. Lungs:  CTA Bilaterally. No Wheezing/Rhonchi/Rales.   Heart:  Regular  rhythm,  No murmur (), No Rubs, No Gallops  Abdomen: Soft, Non distended, Non tender.  +Bowel sounds, no HSM  Neurologic:  Alert and oriented X 3. Psych:   Good insight. Not anxious nor agitated. Lab and Radiology Data Reviewed: (see below)    Medications Reviewed: (see below)  PMH/SH reviewed - no change compared to H&P  ________________________________________________________________________  Total time spent with patient: 15 minutes ________________________________________________________________________  Care Plan discussed with:  Patient x   Family  x   RN               Consultant:  amari Randle MD     Procedures: see electronic medical records for all procedures/Xrays and details which were not copied into this note but were reviewed prior to creation of Plan. LABS:  Recent Labs      08/15/18   0317  08/14/18   0525   WBC  2.4*  3.1*   HGB  9.0*  9.7*   HCT  28.9*  31.4*   PLT  196  187     Recent Labs      08/15/18   0317  08/14/18   0525   NA  147*  144   K  3.6  3.8   CL  114*  110*   CO2  27  27   BUN  13  9   CREA  0.35*  0.39*   GLU  135*  151*   CA  7.3*  7.0*   MG   --   2.0   PHOS   --   2.7     Recent Labs      08/14/18   0525   SGOT  11*   AP  72   TP  4.6*   ALB  2.0*   GLOB  2.6     No results for input(s): INR, PTP, APTT in the last 72 hours. No lab exists for component: Tanika Barifeld   Recent Labs      08/14/18   0525   FERR  140      Lab Results   Component Value Date/Time    Folate 23.0 (H) 08/14/2018 05:25 AM     No results for input(s): PH, PCO2, PO2 in the last 72 hours. No results for input(s): CPK, CKMB in the last 72 hours.     No lab exists for component: TROPONINI  Lab Results   Component Value Date/Time    Color DARK YELLOW 08/10/2018 02:03 PM    Appearance CLEAR 08/10/2018 02:03 PM    Specific gravity 1.028 08/10/2018 02:03 PM    Specific gravity <1.005 03/06/2018 03:28 PM    pH (UA) 5.0 08/10/2018 02:03 PM    Protein NEGATIVE  08/10/2018 02:03 PM    Glucose NEGATIVE  08/10/2018 02:03 PM    Ketone NEGATIVE  08/10/2018 02:03 PM    Bilirubin NEGATIVE  03/06/2018 03:28 PM    Urobilinogen 1.0 08/10/2018 02:03 PM    Nitrites NEGATIVE  08/10/2018 02:03 PM    Leukocyte Esterase NEGATIVE  08/10/2018 02:03 PM    Epithelial cells FEW 08/10/2018 02:03 PM    Bacteria NEGATIVE  08/10/2018 02:03 PM    WBC 0-4 08/10/2018 02:03 PM    RBC 0-5 08/10/2018 02:03 PM       MEDICATIONS:  Current Facility-Administered Medications   Medication Dose Route Frequency    bisacodyl (DULCOLAX) tablet 10 mg  10 mg Oral Q12H    polyethylene glycol (MIRALAX) packet 119 g  119 g Oral ONCE    hydrocortisone Sod Succ (PF) (SOLU-CORTEF) injection 50 mg  50 mg IntraVENous Q12H    dextrose 5% infusion  75 mL/hr IntraVENous CONTINUOUS    mupirocin (BACTROBAN) 2 % ointment   Both Nostrils BID    acetaminophen (TYLENOL) tablet 325 mg  325 mg Oral Q6H PRN    aspirin chewable tablet 81 mg  81 mg Oral QPM    docusate sodium (COLACE) capsule 100 mg  100 mg Oral BID PRN    folic acid (FOLVITE) tablet 1 mg  1 mg Oral DAILY    gabapentin (NEURONTIN) capsule 300 mg  300 mg Oral TID    oxyCODONE-acetaminophen (PERCOCET 10)  mg per tablet 1 Tab  1 Tab Oral Q4H PRN    sodium chloride (NS) flush 5-10 mL  5-10 mL IntraVENous Q8H    sodium chloride (NS) flush 5-10 mL  5-10 mL IntraVENous PRN    naloxone (NARCAN) injection 0.4 mg  0.4 mg IntraVENous PRN    ondansetron (ZOFRAN) injection 4 mg  4 mg IntraVENous Q4H PRN    heparin (porcine) injection 5,000 Units  5,000 Units SubCUTAneous Q12H    polyethylene glycol (MIRALAX) packet 17 g  17 g Oral DAILY    melatonin tablet 3 mg  3 mg Oral QHS

## 2018-08-16 NOTE — CONSULTS
Palliative Medicine Brief Note-    Mr. Karly Fernandez was seen and evaluated. Though he has chronic pain not well controlled, the etiology remains unclear and he is scheduled for NCS, no recommendations for adjustments at this time, will wait to see results of test. Did not discuss code status during visit as patient was fatigued, will follow-up on 8/17 or sooner as needed.     Completed visit note pending    Thank you for consulting palliative medicine

## 2018-08-16 NOTE — PROGRESS NOTES
0  Spoke with Dr. Penelope Wilson regarding pt not drinking much of mirlax bowel prep. Received orders for sorbitol. Will continue to monitor. 1840  Pt had large brown watery bm. Will continue to monitor    1910  Pt had another brown watery BM. Will continue to monitor.     Bedside(SBAR) Shift report given to Lico Verdin

## 2018-08-16 NOTE — PROGRESS NOTES
Called floor and spoke with patient's primary nurse who states patient has only drank 1/2 of bowel prep and has had \"no results. \"  GI physician is unaware so Raya Tracy of this note will call Dr. Ryan Hu and let him know.

## 2018-08-16 NOTE — PROGRESS NOTES
NEUROLOGY NOTE     Chief CO : left arm weakness    SUBJECTIVE:  No overnight events    HPI  Lizz Brooks is a 82 y.o. male. Pt does have hx of rheumatoid arthritis. Neurology consulted because of left upper and lower ext weakness. He states that symptoms started around 3 weeks ago and it started with left leg weakness and numbness that progressed to the left arm and the left side of the face. Symptoms have been progressive. No modifying factors. ROS  A ten system review of constitutional, cardiovascular, respiratory, musculoskeletal, endocrine, skin, SHEENT, genitourinary, psychiatric and neurologic systems was obtained and is unremarkable except as stated in HPI     PMH  Past Medical History:   Diagnosis Date    Aortic stenosis, severe     Arrhythmia     heart murmur    Arthritis     Rheumatoid    CAD (coronary artery disease) 1998    S/P CABG    Cancer (HCC)     Basal cell carcinoma    Chronic pain     from rheumatoid arthritis in knees, elbows fingers    HTN (hypertension)     Hypercholesterolemia     S/P AVR (aortic valve replacement)     23 mm Trifecta with Redo CABG x 1SVG to PDA    S/P CABG x 1 10/13/2014    Redo CABG x 1 SVG to PDA; 'y' from collins of existing OM vein graft    Sarcoidosis     No longer active.      Ulcerative colitis ([facility] [address]     Not active       FH  Family History   Problem Relation Age of Onset    Heart Disease Father     Cancer Sister      breast     Diabetes Sister     Cancer Sister      leukemia    Diabetes Sister     Diabetes Mother    Petty Chhaya Bladder Disease Mother        SH  Social History     Social History    Marital status:      Spouse name: N/A    Number of children: N/A    Years of education: N/A     Social History Main Topics    Smoking status: Former Smoker     Packs/day: 1.50     Years: 20.00     Quit date: 1/1/1978    Smokeless tobacco: Never Used    Alcohol use Yes      Comment: rarely    Drug use: No    Sexual activity: Not on file Other Topics Concern    Not on file     Social History Narrative       ALLERGIES  Allergies   Allergen Reactions    Lisinopril Cough    Questran [Cholestyramine (With Sugar)] Other (comments)     Muscle cramps       PHYSICAL EXAM  EXAMINATION:   Patient Vitals for the past 24 hrs:   Temp Pulse Resp BP SpO2   08/16/18 1533 98.4 °F (36.9 °C) 69 18 121/67 99 %   08/16/18 1100 98.6 °F (37 °C) 67 18 103/49 96 %   08/16/18 0745 98.1 °F (36.7 °C) (!) 54 18 123/64 95 %   08/16/18 0313 97.6 °F (36.4 °C) (!) 58 18 112/52 93 %   08/15/18 2309 98 °F (36.7 °C) 72 18 129/60 95 %   08/15/18 1923 98 °F (36.7 °C) (!) 59 16 115/57 100 %        General:   General appearance: Pt is in no acute distress   Distal pulses are preserved    Neurological Examination:   Mental Status:  AAO x3. Speech is fluent. Follows commands, has normal fund of knowledge, attention, short term recall, comprehension and insight. Cranial Nerves: Visual fields are full. PERRL, Extraocular movements are full. Facial sensation intact. Facial movement intact. Hearing intact to conversation. Palate elevates symmetrically. Shoulder shrug symmetric. Tongue midline. Motor: Strength is 4/5 in LE with right foot drop. RUE is 5/5 and LUE is 2/5 . Normal tone. No atrophy. Sensation: Decreased PP distally in LE and LUE    Reflexes: DTRs2+ in RUE. Absent in LUE and bilateral lower ext. Coordination/Cerebellar: Intact to finger-nose-finger on the right    Gait: deferred    Skin: No significant bruising or lacerations.     LAB DATA REVIEWED:    Recent Results (from the past 24 hour(s))   RPR    Collection Time: 08/16/18 12:52 AM   Result Value Ref Range    RPR NONREACTIVE NR     T4, FREE    Collection Time: 08/16/18 12:52 AM   Result Value Ref Range    T4, Free 1.1 0.8 - 1.5 NG/DL   CRP, HIGH SENSITIVITY    Collection Time: 08/16/18 12:52 AM   Result Value Ref Range    CRP, High sensitivity >9.5 mg/L   RHEUMATOID FACTOR, QT    Collection Time: 08/16/18 12:52 AM   Result Value Ref Range    Rheumatoid factor 46 (H) <15 IU/mL   PARANEOPLASTIC ABS W/REFLEX    Collection Time: 18 12:52 AM   Result Value Ref Range    Anti-Hu Ab PENDING Titer    Anti-Ri Ab PENDING Titer   METABOLIC PANEL, BASIC    Collection Time: 18 11:20 AM   Result Value Ref Range    Sodium 140 136 - 145 mmol/L    Potassium 2.9 (L) 3.5 - 5.1 mmol/L    Chloride 105 97 - 108 mmol/L    CO2 29 21 - 32 mmol/L    Anion gap 6 5 - 15 mmol/L    Glucose 122 (H) 65 - 100 mg/dL    BUN 9 6 - 20 MG/DL    Creatinine 0.51 (L) 0.70 - 1.30 MG/DL    BUN/Creatinine ratio 18 12 - 20      GFR est AA >60 >60 ml/min/1.73m2    GFR est non-AA >60 >60 ml/min/1.73m2    Calcium 7.3 (L) 8.5 - 10.1 MG/DL        Imaging review:  MRI Brain  Stable chronic small vessel ischemic white matter disease. No acute  infarct or mass. MRI C spine  Examination is partially limited by patient motion. There is mild  cervical spondylosis    MRI L spine  Degenerative changes are largely unchanged from 2017    EMG/NCS  Left brachial plexopathy  Peripheral neuropathy    HOME MEDS  Prior to Admission Medications   Prescriptions Last Dose Informant Patient Reported? Taking? OTHER Unknown at Unknown time Other Yes No   Sig: by Injection route every three (3) months. Patient receives lumbar epidural steroid injections by Dr. Ronda Otero from [facility]   aspirin 81 mg chewable tablet 2018 at Unknown time Self No Yes   Sig: Take 1 Tab by mouth every evening. atorvastatin (LIPITOR) 20 mg tablet 2018 at Unknown time Other No Yes   Sig: Take 1 tablet by mouth  daily   docusate sodium (COLACE) 100 mg capsule 7/10/2018 at Unknown time Self Yes Yes   Sig: Take 100 mg by mouth two (2) times daily as needed for Constipation. etanercept (ENBREL) 50 mg/mL (0.98 mL) injection 8/3/2018 at Unknown time Self Yes Yes   Si mg by SubCUTAneous route every .    folic acid (FOLVITE) 1 mg tablet 8/10/2018 at Unknown time Other Yes Yes   Sig: Take 1 mg by mouth daily. gabapentin (NEURONTIN) 300 mg capsule   No No   Sig: Take 1 Cap by mouth three (3) times daily. methotrexate sodium (METHOTREXATE, ANTI-RHEUMATIC,) 2.5 mg DsPk 8/3/2018 at Unknown time Self Yes Yes   Sig: Take 20 mg by mouth Every Saturday. metoprolol succinate (TOPROL-XL) 25 mg XL tablet 8/9/2018 at Unknown time Other Yes Yes   Sig: Take 25 mg by mouth every evening. oxycodone-acetaminophen (PERCOCET)  mg per tablet 8/10/2018 at 1100 Other Yes Yes   Sig: Take 1 tablet by mouth every four (4) hours as needed for Pain. predniSONE (DELTASONE) 2.5 mg tablet 8/9/2018 at Unknown time Other Yes Yes   Sig: Take 2.5 mg by mouth daily. Facility-Administered Medications: None       CURRENT MEDS  Current Facility-Administered Medications   Medication Dose Route Frequency    bisacodyl (DULCOLAX) tablet 10 mg  10 mg Oral Q12H    sorbitol 70 % solution 60 mL  60 mL Oral Q1H    hydrocortisone Sod Succ (PF) (SOLU-CORTEF) injection 50 mg  50 mg IntraVENous Q12H    mupirocin (BACTROBAN) 2 % ointment   Both Nostrils BID    aspirin chewable tablet 81 mg  81 mg Oral QPM    folic acid (FOLVITE) tablet 1 mg  1 mg Oral DAILY    gabapentin (NEURONTIN) capsule 300 mg  300 mg Oral TID    sodium chloride (NS) flush 5-10 mL  5-10 mL IntraVENous Q8H    heparin (porcine) injection 5,000 Units  5,000 Units SubCUTAneous Q12H    polyethylene glycol (MIRALAX) packet 17 g  17 g Oral DAILY    melatonin tablet 3 mg  3 mg Oral QHS       IMPRESSION:  Wilfredo Rodriguez is a 82 y.o. male who presents with acute onset progressive left upper ext weakness. Exam is positive for areflexia of the left upper ext and significant weakness of the LUE. Pt does have rheumatoid arthritis. Imaging of the spine is negative. EMG suggestive of left brachial plexopathy    RECOMMENDATIONS:  1. EMG/NCS - left brachial plexopathy and peripheral neuropathy  2. Lumbar puncture  3.  MRI Brachial plexus      Lee Paco Rubio MD  Neurologist

## 2018-08-16 NOTE — PROGRESS NOTES
Nutrition Assessment:    INTERVENTIONS/RECOMMENDATIONS:   Resume solid diet when medically feasible     ASSESSMENT:   Chart reviewed. Colonoscopy pushed back to tomorrow due to pt only able to drink 1/2 of bowel prep. Pt reports he was eating well until being placed on clear liquid diet. He does not care for ensure clear. Diet Order: Clear liquids  % Eaten:  Patient Vitals for the past 72 hrs:   % Diet Eaten   08/16/18 1255 20 %   08/16/18 0945 20 %   08/15/18 1838 0 %   08/15/18 1346 100 %   08/15/18 0943 45 %   08/14/18 1729 25 %   08/14/18 1357 100 %   08/14/18 0918 50 %     Pertinent Medications: [x]Reviewed: dulcolax, folic acid, miralax,   Pertinent Labs: [x]Reviewed: K+ 2.9  Food Allergies: [x]NKFA  []Other   Last BM:  8/14  Edema:      []RUE   []LUE   []RLE   []LLE      Pressure Ulcer:      [] Stage I   [] Stage II   [] Stage III   [] Stage IV      Anthropometrics: Height: 5' 8.5\" (174 cm) Weight: 52.2 kg (115 lb)    IBW (%IBW):   ( ) UBW (%UBW):   (  %)    BMI: Body mass index is 17.23 kg/(m^2). This BMI is indicative of:  [x]Underweight   []Normal   []Overweight   [] Obesity   [] Extreme Obesity (BMI>40)  Last Weight Metrics:  Weight Loss Metrics 8/10/2018 3/16/2018 3/9/2018 3/7/2018 3/6/2018 2/20/2018 10/5/2017   Today's Wt 115 lb 139 lb 120 lb 120 lb - 127 lb 160 lb   BMI 17.23 kg/m2 21.13 kg/m2 18.25 kg/m2 - 18.25 kg/m2 18.22 kg/m2 22.96 kg/m2       Estimated Nutrition Needs (Based on): 1749 Kcals/day (BMR (1207) x 1.3AF +300kcal) , 68 g (1.3 g/kg bw) Protein  Carbohydrate:  At Least 130 g/day  Fluids: 1749 mL/day or per primary team    NUTRITION DIAGNOSES:   Problem:  Unintended weight loss      Etiology: related to decreased appetite     Signs/Symptoms: as evidenced by 26.8% weight loss x 1 year    Previous Nutrition Dx:  [] Resolved  [] Unresolved           [x] Progressing    NUTRITION INTERVENTIONS:  Meals/Snacks: General/healthful diet   Supplements: Commercial supplement              GOAL: consume >50% of meals in 2-4 days    NUTRITION MONITORING AND EVALUATION      Food/Nutrient Intake Outcomes:  Total energy intake, Liquid meal replacement  Physical Signs/Symptoms Outcomes: GI, Glucose profile, GI profile, Weight/weight change, Electrolyte and renal profile    Previous Goal Met:   [x] Met              [] Progressing Towards Goal              [] Not Progressing Towards Goal   Previous Recommendations:   [] Implemented          [] Not Implemented          [x] Not Applicable    LEARNING NEEDS (Diet, Food/Nutrient-Drug Interaction):    [x] None Identified   [] Identified and Education Provided/Documented   [] Identified and Pt declined/was not appropriate     Cultural, Yazidism, OR Ethnic Dietary Needs:    [x] None Identified   [] Identified and Addressed     [x] Interdisciplinary Care Plan Reviewed/Documented    [x] Discharge Planning: heart healthy, consistent carb diet   [] Participated in Interdisciplinary Rounds    NUTRITION RISK:    [x] High      to        [x] Moderate           []  Low  []  Minimal/Uncompromised      Hetal Jones RDN  Pager [phone]  Weekend Pager [phone]

## 2018-08-17 NOTE — PROGRESS NOTES
I was not able to see the pt as he has gone for a colonoscopy, then MRI of the chest and then a lumbar puncture. Pt will be followed by Dr. Leonor Rich tomorrow.

## 2018-08-17 NOTE — PROGRESS NOTES
TRANSFER - OUT REPORT:    Verbal report given to Bharat Rowe (name) on Randall Omalley  being transferred to [facility] room 2286(unit) for routine post - op       Report consisted of patients Situation, Background, Assessment and   Recommendations(SBAR). Information from the following report(s) SBAR, Kardex, Procedure Summary, MAR and Recent Results was reviewed with the receiving nurse. Lines:   Peripheral IV 08/14/18 Right Forearm (Active)   Site Assessment Clean, dry, & intact 8/17/2018  8:00 AM   Phlebitis Assessment 0 8/17/2018  8:00 AM   Infiltration Assessment 0 8/17/2018  8:00 AM   Dressing Status Clean, dry, & intact 8/17/2018  8:00 AM   Dressing Type Transparent 8/17/2018  8:00 AM   Hub Color/Line Status Pink; Infusing 8/17/2018  8:00 AM   Action Taken Open ports on tubing capped 8/14/2018  9:18 AM   Alcohol Cap Used Yes 8/14/2018  9:18 AM        Opportunity for questions and clarification was provided.         Informed nurse of possible c-diff, endo nurse will send stool sample and start enteric precautions

## 2018-08-17 NOTE — PROGRESS NOTES
Hospitalist Progress Note    NAME: Lizz Brooks   :  1935   MRN:  [id number]       Assessment / Plan:  Severe bradycardia, resolved  -transferred to ICU on  for transcutaneous pacing prn. HR remains stable today. Cardiology felt it was due to vagal   -stop metoprolol XL  -appreciate cardiology following    Hx of RA  Hx of ulcerative colitis  Hx of sarcoidosis  -CT a/p suggestive of chronic proctocolitis  -on chronic prednisone, now on stress dose steroids  -cont' current pain management  -enbrel and MTX on hold  -GI evaluation appreciated, Cscope today    Hypernatremia  -worst today, likely due to diarrhea from bowel prep  -start D5  -repeat bmp     Hypokalemia  -K 2.9, replete with IV and PO KCl    Hypotension, improving  - continue stress dose steroids    Generalized weakness with worsening of weakness on L side  -MRI head showed small chronic small vessel ischemic white matter disease. No acute infarct or mass. 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 No hernation or acute abnormality  -consult PT/OT, recommended SNF    CAD s/p CABG  AS s/p AVR  HTN  - stop BB  -cont' ASA  -statin on hold due to myalgias    Hx of Cdiff  -completed treatment. No diarrhea here    Severe Malnutrition, Body mass index is 17.23 kg/(m^2).        Code status: Full  Prophylaxis: Hep SQ  Recommended Disposition: SNF/LTC     Subjective:     Chief Complaint / Reason for Physician Visit  Pt seen at bedside. Tolerated bowel prep overnight. He has no complaints. Discussed with RN events overnight. Review of Systems:  Symptom Y/N Comments  Symptom Y/N Comments   Fever/Chills n   Chest Pain n    Poor Appetite    Edema     Cough    Abdominal Pain n    Sputum    Joint Pain     SOB/BEY n   Pruritis/Rash     Nausea/vomit    Tolerating PT/OT     Diarrhea    Tolerating Diet y    Constipation    Other       Could NOT obtain due to:      Objective:     VITALS:   Last 24hrs VS reviewed since prior progress note. Most recent are:  Patient Vitals for the past 24 hrs:   Temp Pulse Resp BP SpO2   08/17/18 0754 98.2 °F (36.8 °C) 80 18 148/71 99 %   08/17/18 0357 97.7 °F (36.5 °C) 91 20 148/84 98 %   08/16/18 2335 98.2 °F (36.8 °C) 86 20 107/63 97 %   08/16/18 2208 - 85 - - -   08/16/18 2027 - - - 131/75 -   08/16/18 1934 97.5 °F (36.4 °C) (!) 117 20 115/67 96 %   08/16/18 1533 98.4 °F (36.9 °C) 69 18 121/67 99 %   08/16/18 1100 98.6 °F (37 °C) 67 18 103/49 96 %       Intake/Output Summary (Last 24 hours) at 08/17/18 0922  Last data filed at 08/17/18 0429   Gross per 24 hour   Intake              540 ml   Output                0 ml   Net              540 ml        PHYSICAL EXAM:  General: WD, WN. Alert, cooperative, no acute distress    EENT:  EOMI. Anicteric sclerae. MMM  Resp:  CTA bilaterally, no wheezing or rales. No accessory muscle use  CV:  Sinus bradycardia,  No edema  GI:  Soft, Non distended, Non tender.  +BS  Neurologic:  Alert and oriented X 3, normal speech  Psych:   Not anxious nor agitated  Skin:  No rashes.   No jaundice    Reviewed most current lab test results and cultures  YES  Reviewed most current radiology test results   YES  Review and summation of old records today    NO  Reviewed patient's current orders and MAR    YES  PMH/SH reviewed - no change compared to H&P  ________________________________________________________________________  Care Plan discussed with:    Comments   Patient x    Family      RN x    Care Manager x    Consultant                       x Multidiciplinary team rounds were held today with , nursing, pharmacist and clinical coordinator. Patient's plan of care was discussed; medications were reviewed and discharge planning was addressed. ________________________________________________________________________  Total NON critical care TIME:  35   Minutes    Total CRITICAL CARE TIME Spent:    Minutes non procedure based      Comments   >50% of visit spent in counseling and coordination of care     ________________________________________________________________________  Shelbi Rand MD     Procedures: see electronic medical records for all procedures/Xrays and details which were not copied into this note but were reviewed prior to creation of Plan. LABS:  I reviewed today's most current labs and imaging studies.   Pertinent labs include:  Recent Labs      08/15/18   0317   WBC  2.4*   HGB  9.0*   HCT  28.9*   PLT  196     Recent Labs      08/17/18   0109  08/16/18   1120  08/15/18   0317   NA  150*  140  147*   K  2.9*  2.9*  3.6   CL  115*  105  114*   CO2  28  29  27   GLU  147*  122*  135*   BUN  7  9  13   CREA  0.38*  0.51*  0.35*   CA  7.9*  7.3*  7.3*   MG  1.9   --    --        Signed: Shelbi Rand MD

## 2018-08-17 NOTE — PROGRESS NOTES
08/16/18 1934   Vital Signs   Temp 97.5 °F (36.4 °C)   Temp Source Oral   Pulse (Heart Rate) (!) 117   Heart Rate Source Monitor   Resp Rate 20   O2 Sat (%) 96 %   Level of Consciousness Alert   /67   MAP (Calculated) 83   BP 1 Method Automatic   BP 1 Location Right arm   BP Patient Position At rest   MEWS Score 3   Pt with AFIB, will call MD

## 2018-08-17 NOTE — PERIOP NOTES
TRANSFER - IN REPORT:    Verbal report received from [facility] on Lender Grit  being received from 2286(unit) for ordered procedure      Report consisted of patients Situation, Background, Assessment and   Recommendations(SBAR). Information from the following report(s) SBAR and MAR was reviewed with the receiving nurse. Prep was complete and watery / Yolanda Herter    Opportunity for questions and clarification was provided.

## 2018-08-17 NOTE — PROGRESS NOTES
Anesthesia reports 80mg Propofol, 40mg Lidocaine and 500mL NS given during procedure. Received report from anesthesia staff on vital signs and status of patient. Endoscope was pre-cleaned at the bedside immediately following procedure by Shelli Machuca.

## 2018-08-17 NOTE — PROGRESS NOTES
Attempted to see pt for PT tx, cleared by nursing to mobilize. Similar to yesterday he politely declined due to [place] a lot going on today\" and he is suppose to be taken off the floor for testing/procedure. Attempted to educate yet again the importance of working with therapy, but he continued to decline reporting \"I really want to do the therapy, but it just isn't going to happen today\". Will defer and follow up on Monday if pt is still admitted.  Previous PT noted have recommend SNF and pt reports he is thinking about [facility]

## 2018-08-17 NOTE — PROGRESS NOTES
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 as well as anemia        Subjective:         Current Facility-Administered Medications   Medication Dose Route Frequency    dextrose 5% infusion  100 mL/hr IntraVENous CONTINUOUS    hydrocortisone Sod Succ (PF) (SOLU-CORTEF) injection 50 mg  50 mg IntraVENous Q12H    mupirocin (BACTROBAN) 2 % ointment   Both Nostrils BID    acetaminophen (TYLENOL) tablet 325 mg  325 mg Oral Q6H PRN    aspirin chewable tablet 81 mg  81 mg Oral QPM    docusate sodium (COLACE) capsule 100 mg  100 mg Oral BID PRN    folic acid (FOLVITE) tablet 1 mg  1 mg Oral DAILY    gabapentin (NEURONTIN) capsule 300 mg  300 mg Oral TID    oxyCODONE-acetaminophen (PERCOCET 10)  mg per tablet 1 Tab  1 Tab Oral Q4H PRN    sodium chloride (NS) flush 5-10 mL  5-10 mL IntraVENous Q8H    sodium chloride (NS) flush 5-10 mL  5-10 mL IntraVENous PRN    naloxone (NARCAN) injection 0.4 mg  0.4 mg IntraVENous PRN    ondansetron (ZOFRAN) injection 4 mg  4 mg IntraVENous Q4H PRN    heparin (porcine) injection 5,000 Units  5,000 Units SubCUTAneous Q12H    polyethylene glycol (MIRALAX) packet 17 g  17 g Oral DAILY    melatonin tablet 3 mg  3 mg Oral QHS        Review of Systems:  Pertinent items are noted in HPI. o/w negative for 11 organ systems. Objective:     Patient Vitals for the past 8 hrs:   BP Temp Pulse Resp SpO2   18 1133 126/86 97.7 °F (36.5 °C) 77 20 97 %   18 0754 148/71 98.2 °F (36.8 °C) 80 18 99 %       Temp (24hrs), Av °F (36.7 °C), Min:97.5 °F (36.4 °C), Max:98.4 °F (36.9 °C)           Physical Exam:  Sitting up in chair  Thin appearing male in NAD  HEENT: NC, AT, pupils round without icterus  Neck: no adenopathy, no JVD, no thyromegaly  Lungs cta  Heart regular rate and rhythm. Abdomen soft, nontender, normoactive BS, -HSM appreciated  Ext no edema. Scarring on right leg.    Skin - multiple keratoses  No adenopathy appreciated. Neuro:  LUE resting on pillow - can not lift voluntarily without using right hand. Left leg he can move although somewhat weaker. Right foot turns inward. Labs:    Recent Results (from the past 24 hour(s))   EKG, 12 LEAD, INITIAL    Collection Time: 08/16/18  8:27 PM   Result Value Ref Range    Ventricular Rate 83 BPM    Atrial Rate 83 BPM    P-R Interval 374 ms    QRS Duration 96 ms    Q-T Interval 410 ms    QTC Calculation (Bezet) 481 ms    Calculated R Axis -68 degrees    Calculated T Axis 85 degrees    Diagnosis       Atrial fibrillation  Left axis deviation  Nonspecific ST segment changes  Confirmed by Niko Iniguez ([id number]) on 8/17/2018 4:09:05 AM     METABOLIC PANEL, BASIC    Collection Time: 08/17/18  1:09 AM   Result Value Ref Range    Sodium 150 (H) 136 - 145 mmol/L    Potassium 2.9 (L) 3.5 - 5.1 mmol/L    Chloride 115 (H) 97 - 108 mmol/L    CO2 28 21 - 32 mmol/L    Anion gap 7 5 - 15 mmol/L    Glucose 147 (H) 65 - 100 mg/dL    BUN 7 6 - 20 MG/DL    Creatinine 0.38 (L) 0.70 - 1.30 MG/DL    BUN/Creatinine ratio 18 12 - 20      GFR est AA >60 >60 ml/min/1.73m2    GFR est non-AA >60 >60 ml/min/1.73m2    Calcium 7.9 (L) 8.5 - 10.1 MG/DL   MAGNESIUM    Collection Time: 08/17/18  1:09 AM   Result Value Ref Range    Magnesium 1.9 1.6 - 2.4 mg/dL       Assessment:     Active Problems/Plan:  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 Nothing further needs to be done at this time with regard to this diagnosis. The letter sent to office introducing Mr. Shankar Yanez to my office sent by Dr. Verona Muñoz indicated that he had been treating him for a number of non-melanoma skin cancers for the prior 5 years.  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 CT scan suggest active colitis. Dr. Henok eCdillo has seen him and given his issues with mobility, colonoscopy will be done later today. Left sided weakness - not sure what happened 3 months ago. His brain imaging studies do not show obvious infarct as best I can tell but he clearly had an event and is in need of a diagnosis. Neuro evaluation underway. MRI and LP pending. Anemia - haptoglobin high. b12 and folate checked in 2015 - normal. Current B12, folate and ferritin okay. Katheryn Arellano SPEP negative for M protein and free light chain ratio normal.  MARTÍN negative. Hx of RA, ulcerative colitis and sarcoidosis - on prednisone, has been on embrel and MTX in past. MTX may be contributing to his anemia. CAD s/p CABG, AS s/p AVR - currently with bradycardia.

## 2018-08-17 NOTE — ANESTHESIA POSTPROCEDURE EVALUATION
Post-Anesthesia Evaluation and Assessment    Patient: Bo Aguirre MRN: [id number]  SSN: [id number]    YOB: 1935  Age: 82 y.o. Sex: male       Cardiovascular Function/Vital Signs  Visit Vitals    /67    Pulse 62    Temp 36.5 °C (97.7 °F)    Resp 13    Ht 5' 8.5\" (1.74 m)    Wt 52.2 kg (115 lb)    SpO2 100%    BMI 17.23 kg/m2       Patient is status post total IV anesthesia anesthesia for Procedure(s):  COLONOSCOPY   COLON BIOPSY. Nausea/Vomiting: None    Postoperative hydration reviewed and adequate. Pain:  Pain Scale 1: Numeric (0 - 10) (08/17/18 0909)  Pain Intensity 1: 6 (08/17/18 0909)   Managed    Neurological Status:   Neuro  Neurologic State: Alert (08/17/18 0800)  Orientation Level: Oriented X4 (08/17/18 0800)  Cognition: Follows commands (08/16/18 2020)  LUE Motor Response: Weak;Floppy (08/17/18 0800)  LLE Motor Response: Weak (08/17/18 0800)  RUE Motor Response: Weak (08/17/18 0800)  RLE Motor Response: Weak (08/17/18 0800)   At baseline    Mental Status and Level of Consciousness: Arousable    Pulmonary Status:   O2 Device: Room air (08/17/18 1330)   Adequate oxygenation and airway patent    Complications related to anesthesia: None    Post-anesthesia assessment completed.  No concerns    Signed By: Fannie Lopez DO     August 17, 2018

## 2018-08-17 NOTE — PROCEDURES
NAME:  Perry Sabillon   :   1935   MRN:   [id number]     Date/Time:  2018 1:19 PM    Colonoscopy Operative Report    Procedure Type:   Colonoscopy with biopsy     Indications:   Abnormal GI imaging  Pre-operative Diagnosis: see indication above  Post-operative Diagnosis:  See findings below  :  Shira Bernabe MD  Referring Provider: -Nate Talley MD-Marie Kittie Buerger, MD    Exam:  Airway: clear, no airway problems anticipated  Heart: RRR, without gallops or rubs  Lungs: clear bilaterally without wheezes, crackles, or rhonchi  Abdomen: soft, nontender, nondistended, bowel sounds present  Mental Status: awake, alert and oriented to person, place and time    Sedation:  MAC anesthesia Propofol  Procedure Details:  After informed consent was obtained with all risks and benefits of procedure explained and preoperative exam completed, the patient was taken to the endoscopy suite and placed in the left lateral decubitus position. Upon sequential sedation as per above, a digital rectal exam was performed demonstrating internal hemorrhoids. The Olympus videocolonoscope  was inserted in the rectum and carefully advanced to the cecum, which was identified by the ileocecal valve. The quality of preparation was fair. The colonoscope was slowly withdrawn with careful evaluation between folds. Retroflexion in the rectum was completed demonstrating internal hemorrhoids. Findings:   1. Findings suggestive of pseudomembranous colitis. Biopsied  2. Mild underlying left-sided ulcerative colitis. Biopsied  3. Normal right colon, noting that the bowel preparation was suboptimal and while the cecum was intubated visualization was completely obscured by stool. 4. Small internal hemorrhoids    Specimen Removed:  1. Right colon 2. Left colon 3. Rectum  Complications: None. EBL:  None. Impression:    1. Findings suggestive of pseudomembranous colitis. Biopsied  2.  Mild underlying left-sided ulcerative colitis. Biopsied  3. Normal right colon, noting that the bowel preparation was suboptimal and while the cecum was intubated visualization was completely obscured by stool. 4. Small internal hemorrhoids    Recommendations:   1. Follow up pathology  2. Start PO Vancomycin  3. Stool aspirated for C diff PCR  4.  Mesalamine for mild left-sided ulcerative colitis    Discharge Disposition:  Back to floor following recovery in endoscopy      Sd Douglas MD

## 2018-08-17 NOTE — PROGRESS NOTES
0700: Bedside shift change report given to Mc Mart RN (oncoming nurse) by Krishna Bass RN (offgoing nurse). Report included the following information SBAR and Kardex. 1000: Spoke with Endoscopy and pt scheduled for colonoscopy at 12 today. Spoke with [address] [place] [address] and told she is unsure if patient can have LP done after Colonoscopy due to having to recover from endo and also ly flat on stomach for LP procedure. Advised me to mariah DARNELL.     2639: Paged Dr. Bennie Wlid awaiting call back. Spoke to Dr. Bennie Wild and made aware will notify Dr. Marco Antonio Buck neurology. Paged Marco Antonio Buck awaiting call back. 1050: Spoke with Dr. Marco Antonio Buck and told if pt can't have LP and MRI done will reorder these tests to be done either this weekend or after the weekend. 1229: Spoke with Dr. Marco Antonio Buck and per MD patient can have the LP done after endoscopy. Will notify x-ray nurse Domingo Peguero in regards to this.     1301: Called patient's wife awaiting call back in order to obtain consent for LP due to pt being in endoscopy. 1326: Spoke with Endoscopy nurse RADHA Rebolledo and told patient is now in recovery. MD concerned for C-diff, samples sent to lab and pending. VS stable. Pt now on enteric precautions. 1420: Phone consent obtained by Spouse Giorgi Ga for Lumbar puncture on behalf of her spouse. Second nurse Mxaimiliano Richmond RN obtained consent as well.  1900:   Bedside shift change report given to Krishna Bass RN (oncoming nurse). Report included the following information SBAR, Kardex and Procedure Summary.      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]   Consults IP CONSULT TO PALLIATIVE CARE - PROVIDER  IP CONSULT TO PALLIATIVE CARE - PROVIDER  IP CONSULT TO ONCOLOGY  IP CONSULT TO GASTROENTEROLOGY  IP CONSULT TO NEUROLOGY  IP CONSULT TO CARDIOLOGY      Cardiac Monitoring [x] Yes [] No      Purposeful Hourly Rounding [x] Yes    Gibran Score Total Score: 4   Gibran score 3 or > [x] Bed Alarm [] Avasys [] 1:1 sitter [] Patient refused (Signed refusal form in chart)   Freddy Score Freddy Score: 14   Freddy score 14 or < [] PMT consult [] Wound Care consult    []  Specialty bed  [] Nutrition consult      Influenza Vaccine Received Flu Vaccine for Current Season (usually Sept-March): Not Flu Season           Oxygen needs? [x] Room air Oxygen @  []1L    []2L    []3L   []4L    []5L   []6L via  NC   Chronic home O2 use? [] Yes [x] No  Perform O2 challenge test and document in progress note using [employer] (.Homeoxygen)      Last bowel movement Last Bowel Movement Date: 08/17/18      Urinary Catheter             LDAs               Peripheral IV 08/14/18 Right Forearm (Active)   Site Assessment Clean, dry, & intact 8/17/2018  3:00 PM   Phlebitis Assessment 0 8/17/2018  3:00 PM   Infiltration Assessment 0 8/17/2018  3:00 PM   Dressing Status Clean, dry, & intact 8/17/2018  3:00 PM   Dressing Type Transparent 8/17/2018  3:00 PM   Hub Color/Line Status Pink 8/17/2018  3:00 PM   Action Taken Open ports on tubing capped 8/14/2018  9:18 AM   Alcohol Cap Used Yes 8/14/2018  9:18 AM                         Readmission Risk Assessment Tool Score High Risk            36       Total Score        2 . Living with Significant Other. Assisted Living. LTAC. SNF. or   Rehab    3 Patient Length of Stay (>5 days = 3)    4 IP Visits Last 12 Months (1-3=4, 4=9, >4=11)    5 Pt.  Coverage (Medicare=5 , Medicaid, or Self-Pay=4)    22 Charlson Comorbidity Score (Age + Comorbid Conditions)        Criteria that do not apply:    Has Seen PCP in Last 6 Months (Yes=3, No=0)       Expected Length of Stay 2d 14h   Actual Length of Stay 7

## 2018-08-17 NOTE — ANESTHESIA PREPROCEDURE EVALUATION
Anesthetic History   No history of anesthetic complications            Review of Systems / Medical History  Patient summary reviewed, nursing notes reviewed and pertinent labs reviewed    Pulmonary  Within defined limits                 Neuro/Psych   Within defined limits           Cardiovascular  Within defined limits  Hypertension        Dysrhythmias : atrial fibrillation  CAD, PAD and CABG    Exercise tolerance: >4 METS  Comments: Hx AVR  Carotid Artery Disease  Hx Bradycardia  Hx PAF   GI/Hepatic/Renal  Within defined limits              Endo/Other  Within defined limits  Diabetes  Hypothyroidism  Arthritis     Other Findings   Comments: Rheumatoid Arthritis  Ulcerative Colitis  Sarcoidosis           Physical Exam    Airway  Mallampati: II  TM Distance: 4 - 6 cm  Neck ROM: normal range of motion   Mouth opening: Normal     Cardiovascular  Regular rate and rhythm,  S1 and S2 normal,  no murmur, click, rub, or gallop             Dental    Dentition: Full lower dentures and Full upper dentures     Pulmonary  Breath sounds clear to auscultation               Abdominal  GI exam deferred       Other Findings            Anesthetic Plan    ASA: 3  Anesthesia type: general and total IV anesthesia          Induction: Intravenous  Anesthetic plan and risks discussed with: Patient      Propofol MAC

## 2018-08-17 NOTE — PROGRESS NOTES
Informed Amanda Joe from MRI the endo procedure is completed. They would like him to go to MRI from Endo.

## 2018-08-18 NOTE — PROGRESS NOTES
0700: Bedside shift change report given to Dougie Bunch RN (oncoming nurse) by Miguel Marrufo RN (offgoing nurse). Report included the following information SBAR, Kardex, ED Summary, Procedure Summary, Intake/Output, MAR and Recent Results.

## 2018-08-18 NOTE — PROGRESS NOTES
[address] [address], [address], [address] [facility], [address]     GI PROGRESS NOTE    NAME: Willie Monk   :  1935   MRN:  [id number]       Subjective:     - per pt, no BMs today  - abd only mildly tender    Objective:     VITALS:   Last 24hrs VS reviewed since prior progress note. Most recent are:    Visit Vitals    /43 (BP 1 Location: Right arm, BP Patient Position: Lying left side)    Pulse 69    Temp 98.5 °F (36.9 °C)    Resp 16    Ht 5' 8.5\" (1.74 m)    Wt 52.2 kg (115 lb)    SpO2 98%    BMI 17.23 kg/m2       Intake/Output Summary (Last 24 hours) at 18  Last data filed at 18 6328   Gross per 24 hour   Intake           1562.5 ml   Output                0 ml   Net           1562.5 ml       PHYSICAL EXAM:    General: Chronically ill appearing, cooperative, in no acute distress    HEENT: Atraumatic, Anicteric sclerae. Chest/ Lungs: Adequate air movement bilaterally, no overt wheezing, rhonchi or rales. Heart:  Regular rate & rhythm, no murmurs  Abdomen: Soft, Non distended, minimally tender to palpation.  +Bowel sounds  Extremities: No clubbing or cyanosis  Neurologic:  Grossly alert & oriented  Skin:   No jaundice, no rashes  Psych:   Appropriate mood and affect, appropriate insight     Data Review     Recent Labs      18   1801   WBC  4.3   HGB  10.8*   HCT  34.7*   PLT  283     Recent Labs      18   0319  18   1912   NA  144  140   K  3.5  3.2*   CL  109*  105   CO2  28  26   BUN  9  6   CREA  0.49*  0.53*   GLU  192*  311*   CA  7.1*  7.5*     No results for input(s): SGOT, GPT, AP, TBIL, TP, ALB, GLOB, GGT, AML, LPSE in the last 72 hours. No lab exists for component: AMYP, HLPSE  No results for input(s): INR, PTP, APTT in the last 72 hours.     No lab exists for component: INREXT  ___________________________________________________________________       Assessment:   · 83 yo M w/ Ulcerative colitis c/b pseudomenuris colitis (C.Dif) appreciated on colonoscopy, on therapy for both     Plan:   · Continue PO vancomycin  · Continue Mesalamine     Signed By: Darylene Deaner., MD     8/18/2018  7:59 PM

## 2018-08-18 NOTE — PROGRESS NOTES
Bedside shift change report given to Agueda Bhakta, RADHA (oncoming nurse). Report included the following information SBAR, Kardex, ED Summary, Intake/Output, MAR and Recent Results. 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) [U48.79]  Abnormal CT scan, colon [R93.3]  Ulcerative rectosigmoiditis without complication ([address] [Y01.20]   Consults IP CONSULT TO PALLIATIVE CARE - PROVIDER  IP CONSULT TO PALLIATIVE CARE - PROVIDER  IP CONSULT TO ONCOLOGY  IP CONSULT TO GASTROENTEROLOGY  IP CONSULT TO NEUROLOGY  IP CONSULT TO CARDIOLOGY      Cardiac Monitoring [x] Yes [] No      Purposeful Hourly Rounding [x] Yes    Gibran Score Total Score: 4   Gibran score 3 or > [x] Bed Alarm [] Avasys [] 1:1 sitter [] Patient refused (Signed refusal form in chart)   Freddy Score Freddy Score: 14   Freddy score 14 or < [] PMT consult [] Wound Care consult    []  Specialty bed  [] Nutrition consult      Influenza Vaccine Received Flu Vaccine for Current Season (usually Sept-March): Not Flu Season           Oxygen needs? [x] Room air Oxygen @  []1L    []2L    []3L   []4L    []5L   []6L via  NC   Chronic home O2 use?  [] Yes [] No  Perform O2 challenge test and document in progress note using smartphrase (.Homeoxygen)      Last bowel movement Last Bowel Movement Date: 08/17/18      Urinary Catheter             LDAs               Peripheral IV 08/14/18 Right Forearm (Active)   Site Assessment Clean, dry, & intact 8/18/2018  2:22 PM   Phlebitis Assessment 0 8/18/2018  2:22 PM   Infiltration Assessment 0 8/18/2018  2:22 PM   Dressing Status Clean, dry, & intact 8/18/2018  2:22 PM   Dressing Type Transparent;Tape 8/18/2018  2:22 PM   Hub Color/Line Status Pink;Flushed 8/18/2018  2:22 PM   Action Taken Open ports on tubing capped 8/14/2018  9:18 AM   Alcohol Cap Used Yes 8/14/2018  9:18 AM                         Readmission Risk Assessment Tool Score High Risk            36       Total Score        2 . Living with Significant Other. Assisted Living. LTAC. SNF. or   Rehab    3 Patient Length of Stay (>5 days = 3)    4 IP Visits Last 12 Months (1-3=4, 4=9, >4=11)    5 Pt.  Coverage (Medicare=5 , Medicaid, or Self-Pay=4)    22 Charlson Comorbidity Score (Age + Comorbid Conditions)        Criteria that do not apply:    Has Seen PCP in Last 6 Months (Yes=3, No=0)       Expected Length of Stay 2d 14h   Actual Length of Stay 8

## 2018-08-18 NOTE — PROGRESS NOTES
Hospitalist Progress Note    NAME: Lexx Israel   :  1935   MRN:  [id number]       Assessment / Plan:  Severe bradycardia, resolved  -transferred to ICU on  for transcutaneous pacing prn. HR remains stable today. Cardiology felt it was due to vagal and does not need a PM  -stop metoprolol XL  -appreciate cardiology following    Hx of RA  Hx of ulcerative colitis  Hx of sarcoidosis  C diff colitis  -CT a/p suggestive of chronic proctocolitis  -on chronic prednisone, now on stress dose steroids, can start tapering  -mesalamine started on   -cont' current pain management  -enbrel and MTX on hold  -GI evaluation appreciated, Cscope  suggestive of pseudomembranous colitis, biopsied. Mild underlying L sided ulcerative colitis, biopsied. -started on mesalamine  and PO vancomycin    Hypernatremia  -resolved with IVF. He is tolerating PO intake    Hypokalemia  -K 2.9, replete with IV and PO KCl    Hypotension, improving  - continue stress dose steroids, can start tapering this. Generalized weakness with worsening of weakness on L side  -MRI head showed small chronic small vessel ischemic white matter disease. No acute infarct or mass. -EMG showed L brachial plexopathy and peripheral neuropathy  -MRI chest showed nonspecific plexopathy. Asymmetric muscle edema on L and b/l involvement which can be seen with denervation. 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   No hernation or acute abnormality  -consult PT/OT, recommended SNF    CAD s/p CABG  AS s/p AVR  HTN  - stop BB  -cont' ASA  -statin on hold due to myalgias    Hx of Cdiff  -completed treatment. No diarrhea here    Severe Malnutrition, Body mass index is 17.23 kg/(m^2). Code status: Full  Prophylaxis: Hep SQ  Recommended Disposition: SNF/LTC     Subjective:     Chief Complaint / Reason for Physician Visit  Pt seen at bedside. Sleeping in bed but wakes up easily. He denies diarrhea overnight. Discussed with RN events overnight. Review of Systems:  Symptom Y/N Comments  Symptom Y/N Comments   Fever/Chills n   Chest Pain n    Poor Appetite    Edema     Cough    Abdominal Pain n    Sputum    Joint Pain     SOB/BEY n   Pruritis/Rash     Nausea/vomit    Tolerating PT/OT     Diarrhea    Tolerating Diet y    Constipation    Other       Could NOT obtain due to:      Objective:     VITALS:   Last 24hrs VS reviewed since prior progress note. Most recent are:  Patient Vitals for the past 24 hrs:   Temp Pulse Resp BP SpO2   08/18/18 0753 97.8 °F (36.6 °C) 67 16 100/60 95 %   08/18/18 0500 - - - 101/58 -   08/18/18 0317 97.7 °F (36.5 °C) (!) 55 14 95/56 97 %   08/18/18 0004 97.5 °F (36.4 °C) 64 14 92/51 97 %   08/17/18 2002 98.2 °F (36.8 °C) 76 16 110/59 98 %   08/17/18 1712 97.3 °F (36.3 °C) 77 14 150/89 98 %   08/17/18 1330 - 62 13 107/67 100 %   08/17/18 1325 - 65 13 117/62 100 %   08/17/18 1320 - 65 14 105/58 100 %   08/17/18 1315 - 60 16 101/54 100 %   08/17/18 1310 - (!) 58 15 101/52 100 %   08/17/18 1306 - 68 16 103/52 100 %   08/17/18 1133 97.7 °F (36.5 °C) 77 20 126/86 97 %       Intake/Output Summary (Last 24 hours) at 08/18/18 0920  Last data filed at 08/18/18 3075   Gross per 24 hour   Intake           2062.5 ml   Output              875 ml   Net           1187.5 ml        PHYSICAL EXAM:  General: WD, WN. Alert, cooperative, no acute distress    EENT:  EOMI. Anicteric sclerae. MMM  Resp:  CTA bilaterally, no wheezing or rales.   No accessory muscle use  CV:  Sinus bradycardia,  No edema  GI:  Soft, Non distended, Non tender.  +BS  Neurologic:  Alert and oriented X 3, normal speech  Psych:   Not anxious nor agitated  Skin:  No rashes. No jaundice    Reviewed most current lab test results and cultures  YES  Reviewed most current radiology test results   YES  Review and summation of old records today    NO  Reviewed patient's current orders and MAR    YES  PMH/SH reviewed - no change compared to H&P  ________________________________________________________________________  Care Plan discussed with:    Comments   Patient x    Family      RN x    Care Manager     Consultant                        Multidiciplinary team rounds were held today with , nursing, pharmacist and clinical coordinator. Patient's plan of care was discussed; medications were reviewed and discharge planning was addressed. ________________________________________________________________________  Total NON critical care TIME:  35   Minutes    Total CRITICAL CARE TIME Spent:    Minutes non procedure based      Comments   >50% of visit spent in counseling and coordination of care     ________________________________________________________________________  Aisha Enrique MD     Procedures: see electronic medical records for all procedures/Xrays and details which were not copied into this note but were reviewed prior to creation of Plan. LABS:  I reviewed today's most current labs and imaging studies.   Pertinent labs include:  Recent Labs      08/17/18   1801   WBC  4.3   HGB  10.8*   HCT  34.7*   PLT  283     Recent Labs      08/18/18   0319  08/17/18   1912  08/17/18   0109   NA  144  140  150*   K  3.5  3.2*  2.9*   CL  109*  105  115*   CO2  28  26  28   GLU  192*  311*  147*   BUN  9  6  7   CREA  0.49*  0.53*  0.38*   CA  7.1*  7.5*  7.9*   MG   --    --   1.9       Signed: Aisha Enrique MD

## 2018-08-19 NOTE — PROGRESS NOTES
Hospitalist Progress Note    NAME: Randall Omalley   :  1935   MRN:  [id number]       Assessment / Plan:  Severe bradycardia, resolved, thought to be vagal  -transferred to ICU on  for transcutaneous pacing prn. HR remains stable today. Cardiology felt it was due to vagal and does not need a PM  -appreciate cardiology following  -will need cardiology followup as outpatient for possible monitor    2018 Cardiology note:  1. \"No beta-blocker. Last dose  PM.  2. If he has recurrent symptomatic bradycardia, would give atropine IV. 3. No statin. 4. OK with ASA. HR and BP stable. Some mild sinus bradycardia during sleep is OK. I'd be conservative here. No pacemaker recommended at this time. I'll sign off, please call for any questions or issues. \"    Hx of RA  Hx of ulcerative colitis  Hx of sarcoidosis  C diff colitis  -CT a/p suggestive of chronic proctocolitis  -on chronic prednisone, now on stress dose steroids, can start tapering  -mesalamine started on   -enbrel and MTX on hold  -GI evaluation appreciated, Cscope  suggestive of pseudomembranous colitis, biopsied. Mild underlying L sided ulcerative colitis, biopsied. -meds as guided by GI service    2018 GI note:  \"--Continue PO vancomycin  Continue Mesalamine\"    Hypernatremia, thought to be related to adequate hydration  -IVF as needed and encourage po fluid intake    Hypokalemia POA  -repletion    Generalized weakness with worsening of weakness on L side  -MRI head showed small chronic small vessel ischemic white matter disease. No acute infarct or mass. -EMG showed L brachial plexopathy and peripheral neuropathy  -MRI chest showed nonspecific plexopathy. Asymmetric muscle edema on L and b/l involvement which can be seen with denervation.   -s/p LP , cultures/labs pending  -on going work up with neurology-->neurology to followup    2018 Neurology note:  \"I was not able to see the pt as he has gone for a colonoscopy, then MRI of the chest and then a lumbar puncture. Pt will be followed by Dr. Omar Etienne tomorrow. 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 No hernation or acute abnormality  -consult PT/OT, recommended SNF    CAD s/p CABG  AS s/p AVR        Severe Malnutrition, Body mass index is 17.23 kg/(m^2). Code status: Full  Prophylaxis: Hep SQ  Recommended Disposition: SNF/LTC     8/17/2018 PT note:  \"Attempted to see pt for PT tx, cleared by nursing to mobilize. Similar to yesterday he politely declined due to [place] a lot going on today\" and he is suppose to be taken off the floor for testing/procedure. Attempted to educate yet again the importance of working with therapy, but he continued to decline reporting \"I really want to do the therapy, but it just isn't going to happen today\". Will defer and follow up on Monday if pt is still admitted. Previous PT noted have recommend SNF and pt reports he is thinking about [facility]\"     Subjective:     Chief Complaint / Reason for Physician Visit  Awake, no complaints    Review of Systems:  Symptom Y/N Comments  Symptom Y/N Comments   Fever/Chills n   Chest Pain n    Poor Appetite    Edema     Cough    Abdominal Pain n    Sputum    Joint Pain     SOB/BEY n   Pruritis/Rash     Nausea/vomit n   Tolerating PT/OT     Diarrhea n   Tolerating Diet y    Constipation    Other       Could NOT obtain due to:      Objective:     VITALS:   Last 24hrs VS reviewed since prior progress note.  Most recent are:  Patient Vitals for the past 24 hrs:   Temp Pulse Resp BP SpO2 08/19/18 0804 97.9 °F (36.6 °C) 64 20 120/56 98 %   08/19/18 0516 97.6 °F (36.4 °C) 62 20 116/66 98 %   08/18/18 2335 99.1 °F (37.3 °C) 68 22 114/62 97 %   08/18/18 2019 98.3 °F (36.8 °C) 71 20 109/67 96 %   08/18/18 1422 98.5 °F (36.9 °C) 69 16 110/43 98 %   08/18/18 1151 98 °F (36.7 °C) 71 16 108/48 97 %     No intake or output data in the 24 hours ending 08/19/18 0846     PHYSICAL EXAM:  General:  Alert, cooperative, no acute distress    EENT:  Anicteric sclerae. MMM  Resp:  CTA bilaterally, no wheezing. No accessory muscle use  CV:  s1s2 rrr  GI:  Soft, Non distended, Non tender.  +BS  Neurologic:  Alert and oriented X 3, normal speech  Psych:   Not anxious nor agitated  Skin:  No rashes. No jaundice    Reviewed most current lab test results and cultures  YES  Reviewed most current radiology test results   YES  Review and summation of old records today    NO  Reviewed patient's current orders and MAR    YES  PMH/SH reviewed - no change compared to H&P  ________________________________________________________________________  Care Plan discussed with:    Comments   Patient x    Family      RN     Care Manager     Consultant                        Multidiciplinary team rounds were held today with , nursing, pharmacist and clinical coordinator. Patient's plan of care was discussed; medications were reviewed and discharge planning was addressed. ________________________________________________________________________  Total NON critical care TIME:  25   Minutes    Total CRITICAL CARE TIME Spent:    Minutes non procedure based      Comments   >50% of visit spent in counseling and coordination of care     ________________________________________________________________________  Adelfo Clifton MD     Procedures: see electronic medical records for all procedures/Xrays and details which were not copied into this note but were reviewed prior to creation of Plan.       LABS:  I reviewed today's most current labs and imaging studies.   Pertinent labs include:  Recent Labs      08/17/18   1801   WBC  4.3   HGB  10.8*   HCT  34.7*   PLT  283     Recent Labs      08/19/18   0149  08/18/18   0319  08/17/18   1912  08/17/18   0109   NA  146*  144  140  150*   K  3.4*  3.5  3.2*  2.9*   CL  110*  109*  105  115*   CO2  31  28  26  28   GLU  152*  192*  311*  147*   BUN  15  9  6  7   CREA  0.43*  0.49*  0.53*  0.38*   CA  7.5*  7.1*  7.5*  7.9*   MG   --    --    --   1.9       Signed: Lacy Paiz MD

## 2018-08-19 NOTE — PROGRESS NOTES
[address] [address], [address], [address] [facility], [address]     GI PROGRESS NOTE    NAME: Baldomero Bennett   :  1935   MRN:  [id number]       Subjective:     - per pt, no BMs again so far today  - abd only minimally tender  - wondering if this will keep him from rehab    Objective:     VITALS:   Last 24hrs VS reviewed since prior progress note. Most recent are:    Visit Vitals    /56 (BP 1 Location: Right arm, BP Patient Position: Lying left side)    Pulse 74    Temp 98.2 °F (36.8 °C)    Resp 20    Ht 5' 8.5\" (1.74 m)    Wt 52.2 kg (115 lb)    SpO2 94%    BMI 17.23 kg/m2       Intake/Output Summary (Last 24 hours) at 18 1313  Last data filed at 18 0856   Gross per 24 hour   Intake              120 ml   Output              375 ml   Net             -255 ml       PHYSICAL EXAM:    General: Chronically ill appearing, cooperative, in no acute distress    HEENT: Atraumatic, Anicteric sclerae. Chest/ Lungs: Adequate air movement bilaterally, no overt wheezing, rhonchi or rales. Heart:  Regular rate & rhythm, no murmurs  Abdomen: Soft, Non distended, minimally tender to palpation.  +Bowel sounds  Extremities: No clubbing or cyanosis  Neurologic:  Grossly alert & oriented  Skin:   No jaundice, no rashes  Psych:   Appropriate mood and affect, appropriate insight     Data Review     Recent Labs      18   1801   WBC  4.3   HGB  10.8*   HCT  34.7*   PLT  283     Recent Labs      18   0149  18   0319   NA  146*  144   K  3.4*  3.5   CL  110*  109*   CO2  31  28   BUN  15  9   CREA  0.43*  0.49*   GLU  152*  192*   CA  7.5*  7.1*     No results for input(s): SGOT, GPT, AP, TBIL, TP, ALB, GLOB, GGT, AML, LPSE in the last 72 hours. No lab exists for component: AMYP, HLPSE  No results for input(s): INR, PTP, APTT in the last 72 hours.     No lab exists for component: INREXT, INREXT  ___________________________________________________________________       Assessment:   · 81 yo M w/ Ulcerative colitis c/b pseudomembranous colitis (C.Dif) appreciated on colonoscopy, on therapy for both     Plan:   · Continue 125 mg PO vancomycin q6 hours  · Continue Mesalamine (Delzichol) 800 mg tid     Signed By: Kyra Page MD     8/19/2018  7:59 PM

## 2018-08-20 NOTE — ROUTINE PROCESS
Bedside shift change report given to [address] [place] [address] (oncoming nurse) by Janee Mcintosh (offgoing nurse).  Report included the following information SBAR, Kardex, Intake/Output, MAR, Accordion, Recent Results, Med Rec Status and Cardiac Rhythm SR.

## 2018-08-20 NOTE — PROGRESS NOTES
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 as well as anemia        Subjective:     No complaints today    Current Facility-Administered Medications   Medication Dose Route Frequency    potassium chloride SR (KLOR-CON 10) tablet 20 mEq  20 mEq Oral TID    vancomycin 50 mg/mL oral solution (compounded) 125 mg  125 mg Oral Q6H    mesalamine (DELZICOL) DR capsule 800 mg  800 mg Oral TID    hydrocortisone Sod Succ (PF) (SOLU-CORTEF) injection 50 mg  50 mg IntraVENous Q12H    acetaminophen (TYLENOL) tablet 325 mg  325 mg Oral Q6H PRN    aspirin chewable tablet 81 mg  81 mg Oral QPM    docusate sodium (COLACE) capsule 100 mg  100 mg Oral BID PRN    folic acid (FOLVITE) tablet 1 mg  1 mg Oral DAILY    gabapentin (NEURONTIN) capsule 300 mg  300 mg Oral TID    oxyCODONE-acetaminophen (PERCOCET 10)  mg per tablet 1 Tab  1 Tab Oral Q4H PRN    sodium chloride (NS) flush 5-10 mL  5-10 mL IntraVENous Q8H    sodium chloride (NS) flush 5-10 mL  5-10 mL IntraVENous PRN    naloxone (NARCAN) injection 0.4 mg  0.4 mg IntraVENous PRN    ondansetron (ZOFRAN) injection 4 mg  4 mg IntraVENous Q4H PRN    heparin (porcine) injection 5,000 Units  5,000 Units SubCUTAneous Q12H    polyethylene glycol (MIRALAX) packet 17 g  17 g Oral DAILY    melatonin tablet 3 mg  3 mg Oral QHS        Review of Systems:  o/w negative for 11 organ systems. Objective:     Patient Vitals for the past 8 hrs:   BP Temp Pulse Resp SpO2   18 0805 139/71 97.8 °F (36.6 °C) (!) 57 18 97 %       Temp (24hrs), Av.1 °F (36.7 °C), Min:97.8 °F (36.6 °C), Max:98.3 °F (36.8 °C)       0701 -  1900  In: 100 [P.O.:100]  Out: -     Physical Exam:  Laying in bed, NAD  Thin appearing male  HEENT: NC, AT, pupils round without icterus  Neck: no adenopathy, no JVD, no thyromegaly  Lungs cta  Heart regular rate and rhythm.    Abdomen soft, nontender, normoactive BS, -HSM appreciated  Ext no edema. Scarring on right leg. Skin - multiple keratoses  No adenopathy appreciated. Neuro:  MELISSA resting on pillow - can not lift voluntarily without using right hand. Left leg he can move although somewhat weaker. Right foot turns inward. Labs:    Recent Results (from the past 24 hour(s))   METABOLIC PANEL, BASIC    Collection Time: 08/20/18  1:59 AM   Result Value Ref Range    Sodium 145 136 - 145 mmol/L    Potassium 3.1 (L) 3.5 - 5.1 mmol/L    Chloride 109 (H) 97 - 108 mmol/L    CO2 32 21 - 32 mmol/L    Anion gap 4 (L) 5 - 15 mmol/L    Glucose 143 (H) 65 - 100 mg/dL    BUN 17 6 - 20 MG/DL    Creatinine 0.38 (L) 0.70 - 1.30 MG/DL    BUN/Creatinine ratio 45 (H) 12 - 20      GFR est AA >60 >60 ml/min/1.73m2    GFR est non-AA >60 >60 ml/min/1.73m2    Calcium 7.4 (L) 8.5 - 10.1 MG/DL   CBC WITH AUTOMATED DIFF    Collection Time: 08/20/18  1:59 AM   Result Value Ref Range    WBC 3.5 (L) 4.1 - 11.1 K/uL    RBC 3.18 (L) 4.10 - 5.70 M/uL    HGB 8.9 (L) 12.1 - 17.0 g/dL    HCT 28.6 (L) 36.6 - 50.3 %    MCV 89.9 80.0 - 99.0 FL    MCH 28.0 26.0 - 34.0 PG    MCHC 31.1 30.0 - 36.5 g/dL    RDW 17.4 (H) 11.5 - 14.5 %    PLATELET 946 232 - 533 K/uL    MPV 10.1 8.9 - 12.9 FL    NRBC 0.0 0  WBC    ABSOLUTE NRBC 0.00 0.00 - 0.01 K/uL    NEUTROPHILS 72 32 - 75 %    LYMPHOCYTES 15 12 - 49 %    MONOCYTES 12 5 - 13 %    EOSINOPHILS 0 0 - 7 %    BASOPHILS 0 0 - 1 %    METAMYELOCYTES 1 %    IMMATURE GRANULOCYTES 0 0.0 - 0.5 %    ABS. NEUTROPHILS 2.5 1.8 - 8.0 K/UL    ABS. LYMPHOCYTES 0.5 (L) 0.8 - 3.5 K/UL    ABS. MONOCYTES 0.4 0.0 - 1.0 K/UL    ABS. EOSINOPHILS 0.0 0.0 - 0.4 K/UL    ABS. BASOPHILS 0.0 0.0 - 0.1 K/UL    ABS. IMM.  GRANS. 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 Nothing further needs to be done at this time with regard to this diagnosis. The letter sent to office introducing Mr. Thierry Sotomayor to my office sent by Dr. Carolina Hernandez indicated that he had been treating him for a number of non-melanoma skin cancers for the prior 5 years. 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 Left sided weakness - Neuro consulted and dx with brachial plexitis, conservative management. Anemia - haptoglobin high. b12 and folate checked in 2015 - normal. Current B12, folate and ferritin okay. Silke Perry SPEP negative for M protein and free light chain ratio normal.  MARTÍN negative. Hx of RA, ulcerative colitis and sarcoidosis - on prednisone, has been on embrel and MTX in past. MTX may be contributing to his anemia. Cont Folic Acid    CAD s/p CABG, AS s/p AVR - currently with bradycardia.

## 2018-08-20 NOTE — PROGRESS NOTES
Problem: Self Care Deficits Care Plan (Adult)  Goal: *Acute Goals and Plan of Care (Insert Text)  Occupational Therapy Goals  Weekly re-assessment 8/20/18 all goals appropriate- continue; Initiated 8/11/2018  1. Patient will perform grooming seated EOB with modified independence within 7 day(s). 2.  Patient will perform upper body dressing with moderate assistance  within 7 day(s). 3.  Patient will perform lower body dressing with moderate assistance  within 7 day(s). 4.  Patient will perform toilet transfers with supervision/set-up within 7 day(s). 5.  Patient will perform all aspects of toileting with minimal assistance/contact guard assist within 7 day(s). 6.  Patient will participate in upper extremity therapeutic exercise/activities with independence for 5 minutes within 7 day(s). 7.  Patient will utilize energy conservation techniques during functional activities with verbal cues within 7 day(s). Occupational Therapy TREATMENT: WEEKLY REASSESSMENT  Patient: Allen Villalobos (14 y.o. 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 ASSESSMENT:  Nursing cleared for therapy. Patient needing encouragement to maximize participation secondary to reports of fatigue and back discomfort. Agreeable for self care transfer training. Completed bed mob with mod A, sit <> stand with min AX 2 and min A for transfer to chair approx x 2-3 ft with RW and slow pace. Patient limited interest with ADL tasks on this day. Agreeable to sit in chair. Chair alarm activated. L hand/wrist remain swollen.   Ed on elevation in bed and in chair. Left with doubled pillow under LUE while in chair. Patient with fair progress demonstrated with mobility and toileting tasks. [place] [facility] [place] with min A and transfer from commode with min A. Good sitting balance for participation in ADL tasks. He is limited secondary to LUE swelling and new dx of idiopathic brachial plexus impairment. Patient education for elevation and light massage. All goals remain appropriate and achieveable within next week. Patient is presenting below his baseline. Recommend SNF once medically stable. Recommend with nursing patient to complete as able in order to maintain strength, endurance and independence: self feeding and grooming seated with supervision/setup, OOB to chair 3x/day and mobilizing to the bathroom for toileting with RW and min-mod assist. Do not leave unsupervised on toilet. Thank you for your assistance. Progression toward goals:  []            Improving appropriately and progressing toward goals  [x]            Improving slowly and progressing toward goals  []            Not making progress toward goals and plan of care will be adjusted     PLAN:  Goals have been updated based on progression since last assessment. Patient continues to benefit from skilled intervention to address the above impairments. Continue to follow patient 3 times a week to address goals.   Planned Interventions:  [x]                    Self Care Training                  [x]             Therapeutic Activities  [x]                    Functional Mobility Training    []             Cognitive Retraining  [x]                    Therapeutic Exercises           [x]             Endurance Activities  [x]                    Balance Training                   []             Neuromuscular Re-Education  []                    Visual/Perceptual Training     [x]        Home Safety Training  [x]                    Patient Education                 [x] Family Training/Education  []                    Other (comment):  Discharge Recommendations: Skilled Nursing Facility  Further Equipment Recommendations for Discharge: TBD     SUBJECTIVE:   Patient stated .    OBJECTIVE DATA SUMMARY:   Cognitive/Behavioral Status:                      Functional Mobility and Transfers for ADLs:  Bed Mobility:  Supine to Sit: Moderate assistance    Transfers:  Sit to Stand: Minimum assistance           Balance:  Sitting - Static: Good (unsupported)  Sitting - Dynamic: Fair (occasional)  Standing: With support  Standing - Static: Fair;Constant support  Standing - Dynamic : Fair    ADL Intervention:    Education for safety, sequencing and RW use for self care transfer training. Completed bed mob with mod A, sit <> stand with min AX 2 and min A for transfer to chair approx x 2-3 ft with RW and slow pace. Patient use of RUE to assist LUE onto RW, then able to maintain LUE  on RW. Limtied ability to release  and limtied shoulder stability for control from RW to chair- needing physical assist from therapist.   Education on importance of time OOB in chair to increase overall act tolerance, comfort and respiratory status. Pain:  Pain Scale 1: Numeric (0 - 10)  Pain Intensity 1: 0              Activity Tolerance:   HR 60s.      After treatment:   [x] Patient left in no apparent distress sitting up in chair  [] Patient left in no apparent distress in bed  [x] Call bell left within reach  [x] Nursing notified  [] Caregiver present  [x] Bed alarm activated    COMMUNICATION/COLLABORATION:   The patients plan of care was discussed with: Physical Therapist and Registered Nurse    Earl Carrel, OT  Time Calculation: 13 mins

## 2018-08-20 NOTE — PROGRESS NOTES
Hospitalist Progress Note    NAME: Gary Dobbs   :  1935   MRN:  [id number]       Assessment / Plan:  Severe bradycardia, resolved, thought to be vagal  -transferred to ICU on  for transcutaneous pacing prn. HR remains stable. Cardiology felt it was due to vagal and does not need a PM  -appreciate cardiology following  -will need cardiology followup as outpatient for possible monitor    2018 Cardiology note:  1. \"No beta-blocker. Last dose  PM.  2. If he has recurrent symptomatic bradycardia, would give atropine IV. 3. No statin. 4. OK with ASA. HR and BP stable. Some mild sinus bradycardia during sleep is OK. I'd be conservative here. No pacemaker recommended at this time. Chronic RA  Chronic ulcerative colitis  Chronic sarcoidosis  C diff colitis  CT a/p suggestive of chronic proctocolitis  -on chronic prednisone, now on stress dose steroids, can start tapering  -mesalamine started on   -enbrel and MTX on hold  -GI evaluation appreciated, Cscope  suggestive of pseudomembranous colitis, biopsied. Mild underlying L sided ulcerative colitis, biopsied. -meds as guided by GI service    2018 GI note:  \"--Continue PO vancomycin  - Continue Mesalamine\"    Hypernatremia,   thought to be related to adequate hydration  -IVF as needed and encourage po fluid intake    Hypokalemia POA  - monitor and replenish    Generalized weakness with worsening of weakness on L side  -MRI head showed small chronic small vessel ischemic white matter disease. No acute infarct or mass. -EMG showed L brachial plexopathy and peripheral neuropathy  -MRI chest showed nonspecific plexopathy. Asymmetric muscle edema on L and b/l involvement which can be seen with denervation.   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 No hernation or acute abnormality  -consult PT/OT, recommended SNF    CAD s/p CABG  AS s/p AVR  - cont tx      Severe Malnutrition, Body mass index is 17.23 kg/(m^2). Code status: Full  Prophylaxis: Hep SQ  Recommended Disposition: SNF/LTC      PT noted have recommend SNF and pt reports he is thinking about [facility]\"     Subjective:     Chief Complaint / Reason for Physician Visit  Awake, no complaints    Review of Systems:  Symptom Y/N Comments  Symptom Y/N Comments   Fever/Chills n   Chest Pain n    Poor Appetite    Edema     Cough    Abdominal Pain n    Sputum    Joint Pain     SOB/BEY n   Pruritis/Rash     Nausea/vomit n   Tolerating PT/OT     Diarrhea n   Tolerating Diet y    Constipation    Other       Could NOT obtain due to:      Objective:     VITALS:   Last 24hrs VS reviewed since prior progress note. Most recent are:  Patient Vitals for the past 24 hrs:   Temp Pulse Resp BP SpO2   08/20/18 0805 97.8 °F (36.6 °C) (!) 57 18 139/71 97 %   08/20/18 0329 98.1 °F (36.7 °C) 60 18 118/60 97 %   08/19/18 2308 97.8 °F (36.6 °C) 69 18 133/65 96 %   08/19/18 1944 98.3 °F (36.8 °C) 60 18 115/62 97 %   08/19/18 1554 98.2 °F (36.8 °C) 64 20 95/69 96 %   08/19/18 1139 98.2 °F (36.8 °C) 74 20 105/56 94 %       Intake/Output Summary (Last 24 hours) at 08/20/18 0904  Last data filed at 08/20/18 5126   Gross per 24 hour   Intake                0 ml   Output              500 ml   Net             -500 ml        PHYSICAL EXAM:  General:  Alert, cooperative, no acute distress    EENT:  Anicteric sclerae. MMM  Resp:  CTA bilaterally, no wheezing.   No accessory muscle use  CV:  s1s2 rrr  GI:  Soft, Non distended, Non tender.  +BS  Neurologic:  Alert and oriented X 3, normal speech  Psych:   Not anxious nor agitated  Skin:  No rashes. No jaundice    Reviewed most current lab test results and cultures  YES  Reviewed most current radiology test results   YES  Review and summation of old records today    NO  Reviewed patient's current orders and MAR    YES  PMH/SH reviewed - no change compared to H&P  ________________________________________________________________________  Care Plan discussed with:    Comments   Patient x    Family      RN     Care Manager     Consultant                        Multidiciplinary team rounds were held today with , nursing, pharmacist and clinical coordinator. Patient's plan of care was discussed; medications were reviewed and discharge planning was addressed. ________________________________________________________________________  Total NON critical care TIME:  25   Minutes    Total CRITICAL CARE TIME Spent:    Minutes non procedure based      Comments   >50% of visit spent in counseling and coordination of care     ________________________________________________________________________  Albert Rios MD     Procedures: see electronic medical records for all procedures/Xrays and details which were not copied into this note but were reviewed prior to creation of Plan. LABS:  I reviewed today's most current labs and imaging studies.   Pertinent labs include:  Recent Labs      08/20/18   0159  08/17/18   1801   WBC  3.5*  4.3   HGB  8.9*  10.8*   HCT  28.6*  34.7*   PLT  206  283     Recent Labs      08/20/18   0159  08/19/18   0149  08/18/18   0319   NA  145  146*  144   K  3.1*  3.4*  3.5   CL  109*  110*  109*   CO2  32  31  28   GLU  143*  152*  192*   BUN  17  15  9   CREA  0.38*  0.43*  0.49*   CA  7.4*  7.5*  7.1*   MG  2.0   --    --        Signed: Albert Rios MD

## 2018-08-20 NOTE — PROGRESS NOTES
GI Progress Note [facility]om[facility])  NAME:Ramon Montana :1935 NWL:[id number]   ATTG: Dr. Florian Benton  PCP: Real Valle MD  Date/Time:  2018 4:05 P    Reason for following: UC, abnormal CT    Assessment:   · pseudomembranous colitis  · Ulcerative colitis  - not currently on meds, previously on methotrexate  · Left sided weakness, poor mobility  · Abnormal CT, left-sided colitis  · CAD, weight loss, failure to thrive, AS s/p AVR, HTN, RA, sarcoidosis     Plan:   · Diet as tolerated  · Continue vancomycin  · Continue mesalamine     Subjective:   Feels okay. Pain improving, but has some lower abd pain from time to time still. Denies recent BM. Review of Systems:  Symptom Y/N Comments  Symptom Y/N Comments   Fever/Chills n   Chest Pain n    Cough n   Headaches n    Sputum n   Joint Pain n    SOB/BEY n   Pruritis/Rash n    Tolerating Diet y   Other       Could NOT obtain due to:      Objective:   VITALS:   Last 24hrs VS reviewed since prior progress note. Most recent are:  Visit Vitals    /64 (BP 1 Location: Right arm, BP Patient Position: At rest)    Pulse (!) 57    Temp 97.4 °F (36.3 °C)    Resp 18    Ht 5' 8.5\" (1.74 m)    Wt 52.2 kg (115 lb)    SpO2 98%    BMI 17.23 kg/m2       Intake/Output Summary (Last 24 hours) at 18 1240  Last data filed at 18 1020   Gross per 24 hour   Intake              100 ml   Output              500 ml   Net             -400 ml     PHYSICAL EXAM:  General: WD, WN. Alert, lean, cooperative, no acute distress    HEENT: NC, Atraumatic. Anicteric sclerae. Lungs:  CTA Bilaterally. No Wheezing/Rhonchi/Rales. Heart:  Regular  rhythm,  No murmur (), No Rubs, No Gallops  Abdomen: Soft, Non distended, Non tender.  +Bowel sounds, no HSM  Neurologic:  Alert and oriented X 3. Psych:   Good insight. Not anxious nor agitated.     Lab and Radiology Data Reviewed: (see below)    Medications Reviewed: (see below)  PMH/SH reviewed - no change compared to H&P  ________________________________________________________________________  Total time spent with patient: 15 minutes ________________________________________________________________________  Care Plan discussed with:  Patient x   Family     RN               Consultant:       Ngozi Friedman MD     Procedures: see electronic medical records for all procedures/Xrays and details which were not copied into this note but were reviewed prior to creation of Plan. LABS:  Recent Labs      08/20/18   0159 08/17/18   1801   WBC  3.5*  4.3   HGB  8.9*  10.8*   HCT  28.6*  34.7*   PLT  206  283     Recent Labs      08/20/18   0159  08/19/18   0149  08/18/18   0319   NA  145  146*  144   K  3.1*  3.4*  3.5   CL  109*  110*  109*   CO2  32  31  28   BUN  17  15  9   CREA  0.38*  0.43*  0.49*   GLU  143*  152*  192*   CA  7.4*  7.5*  7.1*   MG  2.0   --    --      No results for input(s): SGOT, GPT, AP, TBIL, TP, ALB, GLOB, GGT, AML, LPSE in the last 72 hours. No lab exists for component: AMYP, HLPSE  No results for input(s): INR, PTP, APTT in the last 72 hours. No lab exists for component: INREXT, INREXT   No results for input(s): FE, TIBC, PSAT, FERR in the last 72 hours. Lab Results   Component Value Date/Time    Folate 23.0 (H) 08/14/2018 05:25 AM     No results for input(s): PH, PCO2, PO2 in the last 72 hours. No results for input(s): CPK, CKMB in the last 72 hours.     No lab exists for component: TROPONINI  Lab Results   Component Value Date/Time    Color DARK YELLOW 08/10/2018 02:03 PM    Appearance CLEAR 08/10/2018 02:03 PM    Specific gravity 1.028 08/10/2018 02:03 PM    Specific gravity <1.005 03/06/2018 03:28 PM    pH (UA) 5.0 08/10/2018 02:03 PM    Protein NEGATIVE  08/10/2018 02:03 PM    Glucose NEGATIVE  08/10/2018 02:03 PM    Ketone NEGATIVE  08/10/2018 02:03 PM    Bilirubin NEGATIVE  03/06/2018 03:28 PM    Urobilinogen 1.0 08/10/2018 02:03 PM    Nitrites NEGATIVE  08/10/2018 02:03 PM    Leukocyte Esterase NEGATIVE  08/10/2018 02:03 PM    Epithelial cells FEW 08/10/2018 02:03 PM    Bacteria NEGATIVE  08/10/2018 02:03 PM    WBC 0-4 08/10/2018 02:03 PM    RBC 0-5 08/10/2018 02:03 PM       MEDICATIONS:  Current Facility-Administered Medications   Medication Dose Route Frequency    potassium chloride SR (KLOR-CON 10) tablet 20 mEq  20 mEq Oral TID    vancomycin 50 mg/mL oral solution (compounded) 125 mg  125 mg Oral Q6H    mesalamine (DELZICOL) DR capsule 800 mg  800 mg Oral TID    hydrocortisone Sod Succ (PF) (SOLU-CORTEF) injection 50 mg  50 mg IntraVENous Q12H    acetaminophen (TYLENOL) tablet 325 mg  325 mg Oral Q6H PRN    aspirin chewable tablet 81 mg  81 mg Oral QPM    docusate sodium (COLACE) capsule 100 mg  100 mg Oral BID PRN    folic acid (FOLVITE) tablet 1 mg  1 mg Oral DAILY    gabapentin (NEURONTIN) capsule 300 mg  300 mg Oral TID    oxyCODONE-acetaminophen (PERCOCET 10)  mg per tablet 1 Tab  1 Tab Oral Q4H PRN    sodium chloride (NS) flush 5-10 mL  5-10 mL IntraVENous Q8H    sodium chloride (NS) flush 5-10 mL  5-10 mL IntraVENous PRN    naloxone (NARCAN) injection 0.4 mg  0.4 mg IntraVENous PRN    ondansetron (ZOFRAN) injection 4 mg  4 mg IntraVENous Q4H PRN    heparin (porcine) injection 5,000 Units  5,000 Units SubCUTAneous Q12H    polyethylene glycol (MIRALAX) packet 17 g  17 g Oral DAILY    melatonin tablet 3 mg  3 mg Oral QHS

## 2018-08-20 NOTE — PROGRESS NOTES
Chief Complaint: left arm weakness    Chart reviewed as well as EMG, CSF labs and imaging studies. Clinical picture consistent with idiopathic brachial plexitis. Treatment is conservative     Assesment and Plan  1. Brachial plexitis  Conservative management  Pain management  Prognosis is good but slow may take years to reach baseline. 2. Hyperlipidemia  Continue atorvastatin    3. Arm pain    4.  Right foot drop  chronic    Allergies  Lisinopril and Questran [cholestyramine (with sugar)]     Medications  Current Facility-Administered Medications   Medication Dose Route Frequency    vancomycin 50 mg/mL oral solution (compounded) 125 mg  125 mg Oral Q6H    mesalamine (DELZICOL) DR capsule 800 mg  800 mg Oral TID    hydrocortisone Sod Succ (PF) (SOLU-CORTEF) injection 50 mg  50 mg IntraVENous Q12H    acetaminophen (TYLENOL) tablet 325 mg  325 mg Oral Q6H PRN    aspirin chewable tablet 81 mg  81 mg Oral QPM    docusate sodium (COLACE) capsule 100 mg  100 mg Oral BID PRN    folic acid (FOLVITE) tablet 1 mg  1 mg Oral DAILY    gabapentin (NEURONTIN) capsule 300 mg  300 mg Oral TID    oxyCODONE-acetaminophen (PERCOCET 10)  mg per tablet 1 Tab  1 Tab Oral Q4H PRN    sodium chloride (NS) flush 5-10 mL  5-10 mL IntraVENous Q8H    sodium chloride (NS) flush 5-10 mL  5-10 mL IntraVENous PRN    naloxone (NARCAN) injection 0.4 mg  0.4 mg IntraVENous PRN    ondansetron (ZOFRAN) injection 4 mg  4 mg IntraVENous Q4H PRN    heparin (porcine) injection 5,000 Units  5,000 Units SubCUTAneous Q12H    polyethylene glycol (MIRALAX) packet 17 g  17 g Oral DAILY    melatonin tablet 3 mg  3 mg Oral QHS        Medical History  Past Medical History:   Diagnosis Date    Aortic stenosis, severe     Arrhythmia     heart murmur    Arthritis     Rheumatoid    CAD (coronary artery disease) 1998    S/P CABG    Cancer (HCC)     Basal cell carcinoma    Chronic pain     from rheumatoid arthritis in knees, elbows fingers    HTN (hypertension)     Hypercholesterolemia     S/P AVR (aortic valve replacement)     23 mm Trifecta with Redo CABG x 1SVG to PDA    S/P CABG x 1 10/13/2014    Redo CABG x 1 SVG to PDA; 'y' from collins of existing OM vein graft    Sarcoidosis     No longer active.  Ulcerative colitis ([facility] [address] [address]     Not active     Review of Systems   Respiratory: Negative for cough and hemoptysis. Cardiovascular: Negative for chest pain and palpitations. Musculoskeletal: Positive for back pain, myalgias and neck pain. Neurological: Positive for focal weakness. Negative for seizures. Psychiatric/Behavioral: Negative for substance abuse and suicidal ideas. Exam:    Visit Vitals    /62 (BP 1 Location: Right arm, BP Patient Position: At rest)    Pulse 60    Temp 98.3 °F (36.8 °C)    Resp 18    Ht 5' 8.5\" (1.74 m)    Wt 115 lb (52.2 kg)    SpO2 97%    BMI 17.23 kg/m2      General: Thin build ill appearing   Head: Normocephalic, atraumatic, anicteric sclera   Neck Normal ROM, No thyromegally   Lungs:  Clear to auscultation bilaterally, No wheezes or rubs   Cardiac: Regular rate and rhythm with no murmurs. Abd: Bowel sounds were audible. No tenderness on palpation   Ext: No pedal edema   Skin: Supple no rash     NeurologicExam:  Mental Status: Alert and oriented to person place and time   Speech: Fluent no aphasia or dysarthria. Cranial Nerves:  II - XII Intact   Motor:  Left upper extremity weakness   Reflexes:   Deep tendon reflexes 0+/4 and symmetric. Sensory:   Symmetric and intact with no perceived deficits modalities involving small or large fibers. Gait:  deferred   Tremor:   No tremor noted. Cerebellar:  Coordination intact. Neurovascular: No carotid bruits.  No JVD           Imaging  CT Results (most recent):    Results from Hospital Encounter encounter on 08/10/18   CT ABD PELV W CONT   Narrative CT CHEST, ABDOMEN, PELVIS WITH CONTRAST. 8/11/2018 7:14 PM     INDICATION: Lymphoma, lymphadenopathy. COMPARISON: 3/6/2018, 12/19/2017. TECHNIQUE: CT of the chest, abdomen, and pelvis was performed after the  administration 100 cc IV Isovue 370 and oral contrast. CT dose reduction was  achieved through use of a standardized protocol tailored for this examination  and automatic exposure control for dose modulation. FINDINGS:  Chest: Scattered calcified pulmonary nodules are benign. Incidental note is made  of a perifissural lymph node along the minor fissure (602-41, 4-32). There is  minimal, subsegmental air space disease or scarring in the right upper lobe  along the minor fissure. Scarring is favored, as the fissure is pulled  superiorly (601-41, 602-48). It is not significantly changed from 2014. 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 Subpleural scarring is associated in several places. No pneumothorax or pleural  effusion. The heart is mildly enlarged. Thinning of the mid septal myocardium  suggests an old infarction. The patient is post CABG, and there are extensive  native coronary artery calcifications. He is also post aortic valve replacement. Calcified hilar lymph nodes are further evidence of old granulomatous disease. Residual or refluxed oral contrast extends in the esophagus to the level of the  aortic arch. Abdomen: Incidental note is made of bilateral renal cysts; nonobstructing, tiny  bilateral renal calculi; and subcentimeter hypodense bilateral renal lesions  which are too small to characterize, but likely also represent cysts. There is  bilateral renal cortical scarring. The stomach, duodenum, liver, gallbladder,  pancreas, and adrenals are normal. Splenic calcifications are consistent with  old granulomatous disease. Pelvis: There is circumferential wall thickening of the rectum and sigmoid  colon. Though the descending colon is decompressed, wall thickening/colitis  probably extends into the descending colon as well. This has been present since  12/19/2017, though associated pericolonic fat stranding is slightly greater  today. Some of the rectal wall thickening is attributable to hemorrhoids, better  seen on 12/19/2017 (2-72, 601-84). The small bowel, ileocecal junction, and  proximal colon are normal. The prostate is enlarged, and indents the bladder  base. A tiny calcification in the dependent bladder (601-74) is new. No free air  or fluid, and no abdominopelvic lymphadenopathy. Impression IMPRESSION:  1. Proctocolitis, extending from the splenic flexure through the rectum. This is  chronic, though inflammation is slightly greater today. 2. Tiny calculus in the bladder. 3. 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 4. No lymphadenopathy in the chest, abdomen, and pelvis. MRI Results (most recent):    Results from [place] encounter on 08/10/18   MRI CHEST W WO CONT   Narrative INDICATION:  Worsening left-sided weakness. Brachioplexopathy and peripheral  neuropathy on EMG     EXAM: MRI chest. Sequences include 3 planes T1 3 planes STIR. After the  intravenous administration of 10 cc of Room 3 planes fat-suppressed T1-weighted  images were obtained. Comparison August 11, 2010. There is patient motion  artifact     FINDINGS: There is increased signal within the left brachial plexus with at  least increased nerve signal extending to the C6 nerve root with associated  enhancement. A discrete mass lesion is not identified. There is a left pleural  effusion with associated pleural plaques. Right brachial plexus demonstrates no  abnormal signal. There is diffuse muscle edema throughout the left hemithorax  predominantly involving the trapezius, rotator cuff muscles and involving the  latissimus dorsi.  There is mild muscle edema of the rotator cuff muscles and  trapezius on the right suggesting a more diffuse process however abnormal signal  is not demonstrated within the right brachial plexus. Impression IMPRESSION:  1. Abnormal signal and enhancement of the left brachial plexus compatible with a  nonspecific plexopathy. No discrete mass lesion  2.  Asymmetric muscle edema worse on the left however there is bilateral  involvement which can be seen with denervation          Lab Review  Lab Results   Component Value Date/Time    WBC 4.3 08/17/2018 06:01 PM    HCT 34.7 (L) 08/17/2018 06:01 PM    HGB 10.8 (L) 08/17/2018 06:01 PM    PLATELET 707 34/61/2370 06:01 PM       Lab Results   Component Value Date/Time    Sodium 146 (H) 08/19/2018 01:49 AM    Potassium 3.4 (L) 08/19/2018 01:49 AM    Chloride 110 (H) 08/19/2018 01:49 AM    CO2 31 08/19/2018 01:49 AM    Glucose 152 (H) 08/19/2018 01:49 AM    BUN 15 08/19/2018 01:49 AM    Creatinine 0.43 (L) 08/19/2018 01:49 AM    Calcium 7.5 (L) 08/19/2018 01:49 AM     Lab Results   Component Value Date/Time    Hemoglobin A1c 5.4 02/20/2018 12:15 PM    Hemoglobin A1c (POC) 7.0 (A) 10/05/2017 10:40 AM        Lab Results   Component Value Date/Time    Vitamin B12 614 08/14/2018 05:25 AM    Folate 23.0 (H) 08/14/2018 05:25 AM       No results found for: ESCOBAR, Scheryl Beams, XBANA    Lab Results   Component Value Date/Time    Cholesterol, total 146 02/20/2018 12:15 PM    HDL Cholesterol 36 (L) 02/20/2018 12:15 PM    LDL, calculated 82 02/20/2018 12:15 PM    VLDL, calculated 28 02/20/2018 12:15 PM    Triglyceride 142 02/20/2018 12:15 PM    CHOL/HDL Ratio 4.1 09/12/2014 10:44 AM

## 2018-08-20 NOTE — PROGRESS NOTES
CM spoke with attending in regards to anticipated discharge. CM was informed that Pt will be ready to possibly discharge in the AM of  8/21/18. CM contacted Pt's daughter Teja Jani [phone] to notify of possible discharge. Daughter was receptive. CM notified accepting facility [facility]) of anticipated discharge. Daughter requested that Pt be transported via [facility]. CM set will call for [facility] transport via [employer]. CM will continue to follow-up with discharge planning.        Madhav Mckenna, [address] [place] [address]  [phone]

## 2018-08-20 NOTE — PROGRESS NOTES
Problem: Mobility Impaired (Adult and Pediatric)  Goal: *Acute Goals and Plan of Care (Insert Text)  Physical Therapy Goals  Revised 8/20/2018  1. Patient will move from supine to sit and sit to supine  in bed with modified independence within 7 day(s). 2.  Patient will transfer from bed to chair and chair to bed with contact guard assist using the least restrictive device within 7 day(s). 3.  Patient will perform sit to stand with contact guard assist within 7 day(s). 4.  Patient will ambulate with contact guard assist for 50 feet with the least restrictive device within 7 day(s). Initiated 8/11/2018  1. Patient will move from supine to sit and sit to supine  in bed with independence within 7 day(s). 2.  Patient will transfer from bed to chair and chair to bed with modified independence using the least restrictive device within 7 day(s). 3.  Patient will perform sit to stand with modified independence within 7 day(s). 4.  Patient will ambulate with modified independence for 50 feet with the least restrictive device within 7 day(s). physical Therapy TREATMENT: WEEKLY REASSESSMENT  Patient: Perry Sabillon (89 y.o. 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 ASSESSMENT:  Patient received supine in bed and initially declined therapy due to back pain, but with encouragement pt agreeable. Pt tolerated session fairly well. Patient continues to have increased edema in LUE and reinforced importance of elevation. Patient completed supine to sit with mod assist and additional time to complete.  Pt performed sit<>stand with RW with min assist. Pt ambulated from bed to chair with min assist with RW demonstrating decreased nadia, shuffle gait, baseline R foot drop and L knee in flexed position during stance phase. Pt remained seated in chair with bed alarm set. Patient will continue to benefit from PT to progress mobility and reach highest level of independence. Pt will benefit from SNF placement upon discharge. Patient's progression toward goals since last assessment: progress has been made towards all goals; min A for transfers and gait with RW, mod A bed mobility      PLAN:  Goals have been updated based on progression since last assessment. Patient continues to benefit from skilled intervention to address the above impairments. Continue to follow the patient 3 times a week to address goals. Planned Interventions:  [x]              Bed Mobility Training             [x]       Neuromuscular Re-Education  [x]              Transfer Training                   []       Orthotic/Prosthetic Training  [x]              Gait Training                         []       Modalities  [x]              Therapeutic Exercises           []       Edema Management/Control  [x]              Therapeutic Activities            [x]       Patient and Family Training/Education  []              Other (comment):  Discharge Recommendations: Aguila Heredia  Further Equipment Recommendations for Discharge: tbd     SUBJECTIVE:   Patient stated I guess that will be fine.     OBJECTIVE DATA SUMMARY:   Critical Behavior:  Neurologic State: Alert  Orientation Level: Oriented X4  Cognition: Follows commands  Safety/Judgement: Fall prevention    Functional Mobility Training:  Bed Mobility:     Supine to Sit: Moderate assistance              Transfers:  Sit to Stand: Minimum assistance           Bed to Chair: Minimum assistance                    Balance:  Sitting - Static: Good (unsupported)  Sitting - Dynamic: Fair (occasional)  Standing: With support  Standing - Static: Fair;Constant support  Standing - Dynamic : Fair  Ambulation/Gait Training:  Distance (ft): 4 Feet (ft)  Assistive Device: Gait belt;Walker, rolling  Ambulation - Level of Assistance: Minimal assistance        Gait Abnormalities: Decreased step clearance (R foot drop, L knee flexed in stance phase)        Base of Support: Widened     Speed/Leanna: Pace decreased (<100 feet/min); Shuffled  Step Length: Right shortened;Left shortened          Pain:  Pain Scale 1: Numeric (0 - 10)  Pain Intensity 1: 0              Activity Tolerance:   Good. VSS  Please refer to the flowsheet for vital signs taken during this treatment.   After treatment:   [x]  Patient left in no apparent distress sitting up in chair  []  Patient left in no apparent distress in bed  [x]  Call bell left within reach  [x]  Nursing notified  []  Caregiver present  [x]  Bed alarm activated    COMMUNICATION/COLLABORATION:   The patients plan of care was discussed with: Occupational Therapist and Registered Nurse    Barbi Rodriguez, PT, DPT   Time Calculation: 13 mins

## 2018-08-21 NOTE — DISCHARGE SUMMARY
Discharge Summary      Name: Gae Lesches  [id number]  YOB: 1935 (Age: 82 y.o.)   Date of Admission: 8/10/2018  Date of Discharge: 8/21/2018  Attending Physician: Kelle Saucedo MD    Discharge Diagnosis:   Hx of RA  Hx of ulcerative colitis  Hx of sarcoidosis  C diff colitis on Cscope  Hypernatremia  Hypokalemia  CAD s/p CABG  AS s/p AVR  HTN  Severe bradycardia     Consultations:  IP CONSULT TO PALLIATIVE CARE - PROVIDER  IP CONSULT TO PALLIATIVE CARE - PROVIDER  IP CONSULT TO ONCOLOGY  IP CONSULT TO GASTROENTEROLOGY  IP CONSULT TO NEUROLOGY  IP CONSULT TO CARDIOLOGY    Procedures:     Brief Admission History/Reason for Admission Per Yee River MD:   Beckie Duval is a 82 y.o. Chrystal Conklinh a complex PMHx significant for HTN, arthritis, chronic pain, CABG,basal cell ca  presents via EMS to the ED with cc of generalized malaise x 3 years. Daughter reports associated symptom of decrease in appetite, numbness in RLE>LLE x 3 years, and numbness in L arm x 30 days. Per medical records, the pt sees Dr. Rory Soto for lower back pain and has a h/o a R foot drop dating back to August 2017. the pt had multiple falls due to the numbness in his legs, with his last fall x 2 weeks ago. No loc. No neck injury. per family his appetite is very poor now. Pt is declining very fast. Loosing  wt. Pt denies any vomiting, CP, abdominal pain, fever, chills, cough, back pain, hematochezia, hematuria, or hematemesis.      We were asked to admit for work up and evaluation of the above problems. Brief Hospital Course by Main Problems:   Hx of RA  Hx of ulcerative colitis  Hx of sarcoidosis  C diff colitis on Cscope  CT a/p suggestive of chronic proctocolitis. Pt was on chronic prednisone, replaced with stress dose steroids. He underwent Cscope on 8/17 showed suggestive of pseudomembranous colitis, biopsied. Mild underlying L sided ulcerative colitis, biopsied.   Pt was started on PO vancomycin which he will need to complete 14 days total.  pt did not have diarrhea inpt. He will need to complete tapered dose steroids with goals of back down to PTA prednisone 2.5mg daily. He ay also resume his home medications. I have discussed with Dr Penelope Wilson. He recommended to cont' mesalamine, PTA meds: MXT and Enbrel on discharge. He will need to have a repeat CT a/p at f/u with GI. Hypernatremia, resolved. He is tolerating PO intake     Hypokalemia, repleted with KCl. He is being discharged on KCl 10mEq daily. Will need to have repat BP in 3-5 days at SNF.     Hypotension, improved.     Generalized weakness with worsening of weakness on L side  MRI head showed small chronic small vessel ischemic white matter disease. No acute infarct or mass. EMG showed L brachial plexopathy and peripheral neuropathy. MRI chest showed nonspecific plexopathy. Asymmetric muscle edema on L and b/l involvement which can be seen with denervation. He was evaluated by neurology with recommendations of conservative management.     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 Appreciate oncology following. Appetite improved. Back pain  MRI C/L spine showed degenerative spine changes with no canal stenosis or cord signal abnormality. No hernation or acute abnormality. PT/OT evaluated pt, recommended SNF on discharge.     CAD s/p CABG  AS s/p AVR  HTN  Severe bradycardia with syncope, documented 7 sec pause on telemetry likely due to vasovagal.  He was evaluated by cardiology. No PM recommended. BB discontinued. Pt to continue ASA and statin.     Severe Malnutrition, Body mass index is 17.23 kg/(m^2).        Discharge Exam:  Patient seen and examined by me on discharge day. Pertinent Findings:  Visit Vitals    /72 (BP 1 Location: Right arm, BP Patient Position:  At rest;Sitting)    Pulse (!) 57    Temp 97.9 °F (36.6 °C)    Resp 18    Ht 5' 8.5\" (1.74 m)    Wt 52.2 kg (115 lb)    SpO2 97%    BMI 17.23 kg/m2     Gen:    Not in distress  Chest: Clear lungs  CVS:   Regular rhythm. No edema  Abd:  Soft, not distended, not tender    Discharge/Recent Laboratory Results:  Recent Labs      08/20/18   0159   NA  145   K  3.1*   CL  109*   CO2  32   BUN  17   GLU  143*   CA  7.4*   MG  2.0     Recent Labs      08/20/18   0159   HGB  8.9*   HCT  28.6*   WBC  3.5*   PLT  206       Discharge Medications:  Current Discharge Medication List      START taking these medications    Details   mesalamine (DELZICOL) 400 mg cdti capsule Take 2 Caps by mouth three (3) times daily for 30 days. Qty: 180 Cap, Refills: 0      polyethylene glycol (MIRALAX) 17 gram packet Take 1 Packet by mouth daily as needed. Qty: 30 Packet, Refills: 0      potassium chloride SR (KLOR-CON 10) 10 mEq tablet Take 1 Tab by mouth daily. Do not crush, break or chew. Swallow whole. Qty: 30 Tab, Refills: 0      vancomycin 50 mg/mL oral solution (compounded) Take 2.5 mL by mouth every six (6) hours for 10 days. Qty: 100 mL, Refills: 0         CONTINUE these medications which have CHANGED    Details   oxyCODONE-acetaminophen (PERCOCET)  mg per tablet Take 1 Tab by mouth every four (4) hours as needed for Pain. Max Daily Amount: 6 Tabs. Qty: 15 Tab, Refills: 0    Associated Diagnoses: Rheumatoid arthritis involving multiple sites with positive rheumatoid factor ([facility] [address]      ! ! predniSONE (DELTASONE) 10 mg tablet 5 tabs daily for 3 days  4 tabs daily for 3 days   3 tabs daily for 3 days   2 tabs daily for 3 days   1 tab   daily for 3 days  Qty: 37 Tab, Refills: 0      !! predniSONE (DELTASONE) 2.5 mg tablet Take 1 Tab by mouth daily. Qty: 30 Tab, Refills: 0       !! - Potential duplicate medications found. Please discuss with provider.       CONTINUE these medications which have NOT CHANGED    Details   aspirin 81 mg chewable tablet Take 1 Tab by mouth every evening. Qty: 90 Tab, Refills: 3      atorvastatin (LIPITOR) 20 mg tablet Take 1 tablet by mouth  daily  Qty: 90 Tab, Refills: 3      methotrexate sodium (METHOTREXATE, ANTI-RHEUMATIC,) 2.5 mg DsPk Take 20 mg by mouth Every Saturday. folic acid (FOLVITE) 1 mg tablet Take 1 mg by mouth daily. etanercept (ENBREL) 50 mg/mL (0.98 mL) injection 50 mg by SubCUTAneous route every Sunday. docusate sodium (COLACE) 100 mg capsule Take 100 mg by mouth two (2) times daily as needed for Constipation. gabapentin (NEURONTIN) 300 mg capsule Take 1 Cap by mouth three (3) times daily. Qty: 270 Cap, Refills: 3      OTHER by Injection route every three (3) months.  Patient receives lumbar epidural steroid injections by Dr. Whit Artis from [place] [address] taking these medications       metoprolol succinate (TOPROL-XL) 25 mg XL tablet Comments:   Reason for Stopping:               DISPOSITION:    Home with Family:    Home with HH/PT/OT/RN:    SNF/LTC: [facility]    [place]:    OTHER:          Follow up with:   PCP : Bre Nichole MD  Follow-up Information     Follow up With Details Comments Contact Info    [address] [facility]   [address] [place] [address]  [phone]      Mar Vela MD In 2 weeks  [address] [facility] [address] [facility]      Bre Nichole MD In 5 days  Tom 7  [phone]            Code: Full  Diet: regular    Total time in minutes spent coordinating this discharge (includes going over instructions, follow-up, prescriptions, and preparing report for sign off to her PCP) :  35 minutes

## 2018-08-21 NOTE — PROGRESS NOTES
Hospital to [facility] SBAR Handoff - Jonathan Gomez                                                                        82 y.o.   male    [address] [facility]   Room: 2286/01    [facility] 2 CARDIOPULMONARY CARE  Unit Phone# :  [phone]      Critical access hospital   [facility]  [address] [address]  Dept: [phone]  Loc: [phone]                    SITUATION     Admitted:  8/10/2018         Attending Provider:  Vlad Goncalves MD       Consultations:  IP CONSULT TO PALLIATIVE CARE - PROVIDER  IP CONSULT TO PALLIATIVE CARE - PROVIDER  IP CONSULT TO ONCOLOGY  IP CONSULT TO GASTROENTEROLOGY  IP CONSULT TO NEUROLOGY  IP CONSULT TO CARDIOLOGY    PCP:  Steven Abernathy MD   [phone]    Treatment Team: Attending Provider: Vlad Goncalves MD; Consulting Provider: Rain Rosen NP; Consulting Provider: Man Ann MD; Consulting Provider: Zaida Veliz MD; Consulting Provider: Gisele Segovia MD; Consulting Provider: Rae [facility] MD; Consulting Provider: Dayanna Avery MD; Care Manager: Marin Mcwilliams; Consulting Provider: Brena Homans., MD; Consulting Provider: Eddie Rich MD; Utilization Review: Jayce Choe RN    Admitting Dx:  Abnormal CT scan, colon [R93.3]  Ulcerative proctitis without complication ([facility]) [W44.00]  Abnormal CT scan, colon [R93.3]  Ulcerative rectosigmoiditis without complication ([facility] [address] [A77.67]       Principal Problem: <principal problem not specified>    4 Days Post-Op of   Procedure(s):  COLONOSCOPY   COLON BIOPSY   BY: Desire Bennett MD             ON: 8/17/2018                  Code Status: Full Code                Advance Directives:   Advance Care Planning 8/11/2018   Patient's Healthcare Decision Maker is: Named in scanned ACP document   Primary Decision Maker Name Yuridia Harris   \"Valeria\"  daughter   Primary Decision Maker Phone Number [phone]   Primary Decision Maker Relationship to Patient Adult child   Confirm Advance Directive Yes, on file    (Send w/patient)   No Doesnt Have       Isolation:  Enteric Contact       MDRO: No current active infections    Pain Medications given:  percocet     Last dose: 8/21/18 at [address] needed: no  Type of equipment:         BACKGROUND     Allergies: Allergies   Allergen Reactions    Lisinopril Cough    Questran [Cholestyramine (With Sugar)] Other (comments)     Muscle cramps       Past Medical History:   Diagnosis Date    Aortic stenosis, severe     Arrhythmia     heart murmur    Arthritis     Rheumatoid    CAD (coronary artery disease) 1998    S/P CABG    Cancer (HCC)     Basal cell carcinoma    Chronic pain     from rheumatoid arthritis in knees, elbows fingers    HTN (hypertension)     Hypercholesterolemia     S/P AVR (aortic valve replacement)     23 mm Trifecta with Redo CABG x 1SVG to PDA    S/P CABG x 1 10/13/2014    Redo CABG x 1 SVG to PDA; 'y' from collins of existing OM vein graft    Sarcoidosis     No longer active.  Ulcerative colitis ([facility] [address]     Not active       Past Surgical History:   Procedure Laterality Date    CARDIAC SURG PROCEDURE UNLIST      CABG    COLONOSCOPY N/A 8/17/2018    COLONOSCOPY  [facility] ENDOSCOPY    HX AORTIC VALVE REPLACEMENT  10/2014    CABG redo X1    HX CATARACT REMOVAL      Left    HX LUMBAR LAMINECTOMY      HX OTHER SURGICAL      skin lesions removed from nose and forehead- basal cell    LAMINECTOMY,LUMBAR  110/2000    DE COLONOSCOPY W/BIOPSY SINGLE/MULTIPLE  12/5/2011            Prescriptions Prior to Admission   Medication Sig    metoprolol succinate (TOPROL-XL) 25 mg XL tablet Take 25 mg by mouth every evening.  aspirin 81 mg chewable tablet Take 1 Tab by mouth every evening.     atorvastatin (LIPITOR) 20 mg tablet Take 1 tablet by mouth  daily    methotrexate sodium (METHOTREXATE, ANTI-RHEUMATIC,) 2.5 mg DsPk Take 20 mg by mouth Every Saturday.  folic acid (FOLVITE) 1 mg tablet Take 1 mg by mouth daily.  etanercept (ENBREL) 50 mg/mL (0.98 mL) injection 50 mg by SubCUTAneous route every Sunday.  docusate sodium (COLACE) 100 mg capsule Take 100 mg by mouth two (2) times daily as needed for Constipation.  gabapentin (NEURONTIN) 300 mg capsule Take 1 Cap by mouth three (3) times daily.  OTHER by Injection route every three (3) months. Patient receives lumbar epidural steroid injections by Dr. Marv Buchanan from [facility] scripts included in transfer packet yes    Vaccinations:    Immunization History   Administered Date(s) Administered    Influenza High Dose Vaccine PF 09/07/2016, 10/05/2017    Influenza Vaccine 09/29/2015    Influenza Vaccine (Madin Berenice Canine Kidney) PF 10/16/2014    Influenza Vaccine Split 10/12/2010, 09/24/2011, 10/29/2012    Influenza Vaccine Whole 10/10/2009    Pneumococcal Conjugate (PCV-13) 09/29/2015    TD Vaccine 01/01/2006    ZZZ-RETIRED (DO NOT USE) Pneumococcal Vaccine (Unspecified Type) 11/14/2002, 01/01/2009               The Charlson CoMorbitiy Index tool is an evidenced based tool that has more automatic generated information. The tool looks at many different items such as the age of the patient, how many times they were admitted in the last calendar year, current length of stay in the hospital and their diagnosis. All of these items are pulled automatically from information documented in the chart from various places and will generate a score that predicts whether a patient is at low (less than 13), medium (13-20) or high (21 or greater) risk of being readmitted.         ASSESSMENT                Temp: 97.9 °F (36.6 °C) (08/21/18 0749) Pulse (Heart Rate): (!) 57 (08/21/18 0749)     Resp Rate: 18 (08/21/18 0749)           BP: 125/72 (08/21/18 0749)     O2 Sat (%): 97 % (08/21/18 0749)     Weight: 52.2 kg (115 lb)    Height: 5' 8.5\" (174 cm) (08/10/18 1142)       If above not within 1 hour of discharge:    BP:_____  P:____  R:____ T:_____ O2 Sat: ___%  O2: ______    Active Orders   Diet    DIET GI LITE (POST SURGICAL)         Orientation: oriented to time, place, person and situation     Active Behaviors: None                                   Active Lines/Drains:  (Peg Tube / Cade / CL or S/L?): no    Urinary Status: Voiding     Last BM: Last Bowel Movement Date: 08/17/18     Skin Integrity: Scars (comment)             Mobility: Very limited   Weight Bearing Status: WBAT (Weight Bearing as Tolerated)      Gait Training  Assistive Device: Gait belt, Walker, rolling  Ambulation - Level of Assistance: Minimal assistance  Distance (ft): 4 Feet (ft)         Lab Results   Component Value Date/Time    Glucose 143 (H) 08/20/2018 01:59 AM    Hemoglobin A1c 5.4 02/20/2018 12:15 PM    INR 1.1 08/10/2018 12:40 PM    INR 1.4 (H) 10/13/2014 03:00 PM    HGB 8.9 (L) 08/20/2018 01:59 AM    HGB 10.8 (L) 08/17/2018 06:01 PM        RECOMMENDATION     See After Visit Summary (AVS) for:  · Discharge instructions  · After [facility]   · Special equipment needed (entered pre-discharge by Care Management)  · Medication Reconciliation    · Follow up Appointment(s)         Report given/sent by:  Iza Perry RN                    Verbal report given to: Barbara Constantino LPN             Estimated discharge time:  8/21/2018 at [phone] - pt is being discharged from the hospital via [facility], they are here to transport him now. IV and tele removed. Discharge instructions/paperwork/prescriptions sent with [facility] staff. No s/s of distress at discharge.

## 2018-08-21 NOTE — PROGRESS NOTES
Pharmacist Discharge Medication Reconciliation    Significant PMH:   Past Medical History:   Diagnosis Date    Aortic stenosis, severe     Arrhythmia     heart murmur    Arthritis     Rheumatoid    CAD (coronary artery disease) 1998    S/P CABG    Cancer (HCC)     Basal cell carcinoma    Chronic pain     from rheumatoid arthritis in knees, elbows fingers    HTN (hypertension)     Hypercholesterolemia     S/P AVR (aortic valve replacement)     23 mm Trifecta with Redo CABG x 1SVG to PDA    S/P CABG x 1 10/13/2014    Redo CABG x 1 SVG to PDA; 'y' from collins of existing OM vein graft    Sarcoidosis     No longer active.  Ulcerative colitis [facility])     Not active     Chief Complaint for this Admission: No chief complaint on file. Allergies: Lisinopril and Questran [cholestyramine (with sugar)]    Discharge Medications:   Current Discharge Medication List        START taking these medications    Details   mesalamine (DELZICOL) 400 mg cdti capsule Take 2 Caps by mouth three (3) times daily for 30 days. Qty: 180 Cap, Refills: 0      polyethylene glycol (MIRALAX) 17 gram packet Take 1 Packet by mouth daily as needed. Qty: 30 Packet, Refills: 0      potassium chloride SR (KLOR-CON 10) 10 mEq tablet Take 1 Tab by mouth daily. Do not crush, break or chew. Swallow whole. Qty: 30 Tab, Refills: 0      vancomycin 50 mg/mL oral solution (compounded) Take 2.5 mL by mouth every six (6) hours for 10 days. Qty: 100 mL, Refills: 0           CONTINUE these medications which have CHANGED    Details   oxyCODONE-acetaminophen (PERCOCET)  mg per tablet Take 1 Tab by mouth every four (4) hours as needed for Pain. Max Daily Amount: 6 Tabs. Qty: 15 Tab, Refills: 0    Associated Diagnoses: Rheumatoid arthritis involving multiple sites with positive rheumatoid factor ([facility] [address]      ! ! predniSONE (DELTASONE) 10 mg tablet 5 tabs daily for 3 days  4 tabs daily for 3 days   3 tabs daily for 3 days   2 tabs daily for 3 days   1 tab   daily for 3 days  Qty: 37 Tab, Refills: 0      !! predniSONE (DELTASONE) 2.5 mg tablet Take 1 Tab by mouth daily. Qty: 30 Tab, Refills: 0       !! - Potential duplicate medications found. Please discuss with provider. CONTINUE these medications which have NOT CHANGED    Details   aspirin 81 mg chewable tablet Take 1 Tab by mouth every evening. Qty: 90 Tab, Refills: 3      atorvastatin (LIPITOR) 20 mg tablet Take 1 tablet by mouth  daily  Qty: 90 Tab, Refills: 3      methotrexate sodium (METHOTREXATE, ANTI-RHEUMATIC,) 2.5 mg DsPk Take 20 mg by mouth Every Saturday. folic acid (FOLVITE) 1 mg tablet Take 1 mg by mouth daily. etanercept (ENBREL) 50 mg/mL (0.98 mL) injection 50 mg by SubCUTAneous route every Sunday. docusate sodium (COLACE) 100 mg capsule Take 100 mg by mouth two (2) times daily as needed for Constipation. gabapentin (NEURONTIN) 300 mg capsule Take 1 Cap by mouth three (3) times daily. Qty: 270 Cap, Refills: 3      OTHER by Injection route every three (3) months.  Patient receives lumbar epidural steroid injections by Dr. Rory Soto from [address] [place] [address] taking these medications       metoprolol succinate (TOPROL-XL) 25 mg XL tablet Comments:   Reason for Stopping:               The patient's chart, MAR and AVS were reviewed by Samantha Kraft, PHARMD.    Discharging Provider: Dr. Karen Austin      Thank You,     MATEUSZ Pitt

## 2018-08-21 NOTE — PROGRESS NOTES
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 Current Facility-Administered Medications   Medication Dose Route Frequency    [START ON 2018] hydrocortisone Sod Succ (PF) (SOLU-CORTEF) injection 50 mg  50 mg IntraVENous DAILY    potassium chloride SR (KLOR-CON 10) tablet 20 mEq  20 mEq Oral TID    vancomycin 50 mg/mL oral solution (compounded) 125 mg  125 mg Oral Q6H    mesalamine (DELZICOL) DR capsule 800 mg  800 mg Oral TID    acetaminophen (TYLENOL) tablet 325 mg  325 mg Oral Q6H PRN    aspirin chewable tablet 81 mg  81 mg Oral QPM    docusate sodium (COLACE) capsule 100 mg  100 mg Oral BID PRN    folic acid (FOLVITE) tablet 1 mg  1 mg Oral DAILY    gabapentin (NEURONTIN) capsule 300 mg  300 mg Oral TID    oxyCODONE-acetaminophen (PERCOCET 10)  mg per tablet 1 Tab  1 Tab Oral Q4H PRN    sodium chloride (NS) flush 5-10 mL  5-10 mL IntraVENous Q8H    sodium chloride (NS) flush 5-10 mL  5-10 mL IntraVENous PRN    naloxone (NARCAN) injection 0.4 mg  0.4 mg IntraVENous PRN    ondansetron (ZOFRAN) injection 4 mg  4 mg IntraVENous Q4H PRN    heparin (porcine) injection 5,000 Units  5,000 Units SubCUTAneous Q12H    polyethylene glycol (MIRALAX) packet 17 g  17 g Oral DAILY    melatonin tablet 3 mg  3 mg Oral QHS        Review of Systems:  o/w negative for 11 organ systems.      Objective:     Patient Vitals for the past 8 hrs:   BP Temp Pulse Resp SpO2   18 0749 125/72 97.9 °F (36.6 °C) (!) 57 18 97 %   18 0339 111/66 97.9 °F (36.6 °C) 60 18 96 %       Temp (24hrs), Av °F (36.7 °C), Min:97.4 °F (36.3 °C), Max:98.5 °F (36.9 °C)       0701 -  1900  In: 240 [P.O.:240]  Out: 220 [Urine:220]    Physical Exam:  Laying in bed sleeping upon my entry,  NAD  Thin appearing male  HEENT: NC, AT, pupils round without icterus  Neck: no adenopathy, no JVD, no thyromegaly  Lungs cta  Heart regular rate and rhythm. Abdomen soft, nontender, normoactive BS, -HSM appreciated  Ext no edema. Scarring on right leg. Skin - multiple keratoses  No adenopathy appreciated. Neuro:  LUE resting on pillow - can not lift voluntarily without using right hand. Left leg he can move although somewhat weaker. Right foot turns inward. Labs:    No results found for this or any previous visit (from the past 24 hour(s)). 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 Nothing further needs to be done at this time with regard to this diagnosis. No need to f/u with us at this time. The letter sent to office introducing Mr. Brennen Taylor to my office sent by Dr. Celso Shay indicated that he had been treating him for a number of non-melanoma skin cancers for the prior 5 years. 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 Left sided weakness - Neuro consulted and dx with brachial plexitis, conservative management. Anemia - haptoglobin high. b12 and folate checked in 2015 - normal. Current B12, folate and ferritin okay. [facility] for M protein and free light chain ratio normal.  MARTÍN negative. Hx of RA, ulcerative colitis and sarcoidosis - on prednisone, has been on embrel and MTX in past. MTX may be contributing to his anemia. Cont Folic Acid    CAD s/p CABG, AS s/p AVR - currently with bradycardia.

## 2018-08-21 NOTE — PROGRESS NOTES
CARDIOLOGY Progress Note    Patient ID:  Patient: Dorothy Gonzalez  MRN: [id number]  Age: 82 y.o.  : 1935    Date of  Admission: 8/10/2018 11:37 AM   PCP:  Kala Whatley MD   Usual cardiologist:  Parth Magallanes III, DO    Assessment:     1. Bradycardia with syncope, documented 7 sec pause on tele . Probably vasovagal.  Occurred after severe back pain. NO RECURRENCE.  2. Paroxysmal atrial fibrillation in 3/2018. 3. Coronary artery disease s/p remote redo CABG (1v SVG-PDA) 10/214.  had SVG-LCx/OM, LIMA-LAD, and SVG-PDA. Negative Cardiolite stress  w/ EF 59%. Echo 2014 w/ EF 60%, 9mmHg gradient across AVR, no AI. 4. Aortic stenosis s/p redo AVR (#23 Trifecta bp AVR) 10/14  5. 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 6. Pancolitis w/ h/o ulcerative colitis. Pseudomembranous colitis. 7. Rheumatoid arthritis, on immunomodulating therapy. 8. Sarcoidosis diagnosis. 9. Hypertension. 10. Hyperlipidemia. 11. Mild bilateral carotid disease. 12. Diabetes type 2.  13. Chronic back pain. 14. Severe protein-calorie malnutrition. Plan:     1. No beta-blocker. 2. No statin here. 3. OK with ASA. No pacemaker recommended at this time. Ok for discharge from a cardiology standpoint, can follow-up routinely with Dr. Nadege Dominguez, his cardiologist.      []       High complexity decision making was performed in this patient    Dorothy Gonzalez is a 82 y.o. male with a history of CAD, CABG, bioprosthetic aortic valve replacement and redo CABG, here with failure to thrive, generalized weakness. He was diagnosed with B cell lymphoma in 2018. I was contacted this AM due to an episode of bradycardia associated with syncope . His heart rate was as low as the 20-30's and a pause of 7 seconds was reported. He was transferred to the ICU for external pacing, which was done transiently. His heart rate restored itself by .     TODAY:  He denies dizziness, syncope, palpitations, chest pain. He has chronic BEY. Lying flat in bed.       Allergies   Allergen Reactions    Lisinopril Cough    Questran [Cholestyramine (With Sugar)] Other (comments)     Muscle cramps          Current Facility-Administered Medications   Medication Dose Route Frequency    potassium chloride SR (KLOR-CON 10) tablet 20 mEq  20 mEq Oral TID    vancomycin 50 mg/mL oral solution (compounded) 125 mg  125 mg Oral Q6H    mesalamine (DELZICOL) DR capsule 800 mg  800 mg Oral TID    hydrocortisone Sod Succ (PF) (SOLU-CORTEF) injection 50 mg  50 mg IntraVENous Q12H    acetaminophen (TYLENOL) tablet 325 mg  325 mg Oral Q6H PRN    aspirin chewable tablet 81 mg  81 mg Oral QPM    docusate sodium (COLACE) capsule 100 mg  100 mg Oral BID PRN    folic acid (FOLVITE) tablet 1 mg  1 mg Oral DAILY    gabapentin (NEURONTIN) capsule 300 mg  300 mg Oral TID    oxyCODONE-acetaminophen (PERCOCET 10)  mg per tablet 1 Tab  1 Tab Oral Q4H PRN    sodium chloride (NS) flush 5-10 mL  5-10 mL IntraVENous Q8H    sodium chloride (NS) flush 5-10 mL  5-10 mL IntraVENous PRN    naloxone (NARCAN) injection 0.4 mg  0.4 mg IntraVENous PRN    ondansetron (ZOFRAN) injection 4 mg  4 mg IntraVENous Q4H PRN    heparin (porcine) injection 5,000 Units  5,000 Units SubCUTAneous Q12H    polyethylene glycol (MIRALAX) packet 17 g  17 g Oral DAILY    melatonin tablet 3 mg  3 mg Oral QHS       Review of Symptoms:    General: negative for fever, chills, sweats, weakness  Gastrointestinal: +colitis, negative for abdominal pain, N/V, dysphagia, visible bleeding   Hematology: +lymphoma, negative for easy bruising, bleeding  agitation       Objective:      Physical Exam:  Temp (24hrs), Av.9 °F (36.6 °C), Min:97.4 °F (36.3 °C), Max:98.1 °F (36.7 °C)    Patient Vitals for the past 8 hrs:   Pulse   18 1938 60   18 1536 (!) 57    Patient Vitals for the past 8 hrs:   Resp   18 1938 20   18 1536 18 Patient Vitals for the past 8 hrs:   BP   18 1938 103/65   18 1536 106/65          Intake/Output Summary (Last 24 hours) at 18  Last data filed at 18   Gross per 24 hour   Intake              300 ml   Output              875 ml   Net             -575 ml       Nondiaphoretic, not in acute distress. No scleral icterus, mucous membranes moist, conjuctivae pink, no xanthelasma. Unlabored, clear to auscultation bilaterally anteriorly, symmetric air movement. Regular slightly bradycardic rhythm, no murmur. Palpable radial pulses bilaterally. Abdomen, soft, nontender, nondistended. Extremities without cyanosis or clubbing. Muscle tone and bulk normal.  Skin warm and dry. No rashes or ulcers. Neuro grossly nonfocal.  No tremor. More awake today, appropriate. CARDIOGRAPHICS and STUDIES, I reviewed:    Telemetry:  Sinus rhythm, no significant bradycardia. EC/13  Sinus rhythm, left axis deviation. Labs:  No results for input(s): CPK, CKMB, CKNDX, TROIQ in the last 72 hours. No lab exists for component: CPKMB  Lab Results   Component Value Date/Time    Cholesterol, total 146 2018 12:15 PM    HDL Cholesterol 36 (L) 2018 12:15 PM    LDL, calculated 82 2018 12:15 PM    Triglyceride 142 2018 12:15 PM    CHOL/HDL Ratio 4.1 2014 10:44 AM     No results for input(s): INR, PTP, APTT in the last 72 hours. No lab exists for component: Geryl Bowels   Recent Labs      18   0159  18   0149  18   0319   NA  145  146*  144   K  3.1*  3.4*  3.5   CL  109*  110*  109*   CO2  32  31  28   BUN  17  15  9   CREA  0.38*  0.43*  0.49*   GLU  143*  152*  192*   CA  7.4*  7.5*  7.1*   WBC  3.5*   --    --    HGB  8.9*   --    --    HCT  28.6*   --    --    PLT  206   --    --      No results for input(s): SGOT, GPT, AP, TBIL, TP, ALB, GLOB, GGT, AML, LPSE in the last 72 hours.     No lab exists for component: AMYP, HLPSE  No components found for: [place]  No results for input(s): PH, PCO2, PO2 in the last 72 hours.         Yee River MD  8/20/2018

## 2018-08-23 NOTE — PROGRESS NOTES
Transition of Care Coordination/Hospital to Post Acute Facility:     Date/Time:  2018 3:35 PM    Patient was admitted to [facility] on 8/10/18 and discharged on 18 for Hx of RA, Hx of ulcerative colitis, Hx of sarcoidosis, C diff colitis on Cscope, Hypernatremia, Hypokalemia, CAD s/p CABG, AS s/p AVR, HTN, Severe bradycardia. Patient was transferred to [facility] ([phone]) for continuation of care. Inpatient RRAT score: 36    Top Challenges reviewed    - Pt d/c'd to SNF [facility]    - Recommendation for 2 week GI f/u w/ Dr Iza Rosales. .     - PCP f/u to be scheduled for after SNF d/c    - [address] [place] [address] (dtr) as per AMD document dated 8/10/18           Method of communication with care team :chart routing, phone. NN attempted to contacted SNF Director of D/C Planning RACHEL Mendez. Message left. Attempted to contact [facility] unit. No answer after 2 minutes. Nurse Navigator(NN) spoke with pt's wife Paayl Wolfe to provide introduction to self and explanation of the Nurse Navigator Role. Verified name and  as patient identifiers. Discussed and reviewed  anticipated length of stay, anticipated needs at time of discharge, follow up appointments. Wife reported SNFTeam Meeting held 18. Pt may be d/c'd tomorrow 18. Pt wants to go home. Wife primary caregiver. Wife reported pt has benefit for [facility]HA 35 hrs/week thru insurance from [employer]. Wife reported plan is for [address] Mon-Fri. Family can assist during weekends. Discussed HH choice. Wife reported have had [facility] in past. Would want to have again. NN advised wife to inform SNF staff of her choice. Wife reported dtr Yin Singh is pt's MPOA & is handling plan/arrangements for pt's transition home. NN contact # given. Wife/dtr to contact NN when definitive d/c date known.       ACP:   Does the patient have a current ACP (including DDNR):  yes  Does the post acute facility have a copy of the patients ACP:  unknown    Medication(s):   New Medications at Discharge:   mesalamine (DELZICOL) 400 mg cdti capsule Take 2 Caps by mouth three (3) times daily for 30 days. Qty: 180 Cap, Refills: 0       polyethylene glycol (MIRALAX) 17 gram packet Take 1 Packet by mouth daily as needed. Qty: 30 Packet, Refills: 0       potassium chloride SR (KLOR-CON 10) 10 mEq tablet Take 1 Tab by mouth daily. Do not crush, break or chew. Swallow whole. Qty: 30 Tab, Refills: 0       vancomycin 50 mg/mL oral solution (compounded) Take 2.5 mL by mouth every six (6) hours for 10 days. Qty: 100 mL, Refills: 0     Changed Medications at Discharge:   oxyCODONE-acetaminophen (PERCOCET)  mg per tablet Take 1 Tab by mouth every four (4) hours as needed for Pain. Max Daily Amount: 6 Tabs. Qty: 15 Tab, Refills: 0     Associated Diagnoses: Rheumatoid arthritis involving multiple sites with positive rheumatoid factor (HCC)       !! predniSONE (DELTASONE) 10 mg tablet 5 tabs daily for 3 days  4 tabs daily for 3 days   3 tabs daily for 3 days   2 tabs daily for 3 days   1 tab   daily for 3 days  Qty: 37 Tab, Refills: 0       !! predniSONE (DELTASONE) 2.5 mg tablet Take 1 Tab by mouth daily. Qty: 30 Tab, Refills: 0     Discontinued Medications at Discharge: metoprolol succinate (TOPROL-XL) 25 mg XL tablet     PCP/Specialist follow up: No future appointments. Pallavi Albert PCP & GI f/u appts to be scheduled for after d/c from SNF Rehab. Opportunity to ask questions was provided. Contact information was provided for future reference or further questions. Will continue to monitor.

## 2018-08-27 NOTE — TELEPHONE ENCOUNTER
----- Message from Remigio Orantes sent at 8/27/2018  1:46 PM EDT -----  Regarding: Evelyn/Telephone  Sunshine from [facility] is requesting a call back in reference to would  follow the pt. Best contact number is [phone].

## 2018-08-27 NOTE — TELEPHONE ENCOUNTER
Message left on Sunshine from [facility] to call back. Dr Cici Castillo will follow for home care but CRISTAL SHUKLA appt needs to be scheduled.

## 2018-08-27 NOTE — TELEPHONE ENCOUNTER
Sandra Wood notified that Dr Rae Domínguez will follow for home health but needs WILLIS appt. Sandra Wood said pt should be discharged today and will let family know.

## 2018-09-06 PROBLEM — G54.0 BRACHIAL PLEXOPATHY: Status: ACTIVE | Noted: 2018-01-01

## 2018-09-06 NOTE — PATIENT INSTRUCTIONS
Return in one month  I will have someone call you when your note for power chair is ready  Get an egg crate or other therapeutic cushion for sitting. Instructions to help gain weight:  Eat frequently. Do not go more than 4 hours without eating. Add high quality foods: nuts and nut butters, yogurt, eggs, granola, avocados, humus. Eat more than one food at a time: for example whole wheat toast with almond butter and banana. Consider smoothies and meal replacement drinks (Instant breakfast, Ensure, Boost). Add protein powder or powdered milk where ever possible to boost nutrition  Jazz up foods with spices or herbs to improve flavor  Drizzle olive or canola oil on vegetables, salads, stews, and soups. If you do not have diabetes drink 100% fruit juice or fat free milk instead of plain water. Clostridium Difficile Colitis: Care Instructions  Your Care Instructions    Clostridium difficile (also called C. difficile) are bacteria that can cause swelling and irritation of the large intestine, or colon. This inflammation is also called colitis. It can cause diarrhea, fever, and belly cramps. You may get C. difficile colitis if you take antibiotics. The infection is most common in people who are taking antibiotics while in the hospital. It is also common in older people in hospitals and nursing homes. Severe disease could cause the colon to swell to many times its normal size (toxic megacolon). This can cause death and needs emergency treatment. You may have a swollen belly that is painful or tender, a rapid heartbeat, and a fever. Follow-up care is a key part of your treatment and safety. Be sure to make and go to all appointments, and call your doctor if you are having problems. It's also a good idea to know your test results and keep a list of the medicines you take. How can you care for yourself at home? · Your doctor may give you antibiotics to treat C. difficile colitis.  If your doctor prescribes an antibiotic, he or she will give you a different antibiotic than the one that caused your infection. Take your antibiotics as directed. Do not stop taking them just because you feel better. You need to take the full course of antibiotics. · To prevent dehydration, drink plenty of fluids, enough so that your urine is light yellow or clear like water. Choose water and other caffeine-free clear liquids until you feel better. If you have kidney, heart, or liver disease and have to limit fluids, talk with your doctor before you increase the amount of fluids you drink. · Begin eating small amounts of mild foods, if you feel like it. Try yogurt that has live cultures of lactobacillus (check the label). ¨ Avoid spicy foods, fruits, alcohol, and caffeine until 48 hours after all symptoms go away. ¨ Avoid chewing gum that contains sorbitol. ¨ Avoid dairy products (except for yogurt with lactobacillus) while you have diarrhea and for 3 days after symptoms go away. · To prevent the spread of C. difficile, practice good hygiene. Keep your hands clean by washing them well and often with soap and clean, running water. Alcohol-based hand sanitizers do not kill C. difficile. When should you call for help? Call 911 if:    · You passed out (lost consciousness).    Call your doctor now or seek immediate medical care if:    · You have a fever over 101°F or shaking chills.     · You feel lightheaded or have a fast heart rate.     · You pass stools that are almost always bloody.     · You have signs of needing more fluids. You have sunken eyes and a dry mouth, and you pass only a little dark urine.     · You have severe belly pain with or without bloating.     · You have severe vomiting and cannot keep down liquids.     · You are not passing any stools or gas.    Watch closely for changes in your health, and be sure to contact your doctor if:    · You do not get better as expected. Where can you learn more?   Go to http://ligia-judi.info/. Enter ([phone] in the search box to learn more about \"Clostridium Difficile Colitis: Care Instructions. \"  Current as of: November 18, 2017  Content Version: 11.7  © 3057-8746 [employer]. Care instructions adapted under license by [employer] (which disclaims liability or warranty for this information). If you have questions about a medical condition or this instruction, always ask your healthcare professional. [place] [address] any warranty or liability for your use of this information.

## 2018-09-06 NOTE — PROGRESS NOTES
HISTORY OF PRESENT ILLNESS  Bo Aguirre is a 82 y.o. male. He is accompanied by his wife. HPI  Mr. Ash Casillas is a 82y.o. year old male, he is seen today for Transition of Care services following a hospital discharge for failure to thrive, C diff colitis, mild ulcerative colitis, and left brachial plexopathy,  on 8/21/18. Our office Nurse Navigator performed an outreach to Mr. Yessenia Ricardo on 8/23 (within 2 business days of discharge) to complete medication reconciliation and a telephonic assessment of his condition. He presented to the ED on 8/10 with complaints of generalized malaise, decrease appetite, weight loss, numbness in left arm. He complained of long standing numbness in RLE>LLE and right foot drop with multiple falls. He weighed 115 lbs on admission; weight was 127 on 2/20 at our office. He had extensive lab and imaging evaluations including search for possible MM and other malignancies, Lyme disease, Sjogren's, thyroid disease, vitamin and mineral deficiencies, syphilis, RA, SLE and other connective tissue disorders, sarcoidosis, fungal infections and all results were normal/negative except LUE and MRI brachial plexus. The latter studies lead to diagnosis of brachial plexopathy. He has findings of colitis on CT and both ulcerative colitis and pseudomembranous colitis were confirmed on colonoscopy. CONSULT TO PALLIATIVE CARE, ONCOLOGY, GASTROENTEROLOGY, NEUROLOGY and CARDIOLOGY. Appetite has been fairly good since home per patient. Wife however says he eats only a half of a salsbury steak, a 1/4 cup of potatoes and green beans for dinner. He snacks in between meals on cookiies and Jello. He ambulates using a walker but someone must walk behind him in case he starts to fall. With assistance he can get to the table, bathroom and bedroom.  He has a wheelchair, but someone has to push him as his left arm too weak to propel it himself He states it just passes through doorways and around corners. He has had a gradual decline in ability to ambulate and in maintaining nutrition since October of last year. Home PT and OT are coming to work with him. He does think this is helping; he made more progress at rehab. He spends half of daytime hours in bed, but gets up every hour to walk. He can only walk about 10 feet even with assistance. He misses being outdoors. He lives in a single story house. Has swelling in ankles and feet off and on, but has gone down a lot since discharge. He has pain in back, [ain in tailbone,  right leg and left shoulder. Percocet 10/325 helps. Sees rheumatologist soon for follow up of  RA. He has a BM every 2 days that is soft but formed. Has f/u with GI next week. He denies dyspnea, abdominal pain, chest pain, rash, nausea and dizziness.      Patient Active Problem List   Diagnosis Code    Rheumatoid arthritis involving multiple sites with positive rheumatoid factor ([facility]) M05.79    Sarcoidosis of lung ([facility]) D86.0    Ulcerative colitis ([facility] K51.90    Hypercholesterolemia E78.00    Inguinal hernia K40.90    Asbestos exposure Z77.090    Hypertension, essential I10    Thyroid nodule K39.0    Umbilical hernia R17.5    S/P AVR (aortic valve replacement) Z95.2    Diabetic peripheral neuropathy ([facility]) E11.42    Osteoporosis M81.0    Lumbar radiculopathy M54.16    Coronary artery disease involving native coronary artery without angina pectoris I25.10    Essential tremor G25.0    Controlled type 2 diabetes mellitus with diabetic polyneuropathy, without long-term current use of insulin ([facility]) E11.42    Advance directive discussed with patient Z70.80    Postlaminectomy syndrome, lumbar region M96.1    Osteoarthritis of spine with radiculopathy, lumbar region M47.26    Right foot drop M21.371    Spinal stenosis, lumbar region, with neurogenic claudication M48.062    Weakness of right lower extremity R29.898    Bilateral carotid artery disease ([facility]) I77.9  C. difficile diarrhea A04.72    PAF (paroxysmal atrial fibrillation) (HCC) I48.0    Falls W19. Nyla Gupta    Brachial plexopathy G54.0     Past Medical History:   Diagnosis Date    Aortic stenosis, severe     Arrhythmia     heart murmur    Arthritis     Rheumatoid    CAD (coronary artery disease) 1998    S/P CABG    Cancer (HCC)     Basal cell carcinoma    Chronic pain     from rheumatoid arthritis in knees, elbows fingers    HTN (hypertension)     Hypercholesterolemia     S/P AVR (aortic valve replacement)     23 mm Trifecta with Redo CABG x 1SVG to PDA    S/P CABG x 1 10/13/2014    Redo CABG x 1 SVG to PDA; 'y' from collins of existing OM vein graft    Sarcoidosis     No longer active.      Ulcerative colitis [facility])     Not active     Past Surgical History:   Procedure Laterality Date    CARDIAC SURG PROCEDURE UNLIST      CABG    COLONOSCOPY N/A 8/17/2018    COLONOSCOPY  performed by Patti Renae [facility] at [facility] ENDOSCOPY    HX AORTIC VALVE REPLACEMENT  10/2014    CABG redo X1    HX CATARACT REMOVAL      Left    HX LUMBAR LAMINECTOMY      HX OTHER SURGICAL      skin lesions removed from nose and forehead- basal cell    LAMINECTOMY,LUMBAR  110/2000    ME COLONOSCOPY W/BIOPSY SINGLE/MULTIPLE  12/5/2011          Social History     Social History    Marital status:      Spouse name: N/A    Number of children: N/A    Years of education: N/A     Social History Main Topics    Smoking status: Former Smoker     Packs/day: 1.50     Years: 20.00     Quit date: 1/1/1978    Smokeless tobacco: Never Used    Alcohol use Yes      Comment: rarely    Drug use: No    Sexual activity: Not Asked     Other Topics Concern    None     Social History Narrative     Family History   Problem Relation Age of Onset    Heart Disease Father     Cancer Sister      breast     Diabetes Sister     Cancer Sister      leukemia    Diabetes Sister     Diabetes Mother    Chata Chávez Bladder Disease Mother      Allergies Allergen Reactions    Lisinopril Cough    Questran [Cholestyramine (With Sugar)] Other (comments)     Muscle cramps     Current Outpatient Prescriptions   Medication Sig Dispense Refill    vancomycin (VANCOCIN) 125 mg capsule Take 125 mg by mouth every six (6) hours.  metoprolol succinate (TOPROL-XL) 25 mg XL tablet       atorvastatin (LIPITOR) 20 mg tablet TAKE 1 TABLET BY MOUTH  DAILY 90 Tab 0    oxyCODONE-acetaminophen (PERCOCET)  mg per tablet Take 1 Tab by mouth every four (4) hours as needed for Pain. Max Daily Amount: 6 Tabs. 15 Tab 0    mesalamine (DELZICOL) 400 mg cdti capsule Take 2 Caps by mouth three (3) times daily for 30 days. 180 Cap 0    polyethylene glycol (MIRALAX) 17 gram packet Take 1 Packet by mouth daily as needed. (Patient taking differently: Take 1 Packet by mouth daily as needed (CONSTIPATION). ) 30 Packet 0    potassium chloride SR (KLOR-CON 10) 10 mEq tablet Take 1 Tab by mouth daily. Do not crush, break or chew. Swallow whole. 30 Tab 0    predniSONE (DELTASONE) 10 mg tablet 5 tabs daily for 3 days  4 tabs daily for 3 days   3 tabs daily for 3 days   2 tabs daily for 3 days   1 tab   daily for 3 days 37 Tab 0    predniSONE (DELTASONE) 2.5 mg tablet Take 1 Tab by mouth daily. 30 Tab 0    gabapentin (NEURONTIN) 300 mg capsule Take 1 Cap by mouth three (3) times daily. 270 Cap 3    OTHER by Injection route every three (3) months. Patient receives lumbar epidural steroid injections by Dr. Lacey Montana from [facility] aspirin 81 mg chewable tablet Take 1 Tab by mouth every evening. 90 Tab 3    methotrexate sodium (METHOTREXATE, ANTI-RHEUMATIC,) 2.5 mg DsPk Take 20 mg by mouth Every Saturday.  folic acid (FOLVITE) 1 mg tablet Take 1 mg by mouth daily.  etanercept (ENBREL) 50 mg/mL (0.98 mL) injection 50 mg by SubCUTAneous route every Sunday.  docusate sodium (COLACE) 100 mg capsule Take 100 mg by mouth two (2) times daily as needed for Constipation. ROS  Visit Vitals    /71 (BP 1 Location: Left arm, BP Patient Position: Sitting)    Pulse 80    Temp 97.9 °F (36.6 °C) (Oral)    Resp 16    Ht 5' 8\" (1.727 m)    Wt 106 lb (48.1 kg)    SpO2 95%    BMI 16.12 kg/m2       Physical Exam   Constitutional: He is oriented to person, place, and time. He appears cachectic. He has a sickly appearance. HENT:   Head: Normocephalic and atraumatic. Eyes: Conjunctivae are normal. Pupils are equal, round, and reactive to light. Neck: Neck supple. Carotid bruit is not present. No thyromegaly present. Cardiovascular: Normal rate, regular rhythm and normal heart sounds. PMI is not displaced. Exam reveals no gallop. No murmur heard. Pulses:       Dorsalis pedis pulses are 2+ on the right side, and 2+ on the left side. Posterior tibial pulses are 2+ on the right side, and 2+ on the left side. Pulmonary/Chest: Effort normal. He has no wheezes. He has no rhonchi. He has no rales. Abdominal: Soft. Normal appearance. He exhibits no abdominal bruit and no mass. There is no hepatosplenomegaly. There is no tenderness. Musculoskeletal: He exhibits no edema. Right shoulder: Normal. He exhibits normal range of motion. Left shoulder: Normal. He exhibits normal range of motion. Right elbow: Normal.He exhibits normal range of motion. Left elbow: Normal. He exhibits normal range of motion. Right wrist: Normal. He exhibits normal range of motion. Left wrist: He exhibits normal range of motion. Right hip: Normal. He exhibits normal range of motion. Left hip: Normal. He exhibits normal range of motion. Right knee: Normal. He exhibits normal range of motion. Left knee: Normal. He exhibits normal range of motion. Right ankle: He exhibits decreased range of motion (foot held in plantar flexion, unalbe to dorrsi flex). Left ankle: Normal. He exhibits normal range of motion. Heberden and Alejandra's nodes present in both hands. Lymphadenopathy:     He has no cervical adenopathy. Right: No supraclavicular adenopathy present. Left: No supraclavicular adenopathy present. Neurological: He is alert and oriented to person, place, and time. No sensory deficit. Gait abnormal.   Motor strength is 4/5 to finger flexion/extension, abduction and opposition, 4/5 wrist flexion/extension, 3/5 elbow flexion/ extension on the right. Motor strength is 3/5 to finger flexion/extension, abduction and 2/5 opposition, 2/5 wrist flexion/extension,2/5  elbow flexion extension on the left    Motor strength 0/5 to dorsiflexion/plantar flexion of ankle,3/5  flexion/extension of knee, 2/5 flexion of hip on the right  Motor strength 3/5 to dorsiflexion/plantar flexion of ankle,3/5  flexion/extension of knee,2/5 flexion of hip on the left    Gait is slow, drags left foot, shuffles. Balance is poor and he falters from side to side. He can just barely rise from seated position without assistance. It took several tries as he fell back into seat twice. Skin: Skin is warm, dry and intact. No rash noted. Psychiatric: He has a normal mood and affect. His behavior is normal.   Nursing note and vitals reviewed.     Lab Results   Component Value Date/Time    Hemoglobin A1c 5.4 09/06/2018 03:23 PM    Hemoglobin A1c (POC) 7.0 (A) 10/05/2017 10:40 AM     Lab Results   Component Value Date/Time    Sodium 143 09/06/2018 03:23 PM    Potassium 4.4 09/06/2018 03:23 PM    Chloride 97 09/06/2018 03:23 PM    CO2 23 09/06/2018 03:23 PM    Anion gap 4 (L) 08/20/2018 01:59 AM    Glucose 195 (H) 09/06/2018 03:23 PM    BUN 13 09/06/2018 03:23 PM    Creatinine 0.67 (L) 09/06/2018 03:23 PM    BUN/Creatinine ratio 19 09/06/2018 03:23 PM    GFR est  09/06/2018 03:23 PM    GFR est non-AA 90 09/06/2018 03:23 PM    Calcium 9.2 09/06/2018 03:23 PM    Bilirubin, total 1.0 09/06/2018 03:23 PM    AST (SGOT) 12 09/06/2018 03:23 PM    Alk. phosphatase 87 09/06/2018 03:23 PM    Protein, total 6.7 09/06/2018 03:23 PM    Albumin 4.0 09/06/2018 03:23 PM    Globulin 2.6 08/14/2018 05:25 AM    A-G Ratio 1.5 09/06/2018 03:23 PM    ALT (SGPT) 6 09/06/2018 03:23 PM     Lab Results   Component Value Date/Time    Cholesterol, total 146 02/20/2018 12:15 PM    HDL Cholesterol 36 (L) 02/20/2018 12:15 PM    LDL, calculated 82 02/20/2018 12:15 PM    VLDL, calculated 28 02/20/2018 12:15 PM    Triglyceride 142 02/20/2018 12:15 PM    CHOL/HDL Ratio 4.1 09/12/2014 10:44 AM     Lab Results   Component Value Date/Time    WBC 3.5 (L) 08/20/2018 01:59 AM    Hemoglobin (POC) 11.2 (L) 10/13/2014 06:59 AM    HGB 8.9 (L) 08/20/2018 01:59 AM    Hematocrit (POC) 33 (L) 10/13/2014 06:59 AM    HCT 28.6 (L) 08/20/2018 01:59 AM    PLATELET 548 96/09/4017 01:59 AM    MCV 89.9 08/20/2018 01:59 AM         Results from Hospital Encounter encounter on 08/10/18   CT ABD PELV W CONT   Narrative CT CHEST, ABDOMEN, PELVIS WITH CONTRAST. 8/11/2018 7:14 PM     INDICATION: Lymphoma, lymphadenopathy. COMPARISON: 3/6/2018, 12/19/2017. TECHNIQUE: CT of the chest, abdomen, and pelvis was performed after the  administration 100 cc IV Isovue 370 and oral contrast. CT dose reduction was  achieved through use of a standardized protocol tailored for this examination  and automatic exposure control for dose modulation. FINDINGS:  Chest: Scattered calcified pulmonary nodules are benign. Incidental note is made  of a perifissural lymph node along the minor fissure (602-41, 4-32). There is  minimal, subsegmental air space disease or scarring in the right upper lobe  along the minor fissure. Scarring is favored, as the fissure is pulled  superiorly (601-41, 602-48). It is not significantly changed from 2014. 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 Subpleural scarring is associated in several places. No pneumothorax or pleural  effusion.  The heart is mildly enlarged. Thinning of the mid septal myocardium  suggests an old infarction. The patient is post CABG, and there are extensive  native coronary artery calcifications. He is also post aortic valve replacement. Calcified hilar lymph nodes are further evidence of old granulomatous disease. Residual or refluxed oral contrast extends in the esophagus to the level of the  aortic arch. Abdomen: Incidental note is made of bilateral renal cysts; nonobstructing, tiny  bilateral renal calculi; and subcentimeter hypodense bilateral renal lesions  which are too small to characterize, but likely also represent cysts. There is  bilateral renal cortical scarring. The stomach, duodenum, liver, gallbladder,  pancreas, and adrenals are normal. Splenic calcifications are consistent with  old granulomatous disease. Pelvis: There is circumferential wall thickening of the rectum and sigmoid  colon. Though the descending colon is decompressed, wall thickening/colitis  probably extends into the descending colon as well. This has been present since  12/19/2017, though associated pericolonic fat stranding is slightly greater  today. Some of the rectal wall thickening is attributable to hemorrhoids, better  seen on 12/19/2017 (2-72, 601-84). The small bowel, ileocecal junction, and  proximal colon are normal. The prostate is enlarged, and indents the bladder  base. A tiny calcification in the dependent bladder (601-74) is new. No free air  or fluid, and no abdominopelvic lymphadenopathy. Impression IMPRESSION:  1. Proctocolitis, extending from the splenic flexure through the rectum. This is  chronic, though inflammation is slightly greater today. 2. Tiny calculus in the bladder. 3. 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 4. No lymphadenopathy in the chest, abdomen, and pelvis.       MRI Results (most recent):    Results from [place] encounter on 08/10/18   MRI CHEST W WO CONT Narrative INDICATION:  Worsening left-sided weakness. Brachioplexopathy and peripheral  neuropathy on EMG     EXAM: MRI chest. Sequences include 3 planes T1 3 planes STIR. After the  intravenous administration of 10 cc of Room 3 planes fat-suppressed T1-weighted  images were obtained. Comparison August 11, 2010. There is patient motion  artifact     FINDINGS: There is increased signal within the left brachial plexus with at  least increased nerve signal extending to the C6 nerve root with associated  enhancement. A discrete mass lesion is not identified. There is a left pleural  effusion with associated pleural plaques. Right brachial plexus demonstrates no  abnormal signal. There is diffuse muscle edema throughout the left hemithorax  predominantly involving the trapezius, rotator cuff muscles and involving the  latissimus dorsi. There is mild muscle edema of the rotator cuff muscles and  trapezius on the right suggesting a more diffuse process however abnormal signal  is not demonstrated within the right brachial plexus. Impression IMPRESSION:  1. Abnormal signal and enhancement of the left brachial plexus compatible with a  nonspecific plexopathy. No discrete mass lesion  2. Asymmetric muscle edema worse on the left however there is bilateral  involvement which can be seen with denervation              ASSESSMENT and PLAN    ICD-10-CM ICD-9-CM    1. Moderate protein-calorie malnutrition (HCC) U35.7 514.6 METABOLIC PANEL, COMPREHENSIVE   2. Brachial plexopathy G54.0 353.0    3. C. difficile diarrhea A04.72 008.45    4. Hypokalemia E87.6 276.8    5. Ulcerative rectosigmoiditis without complication ([facility]) J53.52 556.3    6. Right foot drop M21.371 736.79    7. Weakness of right lower extremity R29.898 729.89    8. Weakness of left arm R29.898 729.89    9. Poor balance R26.89 781.99    10.  Rheumatoid arthritis involving multiple sites with positive rheumatoid factor ([facility]) M05.79 714.0    11. Spinal stenosis, lumbar region, with neurogenic claudication M48.062 724.03    12. Osteoarthritis of spine with radiculopathy, lumbar region M47.26 721.3    13. Controlled type 2 diabetes mellitus with diabetic polyneuropathy, without long-term current use of insulin (HCC) E11.42 250.60 HEMOGLOBIN A1C WITH EAG     357.2      Diagnoses and all orders for this visit:    1. Moderate protein-calorie malnutrition ([facility]  Discussed healthy high calorie foods, not going 3 hours without eating.   -     METABOLIC PANEL, COMPREHENSIVE    2. Brachial plexopathy  Neurologist recommended conservative treatment. 3. C. difficile diarrhea  Almost finished course of Vancomycin. 4. Hypokalemia  Corrected. 5. Ulcerative rectosigmoiditis without complication ([facility] [address]  Improved; he will follow up with gastroenterology. 6. Right foot drop  Secondary to spinal causes. 7. Weakness of right lower extremity--with foot drop  Secondary to spinal causes. 8. Weakness of left arm  Due to brachial plexopathy. No cause found. 9. Poor balance  Must have someone with him to move safely from room to room with walker. 10. Rheumatoid arthritis involving multiple sites with positive rheumatoid factor ([facility] [address]  Stable and will see rheumatologist soon. No swollen joints and ROM preserved. 11. Spinal stenosis, lumbar region, with neurogenic claudication    12. Osteoarthritis of spine with radiculopathy, lumbar region    13. Controlled type 2 diabetes mellitus with diabetic polyneuropathy, without long-term current use of insulin (HCC)  Neuropathy further complicates ability to ambulate.   -     HEMOGLOBIN A1C WITH EAG      Follow-up Disposition:  Return in about 1 month (around 10/6/2018) for Colitis, weakness, DM.  lab results and schedule of future lab studies reviewed with patient  He is requesting a power wheelchair. It sounds like there is not enough room in his home for a scooter.  He has a cane, walker, and wheelchair  but must be spotted when moving around the house with walker to avoid falling and cannot propel the wheelchair himself. If power wheelchair can be driven with right arm he is capable of operating it. .   I have discussed the diagnosis, evaluation and treatment options and the intended plan with the patient. Patient understands and is in agreement. The patient has received an after-visit summary and questions were answered concerning future plans. I have discussed side effects and warnings of any new medications with the patient as well.     Jessika Zhang MD

## 2018-09-06 NOTE — PROGRESS NOTES
Yamile Dorsey  Identified pt with two pt identifiers(name and ). Chief Complaint   Patient presents with   [facility] Follow Up       Reviewed record In preparation for visit and have obtained necessary documentation. Advanced directive / living will on file. 1. Have you been to the ER, urgent care clinic or hospitalized since your last visit? [facility] 8/10/18 then rehab    2. Have you seen or consulted any other health care providers outside of the [address] [place] [address] since your last visit? Include any pap smears or colon screening. Dr Geneva Urbano, [facility] reviewed with provider.     Health Maintenance reviewed:     Health Maintenance Due   Topic    DTaP/Tdap/Td series (1 - Tdap)    MICROALBUMIN Q1     EYE EXAM RETINAL OR DILATED Q1     Influenza Age 5 to Adult     HEMOGLOBIN A1C Q6M     FOOT EXAM Q1           Wt Readings from Last 3 Encounters:   18 106 lb (48.1 kg)   18 127 lb (57.6 kg)   08/10/18 115 lb (52.2 kg)        Temp Readings from Last 3 Encounters:   18 97.9 °F (36.6 °C) (Oral)   18 98 °F (36.7 °C)   18 98 °F (36.7 °C)        BP Readings from Last 3 Encounters:   18 111/71   18 110/70   18 110/70        Pulse Readings from Last 3 Encounters:   18 80   18 76   18 76        Vitals:    18 1412   BP: 111/71   Pulse: 80   Resp: 16   Temp: 97.9 °F (36.6 °C)   TempSrc: Oral   SpO2: 95%   Weight: 106 lb (48.1 kg)   Height: 5' 8\" (1.727 m)   PainSc:   6   PainLoc: Knee          Learning Assessment:   :       Learning Assessment 3/16/2018 2016 2014   PRIMARY LEARNER Patient Patient Patient   HIGHEST LEVEL OF EDUCATION - PRIMARY LEARNER  GRADUATED HIGH SCHOOL OR GED GRADUATED HIGH SCHOOL OR GED GRADUATED HIGH SCHOOL OR GED   BARRIERS PRIMARY LEARNER NONE NONE NONE   CO-LEARNER CAREGIVER No - No   PRIMARY LANGUAGE ENGLISH ENGLISH ENGLISH   LEARNER PREFERENCE PRIMARY DEMONSTRATION LISTENING DEMONSTRATION     - READING READING     - DEMONSTRATION -     - PICTURES -     - VIDEOS -   ANSWERED BY miguel patient patient   RELATIONSHIP SELF SELF SELF        Depression Screening:   :       PHQ over the last two weeks 3/16/2018   Little interest or pleasure in doing things Not at all   Feeling down, depressed, irritable, or hopeless Several days   Total Score PHQ 2 1        Fall Risk Assessment:   :       Fall Risk Assessment, last 12 mths 10/5/2017   Able to walk? Yes   Fall in past 12 months? No        Abuse Screening:   :       Abuse Screening Questionnaire 9/6/2018 3/31/2017 3/1/2016 7/28/2014   Do you ever feel afraid of your partner? N N N N   Are you in a relationship with someone who physically or mentally threatens you? N N N N   Is it safe for you to go home?  Y Y Y Y        ADL Screening:   :       ADL Assessment 9/6/2018   Feeding yourself No Help Needed   Getting from bed to chair Help Needed   Getting dressed Help Needed   Bathing or showering Help Needed   Walk across the room (includes cane/walker) Help Needed   Using the telphone No Help Needed   Taking your medications Help Needed   Preparing meals Help Needed   Managing money (expenses/bills) Help Needed   Moderately strenuous housework (laundry) Help Needed   Shopping for personal items (toiletries/medicines) Help Needed   Shopping for groceries Help Needed   Driving Help Needed   Climbing a flight of stairs Help Needed   Getting to places beyond walking distances Help Needed

## 2018-09-06 NOTE — MR AVS SNAPSHOT
[address] [place] [address] [phone] Patient: Gabriella Grayson MRN: [id number] OMW:[birth year] Visit Information Date & Time Provider Department Dept. Phone Encounter #  
 9/6/2018  1:45 PM Sharath Fuller MD [facility] of [address] [place] [address] [id number] Follow-up Instructions Return in about 1 month (around 10/6/2018) for Colitis, weakness, DM. Upcoming Health Maintenance Date Due DTaP/Tdap/Td series (1 - Tdap) 1/2/2006 MICROALBUMIN Q1 3/1/2018 EYE EXAM RETINAL OR DILATED Q1 4/6/2018 Influenza Age 5 to Adult 8/1/2018 HEMOGLOBIN A1C Q6M 8/20/2018 FOOT EXAM Q1 10/5/2018 MEDICARE YEARLY EXAM 10/6/2018 LIPID PANEL Q1 2/20/2019 GLAUCOMA SCREENING Q2Y 4/6/2019 Allergies as of 9/6/2018  Review Complete On: 9/6/2018 By: Sharath Fuller MD  
  
 Severity Noted Reaction Type Reactions Lisinopril  11/18/2009    Cough Questran [Cholestyramine (With Sugar)]  11/18/2009    Other (comments) Muscle cramps Current Immunizations  Reviewed on 9/6/2018 Name Date Influenza High Dose Vaccine PF 10/5/2017, 9/7/2016 Influenza Vaccine 9/29/2015 Influenza Vaccine (Madin Berenice Canine Kidney) PF 10/16/2014 10:54 AM  
 Influenza Vaccine Split 10/29/2012, 9/24/2011  1:34 PM, 10/12/2010 Influenza Vaccine Whole 10/10/2009 Pneumococcal Conjugate (PCV-13) 9/29/2015 TD Vaccine 1/1/2006 ZZZ-RETIRED (DO NOT USE) Pneumococcal Vaccine (Unspecified Type) 1/1/2009, 11/14/2002 Reviewed by Bev Martinez LPN on 8/9/7621 at  6:61 PM  
 Reviewed by Bev Martinez LPN on 4/1/7413 at  8:25 PM  
You Were Diagnosed With   
  
 Codes Comments Moderate protein-calorie malnutrition ([facility] [address] [facility]    -  Primary ICD-10-CM: E44.0 ICD-9-CM: 263.0 Brachial plexopathy     ICD-10-CM: G54.0 ICD-9-CM: 353.0 Right foot drop     ICD-10-CM: M21.371 ICD-9-CM: 736.79   
 Weakness of right lower extremity     ICD-10-CM: R29.898 ICD-9-CM: 729.89   
 C. difficile diarrhea     ICD-10-CM: A04.72 
ICD-9-CM: 008.45 Ulcerative [facility]itis without complication ([facility]     WVB-99-[place]: K51.30 ICD-9-CM: 556.3 Hypokalemia     ICD-10-CM: E87.6 ICD-9-CM: 276.8 Controlled type 2 diabetes mellitus with diabetic polyneuropathy, without long-term current use of insulin (HCC)     ICD-10-CM: E11.42 
ICD-9-CM: 250.60, 357.2 Vitals BP Pulse Temp Resp Height(growth percentile) Weight(growth percentile) 111/71 (BP 1 Location: Left arm, BP Patient Position: Sitting) 80 97.9 °F (36.6 °C) (Oral) 16 5' 8\" (1.727 m) 106 lb (48.1 kg) SpO2 BMI Smoking Status 95% 16.12 kg/m2 Former Smoker BMI and BSA Data Body Mass Index Body Surface Area  
 16.12 kg/m 2 1.52 m 2 Preferred Pharmacy Pharmacy Name Phone 305 [facility], [address] [place] [address] [address] [facility] 134 Your Updated Medication List  
  
   
This list is accurate as of 9/6/18  3:14 PM.  Always use your most recent med list.  
  
  
  
  
 aspirin 81 mg chewable tablet Take 1 Tab by mouth every evening. atorvastatin 20 mg tablet Commonly known as:  LIPITOR  
TAKE 1 TABLET BY MOUTH  DAILY COLACE 100 mg capsule Generic drug:  docusate sodium Take 100 mg by mouth two (2) times daily as needed for Constipation. ENBREL 50 mg/mL (0.98 mL) injection Generic drug:  etanercept 50 mg by SubCUTAneous route every Sunday. folic acid 1 mg tablet Commonly known as:  Google Take 1 mg by mouth daily. gabapentin 300 mg capsule Commonly known as:  NEURONTIN Take 1 Cap by mouth three (3) times daily. mesalamine 400 mg Cdti capsule Commonly known as:  DELZICOL Take 2 Caps by mouth three (3) times daily for 30 days. METHOTREXATE (ANTI-RHEUMATIC) 2.5 mg tablet dose pack Generic drug:  methotrexate Take 20 mg by mouth Every Saturday. metoprolol succinate 25 mg XL tablet Commonly known as:  TOPROL-XL  
  
 OTHER  
by Injection route every three (3) months. Patient receives lumbar epidural steroid injections by Dr. Zach Ronquillo from [facility]  mg per tablet Commonly known as:  PERCOCET Take 1 Tab by mouth every four (4) hours as needed for Pain. Max Daily Amount: 6 Tabs. polyethylene glycol 17 gram packet Commonly known as:  Beola [place] Take 1 Packet by mouth daily as needed. potassium chloride SR 10 mEq tablet Commonly known as:  KLOR-CON 10 Take 1 Tab by mouth daily. Do not crush, break or chew. Swallow whole. * predniSONE 10 mg tablet Commonly known as:  DELTASONE  
5 tabs daily for 3 days 4 tabs daily for 3 days  3 tabs daily for 3 days  2 tabs daily for 3 days  1 tab   daily for 3 days * predniSONE 2.5 mg tablet Commonly known as:  Lory Roers Take 1 Tab by mouth daily. vancomycin 125 mg capsule Commonly known as:  [employer] Take 125 mg by mouth every six (6) hours. * Notice: This list has 2 medication(s) that are the same as other medications prescribed for you. Read the directions carefully, and ask your doctor or other care provider to review them with you. We Performed the Following HEMOGLOBIN A1C WITH EAG [73461 CPT(R)] METABOLIC PANEL, COMPREHENSIVE [41965 CPT(R)] Follow-up Instructions Return in about 1 month (around 10/6/2018) for Colitis, weakness, DM. To-Do List   
 09/07/2018 To Be Determined Appointment with Erickson Leon LPN at [place] [address]  
  
 09/07/2018 10:00 AM  
  Appointment with Jane Shannon OT at [place] [address]  
  
 09/07/2018 1:00 PM  
  Appointment with Divya Dodge PTA at [place] [address]  
  
 09/11/2018 To Be Determined Appointment with Aarti Burkett at [place] [address]  
  
 09/11/2018 To Be Determined Appointment with Aarti Burkett at [place] [address]  
  
 09/11/2018 To Be Determined Appointment with Ave Knife at [place] [address]  
  
 09/11/2018 To Be Determined Appointment with Oral Loots, PTA at [place] [address]  
  
 09/11/2018 10:00 AM  
  Appointment with Pamela Miranda OT at [place] [address]  
  
 09/13/2018 To Be Determined Appointment with Ave Knife at [place] [address]  
  
 09/13/2018 To Be Determined Appointment with Oral Nanots, PTA at [place] [address]  
  
 09/14/2018 To Be Determined Appointment with Pamela Miranda OT at [place] [address]  
  
 09/18/2018 To Be Determined Appointment with Mark Hurtado OT at [place] [address]  
  
 09/18/2018 To Be Determined Appointment with Mo Lora LPN at [place] [address]  
  
 09/18/2018 To Be Determined Appointment with Ave Knife at [place] [address]  
  
 09/18/2018 To Be Determined Appointment with Oral Loots, PTA at [place] [address]  
  
 09/20/2018 To Be Determined Appointment with Ave Knife at [place] [address]  
  
 09/20/2018 To Be Determined Appointment with Oral Loots, PTA at [place] [address]  
  
 09/21/2018 To Be Determined Appointment with Mark Hurtado OT at [place] [address]  
  
 09/25/2018 To Be Determined Appointment with Aarti Burkett at [place] [address]  
  
 09/25/2018 To Be Determined Appointment with Payal Coronado at [facility]  
  
 09/25/2018 To Be Determined Appointment with Phillip Trejo at [facility]  
  
 09/25/2018 To Be Determined Appointment with Luis Schwarz OT at [facility]  
  
 09/25/2018 To Be Determined Appointment with Severa Heal, PTA at [facility]  
  
 09/27/2018 To Be Determined Appointment with Phillip Trejo at [facility]  
  
 09/27/2018 To Be Determined Appointment with Severa Heal, PTA at [facility]  
  
 09/28/2018 To Be Determined Appointment with Luis Schwarz OT at [place] [address]  
  
 10/02/2018 To Be Determined Appointment with Payal Coronado at [place] [address]  
  
 10/02/2018 To Be Determined Appointment with Severa Heal, PTA at [place] [address]  
  
 10/02/2018 To Be Determined Appointment with Phillip Trejo at [facility]  
  
 10/04/2018 To Be Determined Appointment with Phillip Trejo at [facility]  
  
 10/04/2018 To Be Determined Appointment with Geo Mariano PT at [facility]  
  
 10/09/2018 To Be Determined Appointment with Phillip Trejo at [facility]  
  
 10/09/2018 To Be Determined Appointment with Payal Coronado at [facility]  
  
 10/09/2018 To Be Determined Appointment with Payal Coronado at [facility]  
  
 10/11/2018 To Be Determined Appointment with Phillip Trejo at [facility]  
  
 10/16/2018 To Be Determined Appointment with Laurie Montesinos at [place] [address]  
  
 10/16/2018 To Be Determined Appointment with Lis Banks LPN at [place] [address]  
  
 10/18/2018 To Be Determined Appointment with Laurie Montesinos at [place] [address]  
  
 10/23/2018 To Be Determined Appointment with Jaci Swain at [place] [address]  
  
 10/23/2018 To Be Determined Appointment with Laurie Montesinos at [place] [address]  
  
 10/25/2018 To Be Determined Appointment with Laurie Montesinos at [place] [address] Patient Instructions Return in one month I will have someone call you when your note for power chair is ready Get an egg crate or other therapeutic cushion for sitting. Instructions to help gain weight: 
Eat frequently. Do not go more than 4 hours without eating. Add high quality foods: nuts and nut butters, yogurt, eggs, granola, avocados, humus. Eat more than one food at a time: for example whole wheat toast with almond butter and banana. Consider smoothies and meal replacement drinks (Instant breakfast, Ensure, Boost). Add protein powder or powdered milk where ever possible to boost nutrition Jazz up foods with spices or herbs to improve flavor Drizzle olive or canola oil on vegetables, salads, stews, and soups. If you do not have diabetes drink 100% fruit juice or fat free milk instead of plain water. Clostridium Difficile Colitis: Care Instructions Your Care Instructions Clostridium difficile (also called C. difficile) are bacteria that can cause swelling and irritation of the large intestine, or colon. This inflammation is also called colitis. It can cause diarrhea, fever, and belly cramps. You may get C. difficile colitis if you take antibiotics.  The infection is most common in people who are taking antibiotics while in the hospital. It is also common in older people in hospitals and nursing homes. Severe disease could cause the colon to swell to many times its normal size (toxic megacolon). This can cause death and needs emergency treatment. You may have a swollen belly that is painful or tender, a rapid heartbeat, and a fever. Follow-up care is a key part of your treatment and safety. Be sure to make and go to all appointments, and call your doctor if you are having problems. It's also a good idea to know your test results and keep a list of the medicines you take. How can you care for yourself at home? · Your doctor may give you antibiotics to treat C. difficile colitis. If your doctor prescribes an antibiotic, he or she will give you a different antibiotic than the one that caused your infection. Take your antibiotics as directed. Do not stop taking them just because you feel better. You need to take the full course of antibiotics. · To prevent dehydration, drink plenty of fluids, enough so that your urine is light yellow or clear like water. Choose water and other caffeine-free clear liquids until you feel better. If you have kidney, heart, or liver disease and have to limit fluids, talk with your doctor before you increase the amount of fluids you drink. · Begin eating small amounts of mild foods, if you feel like it. Try yogurt that has live cultures of lactobacillus (check the label). ¨ Avoid spicy foods, fruits, alcohol, and caffeine until 48 hours after all symptoms go away. ¨ Avoid chewing gum that contains sorbitol. ¨ Avoid dairy products (except for yogurt with lactobacillus) while you have diarrhea and for 3 days after symptoms go away. · To prevent the spread of C. difficile, practice good hygiene. Keep your hands clean by washing them well and often with soap and clean, running water. Alcohol-based hand sanitizers do not kill C. difficile. When should you call for help? Call 911 if: 
  · You passed out (lost consciousness).  
 Call your doctor now or seek immediate medical care if: 
  · You have a fever over 101°F or shaking chills.  
  · You feel lightheaded or have a fast heart rate.  
  · You pass stools that are almost always bloody.  
  · You have signs of needing more fluids. You have sunken eyes and a dry mouth, and you pass only a little dark urine.  
  · You have severe belly pain with or without bloating.  
  · You have severe vomiting and cannot keep down liquids.  
  · You are not passing any stools or gas.  
 Watch closely for changes in your health, and be sure to contact your doctor if: 
  · You do not get better as expected. Where can you learn more? Go to http://ligia-judi.info/. Enter [phone] in the search box to learn more about \"Clostridium Difficile Colitis: Care Instructions. \" Current as of: November 18, 2017 Content Version: 11.7 © 0196-3898 [employer]. Care instructions adapted under license by [employer] (which disclaims liability or warranty for this information). If you have questions about a medical condition or this instruction, always ask your healthcare professional. [address] any warranty or liability for your use of this information. Introducing [facility]! [facility] introduces [employer] patient portal. Now you can access parts of your medical record, email your doctor's office, and request medication refills online. 1. In your internet browser, go to [email or web address]. [email or web address] 2. Click on the First Time User? Click Here link in the Sign In box. You will see the New Member Sign Up page. 3. Enter your [employer] Access Code exactly as it appears below. You will not need to use this code after youve completed the sign-up process. If you do not sign up before the expiration date, you must request a new code. · [employer] Access Code: [email or web address] Expires: 9/9/2018  9:46 AM 
 
4. Enter the last four digits of your Social Security Number (xxxx) and Date of Birth (mm/dd/yyyy) as indicated and click Submit. You will be taken to the next sign-up page. 5. Create a [employer] ID. This will be your [employer] login ID and cannot be changed, so think of one that is secure and easy to remember. 6. Create a [employer] password. You can change your password at any time. 7. Enter your Password Reset Question and Answer. This can be used at a later time if you forget your password. 8. Enter your e-mail address. You will receive e-mail notification when new information is available in [address] 9. Click Sign Up. You can now view and download portions of your medical record. 10. Click the Download Summary menu link to download a portable copy of your medical information. If you have questions, please visit the Frequently Asked Questions section of the [employer] website. Remember, [employer] is NOT to be used for urgent needs. For medical emergencies, dial 911. Now available from your iPhone and Android! Please provide this summary of care documentation to your next provider. Your primary care clinician is listed as Milton Begum. If you have any questions after today's visit, please call [phone].

## 2018-09-07 NOTE — PROGRESS NOTES
Inform patient potassium and protein are now in the normal range. Sugar is elevated due to prednisone, but 3 month test HGB A1c is in the normal range.

## 2018-09-07 NOTE — PROGRESS NOTES
Verified two patient identifiers, name and . Pt notified of lab results. Pt had no further questions at this time.

## 2018-09-14 NOTE — TELEPHONE ENCOUNTER
PCP: Frances Pedraza MD     Last appt: 9/6/2018   Future Appointments  Date Time Provider Laverne Sumner   9/18/2018 To Be Determined Pittsburgh Ada, OT [facility] [address]   9/18/2018 To Be Determined Jim Tang LPN [facility]   9/18/2018 To Be Determined [address]   9/18/2018 To Be Determined Josafat Kemp PTA [facility]   9/20/2018 To Be Determined [address]   9/20/2018 To Be Determined Josafat Kemp PTA [facility]   9/21/2018 To Be Determined Mer Ada, OT [facility]   9/25/2018 To Be Determined Neisha Livia [facility]   9/25/2018 To Be Determined Neishaqasim Bhakta [place] [facility]   9/25/2018 To Be Determined Mira Lucio [facility]   9/25/2018 To Be Determined [place] Ada, OT [facility]   9/25/2018 To Be Determined Josafat Kemp PTA [facility]   9/27/2018 To Be Determined [address]   9/27/2018 To Be Determined Josafat Kemp PTA [facility]   9/28/2018 To Be Determined Mer Ada, OT [facility]   10/2/2018 To Be Determined Neishaqasim Bhakta [place] [facility]   10/2/2018 To Be Determined Josafat Kemp PTA [facility]   10/2/2018 To Be Determined Nallely Frees [facility]   10/4/2018 To Be Determined Nallely Frees [facility]   10/4/2018 To Be Determined Alverto Sloan PT [facility]   10/9/2018 To Be Determined Nallely Schwarz [place] [facility]   10/9/2018 To Be Determined Neisha Bhakta [place] [facility]   10/9/2018 To Be Determined Neisha Bhakta [place] [facility]   10/11/2018 To Be Determined Nallely Schwarz [place] [facility]   [facility]16[facility]2018 [facility] [place] [facility]   10/16/2018 To Be Determined Jim Tang, LPN [address] [facility]   10/18/2018 To Be Determined [address]   10/23/2018 To Be Determined [facility] [place] [address] [address] [facility] [address]   10/23/2018 To Be Determined [address] [facility]   10/25/2018 To Be Determined Cheryl Nieves [place] [address]   10/26/2018 1:15 PM Payton Nunez  W. [facility]        Requested Prescriptions     Pending Prescriptions Disp Refills    mesalamine  (ASACOL HD) 800 mg DR tablet 90 Tab      Sig: Take 1 Tab by mouth three (3) times daily.

## 2018-10-04 NOTE — TELEPHONE ENCOUNTER
Per Wendy Faye:      Gamal Fox MD/ telephone  Received: Today       [address] Office                     Pt's daughter Mariam Pickard is stating that the Home health Nurse is requesting that hospsice be ordered for the Pt.  As Pt is actively dying Pt's daughters contact [phone]

## 2018-10-04 NOTE — PROGRESS NOTES
[facility]  Nursing Note  [phone]  Fax [phone]  Name:  Mickie Cralos  YOB: 1935    Received outpatient Palliative Medicine referral from [facility]  to see pt for symptom management and supportive care. Chart  reviewed. Mickie Carlos is a 82 y.o. male with recent hospitalization (8/10/18 to 8/21/18) for failure to thrive, C diff colitis, mild ulcerative colitis, and left brachial plexopathy . Pt's other PMH includes sarcoidosis of lung, ulcerative colitis, DM2, diabetic peripheral neuropathy, CAD, paroxysmal atrial fibrillation. Pt's most recent office visit with PCP Dr. Marilee Chacon was on 9/6/18. Ms. Cleveland Kaufman states that now it is extremely difficult for pt to get out of his home for MD appts. ACP:    [facility] signed on 8/10/18 is on file. Pedro Shoulder is listed at [facility] ([phone]). There is no secondary mPOA listed. Healthcare Instructions sections of AMD are crossed out, no choices made. Nurse called pt to introduce Palliative Medicine services. Dtr Yoel Sheehan answered the phone, she says she called Dr. Marlen Buenrostro office today to ask about the Palliative referral.  She states she used to work in hospice and she wanted to know more about the reason for Palliative for her father. She says someone just arrived at pt's home to administer IV fluids.   She says she will call Palliative back.  Danyelle White, ZAHRAAN, RN-BC  Clinical Referral Navigator  [phone]

## 2018-10-04 NOTE — TELEPHONE ENCOUNTER
Pt's daughter Julissa Hodges called and stated that her father has received an order for pallative care. She would like a call back to discuss it. Julissa Hodges can be reached at [phone].

## 2018-10-05 NOTE — PROGRESS NOTES
Addendum:  [facility] [employer] [facility]. [employer] could not make a visit today so [facility] was called. Family was in agreement as they really want a visit today.

## 2018-10-05 NOTE — PROGRESS NOTES
[facility] Palliative Medicine Office  Nursing Note  [phone]  Fax [phone]     Name:  Myah Baxter  YOB: 1935     Nurse called pt's daughter Anupam Ayala. She says [facility] advised her father to go to the ED yesterday, but he declined and said he just wants to be made comfortable at home. Anupam Ayala says she left a message with Dr. Isaak Friedman (PCP) late yesterday afternoon to ask for a hospice eval order. Anupam Ayala states she \"thought dad was going to die last night\". He is eating or drinking, he is not taking his medications, he is now incontinent of bowel and bladder, and he is less responsive. Nurse offered to contact Dr. Lali Mishra office to obtain order for hospice and dtr voice her appreciation. She would like for hospice to come today if at all possible. Nurse called and spoke with Dr. Jazz Hernandez. Dr. Isaak Friedman approved an order for hospice eval visit and admit if pt meets hospice criteria. Order entered and faxed to [facility] who can see patient this afternoon. Nurse called dtr Anupam Ayala back and relayed above information. She voices her appreciation for hospice visit today.     Leida Conley, ZAHRAAN, RN  Clinical Referral Navigator

## 2018-10-05 NOTE — TELEPHONE ENCOUNTER
Dr Niko Zimmer spoke to [facility] care and Hospice referral will be ordered. Hospice will come out today.

## 2018-10-08 ENCOUNTER — NURSE NAVIGATOR (OUTPATIENT)
Dept: PALLATIVE CARE | Age: 83
End: 2018-10-08

## 2018-10-08 NOTE — PROGRESS NOTES
[facility]  Nursing Note  [phone]  Fax [phone]     Name:  Zita [place]  YOB: 1935     Carolina Hackett with [facility] called to give an update on Madan Castanon:   Mr. Wenceslao Marques was admitted to [facility] on Friday afternoon and passed away over the weekend. Carolina Hackett says that pt's family appreciated the support from Hospice.     ZAHRAA JangN, RN  Clinical Referral Navigator

## 2018-10-19 ENCOUNTER — PATIENT OUTREACH (OUTPATIENT)
Dept: INTERNAL MEDICINE CLINIC | Facility: CLINIC | Age: 83
End: 2018-10-19

## 2018-10-19 NOTE — PROGRESS NOTES
NN Note 
 
- NN EMR review to resolve past WILLIS Episode. Noted pt had transitioned to Hospice. - 10/8/18 note from [address] NN stated [facility] nurse had called to report pt's death over the weekend. - This NN contacted [facility]. As per Meryl pt's date of death was 10/6/18. 
 
- Elizabeth 142 updated.

## 2018-12-27 ENCOUNTER — DOCUMENTATION ONLY (OUTPATIENT)
Dept: INTERNAL MEDICINE CLINIC | Age: 83
End: 2018-12-27
